# Patient Record
Sex: MALE | Race: WHITE | NOT HISPANIC OR LATINO | URBAN - METROPOLITAN AREA
[De-identification: names, ages, dates, MRNs, and addresses within clinical notes are randomized per-mention and may not be internally consistent; named-entity substitution may affect disease eponyms.]

---

## 2018-10-29 ENCOUNTER — INPATIENT (INPATIENT)
Facility: HOSPITAL | Age: 83
LOS: 2 days | Discharge: NURSING, REHAB OR LONG-TERM CARE | DRG: 310 | End: 2018-11-01
Attending: STUDENT IN AN ORGANIZED HEALTH CARE EDUCATION/TRAINING PROGRAM | Admitting: STUDENT IN AN ORGANIZED HEALTH CARE EDUCATION/TRAINING PROGRAM
Payer: MEDICARE

## 2018-10-29 VITALS
HEART RATE: 74 BPM | TEMPERATURE: 98 F | RESPIRATION RATE: 18 BRPM | SYSTOLIC BLOOD PRESSURE: 141 MMHG | OXYGEN SATURATION: 97 % | DIASTOLIC BLOOD PRESSURE: 90 MMHG

## 2018-10-29 DIAGNOSIS — L97.919 NON-PRESSURE CHRONIC ULCER OF UNSPECIFIED PART OF RIGHT LOWER LEG WITH UNSPECIFIED SEVERITY: ICD-10-CM

## 2018-10-29 DIAGNOSIS — E03.9 HYPOTHYROIDISM, UNSPECIFIED: ICD-10-CM

## 2018-10-29 DIAGNOSIS — Z29.9 ENCOUNTER FOR PROPHYLACTIC MEASURES, UNSPECIFIED: ICD-10-CM

## 2018-10-29 DIAGNOSIS — E87.5 HYPERKALEMIA: ICD-10-CM

## 2018-10-29 DIAGNOSIS — I48.91 UNSPECIFIED ATRIAL FIBRILLATION: ICD-10-CM

## 2018-10-29 LAB
ALBUMIN SERPL ELPH-MCNC: 3 G/DL — LOW (ref 3.5–5)
ALP SERPL-CCNC: 70 U/L — SIGNIFICANT CHANGE UP (ref 40–120)
ALT FLD-CCNC: 16 U/L DA — SIGNIFICANT CHANGE UP (ref 10–60)
ANION GAP SERPL CALC-SCNC: 4 MMOL/L — LOW (ref 5–17)
ANION GAP SERPL CALC-SCNC: 5 MMOL/L — SIGNIFICANT CHANGE UP (ref 5–17)
AST SERPL-CCNC: 28 U/L — SIGNIFICANT CHANGE UP (ref 10–40)
BASOPHILS # BLD AUTO: 0.1 K/UL — SIGNIFICANT CHANGE UP (ref 0–0.2)
BASOPHILS NFR BLD AUTO: 1.4 % — SIGNIFICANT CHANGE UP (ref 0–2)
BILIRUB SERPL-MCNC: 0.9 MG/DL — SIGNIFICANT CHANGE UP (ref 0.2–1.2)
BUN SERPL-MCNC: 16 MG/DL — SIGNIFICANT CHANGE UP (ref 7–18)
BUN SERPL-MCNC: 20 MG/DL — HIGH (ref 7–18)
CALCIUM SERPL-MCNC: 8.3 MG/DL — LOW (ref 8.4–10.5)
CALCIUM SERPL-MCNC: 9 MG/DL — SIGNIFICANT CHANGE UP (ref 8.4–10.5)
CHLORIDE SERPL-SCNC: 106 MMOL/L — SIGNIFICANT CHANGE UP (ref 96–108)
CHLORIDE SERPL-SCNC: 107 MMOL/L — SIGNIFICANT CHANGE UP (ref 96–108)
CK MB CFR SERPL CALC: 1.3 NG/ML — SIGNIFICANT CHANGE UP (ref 0–3.6)
CK SERPL-CCNC: 101 U/L — SIGNIFICANT CHANGE UP (ref 35–232)
CO2 SERPL-SCNC: 27 MMOL/L — SIGNIFICANT CHANGE UP (ref 22–31)
CO2 SERPL-SCNC: 28 MMOL/L — SIGNIFICANT CHANGE UP (ref 22–31)
CREAT SERPL-MCNC: 1.13 MG/DL — SIGNIFICANT CHANGE UP (ref 0.5–1.3)
CREAT SERPL-MCNC: 1.13 MG/DL — SIGNIFICANT CHANGE UP (ref 0.5–1.3)
EOSINOPHIL # BLD AUTO: 0.2 K/UL — SIGNIFICANT CHANGE UP (ref 0–0.5)
EOSINOPHIL NFR BLD AUTO: 2.8 % — SIGNIFICANT CHANGE UP (ref 0–6)
GLUCOSE SERPL-MCNC: 80 MG/DL — SIGNIFICANT CHANGE UP (ref 70–99)
GLUCOSE SERPL-MCNC: 85 MG/DL — SIGNIFICANT CHANGE UP (ref 70–99)
HCT VFR BLD CALC: 46.6 % — SIGNIFICANT CHANGE UP (ref 39–50)
HGB BLD-MCNC: 14.9 G/DL — SIGNIFICANT CHANGE UP (ref 13–17)
LYMPHOCYTES # BLD AUTO: 1.9 K/UL — SIGNIFICANT CHANGE UP (ref 1–3.3)
LYMPHOCYTES # BLD AUTO: 34.2 % — SIGNIFICANT CHANGE UP (ref 13–44)
MCHC RBC-ENTMCNC: 31.5 PG — SIGNIFICANT CHANGE UP (ref 27–34)
MCHC RBC-ENTMCNC: 32 GM/DL — SIGNIFICANT CHANGE UP (ref 32–36)
MCV RBC AUTO: 98.2 FL — SIGNIFICANT CHANGE UP (ref 80–100)
MONOCYTES # BLD AUTO: 0.4 K/UL — SIGNIFICANT CHANGE UP (ref 0–0.9)
MONOCYTES NFR BLD AUTO: 7.1 % — SIGNIFICANT CHANGE UP (ref 2–14)
NEUTROPHILS # BLD AUTO: 3.1 K/UL — SIGNIFICANT CHANGE UP (ref 1.8–7.4)
NEUTROPHILS NFR BLD AUTO: 54.7 % — SIGNIFICANT CHANGE UP (ref 43–77)
PLATELET # BLD AUTO: 221 K/UL — SIGNIFICANT CHANGE UP (ref 150–400)
POTASSIUM SERPL-MCNC: 4.3 MMOL/L — SIGNIFICANT CHANGE UP (ref 3.5–5.3)
POTASSIUM SERPL-MCNC: 5.4 MMOL/L — HIGH (ref 3.5–5.3)
POTASSIUM SERPL-SCNC: 4.3 MMOL/L — SIGNIFICANT CHANGE UP (ref 3.5–5.3)
POTASSIUM SERPL-SCNC: 5.4 MMOL/L — HIGH (ref 3.5–5.3)
PROT SERPL-MCNC: 7.7 G/DL — SIGNIFICANT CHANGE UP (ref 6–8.3)
RBC # BLD: 4.75 M/UL — SIGNIFICANT CHANGE UP (ref 4.2–5.8)
RBC # FLD: 15.7 % — HIGH (ref 10.3–14.5)
SODIUM SERPL-SCNC: 138 MMOL/L — SIGNIFICANT CHANGE UP (ref 135–145)
SODIUM SERPL-SCNC: 139 MMOL/L — SIGNIFICANT CHANGE UP (ref 135–145)
TROPONIN I SERPL-MCNC: <0.015 NG/ML — SIGNIFICANT CHANGE UP (ref 0–0.04)
WBC # BLD: 5.7 K/UL — SIGNIFICANT CHANGE UP (ref 3.8–10.5)
WBC # FLD AUTO: 5.7 K/UL — SIGNIFICANT CHANGE UP (ref 3.8–10.5)

## 2018-10-29 PROCEDURE — 99285 EMERGENCY DEPT VISIT HI MDM: CPT

## 2018-10-29 PROCEDURE — 99223 1ST HOSP IP/OBS HIGH 75: CPT | Mod: GC

## 2018-10-29 RX ORDER — ENOXAPARIN SODIUM 100 MG/ML
80 INJECTION SUBCUTANEOUS ONCE
Qty: 0 | Refills: 0 | Status: COMPLETED | OUTPATIENT
Start: 2018-10-29 | End: 2018-10-29

## 2018-10-29 RX ORDER — ASPIRIN/CALCIUM CARB/MAGNESIUM 324 MG
325 TABLET ORAL ONCE
Qty: 0 | Refills: 0 | Status: COMPLETED | OUTPATIENT
Start: 2018-10-29 | End: 2018-10-29

## 2018-10-29 RX ORDER — ENOXAPARIN SODIUM 100 MG/ML
40 INJECTION SUBCUTANEOUS DAILY
Qty: 0 | Refills: 0 | Status: DISCONTINUED | OUTPATIENT
Start: 2018-10-30 | End: 2018-11-01

## 2018-10-29 RX ORDER — SODIUM POLYSTYRENE SULFONATE 4.1 MEQ/G
15 POWDER, FOR SUSPENSION ORAL ONCE
Qty: 0 | Refills: 0 | Status: COMPLETED | OUTPATIENT
Start: 2018-10-29 | End: 2018-10-29

## 2018-10-29 RX ORDER — LEVOTHYROXINE SODIUM 125 MCG
75 TABLET ORAL DAILY
Qty: 0 | Refills: 0 | Status: DISCONTINUED | OUTPATIENT
Start: 2018-10-29 | End: 2018-10-30

## 2018-10-29 RX ORDER — ENOXAPARIN SODIUM 100 MG/ML
85 INJECTION SUBCUTANEOUS ONCE
Qty: 0 | Refills: 0 | Status: DISCONTINUED | OUTPATIENT
Start: 2018-10-29 | End: 2018-10-29

## 2018-10-29 RX ORDER — METOPROLOL TARTRATE 50 MG
12.5 TABLET ORAL
Qty: 0 | Refills: 0 | Status: DISCONTINUED | OUTPATIENT
Start: 2018-10-29 | End: 2018-10-29

## 2018-10-29 RX ORDER — AZTREONAM 2 G
1 VIAL (EA) INJECTION
Qty: 28 | Refills: 0 | OUTPATIENT
Start: 2018-10-29 | End: 2018-11-11

## 2018-10-29 RX ORDER — CEPHALEXIN 500 MG
1 CAPSULE ORAL
Qty: 56 | Refills: 0 | OUTPATIENT
Start: 2018-10-29 | End: 2018-11-11

## 2018-10-29 RX ORDER — SODIUM CHLORIDE 9 MG/ML
1000 INJECTION INTRAMUSCULAR; INTRAVENOUS; SUBCUTANEOUS ONCE
Qty: 0 | Refills: 0 | Status: COMPLETED | OUTPATIENT
Start: 2018-10-29 | End: 2018-10-29

## 2018-10-29 RX ORDER — ENOXAPARIN SODIUM 100 MG/ML
30 INJECTION SUBCUTANEOUS DAILY
Qty: 0 | Refills: 0 | Status: DISCONTINUED | OUTPATIENT
Start: 2018-10-29 | End: 2018-10-29

## 2018-10-29 RX ADMIN — Medication 1 TABLET(S): at 17:22

## 2018-10-29 RX ADMIN — SODIUM POLYSTYRENE SULFONATE 15 GRAM(S): 4.1 POWDER, FOR SUSPENSION ORAL at 13:16

## 2018-10-29 RX ADMIN — SODIUM CHLORIDE 1000 MILLILITER(S): 9 INJECTION INTRAMUSCULAR; INTRAVENOUS; SUBCUTANEOUS at 15:44

## 2018-10-29 RX ADMIN — Medication 325 MILLIGRAM(S): at 17:22

## 2018-10-29 RX ADMIN — Medication 100 MILLIGRAM(S): at 17:22

## 2018-10-29 NOTE — H&P ADULT - SKIN COMMENTS
diffuse skin hyperpigmentation, thickening, flaking w/ multiples areas of bogginess diffusely around bilat ankles w/ several areas of ulceration w/ some granulation. Lateral R ankle w/ discharge and erythema. No focal tenderness or deformity.

## 2018-10-29 NOTE — H&P ADULT - HISTORY OF PRESENT ILLNESS
89 y/o M w/ hx of "thyroid problem (on unknown meds), chronic LE ulcers c/o ulcer eval x today. Denies changes in ulcers.  States he's planning a trip soon and wants ulcers to be evaluated to make sure they are okay. Denies fever, worsening pain, discharge, odor, recent abx or hospitalizations, and any other complaints. NKDA. 89 yo M from home, lives with wife, ambulates with cane, with PMH of hypothyroidism, chronic LE venous ulcerations presents to ED c/o ulcer for ulcer evaluation today, Pt denies changes in ulcers and he is planning a trip soon and wants ulcers to be evaluated to make sure they are okay. Pt denies fever, chills, worsening pain, discharge, odor, chest pain, sob, abd pain, nausea, vomiting, diarrhea, constipation , recent travel, ill contact or any other complaints. In ED, pt vitals stable, ED consulted with podiatrist and recommended out patient follow up, low suspicion for infection. Labs noted K 5.4, s/p Kayexalate and bactrim, doxycyline PO in ED. EKG noted a fib with HR controlled at 84 bpm, which is new A fib.

## 2018-10-29 NOTE — H&P ADULT - PROBLEM SELECTOR PLAN 1
New onset Afib, no hx of prior heart disease   [id number] score of 2  EKG noted a fib with HR controlled at 84bpm  Telemetry monitoring   Trend CEx3  stated lopressor low dose 12.5mg bid for HR controlled   Follow TSH, lipid panel , A1C  f/u ECHO  Cardiology consult: Dr. Crowder. New onset Afib, no hx of prior heart disease   [id number] score of 2  EKG noted a fib with HR controlled at 84bpm  Telemetry monitoring   Trend CEx3  stated cardizem 30mg q8hrs for HR controlled   will give one full dose lovenox for now and hold AC until cardio consult  Follow TSH, lipid panel , A1C  f/u ECHO  Cardiology consult: Dr. Crowder.
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
## 2018-10-29 NOTE — ED ADULT NURSE NOTE - NSIMPLEMENTINTERV_GEN_ALL_ED
Implemented All Fall with Harm Risk Interventions:  [place] to call system. Call bell, personal items and telephone within reach. Instruct patient to call for assistance. Room bathroom lighting operational. Non-slip footwear when patient is off stretcher. Physically safe environment: no spills, clutter or unnecessary equipment. Stretcher in lowest position, wheels locked, appropriate side rails in place. Provide visual cue, wrist band, yellow gown, etc. Monitor gait and stability. Monitor for mental status changes and reorient to person, place, and time. Review medications for side effects contributing to fall risk. Reinforce activity limits and safety measures with patient and family. Provide visual clues: red socks.

## 2018-10-29 NOTE — H&P ADULT - NSHPPHYSICALEXAM_GEN_ALL_CORE
Vital Signs Last 24 Hrs  T(C): 36.3 (29 Oct 2018 15:47), Max: 36.5 (29 Oct 2018 10:14)  T(F): 97.3 (29 Oct 2018 15:47), Max: 97.7 (29 Oct 2018 10:14)  HR: 80 (29 Oct 2018 15:47) (74 - 80)  BP: 151/92 (29 Oct 2018 15:47) (141/90 - 151/92)  BP(mean): --  RR: 18 (29 Oct 2018 15:47) (18 - 18)  SpO2: 97% (29 Oct 2018 15:47) (97% - 97%)

## 2018-10-29 NOTE — ED PROVIDER NOTE - HISTORY ATTESTATION, MLM
----- Message from Anaid Granados sent at 5/23/2017  9:11 AM CDT -----  Contact: Patient  x 1st Request  _ 2nd Request  _ 3rd Request    Who: OSCAR CARRENO [[id number]]    Why: Patient is calling in regards to scheduling a annual appointment. Patient is needing a call back.    What Number to Call Back: [phone].    When to Expect a call back: (Before the end of the day)  -- if call after 3:00 call back will be tomorrow.    I have reviewed and confirmed nurses' notes...

## 2018-10-29 NOTE — H&P ADULT - PROBLEM SELECTOR PLAN 2
stable chronic LE venous ulcerations  s/p bactrim, doxycyline PO in ED   hold ABx for now, less likely infection   afebrile, no leukocytosis   podiatry consulted DR. Magallanes and recommended outpt follow up  PT charito

## 2018-10-29 NOTE — ED PROVIDER NOTE - OBJECTIVE STATEMENT
89 y/o M w/ hx of "thyroid problem (on unknown meds), chronic LE ulcers c/o ulcer eval x today. Denies changes in ulcers.  States he's planning a trip soon and wants ulcers to be evaluated to make sure they are okay. Denies fever, worsening pain, discharge, odor, recent abx or hospitalizations, and any other complaints. NKDA.

## 2018-10-29 NOTE — H&P ADULT - NSHPLABSRESULTS_GEN_ALL_CORE
Comprehensive Metabolic Panel (10.29.18 @ 11:37)    Sodium, Serum: 138 mmol/L    Potassium, Serum: 5.4: Specimen slightly hemolyzed mmol/L    Chloride, Serum: 107 mmol/L    Carbon Dioxide, Serum: 27 mmol/L    Anion Gap, Serum: 4 mmol/L    Blood Urea Nitrogen, Serum: 16 mg/dL    Creatinine, Serum: 1.13 mg/dL    Glucose, Serum: 80 mg/dL    Calcium, Total Serum: 9.0 mg/dL    Protein Total, Serum: 7.7 g/dL    Albumin, Serum: 3.0 g/dL    Bilirubin Total, Serum: 0.9 mg/dL    Alkaline Phosphatase, Serum: 70 U/L    Aspartate Aminotransferase (AST/SGOT): 28 U/L    Alanine Aminotransferase (ALT/SGPT): 16 U/L DA    eGFR if Non : 57: Interpretative comment  The units for eGFR are ml/min/1.73m2 (normalized body surface area). The  eGFR is calculated from a serum creatinine using the CKD-EPI equation.  Other variables required for calculation are race, age and sex. Among  patients with chronic kidney disease (CKD), the eGFR is useful in  determining the stage of disease according to KDOQI CKD classification.  All eGFR results are reported numerically with the following  interpretation.          GFR                    With                 Without     (ml/min/1.73 m2)    Kidney Damage       Kidney Damage        >= 90                    Stage 1                     Normal        60-89                    Stage 2                     Decreased GFR        30-59     Stage 3                     Stage 3        15-29                    Stage 4                     Stage 4        < 15                      Stage 5                     Stage 5  Each stage of CKD assumes that the associated GFR level has been in  effect for at least 3 months. Determination of stages one and two (with  eGFR > 59 ml/min/m2) requires estimation of kidney damage for at least 3  months as defined by structural or functional abnormalities.  Limitations: All estimates of GFR will be less accurate for patients at  extremes of muscle mass (including but not limited to frail elderly,  critically ill, or cancer patients), those with unusual diets, and those  with conditions associated with reduced secretion or extrarenal  elimination of creatinine. The eGFR equation is not recommended for use  in patients with unstable creatinine levels. mL/min/1.73M2    eGFR if African American: 65 mL/min/1.73M2      Complete Blood Count + Automated Diff (10.29.18 @ 11:37)    WBC Count: 5.7 K/uL    RBC Count: 4.75 M/uL    Hemoglobin: 14.9 g/dL    Hematocrit: 46.6 %    Mean Cell Volume: 98.2 fl    Mean Cell Hemoglobin: 31.5 pg    Mean Cell Hemoglobin Conc: 32.0 gm/dL    Red Cell Distrib Width: 15.7 %    Platelet Count - Automated: 221 K/uL    Auto Neutrophil #: 3.1 K/uL    Auto Lymphocyte #: 1.9 K/uL    Auto Monocyte #: 0.4 K/uL    Auto Eosinophil #: 0.2 K/uL    Auto Basophil #: 0.1 K/uL    Auto Neutrophil %: 54.7: Differential percentages must be correlated with absolute numbers for  clinical significance. %    Auto Lymphocyte %: 34.2 %    Auto Monocyte %: 7.1 %    Auto Eosinophil %: 2.8 %    Auto Basophil %: 1.4 %

## 2018-10-29 NOTE — H&P ADULT - PROBLEM SELECTOR PLAN 5
IMPROVE VTE Individual Risk Assessment    RISK                                                          Points  [] Previous VTE                                           3  [] Thrombophilia                                        2  [] Lower limb paralysis                              2   [] Current Cancer                                       2   [x Immobilization > 24 hrs                        1  [] ICU/CCU stay > 24 hours                       1  [x] Age > 60                                                   1    IMPROVE VTE Score: 2  need for DVT ppx. on lovenox  no need for GI ppx

## 2018-10-29 NOTE — ED PROVIDER NOTE - PHYSICAL EXAMINATION
SKIN: bilateral LEs-- diffuse skin hyperpigmentation, thickening, flaking w/ multiples areas of bogginess diffusely around bilat ankles w/ several areas of ulceration w/ some granulation. Lateral R ankle w/ discharge and erythema. No focal tenderness or deformity. DP pulses 1+ bilaterally. Otherwise within normal limits.

## 2018-10-29 NOTE — H&P ADULT - ASSESSMENT
89 yo M from home, lives with wife, ambulates with cane, with PMH of hypothyroidism, chronic LE venous ulcerations presents to ED c/o ulcer for ulcer evaluation today, Pt denies changes in ulcers and he is planning a trip soon and wants ulcers to be evaluated to make sure they are okay. Pt denies fever, chills, worsening pain, discharge, odor, chest pain, sob, abd pain, nausea, vomiting, diarrhea, constipation , recent travel, ill contact or any other complaints. In ED, pt vitals stable, ED consulted with podiatrist and recommended out patient follow up, low suspicion for infection. Labs noted K 5.4, s/p Kayexalate and bactrim, doxycyline PO in ED. EKG noted a fib with HR controlled at 84 bpm, which is new A fib. 89 yo M from home, lives with wife, ambulates with cane, with PMH of hypothyroidism, chronic LE venous ulcerations presents to ED c/o ulcer for ulcer evaluation today. In ED, pt vitals stable, ED consulted with podiatrist and recommended out patient follow up, low suspicion for infection. Labs noted K 5.4, s/p Kayexalate and bactrim, doxycyline PO in ED. EKG noted a fib with HR controlled at 84 bpm, which is new A fib.

## 2018-10-29 NOTE — CONSULT NOTE ADULT - SUBJECTIVE AND OBJECTIVE BOX
Patient is a 89y old  Male who presents with a chief complaint of     HPI: 89 y/o M w/ hx of "thyroid problem (on unknown meds), chronic LE ulcers c/o ulcer eval x today. Denies changes in ulcers.  States he's planning a trip soon and wants ulcers to be evaluated to make sure they are okay. Denies fever, worsening pain, discharge, odor, recent abx or hospitalizations, and any other complaints. NKDA.  Pt seen bedside by podiatry in ED. pt states he has had ulcers for years and before would follow up with a podiatrist for his wounds but did not continue due to being unhappy with the care. Pt currently not being cared for by a podiatrist. pt denies F/C/N/SOB. pt states the ulcers have not changed in appearance for a long time.       PMH:  Allergies: No Known Allergies    Medications:   FH:  PSX:   SH:     Vital Signs Last 24 Hrs  T(C): 36.5 (29 Oct 2018 10:14), Max: 36.5 (29 Oct 2018 10:14)  T(F): 97.7 (29 Oct 2018 10:14), Max: 97.7 (29 Oct 2018 10:14)  HR: 74 (29 Oct 2018 10:14) (74 - 74)  BP: 141/90 (29 Oct 2018 10:14) (141/90 - 141/90)  BP(mean): --  RR: 18 (29 Oct 2018 10:14) (18 - 18)  SpO2: 97% (29 Oct 2018 10:14) (97% - 97%)    LABS- none                          PHYSICAL EXAM  GEN: ADRY DE SOUZA is a pleasant well-nourished, well developed 89y Male in no acute distress, alert awake, and oriented to person, place and time.   LE Focused:    Vasc: DP/PT palpable b/l, CFT< 3 secs to all digits, TG warm to cool   Derm: lateral right leg ulceration, no drainage, -PTB, erythema to lower legs b/l, -purulence  Neuro: protective sensation intact      A:   b/l venous ulcerations    P:  pt evaluated and chart reviewed  DSD and ACE applied bilaterally  PT stable podiatrically  discussed with Pt that he should follow up either with a podiatrist of his choosing or at our podiatry clinic at [address] [place] [address].

## 2018-10-29 NOTE — ED PROVIDER NOTE - MEDICAL DECISION MAKING DETAILS
Pt w/ likely early R ankle ulcer infection. no systemic sx. Pt feels well. VSS. Will consult podiatry for possible outpatient trail of abx vs admission.

## 2018-10-29 NOTE — ED ADULT NURSE NOTE - OBJECTIVE STATEMENT
BIB EMS from home alert and verbally responsive C/O none healing wound to B/L LE. Pt noted with  swelling to Rt LE redness and discoloration to B/L legs denies fever/chills

## 2018-10-29 NOTE — ED PROVIDER NOTE - CARE PLAN
Principal Discharge DX:	Ulcers of both lower extremities  Secondary Diagnosis:	Hyperkalemia Principal Discharge DX:	Ulcers of both lower extremities  Secondary Diagnosis:	Hyperkalemia  Secondary Diagnosis:	Atrial fibrillation

## 2018-10-30 DIAGNOSIS — Z65.9 PROBLEM RELATED TO UNSPECIFIED PSYCHOSOCIAL CIRCUMSTANCES: ICD-10-CM

## 2018-10-30 LAB
CK MB BLD-MCNC: 1.9 % — SIGNIFICANT CHANGE UP (ref 0–3.5)
CK MB CFR SERPL CALC: 1.4 NG/ML — SIGNIFICANT CHANGE UP (ref 0–3.6)
CK SERPL-CCNC: 72 U/L — SIGNIFICANT CHANGE UP (ref 35–232)
TROPONIN I SERPL-MCNC: 0.02 NG/ML — SIGNIFICANT CHANGE UP (ref 0–0.04)

## 2018-10-30 PROCEDURE — 99233 SBSQ HOSP IP/OBS HIGH 50: CPT | Mod: GC

## 2018-10-30 PROCEDURE — 99222 1ST HOSP IP/OBS MODERATE 55: CPT

## 2018-10-30 RX ORDER — HALOPERIDOL DECANOATE 100 MG/ML
2 INJECTION INTRAMUSCULAR ONCE
Qty: 0 | Refills: 0 | Status: DISCONTINUED | OUTPATIENT
Start: 2018-10-30 | End: 2018-10-30

## 2018-10-30 RX ORDER — HALOPERIDOL DECANOATE 100 MG/ML
2 INJECTION INTRAMUSCULAR ONCE
Qty: 0 | Refills: 0 | Status: COMPLETED | OUTPATIENT
Start: 2018-10-30 | End: 2018-10-30

## 2018-10-30 RX ORDER — ASPIRIN/CALCIUM CARB/MAGNESIUM 324 MG
81 TABLET ORAL DAILY
Qty: 0 | Refills: 0 | Status: DISCONTINUED | OUTPATIENT
Start: 2018-10-30 | End: 2018-11-01

## 2018-10-30 RX ORDER — LEVOTHYROXINE SODIUM 125 MCG
100 TABLET ORAL DAILY
Qty: 0 | Refills: 0 | Status: DISCONTINUED | OUTPATIENT
Start: 2018-10-30 | End: 2018-11-01

## 2018-10-30 RX ADMIN — HALOPERIDOL DECANOATE 2 MILLIGRAM(S): 100 INJECTION INTRAMUSCULAR at 15:04

## 2018-10-30 RX ADMIN — Medication 75 MICROGRAM(S): at 05:58

## 2018-10-30 NOTE — CONSULT NOTE ADULT - SUBJECTIVE AND OBJECTIVE BOX
CHIEF COMPLAINT: foot ulcer    HPI: 89 yo M with hypothyroidism and chronic LE ulcers in setting of venous stasis who presented to the ED to have his legs evaluated. A routine EKG showed atrial fibrillation, for which cardiology service was called. Patient       In the ED, patient noted to be in rate-controlled atrial fibrillation (84 bpm).     PAST MEDICAL & SURGICAL HISTORY:  As above, no significant past surgical history    Allergies    No Known Allergies    MEDICATIONS  (STANDING):  diltiazem    Tablet 30 milliGRAM(s) Oral every 8 hours  enoxaparin Injectable 40 milliGRAM(s) SubCutaneous daily  levothyroxine 75 MICROGram(s) Oral daily    MEDICATIONS  (PRN):    FAMILY HISTORY:  No pertinent family history in first degree relatives    No family history of premature coronary artery disease or sudden cardiac death    SOCIAL HISTORY:  Smoking-  Alcohol-  Illicit Drug use-    REVIEW OF SYSTEMS:  Constitutional: [ ] fever, [ ]weight loss,  [ ]fatigue  Eyes: [ ] visual changes  Respiratory: [ ]shortness of breath;  [ ] cough, [ ]wheezing, [ ]chills, [ ]hemoptysis  Cardiovascular: [ ] chest pain, [ ]palpitations, [ ]dizziness,  [ ]leg swelling [ ]syncope  Gastrointestinal: [ ] abdominal pain, [ ]nausea, [ ]vomiting,  [ ]diarrhea   Genitourinary: [ ] dysuria, [ ] hematuria  Neurologic: [ ] headaches [ ] tremors  [ ] weakness [ ] lightheadedness  Skin: [ ] itching, [ ]burning, [ ] rashes  Endocrine: [ ] heat or cold intolerance  Musculoskeletal: [ ] joint pain or swelling; [ ] muscle, back, or extremity pain  Psychiatric: [ ] depression, [ ]anxiety, [ ]mood swings, or [ ]difficulty sleeping  Hematologic: [ ] easy bruising, [ ] bleeding gums       [ x] All others negative	  [ ] Unable to obtain    Vital Signs Last 24 Hrs  T(C): 36.4 (30 Oct 2018 07:51), Max: 36.6 (29 Oct 2018 20:36)  T(F): 97.6 (30 Oct 2018 07:51), Max: 97.9 (29 Oct 2018 20:36)  HR: 64 (30 Oct 2018 07:51) (64 - 80)  BP: 136/64 (30 Oct 2018 07:51) (136/64 - 162/84)  BP(mean): --  RR: 18 (30 Oct 2018 07:51) (18 - 18)  SpO2: 100% (30 Oct 2018 07:51) (97% - 100%)  I&O's Summary    PHYSICAL EXAM:  General: No acute distress  HEENT: EOMI, PERRL  Neck: Supple, No JVD  Lungs: Clear to auscultation bilaterally; No rales or wheezing  Heart: Regular rate and rhythm; No murmurs, rubs, or gallops  Abdomen: Nontender, bowel sounds present  Extremities: No clubbing, cyanosis, or edema  Nervous system:  Alert & Oriented X3, no focal deficits  Psychiatric: Normal affect  Skin: No rashes or lesions      LABS:  10-29    139  |  106  |  20<H>  ----------------------------<  85  4.3   |  28  |  1.13    Ca    8.3<L>      29 Oct 2018 19:46    TPro  7.7  /  Alb  3.0<L>  /  TBili  0.9  /  DBili  x   /  AST  28  /  ALT  16  /  AlkPhos  70  10-29    Creatinine Trend: 1.13<--, 1.13<--                        14.9   5.7   )-----------( 221      ( 29 Oct 2018 11:37 )             46.6     Lipid Panel:   Cardiac Enzymes: CARDIAC MARKERS ( 30 Oct 2018 01:47 )  0.020 ng/mL / x     / 72 U/L / x     / 1.4 ng/mL  CARDIAC MARKERS ( 29 Oct 2018 19:46 )  <0.015 ng/mL / x     / 101 U/L / x     / 1.3 ng/mL    ECG [my interpretation]: 10/29/2018 @ 14:55: A fib at 84 bpm    TELEMETRY:    ECHO: Pending CHIEF COMPLAINT: foot ulcer    HPI: 89 yo M with hypothyroidism and chronic LE ulcers in setting of venous stasis who presented to the ED to have his legs evaluated. A routine EKG showed atrial fibrillation, for which cardiology service was called. Patient denies any palpitations, chest pain, dyspnea, LH, syncope, or any other cardiac symptoms. He is not aware of the arrhythmia     In the ED, patient noted to be in rate-controlled atrial fibrillation (84 bpm).     PAST MEDICAL & SURGICAL HISTORY:  As above, no significant past surgical history    Allergies    No Known Allergies    MEDICATIONS  (STANDING):  diltiazem    Tablet 30 milliGRAM(s) Oral every 8 hours  enoxaparin Injectable 40 milliGRAM(s) SubCutaneous daily  levothyroxine 75 MICROGram(s) Oral daily    MEDICATIONS  (PRN):    FAMILY HISTORY:  No family history of premature coronary artery disease or sudden cardiac death    SOCIAL HISTORY:  Smoking-Denies  Alcohol-denies  Illicit Drug use-denies    REVIEW OF SYSTEMS:  Constitutional: [ ] fever, [ ]weight loss,  [ ]fatigue  Eyes: [ ] visual changes  Respiratory: [ ]shortness of breath;  [ ] cough, [ ]wheezing, [ ]chills, [ ]hemoptysis  Cardiovascular: [ ] chest pain, [ ]palpitations, [ ]dizziness,  [ ]leg swelling [ ]syncope  Gastrointestinal: [ ] abdominal pain, [ ]nausea, [ ]vomiting,  [ ]diarrhea   Genitourinary: [ ] dysuria, [ ] hematuria  Neurologic: [ ] headaches [ ] tremors  [ ] weakness [ ] lightheadedness  Skin: [ ] itching, [ ]burning, [ ] rashes  Endocrine: [ ] heat or cold intolerance  Musculoskeletal: [ ] joint pain or swelling; [ ] muscle, back, or extremity pain  Psychiatric: [ ] depression, [ ]anxiety, [ ]mood swings, or [ ]difficulty sleeping  Hematologic: [ ] easy bruising, [ ] bleeding gums       [ x] All others negative	  [ ] Unable to obtain    Vital Signs Last 24 Hrs  T(C): 36.4 (30 Oct 2018 07:51), Max: 36.6 (29 Oct 2018 20:36)  T(F): 97.6 (30 Oct 2018 07:51), Max: 97.9 (29 Oct 2018 20:36)  HR: 64 (30 Oct 2018 07:51) (64 - 80)  BP: 136/64 (30 Oct 2018 07:51) (136/64 - 162/84)  BP(mean): --  RR: 18 (30 Oct 2018 07:51) (18 - 18)  SpO2: 100% (30 Oct 2018 07:51) (97% - 100%)  I&O's Summary    PHYSICAL EXAM:  General: No acute distress  HEENT: EOMI, PERRL  Neck: Supple, No JVD  Lungs: Clear to auscultation bilaterally; No rales or wheezing  Heart: Regular rate and rhythm; No murmurs, rubs, or gallops  Abdomen: Nontender, bowel sounds present  Extremities: No clubbing, cyanosis,B/L LE wrappings  Nervous system:  Alert & Oriented X3, no focal deficits  Psychiatric: Normal affect  Skin: No rashes or lesions      LABS:  10-29    139  |  106  |  20<H>  ----------------------------<  85  4.3   |  28  |  1.13    Ca    8.3<L>      29 Oct 2018 19:46    TPro  7.7  /  Alb  3.0<L>  /  TBili  0.9  /  DBili  x   /  AST  28  /  ALT  16  /  AlkPhos  70  10-29    Creatinine Trend: 1.13<--, 1.13<--                        14.9   5.7   )-----------( 221      ( 29 Oct 2018 11:37 )             46.6     Lipid Panel:   Cardiac Enzymes: CARDIAC MARKERS ( 30 Oct 2018 01:47 )  0.020 ng/mL / x     / 72 U/L / x     / 1.4 ng/mL  CARDIAC MARKERS ( 29 Oct 2018 19:46 )  <0.015 ng/mL / x     / 101 U/L / x     / 1.3 ng/mL    ECG [my interpretation]: 10/29/2018 @ 14:55: A fib at 84 bpm    TELEMETRY: A fib in 80s    ECHO: Pending

## 2018-10-30 NOTE — CONSULT NOTE ADULT - ASSESSMENT
89 yo M with hypothyroidism with incidentally noted atrial fibrillation on EKG.     1. Atrial fibrillation:   -CHADS2-VASc score is 2 (age +2), consistent with 2.2% annual risk of stroke if off systemic anticoagulation  -Would start on Eliquis 5mg PO BID if covered by insurance  -Check TSH  -Check echocardiogram  -Rate controlled on diltiazem 30mg Q8H    ***Note that this is a preliminary note and any recommendations should NOT be carried out until this note is finalized. *** 89 yo M with hypothyroidism with incidentally noted atrial fibrillation on EKG.     1. Atrial fibrillation:   -CHADS2-VASc score is 2 (age +2), consistent with 2.2% annual risk of stroke if off systemic anticoagulation  -Would start on Eliquis 5mg PO BID if covered by insurance  -Check TSH  -Check echocardiogram  -Rate controlled on diltiazem 30mg Q8H

## 2018-10-30 NOTE — PATIENT PROFILE ADULT - VISION (WITH CORRECTIVE LENSES IF THE PATIENT USUALLY WEARS THEM):
Partially impaired: cannot see medication labels or newsprint, but can see obstacles in path, and the surrounding layout; can count fingers at arm's length/with glasses

## 2018-10-30 NOTE — ADVANCED PRACTICE NURSE CONSULT - ASSESSMENT
This is a 89yr old male patient admitted for Chronic Ulcers, presenting with Bilateral Lower Leg Ulcers to which the patient is being followed by Podiatry with a treatment plan in place to address these issues. There is currently no further need for wound care specialist consultation at this time.

## 2018-10-31 LAB
APTT BLD: 33 SEC — SIGNIFICANT CHANGE UP (ref 27.5–36.3)
INR BLD: 1.2 RATIO — HIGH (ref 0.88–1.16)
PROTHROM AB SERPL-ACNC: 13.4 SEC — HIGH (ref 10–12.9)

## 2018-10-31 PROCEDURE — 99233 SBSQ HOSP IP/OBS HIGH 50: CPT | Mod: GC

## 2018-10-31 RX ADMIN — ENOXAPARIN SODIUM 40 MILLIGRAM(S): 100 INJECTION SUBCUTANEOUS at 13:31

## 2018-10-31 RX ADMIN — Medication 81 MILLIGRAM(S): at 13:31

## 2018-10-31 RX ADMIN — Medication 100 MICROGRAM(S): at 06:09

## 2018-10-31 NOTE — PHYSICAL THERAPY INITIAL EVALUATION ADULT - PERTINENT HX OF CURRENT PROBLEM, REHAB EVAL
Pt. admitted from home due to non-ulcers on both LE. Pt. lives w/ wife in apt. bldg and uses SAC to ambulate. Pt. w/ h/o Hypothyroidism

## 2018-10-31 NOTE — PROGRESS NOTE ADULT - I WAS PHYSICALLY PRESENT FOR THE KEY PORTIONS OF THE EVALUATION AND MANAGEMENT (E/M) SERVICE PROVIDED.  I AGREE WITH THE ABOVE HISTORY, PHYSICAL, AND PLAN WHICH I HAVE REVIEWED AND EDITED WHERE APPROPRIATE
Pt A+Ox4. Pt NPO for IR. Pt received from IR & requesting dinner. Current orders for NPO except meds. Statement Selected

## 2018-10-31 NOTE — PROGRESS NOTE ADULT - ATTENDING COMMENTS
Agree with all the above   Patient seen and examined at bedside. Wife and aid were present during examination. Today he is pleasant and cooperative. No new complains, only asks " please fix my legs". Still refusing to be on anticoagulation.   Per wife, patient was diagnosed with Afib couple of years ago, but refused to be on anticoagulation.     ROS as above   PE:   General: fragile looking elderly man sitting on the chair, NAD   Cardio: irregular rate, no murmur   Pulm; clear breath sounds   GI: abdomen soft, non tender   Extr: B/l LE edema and redness with dry scaly skin   Neuro: AOx3, no focal weakness     Vital Signs Last 24 Hrs  T(C): 36.6 (31 Oct 2018 15:20), Max: 36.9 (31 Oct 2018 04:57)  T(F): 97.8 (31 Oct 2018 15:20), Max: 98.4 (31 Oct 2018 04:57)  HR: 70 (31 Oct 2018 15:20) (58 - 78)  BP: 118/67 (31 Oct 2018 15:20) (104/44 - 162/80)  BP(mean): --  RR: 18 (31 Oct 2018 15:20) (18 - 18)  SpO2: 100% (31 Oct 2018 15:20) (97% - 100%)    1. AFib is likely chronic not new: per wife it was diagnosed couple of years ago   Refusing anticoagulation: c/w Aspirin   Rate controlled on Diltiazem   d/c telemetry     2. b/l Venous stasis; compression stacking  Local wound care for small superficial ulcer     The rest as above     Medically stable for discharge to [facility].  SEBASTIAN Ramon notified

## 2018-11-01 ENCOUNTER — TRANSCRIPTION ENCOUNTER (OUTPATIENT)
Age: 83
End: 2018-11-01

## 2018-11-01 VITALS
OXYGEN SATURATION: 98 % | HEART RATE: 60 BPM | SYSTOLIC BLOOD PRESSURE: 143 MMHG | DIASTOLIC BLOOD PRESSURE: 69 MMHG | TEMPERATURE: 98 F | RESPIRATION RATE: 18 BRPM

## 2018-11-01 LAB
ALBUMIN SERPL ELPH-MCNC: 2.6 G/DL — LOW (ref 3.5–5)
ALP SERPL-CCNC: 56 U/L — SIGNIFICANT CHANGE UP (ref 40–120)
ALT FLD-CCNC: 15 U/L DA — SIGNIFICANT CHANGE UP (ref 10–60)
ANION GAP SERPL CALC-SCNC: 7 MMOL/L — SIGNIFICANT CHANGE UP (ref 5–17)
AST SERPL-CCNC: 20 U/L — SIGNIFICANT CHANGE UP (ref 10–40)
BASOPHILS # BLD AUTO: 0.1 K/UL — SIGNIFICANT CHANGE UP (ref 0–0.2)
BASOPHILS NFR BLD AUTO: 0.9 % — SIGNIFICANT CHANGE UP (ref 0–2)
BILIRUB SERPL-MCNC: 0.6 MG/DL — SIGNIFICANT CHANGE UP (ref 0.2–1.2)
BUN SERPL-MCNC: 22 MG/DL — HIGH (ref 7–18)
CALCIUM SERPL-MCNC: 8.3 MG/DL — LOW (ref 8.4–10.5)
CHLORIDE SERPL-SCNC: 109 MMOL/L — HIGH (ref 96–108)
CO2 SERPL-SCNC: 26 MMOL/L — SIGNIFICANT CHANGE UP (ref 22–31)
CREAT SERPL-MCNC: 1.15 MG/DL — SIGNIFICANT CHANGE UP (ref 0.5–1.3)
EOSINOPHIL # BLD AUTO: 0.3 K/UL — SIGNIFICANT CHANGE UP (ref 0–0.5)
EOSINOPHIL NFR BLD AUTO: 4.8 % — SIGNIFICANT CHANGE UP (ref 0–6)
GLUCOSE SERPL-MCNC: 76 MG/DL — SIGNIFICANT CHANGE UP (ref 70–99)
HCT VFR BLD CALC: 35.6 % — LOW (ref 39–50)
HGB BLD-MCNC: 11.5 G/DL — LOW (ref 13–17)
LYMPHOCYTES # BLD AUTO: 2.2 K/UL — SIGNIFICANT CHANGE UP (ref 1–3.3)
LYMPHOCYTES # BLD AUTO: 35.8 % — SIGNIFICANT CHANGE UP (ref 13–44)
MAGNESIUM SERPL-MCNC: 2.3 MG/DL — SIGNIFICANT CHANGE UP (ref 1.6–2.6)
MCHC RBC-ENTMCNC: 31.6 PG — SIGNIFICANT CHANGE UP (ref 27–34)
MCHC RBC-ENTMCNC: 32.3 GM/DL — SIGNIFICANT CHANGE UP (ref 32–36)
MCV RBC AUTO: 97.7 FL — SIGNIFICANT CHANGE UP (ref 80–100)
MONOCYTES # BLD AUTO: 0.3 K/UL — SIGNIFICANT CHANGE UP (ref 0–0.9)
MONOCYTES NFR BLD AUTO: 5.5 % — SIGNIFICANT CHANGE UP (ref 2–14)
NEUTROPHILS # BLD AUTO: 3.2 K/UL — SIGNIFICANT CHANGE UP (ref 1.8–7.4)
NEUTROPHILS NFR BLD AUTO: 52.9 % — SIGNIFICANT CHANGE UP (ref 43–77)
PHOSPHATE SERPL-MCNC: 3 MG/DL — SIGNIFICANT CHANGE UP (ref 2.5–4.5)
PLATELET # BLD AUTO: 203 K/UL — SIGNIFICANT CHANGE UP (ref 150–400)
POTASSIUM SERPL-MCNC: 3.9 MMOL/L — SIGNIFICANT CHANGE UP (ref 3.5–5.3)
POTASSIUM SERPL-SCNC: 3.9 MMOL/L — SIGNIFICANT CHANGE UP (ref 3.5–5.3)
PROT SERPL-MCNC: 6.6 G/DL — SIGNIFICANT CHANGE UP (ref 6–8.3)
RBC # BLD: 3.64 M/UL — LOW (ref 4.2–5.8)
RBC # FLD: 15.5 % — HIGH (ref 10.3–14.5)
SODIUM SERPL-SCNC: 142 MMOL/L — SIGNIFICANT CHANGE UP (ref 135–145)
WBC # BLD: 6 K/UL — SIGNIFICANT CHANGE UP (ref 3.8–10.5)
WBC # FLD AUTO: 6 K/UL — SIGNIFICANT CHANGE UP (ref 3.8–10.5)

## 2018-11-01 PROCEDURE — 99239 HOSP IP/OBS DSCHRG MGMT >30: CPT

## 2018-11-01 RX ORDER — DILTIAZEM HCL 120 MG
1 CAPSULE, EXT RELEASE 24 HR ORAL
Qty: 0 | Refills: 0 | DISCHARGE
Start: 2018-11-01

## 2018-11-01 RX ORDER — LEVOTHYROXINE SODIUM 125 MCG
1 TABLET ORAL
Qty: 0 | Refills: 0 | DISCHARGE
Start: 2018-11-01

## 2018-11-01 RX ORDER — ASPIRIN/CALCIUM CARB/MAGNESIUM 324 MG
1 TABLET ORAL
Qty: 0 | Refills: 0 | DISCHARGE
Start: 2018-11-01

## 2018-11-01 RX ORDER — LEVOTHYROXINE SODIUM 125 MCG
1 TABLET ORAL
Qty: 0 | Refills: 0 | COMMUNITY

## 2018-11-01 RX ADMIN — ENOXAPARIN SODIUM 40 MILLIGRAM(S): 100 INJECTION SUBCUTANEOUS at 12:18

## 2018-11-01 RX ADMIN — Medication 100 MICROGRAM(S): at 06:44

## 2018-11-01 RX ADMIN — Medication 81 MILLIGRAM(S): at 12:18

## 2018-11-01 NOTE — PROGRESS NOTE ADULT - PROBLEM SELECTOR PLAN 5
IMPROVE VTE Individual Risk Assessment  RISK                                                          Points  [] Previous VTE                                           3  [] Thrombophilia                                        2  [] Lower limb paralysis                              2   [] Current Cancer                                       2   [x Immobilization > 24 hrs                        1  [] ICU/CCU stay > 24 hours                       1  [x] Age > 60                                                   1    IMPROVE VTE Score: 2  need for DVT ppx. on lovenox  no need for GI ppx
Unable to get in touch with patients wife  Will postpone the discharge for today .
IMPROVE VTE Individual Risk Assessment  RISK                                                          Points  [] Previous VTE                                           3  [] Thrombophilia                                        2  [] Lower limb paralysis                              2   [] Current Cancer                                       2   [x Immobilization > 24 hrs                        1  [] ICU/CCU stay > 24 hours                       1  [x] Age > 60                                                   1    IMPROVE VTE Score: 2  need for DVT ppx. on lovenox  no need for GI ppx

## 2018-11-01 NOTE — DISCHARGE NOTE ADULT - PATIENT PORTAL LINK FT
You can access the [facility] Patient Portal, offered by [facility], by registering with the following website: [email or web address]

## 2018-11-01 NOTE — DISCHARGE NOTE ADULT - CARE PLAN
Principal Discharge DX:	Atrial fibrillation  Secondary Diagnosis:	Hypothyroid  Secondary Diagnosis:	Ulcers of both lower extremities  Secondary Diagnosis:	Hyperkalemia Principal Discharge DX:	Atrial fibrillation  Goal:	hr< 100  Assessment and plan of treatment:	You presented with Afib. Its chronic Afib as per your wife. You were recommended to start on Eliquis. But you refused to start it. You are recommended to take aspirin 81 mg once a day and Cardizem 30mg three times a day. Your ECHo was done. YOu are recommended to take  Secondary Diagnosis:	Hypothyroid  Secondary Diagnosis:	Ulcers of both lower extremities  Secondary Diagnosis:	Hyperkalemia Principal Discharge DX:	Atrial fibrillation  Goal:	hr< 100  Assessment and plan of treatment:	You presented with Afib. Its chronic Afib as per your wife. You were recommended to start on Eliquis. But you refused to start it. You are recommended to take aspirin 81 mg once a day and Cardizem 30mg three times a day. Your ECHo was done. YOu are recommended to follow up OP for the results in 1 week  Secondary Diagnosis:	Hypothyroid  Assessment and plan of treatment:	You TSH: 21, most likely because of non compliance with medications. You Levothyroxine dose is increased to 100 mcg once a day. You are recommended to follow up with PCP.  Secondary Diagnosis:	Ulcers of both lower extremities  Assessment and plan of treatment:	You have superficial ulcers on b/l Lower extremities. They are most likely due to venous stasis. Podiatry was consulted and they recommended out patient follow up with podiatry and wound care.  Secondary Diagnosis:	Hyperkalemia  Assessment and plan of treatment:	You potassium was low on admission. it was replaced and repeat potassium was normal. You are recommended to maintain good oral intake,

## 2018-11-01 NOTE — DISCHARGE NOTE ADULT - MEDICATION SUMMARY - MEDICATIONS TO TAKE
I will START or STAY ON the medications listed below when I get home from the hospital:    aspirin 81 mg oral tablet, chewable  -- 1 tab(s) by mouth once a day  -- Indication: For Antiplatelet     dilTIAZem 30 mg oral tablet  -- 1 tab(s) by mouth every 8 hours  -- Indication: For Afib     levothyroxine 100 mcg (0.1 mg) oral tablet  -- 1 tab(s) by mouth once a day  -- Indication: For Thyroid

## 2018-11-01 NOTE — PROGRESS NOTE ADULT - ATTENDING COMMENTS
Agree with all the above   patient seen and examined at bedside. No new complains, wants to walk around the unit. No overnight events. Awaiting EDIE placement.   Labs and vital signs reviewed   Plan of care as above and discussed with Dr. Felton PGY1.   Spoke to CM; Charisma Ramon: authorisation from insurance company received, awaiting the choices from family.

## 2018-11-01 NOTE — PROGRESS NOTE ADULT - ASSESSMENT
89 yo M from home, lives with wife, ambulates with cane, with PMH of hypothyroidism, chronic LE venous ulcerations presents to ED c/o ulcer for ulcer evaluation today. In ED, pt vitals stable, ED consulted with podiatrist and recommended out patient follow up, low suspicion for infection. Labs noted K 5.4, s/p Kayexalate and bactrim, doxycyline PO in ED. EKG noted a fib with HR controlled at 84 bpm, which is new A fib.

## 2018-11-01 NOTE — DISCHARGE NOTE ADULT - HOSPITAL COURSE
89 yo M from home, lives with wife, ambulates with cane, with PMH of hypothyroidism, chronic LE venous ulcerations presents to ED c/o ulcer for ulcer evaluation today, Pt denies changes in ulcers and he is planning a trip soon and wants ulcers to be evaluated to make sure they are okay. Pt denies fever, chills, worsening pain, discharge, odor, chest pain, sob, abd pain, nausea, vomiting, diarrhea, constipation , recent travel, ill contact or any other complaints. In ED, pt vitals stable, ED consulted with podiatrist and recommended out patient follow up, low suspicion for infection. Labs noted K 5.4, s/p Kayexalate and bactrim, doxycyline PO in ED. EKG noted a fib with HR controlled at 84 bpm, which is new A fib.     For A-fib, ? chronic as per the family,  Cardizem 30 Q8 and aspirin. Patient  refused AC .  CE: negative 3   stated cardizem 30mg q8hrs for HR controlled   For Ulcers of both lower extremities, venous ulcerations : Podiatry consulted. Recommended OP Podiatry follow up and wound care. It was not infected.  For  Hyperkalemia.  Plan: K 5.4 with slight hemolyzed, s/p Kayexalate in ED, f/u repeat BMP: resolved .     For  Hypothyroid. TSH: 21. / non compliance with medications. Synthroid increased to 100 from 75     Patient is medically stable ot be discharged. CAse discussed with the attending

## 2018-11-01 NOTE — DISCHARGE NOTE ADULT - MEDICATION SUMMARY - MEDICATIONS TO STOP TAKING
I will STOP taking the medications listed below when I get home from the hospital:    Keflex 500 mg oral capsule  -- 1 cap(s) by mouth 4 times a day   -- Finish all this medication unless otherwise directed by prescriber.    doxycycline hyclate 100 mg oral tablet  -- 1 tab(s) by mouth 2 times a day   -- Avoid prolonged or excessive exposure to direct and/or artificial sunlight while taking this medication.  Do not take this drug if you are pregnant.  Finish all this medication unless otherwise directed by prescriber.  Medication should be taken with plenty of water.

## 2018-11-01 NOTE — PROGRESS NOTE ADULT - PROBLEM SELECTOR PLAN 2
stable chronic LE venous ulcerations  Podiatry: OP Podiatry follow up
stable chronic LE venous ulcerations  Podiatry: OP Podiatry follow up
stable chronic LE venous ulcerations  Compression stoking

## 2018-11-01 NOTE — PROGRESS NOTE ADULT - PROBLEM SELECTOR PLAN 1
TSH: 21,   Patient refused Eliquis  c/w aspirin, Cardizem 30 q8  Patient refused Eliquis   HR controlled   DC tele  ECHO: awaiting result   DC planning pending rehab placement
Its a chronic afib as per the family   Patient refused Eliquis  c/w aspirin, Cardizem 30 q8  Patient refused Eliquis   HR controlled   DC tele  ECHO: awaiting result   DC planning pending rehab placement
Rate controlled on Cardizem   [id number] score of 2, refusing to be on Eliquis. Says : I will take my changes"   Will start Aspirin 81 mg po daily   Awaiting TTE   Cardiology consult noted

## 2018-11-01 NOTE — DISCHARGE NOTE ADULT - PLAN OF CARE
hr< 100 You presented with Afib. Its chronic Afib as per your wife. You were recommended to start on Eliquis. But you refused to start it. You are recommended to take aspirin 81 mg once a day and Cardizem 30mg three times a day. Your ECHo was done. YOu are recommended to take You presented with Afib. Its chronic Afib as per your wife. You were recommended to start on Eliquis. But you refused to start it. You are recommended to take aspirin 81 mg once a day and Cardizem 30mg three times a day. Your ECHo was done. YOu are recommended to follow up OP for the results in 1 week You TSH: 21, most likely because of non compliance with medications. You Levothyroxine dose is increased to 100 mcg once a day. You are recommended to follow up with PCP. You have superficial ulcers on b/l Lower extremities. They are most likely due to venous stasis. Podiatry was consulted and they recommended out patient follow up with podiatry and wound care. You potassium was low on admission. it was replaced and repeat potassium was normal. You are recommended to maintain good oral intake,

## 2018-11-01 NOTE — PROGRESS NOTE ADULT - PROBLEM SELECTOR PLAN 4
TSH is 21, unclear if patient takes Levothyroxine or not at home   Increased levothyroxine to 100 mcg po daily
TSH: 21: ?non compliance with the medication   Levothyroxine dose increased to 100
TSH: 21: ?non compliance with the medication   Levothyroxine dose increased to 100

## 2018-11-01 NOTE — PROGRESS NOTE ADULT - SUBJECTIVE AND OBJECTIVE BOX
PGY 1 Note discussed with supervising resident and primary attending    Patient is a 89y old  Male who presents with a chief complaint of foot ulcer and new A fib (30 Oct 2018 17:34)      INTERVAL HPI/OVERNIGHT EVENTS:   Patient seen at the bedside. No new complains     MEDICATIONS  (STANDING):  aspirin  chewable 81 milliGRAM(s) Oral daily  diltiazem    Tablet 30 milliGRAM(s) Oral every 8 hours  enoxaparin Injectable 40 milliGRAM(s) SubCutaneous daily  levothyroxine 100 MICROGram(s) Oral daily    MEDICATIONS  (PRN):      __________________________________________________  REVIEW OF SYSTEMS:    CONSTITUTIONAL: No fever,   EYES: no acute visual disturbances  NECK: No pain or stiffness  RESPIRATORY: No cough; No shortness of breath  CARDIOVASCULAR: No chest pain, no palpitations  GASTROINTESTINAL: No pain. No nausea or vomiting; No diarrhea   NEUROLOGICAL: No headache or numbness, no tremors  MUSCULOSKELETAL: No joint pain, no muscle pain  GENITOURINARY: no dysuria, no frequency, no hesitancy  PSYCHIATRY: no depression , no anxiety  ALL OTHER  ROS negative        Vital Signs Last 24 Hrs  T(C): 36.6 (31 Oct 2018 15:20), Max: 36.9 (31 Oct 2018 04:57)  T(F): 97.8 (31 Oct 2018 15:20), Max: 98.4 (31 Oct 2018 04:57)  HR: 70 (31 Oct 2018 15:20) (58 - 78)  BP: 118/67 (31 Oct 2018 15:20) (104/44 - 162/80)  BP(mean): --  RR: 18 (31 Oct 2018 15:20) (18 - 18)  SpO2: 100% (31 Oct 2018 15:20) (97% - 100%)    ________________________________________________  PHYSICAL EXAM:  GENERAL: NAD  HEENT: Normocephalic;  conjunctivae and sclerae clear; moist mucous membranes;   NECK : supple  CHEST/LUNG: Clear to auscultation bilaterally with good air entry   HEART: S1 S2  regular; no murmurs, gallops or rubs  ABDOMEN: Soft, Nontender, Nondistended; Bowel sounds present  EXTREMITIES: no cyanosis; no edema; no calf tenderness  SKIN: warm and dry; no rash  NERVOUS SYSTEM:  Awake and alert; Oriented  to place, person and time ; no new deficits    _________________________________________________  LABS:    10-29    139  |  106  |  20<H>  ----------------------------<  85  4.3   |  28  |  1.13    Ca    8.3<L>      29 Oct 2018 19:46      PT/INR - ( 31 Oct 2018 06:27 )   PT: 13.4 sec;   INR: 1.20 ratio         PTT - ( 31 Oct 2018 06:27 )  PTT:33.0 sec    CAPILLARY BLOOD GLUCOSE            RADIOLOGY & ADDITIONAL TESTS:    Imaging Personally Reviewed:  YES/NO    Consultant(s) Notes Reviewed:   YES/ No    Care Discussed with Consultants :     Plan of care was discussed with patient and /or primary care giver; all questions and concerns were addressed and care was aligned with patient's wishes.
PGY 1 Note discussed with supervising resident and primary attending    Patient is a 89y old  Male who presents with a chief complaint of foot ulcer and new A fib (31 Oct 2018 16:28)      INTERVAL HPI/OVERNIGHT EVENTS:   Patient seen at the bedside. No new complains , no over night events    MEDICATIONS  (STANDING):  aspirin  chewable 81 milliGRAM(s) Oral daily  diltiazem    Tablet 30 milliGRAM(s) Oral every 8 hours  enoxaparin Injectable 40 milliGRAM(s) SubCutaneous daily  levothyroxine 100 MICROGram(s) Oral daily    MEDICATIONS  (PRN):      __________________________________________________  REVIEW OF SYSTEMS:    CONSTITUTIONAL: No fever,   EYES: no acute visual disturbances  NECK: No pain or stiffness  RESPIRATORY: No cough; No shortness of breath  CARDIOVASCULAR: No chest pain, no palpitations  GASTROINTESTINAL: No pain. No nausea or vomiting; No diarrhea   NEUROLOGICAL: No headache or numbness, no tremors  MUSCULOSKELETAL: No joint pain, no muscle pain  GENITOURINARY: no dysuria, no frequency, no hesitancy  PSYCHIATRY: no depression , no anxiety  ALL OTHER  ROS negative        Vital Signs Last 24 Hrs  T(C): 36.4 (01 Nov 2018 07:27), Max: 37 (01 Nov 2018 00:07)  T(F): 97.5 (01 Nov 2018 07:27), Max: 98.6 (01 Nov 2018 00:07)  HR: 55 (01 Nov 2018 07:27) (55 - 71)  BP: 149/84 (01 Nov 2018 07:27) (104/44 - 149/84)  BP(mean): --  RR: 18 (01 Nov 2018 07:27) (16 - 18)  SpO2: 99% (01 Nov 2018 07:27) (96% - 100%)    ________________________________________________  PHYSICAL EXAM:  GENERAL: NAD  HEENT: Normocephalic;  conjunctivae and sclerae clear; moist mucous membranes;   NECK : supple  CHEST/LUNG: Clear to auscultation bilaterally with good air entry   HEART: S1 S2  regular; no murmurs, gallops or rubs  ABDOMEN: Soft, Nontender, Nondistended; Bowel sounds present  EXTREMITIES: no cyanosis; no edema; no calf tenderness  SKIN: warm and dry; no rash  NERVOUS SYSTEM:  Awake and alert; Oriented  to place, person and time ; no new deficits    _________________________________________________  LABS:                        11.5   6.0   )-----------( 203      ( 01 Nov 2018 07:28 )             x        11-01    142  |  109<H>  |  22<H>  ----------------------------<  76  3.9   |  26  |  1.15    Ca    8.3<L>      01 Nov 2018 07:28  Phos  3.0     11-01  Mg     2.3     11-01    TPro  6.6  /  Alb  2.6<L>  /  TBili  0.6  /  DBili  x   /  AST  20  /  ALT  15  /  AlkPhos  56  11-01    PT/INR - ( 31 Oct 2018 06:27 )   PT: 13.4 sec;   INR: 1.20 ratio         PTT - ( 31 Oct 2018 06:27 )  PTT:33.0 sec    CAPILLARY BLOOD GLUCOSE            RADIOLOGY & ADDITIONAL TESTS:    Imaging Personally Reviewed:  YES/NO    Consultant(s) Notes Reviewed:   YES/ No    Care Discussed with Consultants :     Plan of care was discussed with patient and /or primary care giver; all questions and concerns were addressed and care was aligned with patient's wishes.
Patient is a 89y old  Male who presents with a chief complaint of foot ulcer and new A fib (30 Oct 2018 09:06)      INTERVAL HPI/OVERNIGHT EVENTS: seen and examined. Still in ED. Has not complains, but agitated and wants to leave the hospital. Says " I hate all of you"   Per the nurse, patient is angry that he cant go home, was hitting the staff with his cane.     MEDICATIONS  (STANDING):  aspirin  chewable 81 milliGRAM(s) Oral daily  diltiazem    Tablet 30 milliGRAM(s) Oral every 8 hours  enoxaparin Injectable 40 milliGRAM(s) SubCutaneous daily  levothyroxine 100 MICROGram(s) Oral daily    MEDICATIONS  (PRN):      Allergies    No Known Allergies    Intolerances        REVIEW OF SYSTEMS: did not provide th ROS    Vital Signs Last 24 Hrs  T(C): 36.4 (30 Oct 2018 07:51), Max: 36.6 (29 Oct 2018 20:36)  T(F): 97.6 (30 Oct 2018 07:51), Max: 97.9 (29 Oct 2018 20:36)  HR: 65 (30 Oct 2018 12:58) (64 - 68)  BP: 156/60 (30 Oct 2018 12:58) (136/64 - 162/84)  BP(mean): --  RR: 14 (30 Oct 2018 11:22) (14 - 18)  SpO2: 99% (30 Oct 2018 11:22) (97% - 100%)    PHYSICAL EXAM:  GENERAL: Angry, sitting on the chair   HEAD:  Atraumatic, Normocephalic  EYES: conjunctiva and sclera clear  NECK: Supple, No JVD  NERVOUS SYSTEM:  Alert & Oriented X3, No gross focal deficits  CHEST/LUNG: Clear to percussion bilaterally; No rales, rhonchi, wheezing, or rubs  HEART:  Irregular rate and rhythm; No murmurs, rubs, or gallops  ABDOMEN: Soft, Nontender, Nondistended; Bowel sounds present  EXTREMITIES: b/l venous stasis with dermatitis,       LABS:                        14.9   5.7   )-----------( 221      ( 29 Oct 2018 11:37 )             46.6     10-29    139  |  106  |  20<H>  ----------------------------<  85  4.3   |  28  |  1.13    Ca    8.3<L>      29 Oct 2018 19:46    TPro  7.7  /  Alb  3.0<L>  /  TBili  0.9  /  DBili  x   /  AST  28  /  ALT  16  /  AlkPhos  70  10-29        CAPILLARY BLOOD GLUCOSE          RADIOLOGY & ADDITIONAL TESTS:    Imaging Personally Reviewed:  [ ] YES  [ ] NO    Consultant(s) Notes Reviewed:  [ ] YES  [ ] NO    Care Discussed with Consultants/Other Providers [ ] YES  [ ] NO    Plan of Care discussed with Housestaff [ ]YES [ ] NO

## 2018-11-11 PROCEDURE — 84443 ASSAY THYROID STIM HORMONE: CPT

## 2018-11-11 PROCEDURE — 97530 THERAPEUTIC ACTIVITIES: CPT

## 2018-11-11 PROCEDURE — 84484 ASSAY OF TROPONIN QUANT: CPT

## 2018-11-11 PROCEDURE — 84100 ASSAY OF PHOSPHORUS: CPT

## 2018-11-11 PROCEDURE — 93005 ELECTROCARDIOGRAM TRACING: CPT

## 2018-11-11 PROCEDURE — 82553 CREATINE MB FRACTION: CPT

## 2018-11-11 PROCEDURE — 80048 BASIC METABOLIC PNL TOTAL CA: CPT

## 2018-11-11 PROCEDURE — 99285 EMERGENCY DEPT VISIT HI MDM: CPT

## 2018-11-11 PROCEDURE — 93306 TTE W/DOPPLER COMPLETE: CPT

## 2018-11-11 PROCEDURE — 80053 COMPREHEN METABOLIC PANEL: CPT

## 2018-11-11 PROCEDURE — 85730 THROMBOPLASTIN TIME PARTIAL: CPT

## 2018-11-11 PROCEDURE — 85610 PROTHROMBIN TIME: CPT

## 2018-11-11 PROCEDURE — 85027 COMPLETE CBC AUTOMATED: CPT

## 2018-11-11 PROCEDURE — 82550 ASSAY OF CK (CPK): CPT

## 2018-11-11 PROCEDURE — 83735 ASSAY OF MAGNESIUM: CPT

## 2018-11-11 PROCEDURE — 97110 THERAPEUTIC EXERCISES: CPT

## 2018-11-11 PROCEDURE — 97162 PT EVAL MOD COMPLEX 30 MIN: CPT

## 2019-11-15 ENCOUNTER — INPATIENT (INPATIENT)
Facility: HOSPITAL | Age: 84
LOS: 19 days | Discharge: NURSING, REHAB OR LONG-TERM CARE | DRG: 871 | End: 2019-12-05
Attending: INTERNAL MEDICINE | Admitting: INTERNAL MEDICINE
Payer: MEDICARE

## 2019-11-15 VITALS — WEIGHT: 220.02 LBS | TEMPERATURE: 100 F | HEART RATE: 101 BPM | RESPIRATION RATE: 17 BRPM | OXYGEN SATURATION: 97 %

## 2019-11-15 DIAGNOSIS — R50.9 FEVER, UNSPECIFIED: ICD-10-CM

## 2019-11-15 PROBLEM — E03.9 HYPOTHYROIDISM, UNSPECIFIED: Chronic | Status: ACTIVE | Noted: 2018-10-29

## 2019-11-15 PROBLEM — L97.919 NON-PRESSURE CHRONIC ULCER OF UNSPECIFIED PART OF RIGHT LOWER LEG WITH UNSPECIFIED SEVERITY: Chronic | Status: ACTIVE | Noted: 2018-10-29

## 2019-11-15 LAB
ALBUMIN SERPL ELPH-MCNC: 3.4 G/DL — LOW (ref 3.5–5)
ALP SERPL-CCNC: 168 U/L — HIGH (ref 40–120)
ALT FLD-CCNC: 65 U/L DA — HIGH (ref 10–60)
ANION GAP SERPL CALC-SCNC: 8 MMOL/L — SIGNIFICANT CHANGE UP (ref 5–17)
APTT BLD: 37.3 SEC — HIGH (ref 27.5–36.3)
AST SERPL-CCNC: 68 U/L — HIGH (ref 10–40)
BASOPHILS # BLD AUTO: 0.01 K/UL — SIGNIFICANT CHANGE UP (ref 0–0.2)
BASOPHILS NFR BLD AUTO: 0.1 % — SIGNIFICANT CHANGE UP (ref 0–2)
BILIRUB SERPL-MCNC: 1.1 MG/DL — SIGNIFICANT CHANGE UP (ref 0.2–1.2)
BUN SERPL-MCNC: 32 MG/DL — HIGH (ref 7–18)
CALCIUM SERPL-MCNC: 9.1 MG/DL — SIGNIFICANT CHANGE UP (ref 8.4–10.5)
CHLORIDE SERPL-SCNC: 101 MMOL/L — SIGNIFICANT CHANGE UP (ref 96–108)
CO2 SERPL-SCNC: 27 MMOL/L — SIGNIFICANT CHANGE UP (ref 22–31)
CREAT SERPL-MCNC: 2.22 MG/DL — HIGH (ref 0.5–1.3)
EOSINOPHIL # BLD AUTO: 0.13 K/UL — SIGNIFICANT CHANGE UP (ref 0–0.5)
EOSINOPHIL NFR BLD AUTO: 1.1 % — SIGNIFICANT CHANGE UP (ref 0–6)
FLU A RESULT: SIGNIFICANT CHANGE UP
FLU A RESULT: SIGNIFICANT CHANGE UP
FLUAV AG NPH QL: SIGNIFICANT CHANGE UP
FLUBV AG NPH QL: SIGNIFICANT CHANGE UP
GLUCOSE SERPL-MCNC: 105 MG/DL — HIGH (ref 70–99)
HCT VFR BLD CALC: 44.9 % — SIGNIFICANT CHANGE UP (ref 39–50)
HGB BLD-MCNC: 14.2 G/DL — SIGNIFICANT CHANGE UP (ref 13–17)
IMM GRANULOCYTES NFR BLD AUTO: 0.5 % — SIGNIFICANT CHANGE UP (ref 0–1.5)
INR BLD: 1.37 RATIO — HIGH (ref 0.88–1.16)
LACTATE SERPL-SCNC: 1.9 MMOL/L — SIGNIFICANT CHANGE UP (ref 0.7–2)
LYMPHOCYTES # BLD AUTO: 1.15 K/UL — SIGNIFICANT CHANGE UP (ref 1–3.3)
LYMPHOCYTES # BLD AUTO: 9.8 % — LOW (ref 13–44)
MCHC RBC-ENTMCNC: 31.2 PG — SIGNIFICANT CHANGE UP (ref 27–34)
MCHC RBC-ENTMCNC: 31.6 GM/DL — LOW (ref 32–36)
MCV RBC AUTO: 98.7 FL — SIGNIFICANT CHANGE UP (ref 80–100)
MONOCYTES # BLD AUTO: 0.5 K/UL — SIGNIFICANT CHANGE UP (ref 0–0.9)
MONOCYTES NFR BLD AUTO: 4.3 % — SIGNIFICANT CHANGE UP (ref 2–14)
NEUTROPHILS # BLD AUTO: 9.88 K/UL — HIGH (ref 1.8–7.4)
NEUTROPHILS NFR BLD AUTO: 84.2 % — HIGH (ref 43–77)
NRBC # BLD: 0 /100 WBCS — SIGNIFICANT CHANGE UP (ref 0–0)
PLATELET # BLD AUTO: 280 K/UL — SIGNIFICANT CHANGE UP (ref 150–400)
POTASSIUM SERPL-MCNC: 4.8 MMOL/L — SIGNIFICANT CHANGE UP (ref 3.5–5.3)
POTASSIUM SERPL-SCNC: 4.8 MMOL/L — SIGNIFICANT CHANGE UP (ref 3.5–5.3)
PROT SERPL-MCNC: 8.6 G/DL — HIGH (ref 6–8.3)
PROTHROM AB SERPL-ACNC: 15.4 SEC — HIGH (ref 10–12.9)
RBC # BLD: 4.55 M/UL — SIGNIFICANT CHANGE UP (ref 4.2–5.8)
RBC # FLD: 14 % — SIGNIFICANT CHANGE UP (ref 10.3–14.5)
RSV RESULT: SIGNIFICANT CHANGE UP
RSV RNA RESP QL NAA+PROBE: SIGNIFICANT CHANGE UP
SODIUM SERPL-SCNC: 136 MMOL/L — SIGNIFICANT CHANGE UP (ref 135–145)
WBC # BLD: 11.73 K/UL — HIGH (ref 3.8–10.5)
WBC # FLD AUTO: 11.73 K/UL — HIGH (ref 3.8–10.5)

## 2019-11-15 PROCEDURE — 99285 EMERGENCY DEPT VISIT HI MDM: CPT

## 2019-11-15 PROCEDURE — 99223 1ST HOSP IP/OBS HIGH 75: CPT

## 2019-11-15 PROCEDURE — 71045 X-RAY EXAM CHEST 1 VIEW: CPT | Mod: 26

## 2019-11-15 RX ORDER — ACETAMINOPHEN 500 MG
650 TABLET ORAL EVERY 6 HOURS
Refills: 0 | Status: DISCONTINUED | OUTPATIENT
Start: 2019-11-15 | End: 2019-11-16

## 2019-11-15 RX ORDER — CEFTRIAXONE 500 MG/1
1000 INJECTION, POWDER, FOR SOLUTION INTRAMUSCULAR; INTRAVENOUS ONCE
Refills: 0 | Status: COMPLETED | OUTPATIENT
Start: 2019-11-15 | End: 2019-11-15

## 2019-11-15 RX ORDER — SODIUM CHLORIDE 9 MG/ML
3100 INJECTION INTRAMUSCULAR; INTRAVENOUS; SUBCUTANEOUS ONCE
Refills: 0 | Status: COMPLETED | OUTPATIENT
Start: 2019-11-15 | End: 2019-11-15

## 2019-11-15 RX ADMIN — CEFTRIAXONE 1000 MILLIGRAM(S): 500 INJECTION, POWDER, FOR SOLUTION INTRAMUSCULAR; INTRAVENOUS at 18:15

## 2019-11-15 RX ADMIN — CEFTRIAXONE 100 MILLIGRAM(S): 500 INJECTION, POWDER, FOR SOLUTION INTRAMUSCULAR; INTRAVENOUS at 17:45

## 2019-11-15 RX ADMIN — SODIUM CHLORIDE 3100 MILLILITER(S): 9 INJECTION INTRAMUSCULAR; INTRAVENOUS; SUBCUTANEOUS at 17:10

## 2019-11-15 RX ADMIN — SODIUM CHLORIDE 3100 MILLILITER(S): 9 INJECTION INTRAMUSCULAR; INTRAVENOUS; SUBCUTANEOUS at 19:10

## 2019-11-15 NOTE — H&P ADULT - ASSESSMENT
Patient is 89 year old male, has hha 24x7, ambulates with walker,  has medical history significant for hypothyroidism, chronic venous insufficiency came in to ED because of fever of 101 at home. Pt has leucocytosis of 11.7, has some cough, and dysuria and also pt has b/l lower extremity swelling, warmness and edema concerning for possible sources of infection.

## 2019-11-15 NOTE — ED ADULT NURSE NOTE - NSIMPLEMENTINTERV_GEN_ALL_ED
Implemented All Fall with Harm Risk Interventions:  [place] to call system. Call bell, personal items and telephone within reach. Instruct patient to call for assistance. Room bathroom lighting operational. Non-slip footwear when patient is off stretcher. Physically safe environment: no spills, clutter or unnecessary equipment. Stretcher in lowest position, wheels locked, appropriate side rails in place. Provide visual cue, wrist band, yellow gown, etc. Monitor gait and stability. Monitor for mental status changes and reorient to person, place, and time. Review medications for side effects contributing to fall risk. Reinforce activity limits and safety measures with patient and family. Provide visual clues: red socks.

## 2019-11-15 NOTE — H&P ADULT - PROBLEM SELECTOR PLAN 3
Pt has hx of afib  EKG: Afib rate controlled @ 85 bpm  on last admission pt was on diltiazem, but wife is not sure   not on any AC

## 2019-11-15 NOTE — H&P ADULT - PROBLEM SELECTOR PLAN 2
likely pre renal given sepsis and dehydration   s/p 3 L ivf in the ED  f/u bmp in am   f/u ct abdomen  Nephro eval in am

## 2019-11-15 NOTE — ED ADULT NURSE NOTE - OBJECTIVE STATEMENT
Patient BIBA from home by family for fever, weakness , inability to walk and poor appetite as per friend at bedside. PAtient A&Ox2-3 with b/l lower extremities cellulitis

## 2019-11-15 NOTE — H&P ADULT - HISTORY OF PRESENT ILLNESS
Patient is 89 year old male, has hha 24x7, ambulates with walker,  has medical history significant for hypothyroidism, chronic venous insufficiency came in to ED because of fever of 101 at home.   Per wife at bedside pt was in his usual health a week ago, when pt was started on abx for UTI by visiting physician. pt completed the abx of 5 days. Since yesterday pt is having fever at home tmax 101.4, associated with dry cough for 2 days, weakness, poor po intake, chills, dysuria. Pt also has b/l leg swelling, with warmness, and redness. per wife it's chronic and pt is on furosemide for leg swelling. Pt denies any dizziness, headache, chest pain, sob, n/v/d/c.

## 2019-11-15 NOTE — H&P ADULT - PROBLEM SELECTOR PLAN 1
fever, tachycardia, leucocytosis meets sirs criteria   currently unknown source   cxr neg but pt has cough, pt has not given urine sample as pt is incontinent   b/l leg swelling concerning for cellulitis although per wife it she has not see any unusual changes in his legs   will c/w Rocephin for now, u/a remains neg will give vancomycin to cover for cellulitis

## 2019-11-15 NOTE — ED ADULT NURSE NOTE - ED STAT RN HANDOFF DETAILS
received pt.in bed at 1910 pt.is awake and  responsive.denies  pain. admitted to medicine for febrile.  pending RSV result. transfer to holding area. report given to rn petra ( holding ) not in distress

## 2019-11-15 NOTE — ED PROVIDER NOTE - OBJECTIVE STATEMENT
Patient is a 89 y/o male with PMHx hypothyroid, lower extremity ulcers, c/o fever and cough today; as of now, he is currently complaining of feeling cold. Patient notes a Tmax of 101F at home. In the ER his temperature is 101.4F. He denies nausea, vomiting, diarrhea, urinary complaints, or any other acute complaints.

## 2019-11-15 NOTE — H&P ADULT - PROBLEM SELECTOR PLAN 4
pt on levothyroxine. wife is not sure about the dose   will start pt on synthroid 100mcg as per last admission   f/u TSH   ***primary team to confirm home medication

## 2019-11-15 NOTE — H&P ADULT - ATTENDING COMMENTS
Vital Signs Last 24 Hrs  T(C): 37.1 (15 Nov 2019 21:52), Max: 38 (15 Nov 2019 16:21)  T(F): 98.7 (15 Nov 2019 21:52), Max: 100.4 (15 Nov 2019 16:21)  HR: 95 (15 Nov 2019 21:52) (87 - 101)  BP: 119/89 (15 Nov 2019 21:52) (103/88 - 119/89)  BP(mean): --  RR: 20 (15 Nov 2019 21:52) (17 - 20)  SpO2: 95% (15 Nov 2019 21:52) (95% - 97%) Patient seen and examined ; case was discussed with the admitting resident    ROS: as in the HPI; all other ROS negative    SH and family history as above    Vital Signs Last 24 Hrs  T(C): 37.1 (15 Nov 2019 21:52), Max: 38 (15 Nov 2019 16:21)  T(F): 98.7 (15 Nov 2019 21:52), Max: 100.4 (15 Nov 2019 16:21)  HR: 95 (15 Nov 2019 21:52) (87 - 101)  BP: 119/89 (15 Nov 2019 21:52) (103/88 - 119/89)  BP(mean): --  RR: 20 (15 Nov 2019 21:52) (17 - 20)  SpO2: 95% (15 Nov 2019 21:52) (95% - 97%)    GEN: NAD  HEENT- normocephalic; mouth moist  CVS- S1S2+  LUNGS- clear to auscultation; no wheezing  ABD: Soft , nontender, nondistended, Bowel sounds are present  EXTREMITY: no calf tenderness, no cyanosis, no edema  NEURO: AAOx3; non focal neurologic exam; cranial nerves grossly intact  PSYCH: normal affect and behavior  BACK: no swelling or mass;   VASCULAR: ++ distal peripheral pulses  SKIN: warm and dry.   Labs Reviewed:                         14.2   11.73 )-----------( 280      ( 15 Nov 2019 17:09 )             44.9   11-15    136  |  101  |  32<H>  ----------------------------<  105<H>  4.8   |  27  |  2.22<H>  Ca    9.1      15 Nov 2019 17:09    TPro  8.6<H>  /  Alb  3.4<L>  /  TBili  1.1  /  DBili  x   /  AST  68<H>  /  ALT  65<H>  /  AlkPhos  168<H>  11-15  MEDICATIONS  (PRN):  acetaminophen   Tablet .. 650 milliGRAM(s) Oral every 6 hours PRN Moderate Pain (4 - 6)    Prev hospitalization records reviewed     89 y/o M with a fib not on AC per patient preference and documented refusal of noac on prev admission, chronic venous stasis, hypothyroidism admitted with sepsis.         Plan of care discussed with patient ;  all questions and concerns were addressed.  Discussed with Patient's family Patient seen and examined ; case was discussed with the admitting resident    ROS: as in the HPI; all other ROS negative    SH and family history as above    Vital Signs Last 24 Hrs  T(C): 37.1 (15 Nov 2019 21:52), Max: 38 (15 Nov 2019 16:21)  T(F): 98.7 (15 Nov 2019 21:52), Max: 100.4 (15 Nov 2019 16:21)  HR: 95 (15 Nov 2019 21:52) (87 - 101)  BP: 119/89 (15 Nov 2019 21:52) (103/88 - 119/89)  BP(mean): --  RR: 20 (15 Nov 2019 21:52) (17 - 20)  SpO2: 95% (15 Nov 2019 21:52) (95% - 97%)    GEN: NAD  HEENT- normocephalic; mouth moist  CVS- S1S2+  LUNGS- clear to auscultation; no wheezing  ABD: Soft , nontender, nondistended, Bowel sounds are present  EXTREMITY: no calf tenderness, no cyanosis, LE + edema with chronic stasis dermatitis, warm to touch, cap refill <2s  NEURO: AAOx3; non focal neurologic exam; cranial nerves grossly intact  PSYCH: normal affect and behavior  BACK: no swelling or mass;   VASCULAR: ++ distal peripheral pulses  SKIN: warm and dry, scattered SKs   Labs Reviewed:                         14.2   11.73 )-----------( 280      ( 15 Nov 2019 17:09 )             44.9   11-15    136  |  101  |  32<H>  ----------------------------<  105<H>  4.8   |  27  |  2.22<H>  Ca    9.1      15 Nov 2019 17:09    TPro  8.6<H>  /  Alb  3.4<L>  /  TBili  1.1  /  DBili  x   /  AST  68<H>  /  ALT  65<H>  /  AlkPhos  168<H>  11-15  MEDICATIONS  (PRN):  acetaminophen   Tablet .. 650 milliGRAM(s) Oral every 6 hours PRN Moderate Pain (4 - 6)  CXR Reviewed   EKG reviewed   CT abdomen/pelvis reviewed pending radiology read   Prev hospitalization records reviewed     89 y/o M with a fib not on AC per patient preference and documented refusal of noac on prev admission, chronic venous stasis, hypothyroidism admitted with sepsis.     1. Cellulitis, possible UTI- Obtain UA and UCx, f/u blood and urine cultures, cont rocephin for now presumed urosepsis and cellulitis, f/u results of Abdominal CT, le doppler, podiatry consult appreciated, patient might need vasc evaluation  2. Sepsis- poa- leukocytosis, fever, tachycardia- due to cellulitis vs uti- empiric rocephin, monitor response consider adding doxycycline for better skin whitney coverage   3. ARMIN- UA as above, CT abdomen to eval obstruction, measure I/Os, avoid nephrotoxic medications   4. Elevated hepatic enzymes- mixed hepatocellular and cholestatic pattern, abd nontender, f/u results of CT abdomen, consider US if uptrending   5. a fib- chadsvasc of 3 given age and HTN- patient refused noac in the past, cont asa, currently rate controlled and patient denies taking dilt as on prev admission one year ago.   6. Hypothyroid       Plan of care discussed with patient ;  all questions and concerns were addressed.

## 2019-11-15 NOTE — ED PROVIDER NOTE - PHYSICAL EXAMINATION
Ronchi on both bases of lungs  Abd soft, nontender.  AOx3.  Skin dry. Mucous membranes dry.  Lower extremity chronic brawny skin changes with some edema.

## 2019-11-16 DIAGNOSIS — Z29.9 ENCOUNTER FOR PROPHYLACTIC MEASURES, UNSPECIFIED: ICD-10-CM

## 2019-11-16 DIAGNOSIS — E03.9 HYPOTHYROIDISM, UNSPECIFIED: ICD-10-CM

## 2019-11-16 DIAGNOSIS — L03.90 CELLULITIS, UNSPECIFIED: ICD-10-CM

## 2019-11-16 DIAGNOSIS — A41.9 SEPSIS, UNSPECIFIED ORGANISM: ICD-10-CM

## 2019-11-16 DIAGNOSIS — N17.9 ACUTE KIDNEY FAILURE, UNSPECIFIED: ICD-10-CM

## 2019-11-16 DIAGNOSIS — I48.91 UNSPECIFIED ATRIAL FIBRILLATION: ICD-10-CM

## 2019-11-16 LAB
24R-OH-CALCIDIOL SERPL-MCNC: 30.4 NG/ML — SIGNIFICANT CHANGE UP (ref 30–80)
ALBUMIN SERPL ELPH-MCNC: 2.5 G/DL — LOW (ref 3.5–5)
ALP SERPL-CCNC: 125 U/L — HIGH (ref 40–120)
ALT FLD-CCNC: 45 U/L DA — SIGNIFICANT CHANGE UP (ref 10–60)
ANION GAP SERPL CALC-SCNC: 9 MMOL/L — SIGNIFICANT CHANGE UP (ref 5–17)
APPEARANCE UR: CLEAR — SIGNIFICANT CHANGE UP
AST SERPL-CCNC: 40 U/L — SIGNIFICANT CHANGE UP (ref 10–40)
BILIRUB SERPL-MCNC: 1.1 MG/DL — SIGNIFICANT CHANGE UP (ref 0.2–1.2)
BILIRUB UR-MCNC: NEGATIVE — SIGNIFICANT CHANGE UP
BUN SERPL-MCNC: 29 MG/DL — HIGH (ref 7–18)
CALCIUM SERPL-MCNC: 8.2 MG/DL — LOW (ref 8.4–10.5)
CHLORIDE SERPL-SCNC: 104 MMOL/L — SIGNIFICANT CHANGE UP (ref 96–108)
CHOLEST SERPL-MCNC: 105 MG/DL — SIGNIFICANT CHANGE UP (ref 10–199)
CO2 SERPL-SCNC: 23 MMOL/L — SIGNIFICANT CHANGE UP (ref 22–31)
COLOR SPEC: YELLOW — SIGNIFICANT CHANGE UP
CREAT SERPL-MCNC: 1.74 MG/DL — HIGH (ref 0.5–1.3)
DIFF PNL FLD: ABNORMAL
EPI CELLS # UR: ABNORMAL /HPF
FOLATE SERPL-MCNC: 12.7 NG/ML — SIGNIFICANT CHANGE UP
GLUCOSE SERPL-MCNC: 108 MG/DL — HIGH (ref 70–99)
GLUCOSE UR QL: NEGATIVE — SIGNIFICANT CHANGE UP
HBA1C BLD-MCNC: 5.5 % — SIGNIFICANT CHANGE UP (ref 4–5.6)
HCT VFR BLD CALC: 37.6 % — LOW (ref 39–50)
HDLC SERPL-MCNC: 37 MG/DL — LOW
HGB BLD-MCNC: 12.2 G/DL — LOW (ref 13–17)
KETONES UR-MCNC: ABNORMAL
LEUKOCYTE ESTERASE UR-ACNC: ABNORMAL
LIPID PNL WITH DIRECT LDL SERPL: 56 MG/DL — SIGNIFICANT CHANGE UP
MCHC RBC-ENTMCNC: 31.4 PG — SIGNIFICANT CHANGE UP (ref 27–34)
MCHC RBC-ENTMCNC: 32.4 GM/DL — SIGNIFICANT CHANGE UP (ref 32–36)
MCV RBC AUTO: 96.9 FL — SIGNIFICANT CHANGE UP (ref 80–100)
NITRITE UR-MCNC: NEGATIVE — SIGNIFICANT CHANGE UP
NRBC # BLD: 0 /100 WBCS — SIGNIFICANT CHANGE UP (ref 0–0)
PH UR: 5 — SIGNIFICANT CHANGE UP (ref 5–8)
PHOSPHATE SERPL-MCNC: 2.3 MG/DL — LOW (ref 2.5–4.5)
PLATELET # BLD AUTO: 224 K/UL — SIGNIFICANT CHANGE UP (ref 150–400)
POTASSIUM SERPL-MCNC: 4.1 MMOL/L — SIGNIFICANT CHANGE UP (ref 3.5–5.3)
POTASSIUM SERPL-SCNC: 4.1 MMOL/L — SIGNIFICANT CHANGE UP (ref 3.5–5.3)
PROT SERPL-MCNC: 6.7 G/DL — SIGNIFICANT CHANGE UP (ref 6–8.3)
PROT UR-MCNC: 15
RBC # BLD: 3.88 M/UL — LOW (ref 4.2–5.8)
RBC # FLD: 14.2 % — SIGNIFICANT CHANGE UP (ref 10.3–14.5)
RBC CASTS # UR COMP ASSIST: SIGNIFICANT CHANGE UP /HPF (ref 0–2)
SODIUM SERPL-SCNC: 136 MMOL/L — SIGNIFICANT CHANGE UP (ref 135–145)
SP GR SPEC: 1.02 — SIGNIFICANT CHANGE UP (ref 1.01–1.02)
TOTAL CHOLESTEROL/HDL RATIO MEASUREMENT: 2.8 RATIO — LOW (ref 3.4–9.6)
TRIGL SERPL-MCNC: 58 MG/DL — SIGNIFICANT CHANGE UP (ref 10–149)
TSH SERPL-MCNC: 1.91 UU/ML — SIGNIFICANT CHANGE UP (ref 0.34–4.82)
UROBILINOGEN FLD QL: 1
VIT B12 SERPL-MCNC: 372 PG/ML — SIGNIFICANT CHANGE UP (ref 232–1245)
WBC # BLD: 12.43 K/UL — HIGH (ref 3.8–10.5)
WBC # FLD AUTO: 12.43 K/UL — HIGH (ref 3.8–10.5)
WBC UR QL: SIGNIFICANT CHANGE UP /HPF (ref 0–5)

## 2019-11-16 PROCEDURE — 74176 CT ABD & PELVIS W/O CONTRAST: CPT | Mod: 26

## 2019-11-16 PROCEDURE — 99233 SBSQ HOSP IP/OBS HIGH 50: CPT | Mod: GC

## 2019-11-16 RX ORDER — AMPICILLIN SODIUM AND SULBACTAM SODIUM 250; 125 MG/ML; MG/ML
1.5 INJECTION, POWDER, FOR SUSPENSION INTRAMUSCULAR; INTRAVENOUS EVERY 6 HOURS
Refills: 0 | Status: DISCONTINUED | OUTPATIENT
Start: 2019-11-17 | End: 2019-11-19

## 2019-11-16 RX ORDER — ENOXAPARIN SODIUM 100 MG/ML
40 INJECTION SUBCUTANEOUS AT BEDTIME
Refills: 0 | Status: DISCONTINUED | OUTPATIENT
Start: 2019-11-16 | End: 2019-11-16

## 2019-11-16 RX ORDER — FUROSEMIDE 40 MG
40 TABLET ORAL DAILY
Refills: 0 | Status: DISCONTINUED | OUTPATIENT
Start: 2019-11-16 | End: 2019-11-17

## 2019-11-16 RX ORDER — LEVOTHYROXINE SODIUM 125 MCG
100 TABLET ORAL DAILY
Refills: 0 | Status: DISCONTINUED | OUTPATIENT
Start: 2019-11-16 | End: 2019-12-05

## 2019-11-16 RX ORDER — CEFTRIAXONE 500 MG/1
1000 INJECTION, POWDER, FOR SOLUTION INTRAMUSCULAR; INTRAVENOUS EVERY 24 HOURS
Refills: 0 | Status: DISCONTINUED | OUTPATIENT
Start: 2019-11-16 | End: 2019-11-16

## 2019-11-16 RX ORDER — TAMSULOSIN HYDROCHLORIDE 0.4 MG/1
0.4 CAPSULE ORAL ONCE
Refills: 0 | Status: COMPLETED | OUTPATIENT
Start: 2019-11-16 | End: 2019-11-16

## 2019-11-16 RX ORDER — ACETAMINOPHEN 500 MG
650 TABLET ORAL EVERY 6 HOURS
Refills: 0 | Status: DISCONTINUED | OUTPATIENT
Start: 2019-11-16 | End: 2019-12-05

## 2019-11-16 RX ORDER — LISINOPRIL 2.5 MG/1
2.5 TABLET ORAL DAILY
Refills: 0 | Status: DISCONTINUED | OUTPATIENT
Start: 2019-11-16 | End: 2019-11-17

## 2019-11-16 RX ORDER — CLOPIDOGREL BISULFATE 75 MG/1
75 TABLET, FILM COATED ORAL DAILY
Refills: 0 | Status: DISCONTINUED | OUTPATIENT
Start: 2019-11-16 | End: 2019-11-21

## 2019-11-16 RX ORDER — AMPICILLIN SODIUM AND SULBACTAM SODIUM 250; 125 MG/ML; MG/ML
INJECTION, POWDER, FOR SUSPENSION INTRAMUSCULAR; INTRAVENOUS
Refills: 0 | Status: DISCONTINUED | OUTPATIENT
Start: 2019-11-16 | End: 2019-11-19

## 2019-11-16 RX ORDER — ENOXAPARIN SODIUM 100 MG/ML
30 INJECTION SUBCUTANEOUS DAILY
Refills: 0 | Status: DISCONTINUED | OUTPATIENT
Start: 2019-11-16 | End: 2019-11-25

## 2019-11-16 RX ORDER — AMPICILLIN SODIUM AND SULBACTAM SODIUM 250; 125 MG/ML; MG/ML
1.5 INJECTION, POWDER, FOR SUSPENSION INTRAMUSCULAR; INTRAVENOUS ONCE
Refills: 0 | Status: COMPLETED | OUTPATIENT
Start: 2019-11-16 | End: 2019-11-16

## 2019-11-16 RX ADMIN — AMPICILLIN SODIUM AND SULBACTAM SODIUM 100 GRAM(S): 250; 125 INJECTION, POWDER, FOR SUSPENSION INTRAMUSCULAR; INTRAVENOUS at 18:30

## 2019-11-16 RX ADMIN — Medication 40 MILLIGRAM(S): at 17:43

## 2019-11-16 RX ADMIN — AMPICILLIN SODIUM AND SULBACTAM SODIUM 100 GRAM(S): 250; 125 INJECTION, POWDER, FOR SUSPENSION INTRAMUSCULAR; INTRAVENOUS at 23:05

## 2019-11-16 RX ADMIN — Medication 650 MILLIGRAM(S): at 05:24

## 2019-11-16 RX ADMIN — CLOPIDOGREL BISULFATE 75 MILLIGRAM(S): 75 TABLET, FILM COATED ORAL at 17:43

## 2019-11-16 RX ADMIN — LISINOPRIL 2.5 MILLIGRAM(S): 2.5 TABLET ORAL at 17:42

## 2019-11-16 RX ADMIN — Medication 650 MILLIGRAM(S): at 21:53

## 2019-11-16 RX ADMIN — TAMSULOSIN HYDROCHLORIDE 0.4 MILLIGRAM(S): 0.4 CAPSULE ORAL at 05:22

## 2019-11-16 RX ADMIN — ENOXAPARIN SODIUM 30 MILLIGRAM(S): 100 INJECTION SUBCUTANEOUS at 11:37

## 2019-11-16 RX ADMIN — Medication 650 MILLIGRAM(S): at 20:42

## 2019-11-16 RX ADMIN — Medication 110 MILLIGRAM(S): at 05:21

## 2019-11-16 RX ADMIN — Medication 100 MICROGRAM(S): at 05:25

## 2019-11-16 RX ADMIN — Medication 110 MILLIGRAM(S): at 17:42

## 2019-11-16 NOTE — PROGRESS NOTE ADULT - PROBLEM SELECTOR PLAN 2
Suspect cellulitis of Lower extremity- erythema, warmth  -Podiatry consulted, recommends IV Abx for cellulitis  -US LE Doppler ordered for b/l lower extremity.  -Patient on furosemide at home, will continue.

## 2019-11-16 NOTE — CONSULT NOTE ADULT - SUBJECTIVE AND OBJECTIVE BOX
S :   89y year old Male seen bedside resting. Pt states "he does not want to die as he has too many responsibilities at home." Pt denies pain in his legs or feet. Pt shows signs of dementia and is poor historian.       Patient admits to  (-) Fevers, (-) Chills, (-) Nausea, (-) Vomiting, (-) Shortness of Breath      PMH: Hypothyroid  Ulcers of both lower extremities    PSH:No significant past surgical history      Allergies:No Known Allergies      Labs:                        14.2   11.73 )-----------( 280      ( 15 Nov 2019 17:09 )             44.9       WBC Trend  11.73<H> Date (11-15 @ 17:09)      Chem  11-15    136  |  101  |  32<H>  ----------------------------<  105<H>  4.8   |  27  |  2.22<H>    Ca    9.1      15 Nov 2019 17:09    TPro  8.6<H>  /  Alb  3.4<L>  /  TBili  1.1  /  DBili  x   /  AST  68<H>  /  ALT  65<H>  /  AlkPhos  168<H>  11-15          T(F): 100.8 (11-16-19 @ 04:58), Max: 100.8 (11-16-19 @ 04:58)  HR: 90 (11-16-19 @ 04:58) (80 - 101)  BP: 132/62 (11-16-19 @ 04:58) (103/88 - 132/62)  RR: 18 (11-16-19 @ 04:58) (17 - 20)  SpO2: 98% (11-16-19 @ 04:58) (95% - 99%)  Wt(kg): --    O:   Integument:  Skin warm, dry and supple bilateral.  No open lesions or inter-digital macerations noted bilateral. Moderate erythema noted lower legs. mild non-pitting edema. Pt legs are covered in thick lotion. TG warm to warm.   Vascular: Dorsalis Pedis and Posterior Tibial pulses 1/4.  Capillary re-fill time less than 3 seconds digits 1-5 bilateral.   Neuro: unable to fully assess due to lack of verbal cooperation. Pt reacts to light touch at level of the toes.       A:   Cellulitis LE b/l     P: Discussed diagnosis and treatment with patient  Pt would benefit from IV antibiotics  Ordered xray   Seen with

## 2019-11-16 NOTE — CONSULT NOTE ADULT - SUBJECTIVE AND OBJECTIVE BOX
[facility] NEPHROLOGY- CONSULTATION NOTE    Patient is a 89y Male with hypothyroidism, chronic venous insufficiency, recent UTI s/p antibiotics p/w fever, cough and worsening LE swelling/ warmth. Renal consulted for Elevated serum creatinine.  Pt's wife denies any h/o kidney disease. Patient denies any NSAID use, recent CT with contrast, hepatitis or blood transfusions.  Pt with dysuria and finished course of abx 1 week ago. Pt then developed Fever yesterday with dry cough for 2 days. Pt with chronic LE edema on Lasix but now worsening with warmth.   pt denies any SOB, chest pain, n/v/d/ abd pain    PAST MEDICAL & SURGICAL HISTORY:  Hypothyroid  Ulcers of both lower extremities  No significant past surgical history    No Known Allergies    Home Medications Reviewed  Hospital Medications:   MEDICATIONS  (STANDING):  ampicillin/sulbactam  IVPB 1.5 Gram(s) IV Intermittent once  ampicillin/sulbactam  IVPB      clopidogrel Tablet 75 milliGRAM(s) Oral daily  doxycycline IVPB      doxycycline IVPB 100 milliGRAM(s) IV Intermittent every 12 hours  enoxaparin Injectable 30 milliGRAM(s) SubCutaneous daily  furosemide    Tablet 40 milliGRAM(s) Oral daily  levothyroxine 100 MICROGram(s) Oral daily  lisinopril 2.5 milliGRAM(s) Oral daily    SOCIAL HISTORY:    FAMILY HISTORY:  No pertinent family history in first degree relatives      REVIEW OF SYSTEMS:  Gen: no changes in weight  HEENT: no rhinorrhea  Neck: no sore throat  Cards: no chest pain  Resp: no dyspnea +dry cough  GI: no nausea or vomiting or diarrhea  : + dysuria No hematuria  Vascular: +LE edema  Derm: no rashes  Neuro: no numbness/tingling  All other review of systems is negative unless indicated above.    VITALS:  T(F): 97.8 (19 @ 13:27), Max: 100.8 (19 @ 04:58)  HR: 97 (19 @ 13:27)  BP: 154/67 (19 @ 13:27)  RR: 19 (19 @ 13:27)  SpO2: 100% (19 @ 13:27)  Wt(kg): --      Weight (kg): 92.3 ( @ 02:18)    PHYSICAL EXAM:  Gen: NAD, calm  HEENT: MMM  Neck: no JVD  Cards: irregular, +S1/S2, no M/G/R  Resp: CTA ant   GI: soft, NT/ND, NABS  : no CVA tenderness/no cabrales  Extremities: + LE edema B/L with hyperpigmentation/ warmth  Derm: see above  Neuro: non-focal    LABS:      136  |  104  |  29<H>  ----------------------------<  108<H>  4.1   |  23  |  1.74<H>    Ca    8.2<L>      2019 08:40  Phos  2.3         TPro  6.7  /  Alb  2.5<L>  /  TBili  1.1  /  DBili      /  AST  40  /  ALT  45  /  AlkPhos  125<H>      Creatinine Trend: 1.74 <--, 2.22 <--                        12.2   12.43 )-----------( 224      ( 2019 08:40 )             37.6     Urine Studies:  Urinalysis Basic - ( 2019 08:33 )    Color: Yellow / Appearance: Clear / S.020 / pH:   Gluc:  / Ketone: Trace  / Bili: Negative / Urobili: 1   Blood:  / Protein: 15 / Nitrite: Negative   Leuk Esterase: Trace / RBC: 0-2 /HPF / WBC 0-2 /HPF   Sq Epi:  / Non Sq Epi: Occasional /HPF / Bacteria:         RADIOLOGY & ADDITIONAL STUDIES:    < from: Xray Chest 1 View-PORTABLE IMMEDIATE (11.15.19 @ 17:48) >    EXAM:  XR CHEST PORTABLE IMMED 1V                            PROCEDURE DATE:  11/15/2019          INTERPRETATION:  AP semisupine chest on November 15, 2019 at 5:48 PM.   Patient has sepsis.    Poor inspiration crowds the chest.    The heart is magnified by technique.    There are no gross areas of lung consolidation or layering effusion.    Chest is similar to August 3, 2013.    IMPRESSION: No gross acute finding.      < end of copied text >    < from: CT Abdomen and Pelvis No Cont (19 @ 00:07) >    EXAM:  CT ABDOMEN AND PELVIS                            PROCEDURE DATE:  2019    < from: CT Abdomen and Pelvis No Cont (19 @ 00:07) >  KIDNEYS/URETERS: 3.5 cm left renal cyst.    < from: CT Abdomen and Pelvis No Cont (19 @ 00:07) >  IMPRESSION:     Motion limited examination. Also limited without intravenous and oral   contrast.    No CT finding to explain the patient's sepsis. Numerous incidental   findings as above.        < end of copied text >      < end of copied text >    < end of copied text >

## 2019-11-16 NOTE — PROGRESS NOTE ADULT - SUBJECTIVE AND OBJECTIVE BOX
PGY 1 Note discussed with supervising resident and primary attending    Patient is a 89y old  Male who presents with a chief complaint of Fever (2019 08:38)      INTERVAL HPI/OVERNIGHT EVENTS: Patient examined lying in bed. Patient is a poor historian, goes off on tangents. However when asked about pain, does not have any current pain in legs, however does so upon walking. Denies any chest pain, SOB, nausea, vomiting, abdominal pain. patient has difficulty increasing urine(observed, and confirmed by CT finding of prostate obstructing the bladder.) Is able to tolerate diet well.     MEDICATIONS  (STANDING):  cefTRIAXone   IVPB 1000 milliGRAM(s) IV Intermittent every 24 hours  doxycycline IVPB      doxycycline IVPB 100 milliGRAM(s) IV Intermittent every 12 hours  enoxaparin Injectable 30 milliGRAM(s) SubCutaneous daily  levothyroxine 100 MICROGram(s) Oral daily    MEDICATIONS  (PRN):  acetaminophen   Tablet .. 650 milliGRAM(s) Oral every 6 hours PRN Temp greater or equal to 38C (100.4F), Moderate Pain (4 - 6)      __________________________________________________  REVIEW OF SYSTEMS:    CONSTITUTIONAL: +fever, + chills   EYES: no acute visual disturbances  NECK: No pain or stiffness  RESPIRATORY: +cough; No shortness of breath  CARDIOVASCULAR: No chest pain, no palpitations  GASTROINTESTINAL: No pain. No nausea or vomiting; No diarrhea   NEUROLOGICAL: No headache or numbness, no tremors  MUSCULOSKELETAL: No joint pain, no muscle pain, + lower extremity pain.  GENITOURINARY: no dysuria, no frequency, +hesitancy  ALL OTHER  ROS negative        Vital Signs Last 24 Hrs  T(C): 38.2 (2019 04:58), Max: 38.2 (2019 04:58)  T(F): 100.8 (2019 04:58), Max: 100.8 (2019 04:58)  HR: 90 (2019 04:58) (80 - 101)  BP: 132/62 (2019 04:58) (103/88 - 132/62)  BP(mean): --  RR: 18 (2019 04:58) (17 - 20)  SpO2: 98% (2019 04:58) (95% - 99%)    ________________________________________________  PHYSICAL EXAM:  GENERAL: NAD  HEENT: Normocephalic;  conjunctivae and sclerae clear; moist mucous membranes;   NECK : supple  CHEST/LUNG: Clear to auscultation bilaterally with good air entry   HEART: S1 S2  irregular; no murmurs appreciated  ABDOMEN: Soft, Nontender, Nondistended; Bowel sounds present  EXTREMITIES: White scaly skin extending to the knee of b/L LE. Warmth, erythema  SKIN: warm and dry; no rash  NERVOUS SYSTEM:  Awake and alert; Oriented  to place, person and time ; no new deficits    _________________________________________________  LABS:                        12.2   12.43 )-----------( 224      ( 2019 08:40 )             37.6         136  |  104  |  29<H>  ----------------------------<  108<H>  4.1   |  23  |  1.74<H>    Ca    8.2<L>      2019 08:40  Phos  2.3         TPro  6.7  /  Alb  2.5<L>  /  TBili  1.1  /  DBili  x   /  AST  40  /  ALT  45  /  AlkPhos  125<H>      PT/INR - ( 15 Nov 2019 17:16 )   PT: 15.4 sec;   INR: 1.37 ratio         PTT - ( 15 Nov 2019 17:16 )  PTT:37.3 sec  Urinalysis Basic - ( 2019 08:33 )    Color: Yellow / Appearance: Clear / S.020 / pH: x  Gluc: x / Ketone: Trace  / Bili: Negative / Urobili: 1   Blood: x / Protein: 15 / Nitrite: Negative   Leuk Esterase: Trace / RBC: 0-2 /HPF / WBC 0-2 /HPF   Sq Epi: x / Non Sq Epi: Occasional /HPF / Bacteria: x      CAPILLARY BLOOD GLUCOSE            RADIOLOGY & ADDITIONAL TESTS:    Imaging Personally Reviewed:  YES/NO    Consultant(s) Notes Reviewed:   YES/ No    Care Discussed with Consultants :     Plan of care was discussed with patient and /or primary care giver; all questions and concerns were addressed and care was aligned with patient's wishes.

## 2019-11-16 NOTE — CONSULT NOTE ADULT - ATTENDING COMMENTS
[facility] NEPHROLOGY  Justin Orozco M.D.  Kj Fernandez D.O.  Tonia Sanches M.D.  Salina Case, MSN, ANP-C  [phone]    [address] [place] [address]

## 2019-11-16 NOTE — PROGRESS NOTE ADULT - PROBLEM SELECTOR PLAN 1
fever, tachycardia, leucocytosis meets SIRS criteria   -UA (-), CXR (-) however complains of cough  -Was started on ceftriaxone for suspected UTI  -b/l leg swelling concerning for cellulitis although per wife it she has not see any unusual changes in his legs   -c/w Rocephin for now, U/A neg, consider to give vancomycin to cover for MRSA cellulitis

## 2019-11-16 NOTE — CONSULT NOTE ADULT - ASSESSMENT
Patient is a 89y Male with hypothyroidism, chronic venous insufficiency, recent UTI s/p antibiotics p/w fever, cough and worsening LE swelling/ warmth. Renal consulted for Elevated serum creatinine.    1. ARMIN- likely hemodynamically mediated in the setting of sepsis. Recc to hold Lisnopril. Check urine lytes. Ok to c/w Lasix for now.   Strict I/Os. Avoid nephrotoxins/ NSAIDs/ RCA. Monitor BMP.  2. CKD-3- previous Scr 1.1; defer secondary w/u due to advanced age. Monitor lytes  3. Sepsis- 2/2 Cellulitis- c/w abx as per primary team.   4. LE edema- ok to c/w lasix for now. Monitor u/p. If worsening kidney function; would hold Lasix  5. HTN 2/2 CKD- Recc to hold Lisnopril due to ARMIN. Pt with Afib. Consider BB or CCB for rate control. Monitor BP

## 2019-11-16 NOTE — PROGRESS NOTE ADULT - PROBLEM SELECTOR PLAN 3
Baseline Cr of 1.15 in 11/2018  Creatinine trending down with IVF, however still above baseline  -CT abd/pelvis shows mass effect of enlarged prostate on bladder  -Started on Tamsulosin  -Consider Nephro consult

## 2019-11-17 LAB
ANION GAP SERPL CALC-SCNC: 11 MMOL/L — SIGNIFICANT CHANGE UP (ref 5–17)
BUN SERPL-MCNC: 32 MG/DL — HIGH (ref 7–18)
CALCIUM SERPL-MCNC: 8.8 MG/DL — SIGNIFICANT CHANGE UP (ref 8.4–10.5)
CHLORIDE SERPL-SCNC: 103 MMOL/L — SIGNIFICANT CHANGE UP (ref 96–108)
CHLORIDE UR-SCNC: 55 MMOL/L — SIGNIFICANT CHANGE UP (ref 55–125)
CO2 SERPL-SCNC: 22 MMOL/L — SIGNIFICANT CHANGE UP (ref 22–31)
CREAT ?TM UR-MCNC: 173 MG/DL — SIGNIFICANT CHANGE UP
CREAT SERPL-MCNC: 1.9 MG/DL — HIGH (ref 0.5–1.3)
CULTURE RESULTS: SIGNIFICANT CHANGE UP
GLUCOSE SERPL-MCNC: 101 MG/DL — HIGH (ref 70–99)
HCT VFR BLD CALC: 41.3 % — SIGNIFICANT CHANGE UP (ref 39–50)
HGB BLD-MCNC: 13.5 G/DL — SIGNIFICANT CHANGE UP (ref 13–17)
MCHC RBC-ENTMCNC: 31.2 PG — SIGNIFICANT CHANGE UP (ref 27–34)
MCHC RBC-ENTMCNC: 32.7 GM/DL — SIGNIFICANT CHANGE UP (ref 32–36)
MCV RBC AUTO: 95.4 FL — SIGNIFICANT CHANGE UP (ref 80–100)
NRBC # BLD: 0 /100 WBCS — SIGNIFICANT CHANGE UP (ref 0–0)
PLATELET # BLD AUTO: 235 K/UL — SIGNIFICANT CHANGE UP (ref 150–400)
POTASSIUM SERPL-MCNC: 4.6 MMOL/L — SIGNIFICANT CHANGE UP (ref 3.5–5.3)
POTASSIUM SERPL-SCNC: 4.6 MMOL/L — SIGNIFICANT CHANGE UP (ref 3.5–5.3)
RBC # BLD: 4.33 M/UL — SIGNIFICANT CHANGE UP (ref 4.2–5.8)
RBC # FLD: 14.1 % — SIGNIFICANT CHANGE UP (ref 10.3–14.5)
SODIUM SERPL-SCNC: 136 MMOL/L — SIGNIFICANT CHANGE UP (ref 135–145)
SODIUM UR-SCNC: 33 MMOL/L — LOW (ref 40–220)
SPECIMEN SOURCE: SIGNIFICANT CHANGE UP
UUN UR-MCNC: 1065 MG/DL — SIGNIFICANT CHANGE UP
WBC # BLD: 17.03 K/UL — HIGH (ref 3.8–10.5)
WBC # FLD AUTO: 17.03 K/UL — HIGH (ref 3.8–10.5)

## 2019-11-17 PROCEDURE — 99232 SBSQ HOSP IP/OBS MODERATE 35: CPT

## 2019-11-17 RX ORDER — SODIUM CHLORIDE 9 MG/ML
500 INJECTION INTRAMUSCULAR; INTRAVENOUS; SUBCUTANEOUS
Refills: 0 | Status: DISCONTINUED | OUTPATIENT
Start: 2019-11-17 | End: 2019-11-18

## 2019-11-17 RX ADMIN — Medication 40 MILLIGRAM(S): at 05:52

## 2019-11-17 RX ADMIN — AMPICILLIN SODIUM AND SULBACTAM SODIUM 100 GRAM(S): 250; 125 INJECTION, POWDER, FOR SUSPENSION INTRAMUSCULAR; INTRAVENOUS at 05:17

## 2019-11-17 RX ADMIN — CLOPIDOGREL BISULFATE 75 MILLIGRAM(S): 75 TABLET, FILM COATED ORAL at 11:57

## 2019-11-17 RX ADMIN — AMPICILLIN SODIUM AND SULBACTAM SODIUM 100 GRAM(S): 250; 125 INJECTION, POWDER, FOR SUSPENSION INTRAMUSCULAR; INTRAVENOUS at 17:12

## 2019-11-17 RX ADMIN — Medication 110 MILLIGRAM(S): at 05:52

## 2019-11-17 RX ADMIN — Medication 650 MILLIGRAM(S): at 22:39

## 2019-11-17 RX ADMIN — Medication 100 MICROGRAM(S): at 05:52

## 2019-11-17 RX ADMIN — Medication 650 MILLIGRAM(S): at 16:20

## 2019-11-17 RX ADMIN — LISINOPRIL 2.5 MILLIGRAM(S): 2.5 TABLET ORAL at 05:52

## 2019-11-17 RX ADMIN — AMPICILLIN SODIUM AND SULBACTAM SODIUM 100 GRAM(S): 250; 125 INJECTION, POWDER, FOR SUSPENSION INTRAMUSCULAR; INTRAVENOUS at 11:57

## 2019-11-17 RX ADMIN — ENOXAPARIN SODIUM 30 MILLIGRAM(S): 100 INJECTION SUBCUTANEOUS at 11:57

## 2019-11-17 RX ADMIN — SODIUM CHLORIDE 50 MILLILITER(S): 9 INJECTION INTRAMUSCULAR; INTRAVENOUS; SUBCUTANEOUS at 17:12

## 2019-11-17 RX ADMIN — Medication 650 MILLIGRAM(S): at 15:49

## 2019-11-17 RX ADMIN — AMPICILLIN SODIUM AND SULBACTAM SODIUM 100 GRAM(S): 250; 125 INJECTION, POWDER, FOR SUSPENSION INTRAMUSCULAR; INTRAVENOUS at 23:01

## 2019-11-17 RX ADMIN — Medication 110 MILLIGRAM(S): at 17:12

## 2019-11-17 RX ADMIN — Medication 650 MILLIGRAM(S): at 23:41

## 2019-11-17 NOTE — PROGRESS NOTE ADULT - SUBJECTIVE AND OBJECTIVE BOX
[facility] NEPHROLOGY- PROGRESS NOTE    Patient is a 89y Male with hypothyroidism, chronic venous insufficiency, recent UTI s/p antibiotics p/w fever, cough and worsening LE swelling/ warmth. Renal consulted for Elevated serum creatinine.    Hospital Medications: Medications reviewed.  REVIEW OF SYSTEMS:  CONSTITUTIONAL: +fevers No chills  RESPIRATORY: No shortness of breath  CARDIOVASCULAR: No chest pain.  GASTROINTESTINAL: No nausea, vomiting, diarrhea or abdominal pain.   VASCULAR: +bilateral lower extremity edema.     VITALS:  T(F): 99.3 (19 @ 10:09), Max: 101.7 (19 @ 20:40)  HR: 96 (19 @ 10:15)  BP: 134/59 (19 @ 10:09)  RR: 24 (19 @ 10:09)  SpO2: 100% (19 @ 10:09)  Wt(kg): --    Weight (kg): 92.3 ( @ 02:18)    PHYSICAL EXAM:  Gen: NAD, calm  HEENT: MMM  Neck: no JVD  Cards: irregular, +S1/S2, no M/G/R  Resp: CTA ant   GI: soft, NT/ND, NABS  Extremities: + LE edema B/L with hyperpigmentation/ warmth        LABS:      136  |  103  |  32<H>  ----------------------------<  101<H>  4.6   |  22  |  1.90<H>    Ca    8.8      2019 06:32  Phos  2.3         TPro  6.7  /  Alb  2.5<L>  /  TBili  1.1  /  DBili      /  AST  40  /  ALT  45  /  AlkPhos  125<H>      Creatinine Trend: 1.90 <--, 1.74 <--, 2.22 <--                        13.5   17.03 )-----------( 235      ( 2019 06:32 )             41.3     Urine Studies:  Urinalysis Basic - ( 2019 08:33 )    Color: Yellow / Appearance: Clear / S.020 / pH:   Gluc:  / Ketone: Trace  / Bili: Negative / Urobili: 1   Blood:  / Protein: 15 / Nitrite: Negative   Leuk Esterase: Trace / RBC: 0-2 /HPF / WBC 0-2 /HPF   Sq Epi:  / Non Sq Epi: Occasional /HPF / Bacteria:       Creatinine, Random Urine: 173 mg/dL ( @ 02:52)  Chloride, Random Urine: 55 mmol/L ( @ 02:52)  Sodium, Random Urine: 33 mmol/L ( @ 02:52)    RADIOLOGY & ADDITIONAL STUDIES:

## 2019-11-17 NOTE — PROGRESS NOTE ADULT - PROBLEM SELECTOR PLAN 1
fever, tachycardia, leucocytosis meets SIRS criteria   -UA (-), CXR (-) however complains of cough  -Was started on ceftriaxone for suspected UTI  -b/l leg swelling concerning for cellulitis although per wife it she has not see any unusual changes in his legs   -c/w Rocephin for now, U/A neg, consider to give vancomycin to cover for MRSA cellulitis Possibly from LE cellulitis, however, would explore etiologies in light of recent abx course  -need to corroborate recent abx course from [facility]  -UA (-), CXR (-) however complains of cough  -b/l leg swelling concerning for cellulitis although per wife it she has not see any unusual changes in his legs   -c/w Rocephin for now, U/A neg, consider to give vancomycin to cover for MRSA cellulitis

## 2019-11-17 NOTE — PROGRESS NOTE ADULT - SUBJECTIVE AND OBJECTIVE BOX
Patient is a 89y old  Male who presents with a chief complaint of Fever (2019 10:41)      INTERVAL HPI/OVERNIGHT EVENTS: no new complaints    MEDICATIONS  (STANDING):  ampicillin/sulbactam  IVPB 1.5 Gram(s) IV Intermittent every 6 hours  ampicillin/sulbactam  IVPB      clopidogrel Tablet 75 milliGRAM(s) Oral daily  doxycycline IVPB      doxycycline IVPB 100 milliGRAM(s) IV Intermittent every 12 hours  enoxaparin Injectable 30 milliGRAM(s) SubCutaneous daily  levothyroxine 100 MICROGram(s) Oral daily  sodium chloride 0.9%. 500 milliLiter(s) (50 mL/Hr) IV Continuous <Continuous>    MEDICATIONS  (PRN):  acetaminophen   Tablet .. 650 milliGRAM(s) Oral every 6 hours PRN Temp greater or equal to 38C (100.4F), Moderate Pain (4 - 6)      __________________________________________________  REVIEW OF SYSTEMS:    CONSTITUTIONAL: No fever,   EYES: no acute visual disturbances  NECK: No pain or stiffness  RESPIRATORY: No cough; No shortness of breath  CARDIOVASCULAR: No chest pain, no palpitations  GASTROINTESTINAL: No pain. No nausea or vomiting; No diarrhea   NEUROLOGICAL: No headache or numbness, no tremors  MUSCULOSKELETAL: No joint pain, no muscle pain  GENITOURINARY: no dysuria, no frequency, no hesitancy  PSYCHIATRY: no depression , no anxiety  ALL OTHER  ROS negative        Vital Signs Last 24 Hrs  T(C): 37.9 (2019 13:01), Max: 38.7 (2019 20:40)  T(F): 100.2 (2019 13:01), Max: 101.7 (2019 20:40)  HR: 98 (2019 13:01) (81 - 100)  BP: 122/78 (2019 13:01) (111/59 - 134/59)  BP(mean): --  RR: 20 (2019 13:01) (16 - 24)  SpO2: 100% (2019 13:01) (99% - 100%)    ________________________________________________  PHYSICAL EXAM:  GENERAL: NAD  HEENT: Normocephalic;  conjunctivae and sclerae clear; moist mucous membranes;   NECK : supple  CHEST/LUNG: Clear to auscultation bilaterally with good air entry   HEART: S1 S2  regular; no murmurs, gallops or rubs  ABDOMEN: Soft, Nontender, Nondistended; Bowel sounds present  EXTREMITIES: no cyanosis; no edema; no calf tenderness  SKIN: warm and dry; no rash  NERVOUS SYSTEM:  Awake and alert; Oriented  to place, person and time ; no new deficits    _________________________________________________  LABS:                        13.5   17.03 )-----------( 235      ( 2019 06:32 )             41.3         136  |  103  |  32<H>  ----------------------------<  101<H>  4.6   |  22  |  1.90<H>    Ca    8.8      2019 06:32  Phos  2.3         TPro  6.7  /  Alb  2.5<L>  /  TBili  1.1  /  DBili  x   /  AST  40  /  ALT  45  /  AlkPhos  125<H>        Urinalysis Basic - ( 2019 08:33 )    Color: Yellow / Appearance: Clear / S.020 / pH: x  Gluc: x / Ketone: Trace  / Bili: Negative / Urobili: 1   Blood: x / Protein: 15 / Nitrite: Negative   Leuk Esterase: Trace / RBC: 0-2 /HPF / WBC 0-2 /HPF   Sq Epi: x / Non Sq Epi: Occasional /HPF / Bacteria: x      CAPILLARY BLOOD GLUCOSE            RADIOLOGY & ADDITIONAL TESTS:    Imaging Personally Reviewed:  YES/NO    Consultant(s) Notes Reviewed:   YES/ No    Care Discussed with Consultants :     Plan of care was discussed with patient and /or primary care giver; all questions and concerns were addressed and care was aligned with patient's wishes. Patient is a 89y old  Male who presents with a chief complaint of Fever (2019 10:41)      INTERVAL HPI/OVERNIGHT EVENTS: overall the patient reports he feels unwell though discomfort at b/l lower extremities has improved  No chills/ nausea/vomiting/diarrhea. + dry cough. Pt awaiting CT chest, radiology notified of study.  **Of note, wife reports pt received 5 days of abx prior to this current admission for LE cellulitis. She does not recall the name of the abx--> prescription was picked up at the [facility] at [address]    MEDICATIONS  (STANDING):  ampicillin/sulbactam  IVPB 1.5 Gram(s) IV Intermittent every 6 hours  ampicillin/sulbactam  IVPB      clopidogrel Tablet 75 milliGRAM(s) Oral daily  doxycycline IVPB      doxycycline IVPB 100 milliGRAM(s) IV Intermittent every 12 hours  enoxaparin Injectable 30 milliGRAM(s) SubCutaneous daily  levothyroxine 100 MICROGram(s) Oral daily  sodium chloride 0.9%. 500 milliLiter(s) (50 mL/Hr) IV Continuous <Continuous>    MEDICATIONS  (PRN):  acetaminophen   Tablet .. 650 milliGRAM(s) Oral every 6 hours PRN Temp greater or equal to 38C (100.4F), Moderate Pain (4 - 6)      __________________________________________________  REVIEW OF SYSTEMS:    CONSTITUTIONAL: No fever,   EYES: no acute visual disturbances  NECK: No pain or stiffness  RESPIRATORY: No cough; No shortness of breath  CARDIOVASCULAR: No chest pain, no palpitations  GASTROINTESTINAL: No pain. No nausea or vomiting; No diarrhea   NEUROLOGICAL: No headache or numbness, no tremors  MUSCULOSKELETAL: No joint pain, no muscle pain  GENITOURINARY: no dysuria, no frequency, no hesitancy  PSYCHIATRY: no depression , no anxiety  ALL OTHER  ROS negative        Vital Signs Last 24 Hrs  T(C): 37.9 (2019 13:01), Max: 38.7 (2019 20:40)  T(F): 100.2 (2019 13:01), Max: 101.7 (2019 20:40)  HR: 98 (2019 13:01) (81 - 100)  BP: 122/78 (2019 13:01) (111/59 - 134/59)  BP(mean): --  RR: 20 (2019 13:01) (16 - 24)  SpO2: 100% (2019 13:01) (99% - 100%)    ________________________________________________  PHYSICAL EXAM:  GENERAL: NAD  HEENT: Normocephalic;  conjunctivae and sclerae clear; moist mucous membranes;   NECK : supple  CHEST/LUNG: Clear to auscultation bilaterally with good air entry   HEART: S1 S2  regular; no murmurs, gallops or rubs  ABDOMEN: Soft, Nontender, Nondistended; Bowel sounds present  EXTREMITIES: no cyanosis; no edema; no calf tenderness  SKIN: warm and dry; no rash  NERVOUS SYSTEM:  Awake and alert; Oriented  to place, person and time ; no new deficits    _________________________________________________  LABS:                        13.5   17. )-----------( 235      ( 2019 06:32 )             41.3         136  |  103  |  32<H>  ----------------------------<  101<H>  4.6   |  22  |  1.90<H>    Ca    8.8      2019 06:32  Phos  2.3         TPro  6.7  /  Alb  2.5<L>  /  TBili  1.1  /  DBili  x   /  AST  40  /  ALT  45  /  AlkPhos  125<H>        Urinalysis Basic - ( 2019 08:33 )    Color: Yellow / Appearance: Clear / S.020 / pH: x  Gluc: x / Ketone: Trace  / Bili: Negative / Urobili: 1   Blood: x / Protein: 15 / Nitrite: Negative   Leuk Esterase: Trace / RBC: 0-2 /HPF / WBC 0-2 /HPF   Sq Epi: x / Non Sq Epi: Occasional /HPF / Bacteria: x      CAPILLARY BLOOD GLUCOSE            RADIOLOGY & ADDITIONAL TESTS:    Imaging Personally Reviewed:  YES/NO    Consultant(s) Notes Reviewed:   YES/ No    Care Discussed with Consultants :     Plan of care was discussed with patient and /or primary care giver; all questions and concerns were addressed and care was aligned with patient's wishes.

## 2019-11-17 NOTE — PROGRESS NOTE ADULT - ATTENDING COMMENTS
[facility] NEPHROLOGY  Justin Orozco M.D.  jK Fernandez D.O.  Tonia Sanches M.D.  Salina Case, MSN, ANP-C  [phone]    [address] [place] [address]

## 2019-11-17 NOTE — PROGRESS NOTE ADULT - PROBLEM SELECTOR PLAN 3
Baseline Cr of 1.15 in 11/2018  Creatinine trending down with IVF, however still above baseline  -CT abd/pelvis shows mass effect of enlarged prostate on bladder  -Started on Tamsulosin  -Consider Nephro consult Baseline Cr of 1.15 in 11/2018  Creatinine trending down with IVF, however still above baseline  -CT abd/pelvis shows mass effect of enlarged prostate on bladder  -Started on Tamsulosin  -give trial of gentle IVF at 50 cc/hr today. Pt still received ACE-I and lasix both today and yesterday. Would defer until creatinine is stable  -renal/bladder ultrasound

## 2019-11-17 NOTE — PROGRESS NOTE ADULT - ASSESSMENT
Patient is a 89y Male with hypothyroidism, chronic venous insufficiency, recent UTI s/p antibiotics p/w fever, cough and worsening LE swelling/ warmth. Renal consulted for Elevated serum creatinine.    1. ARMIN- likely hemodynamically mediated in the setting of sepsis. Pt with mild increase in SCr and low urine Na; Will hold Lisnopril/ Lasix. CT with no hydro   Strict I/Os. Avoid nephrotoxins/ NSAIDs/ RCA. Monitor BMP.  2. CKD-3- previous Scr 1.1; defer secondary w/u due to advanced age. Monitor lytes  3. Sepsis- 2/2 Cellulitis- c/w abx as per primary team.   4. LE edema- in the setting of cellulitis. Will hold Lasis due to  worsening kidney function.   5. HTN 2/2 CKD- Recc to hold Lisnopril due to ARMIN. Pt with Afib. Consider BB or CCB for rate control. Monitor BP

## 2019-11-17 NOTE — PROGRESS NOTE ADULT - PROBLEM SELECTOR PLAN 5
Home medication 100mcg of levothyroxine  TSH- wnl Pt has hx of afib  EKG: Afib rate controlled   Not on any rate control medication  -Patient refused AC on previous admission with new onset afib, however patient on clopidogrel(?) as confirmed with pharmacy

## 2019-11-17 NOTE — PROGRESS NOTE ADULT - SUBJECTIVE AND OBJECTIVE BOX
S :   89y year old Male seen bedside resting. Pt states "he does not want to die as he has too many responsibilities at home." Pt denies pain in his legs or feet. Pt shows signs of dementia and is poor historian.   Pt was seen this morning by podiatry . Pt states he has no complaints about his legs. Legs showing mild erythema and hyperpigmentation.      Patient admits to  (-) Fevers, (-) Chills, (-) Nausea, (-) Vomiting, (-) Shortness of Breath      PMH: Hypothyroid  Ulcers of both lower extremities    PSH:No significant past surgical history      Allergies:No Known Allergies      Labs:                        13.5   17.03 )-----------( 235      ( 17 Nov 2019 06:32 )             41.3   chem  11-17    136  |  103  |  32<H>  ----------------------------<  101<H>  4.6   |  22  |  1.90<H>    Ca    8.8      17 Nov 2019 06:32  Phos  2.3     11-16    TPro  6.7  /  Alb  2.5<L>  /  TBili  1.1  /  DBili  x   /  AST  40  /  ALT  45  /  AlkPhos  125<H>  11-16  Vitals  Vital Signs Last 24 Hrs  T(C): 36.9 (17 Nov 2019 05:00), Max: 38.7 (16 Nov 2019 20:40)  T(F): 98.4 (17 Nov 2019 05:00), Max: 101.7 (16 Nov 2019 20:40)  HR: 81 (17 Nov 2019 05:00) (81 - 100)  BP: 111/59 (17 Nov 2019 05:00) (111/59 - 154/67)  BP(mean): --  RR: 16 (17 Nov 2019 05:00) (16 - 19)  SpO2: 99% (17 Nov 2019 05:00) (99% - 100%)      O:   Integument:  Skin warm, dry and supple bilateral.  No open lesions or inter-digital macerations noted bilateral. Moderate erythema noted lower legs. mild non-pitting edema. Pt legs are covered in thick lotion. TG warm to warm.   Vascular: Dorsalis Pedis and Posterior Tibial pulses 1/4.  Capillary re-fill time less than 3 seconds digits 1-5 bilateral.   Neuro: unable to fully assess due to lack of verbal cooperation. Pt reacts to light touch at level of the toes.       A:   Cellulitis LE b/l     P: Discussed diagnosis and treatment with patient  Pt would benefit from IV antibiotics  xray pending  Discussed  with

## 2019-11-17 NOTE — PROGRESS NOTE ADULT - PROBLEM SELECTOR PLAN 2
Suspect cellulitis of Lower extremity- erythema, warmth  -Podiatry consulted, recommends IV Abx for cellulitis  -US LE Doppler ordered for b/l lower extremity.  -Patient on furosemide at home, will continue. Suspect cellulitis of Lower extremity- erythema, warmth  -Podiatry consulted, recommends IV Abx for cellulitis  -US LE Doppler ordered for b/l lower extremity.  -unasyn+doxycycline  -call pharmacy above for recent treatment course

## 2019-11-18 DIAGNOSIS — R05 COUGH: ICD-10-CM

## 2019-11-18 LAB
ANION GAP SERPL CALC-SCNC: 9 MMOL/L — SIGNIFICANT CHANGE UP (ref 5–17)
BUN SERPL-MCNC: 33 MG/DL — HIGH (ref 7–18)
CALCIUM SERPL-MCNC: 8.6 MG/DL — SIGNIFICANT CHANGE UP (ref 8.4–10.5)
CHLORIDE SERPL-SCNC: 103 MMOL/L — SIGNIFICANT CHANGE UP (ref 96–108)
CO2 SERPL-SCNC: 23 MMOL/L — SIGNIFICANT CHANGE UP (ref 22–31)
CREAT SERPL-MCNC: 1.49 MG/DL — HIGH (ref 0.5–1.3)
GLUCOSE SERPL-MCNC: 111 MG/DL — HIGH (ref 70–99)
HCT VFR BLD CALC: 38.1 % — LOW (ref 39–50)
HGB BLD-MCNC: 12.3 G/DL — LOW (ref 13–17)
MCHC RBC-ENTMCNC: 30.8 PG — SIGNIFICANT CHANGE UP (ref 27–34)
MCHC RBC-ENTMCNC: 32.3 GM/DL — SIGNIFICANT CHANGE UP (ref 32–36)
MCV RBC AUTO: 95.3 FL — SIGNIFICANT CHANGE UP (ref 80–100)
NRBC # BLD: 0 /100 WBCS — SIGNIFICANT CHANGE UP (ref 0–0)
PLATELET # BLD AUTO: 234 K/UL — SIGNIFICANT CHANGE UP (ref 150–400)
POTASSIUM SERPL-MCNC: 3.8 MMOL/L — SIGNIFICANT CHANGE UP (ref 3.5–5.3)
POTASSIUM SERPL-SCNC: 3.8 MMOL/L — SIGNIFICANT CHANGE UP (ref 3.5–5.3)
RBC # BLD: 4 M/UL — LOW (ref 4.2–5.8)
RBC # FLD: 14.2 % — SIGNIFICANT CHANGE UP (ref 10.3–14.5)
SODIUM SERPL-SCNC: 135 MMOL/L — SIGNIFICANT CHANGE UP (ref 135–145)
WBC # BLD: 17.3 K/UL — HIGH (ref 3.8–10.5)
WBC # FLD AUTO: 17.3 K/UL — HIGH (ref 3.8–10.5)

## 2019-11-18 PROCEDURE — 76770 US EXAM ABDO BACK WALL COMP: CPT | Mod: 26

## 2019-11-18 PROCEDURE — 71250 CT THORAX DX C-: CPT | Mod: 26

## 2019-11-18 PROCEDURE — 73590 X-RAY EXAM OF LOWER LEG: CPT | Mod: 26,50

## 2019-11-18 PROCEDURE — 73630 X-RAY EXAM OF FOOT: CPT | Mod: 26,50

## 2019-11-18 PROCEDURE — 99232 SBSQ HOSP IP/OBS MODERATE 35: CPT

## 2019-11-18 RX ADMIN — CLOPIDOGREL BISULFATE 75 MILLIGRAM(S): 75 TABLET, FILM COATED ORAL at 11:34

## 2019-11-18 RX ADMIN — ENOXAPARIN SODIUM 30 MILLIGRAM(S): 100 INJECTION SUBCUTANEOUS at 11:34

## 2019-11-18 RX ADMIN — AMPICILLIN SODIUM AND SULBACTAM SODIUM 100 GRAM(S): 250; 125 INJECTION, POWDER, FOR SUSPENSION INTRAMUSCULAR; INTRAVENOUS at 11:34

## 2019-11-18 RX ADMIN — SODIUM CHLORIDE 50 MILLILITER(S): 9 INJECTION INTRAMUSCULAR; INTRAVENOUS; SUBCUTANEOUS at 05:35

## 2019-11-18 RX ADMIN — AMPICILLIN SODIUM AND SULBACTAM SODIUM 100 GRAM(S): 250; 125 INJECTION, POWDER, FOR SUSPENSION INTRAMUSCULAR; INTRAVENOUS at 17:23

## 2019-11-18 RX ADMIN — AMPICILLIN SODIUM AND SULBACTAM SODIUM 100 GRAM(S): 250; 125 INJECTION, POWDER, FOR SUSPENSION INTRAMUSCULAR; INTRAVENOUS at 23:57

## 2019-11-18 RX ADMIN — Medication 650 MILLIGRAM(S): at 23:12

## 2019-11-18 RX ADMIN — Medication 100 MICROGRAM(S): at 05:21

## 2019-11-18 RX ADMIN — Medication 110 MILLIGRAM(S): at 17:23

## 2019-11-18 RX ADMIN — Medication 650 MILLIGRAM(S): at 22:12

## 2019-11-18 RX ADMIN — SODIUM CHLORIDE 50 MILLILITER(S): 9 INJECTION INTRAMUSCULAR; INTRAVENOUS; SUBCUTANEOUS at 11:34

## 2019-11-18 RX ADMIN — Medication 110 MILLIGRAM(S): at 05:21

## 2019-11-18 RX ADMIN — AMPICILLIN SODIUM AND SULBACTAM SODIUM 100 GRAM(S): 250; 125 INJECTION, POWDER, FOR SUSPENSION INTRAMUSCULAR; INTRAVENOUS at 05:21

## 2019-11-18 NOTE — SWALLOW BEDSIDE ASSESSMENT ADULT - SLP PERTINENT HISTORY OF CURRENT PROBLEM
89 year old male, has hha 24x7, ambulates with walker,  has medical history significant for hypothyroidism, chronic venous insufficiency came in to ED because of fever of 101 at home.

## 2019-11-18 NOTE — SWALLOW BEDSIDE ASSESSMENT ADULT - CONSISTENCIES ADMINISTERED
puree/4x nectar thick/3x thin liquid/5x Saltine crackers + applesauce 3x/mech soft saltine cracker 1x/solid

## 2019-11-18 NOTE — SWALLOW BEDSIDE ASSESSMENT ADULT - SWALLOW EVAL: RECOMMENDED DIET
Mechanical Soft Diet (chopped vegetables & ground meats w/gravy if possible) and Nectar Thick Liquids

## 2019-11-18 NOTE — SWALLOW BEDSIDE ASSESSMENT ADULT - COMMENTS
Pt sleepy but arousable. HOB elevated to 45 degrees. Followed basic directions & responded to basic questions appropriately. Per HIRAL Chong, pt coughing w/thin liquids yesterday and this morning. Pt burped after PO trials & reported stomach pain. When asked if he wants to vomit, pt replied "not really." Pt fatigue w/labored breathing towards end of eval. Pt likes apples juice & water. Gurgly vocal quality post swallow 1x. Throat clear post swallow 1x. Mild oral residue post swallow but cleared w/sips of liquid. Mild oral residue post swallow but cleared w/sips of liquid. Pt refused more trials. Eval terminated at this time. Pt sleepy but arousable. HOB elevated to 45 degrees. Followed basic directions & responded to basic questions appropriately. Per HIRAL Chong, pt coughing w/thin liquids yesterday and this morning. Pt burped after PO trials & reported stomach pain. When asked if he wants to vomit, pt replied "not really." Pt fatigue w/slight labored breathing towards end of eval. Pt likes apples juice & water.

## 2019-11-18 NOTE — PROGRESS NOTE ADULT - SUBJECTIVE AND OBJECTIVE BOX
[facility] NEPHROLOGY- PROGRESS NOTE    Patient is a 89y Male with hypothyroidism, chronic venous insufficiency, recent UTI s/p antibiotics p/w fever, cough and worsening LE swelling/ warmth. Renal consulted for Elevated serum creatinine.    Hospital Medications: Medications reviewed.  REVIEW OF SYSTEMS:  CONSTITUTIONAL: +fevers No chills  RESPIRATORY: + shortness of breath  CARDIOVASCULAR: No chest pain.  GASTROINTESTINAL: No nausea, vomiting, or diarrhea + abdominal pain.   VASCULAR: +bilateral lower extremity edema.     VITALS:  T(F): 100 (19 @ 12:42), Max: 101.6 (19 @ 18:26)  HR: 93 (19 @ 12:42)  BP: 152/90 (19 @ 12:42)  RR: 18 (19 @ 12:42)  SpO2: 98% (19 @ 12:42)  Wt(kg): --      PHYSICAL EXAM:  Gen: NAD, calm  HEENT: MMM  Neck: no JVD  Cards: irregular, +S1/S2, no M/G/R  Resp: mild rales at bases  GI: soft, NT/ND, NABS  Extremities: + LE edema B/L with hyperpigmentation      LABS:      135  |  103  |  33<H>  ----------------------------<  111<H>  3.8   |  23  |  1.49<H>    Ca    8.6      2019 07:06      Creatinine Trend: 1.49 <--, 1.90 <--, 1.74 <--, 2.22 <--                        12.3   17.30 )-----------( 234      ( 2019 07:06 )             38.1     Urine Studies:  Urinalysis Basic - ( 2019 08:33 )    Color: Yellow / Appearance: Clear / S.020 / pH:   Gluc:  / Ketone: Trace  / Bili: Negative / Urobili: 1   Blood:  / Protein: 15 / Nitrite: Negative   Leuk Esterase: Trace / RBC: 0-2 /HPF / WBC 0-2 /HPF   Sq Epi:  / Non Sq Epi: Occasional /HPF / Bacteria:       Creatinine, Random Urine: 173 mg/dL (11-17 @ 02:52)  Chloride, Random Urine: 55 mmol/L ( @ 02:52)  Sodium, Random Urine: 33 mmol/L ( @ 02:52)    < from: CT Chest No Cont (19 @ 10:36) >    EXAM:  CT CHEST                            PROCEDURE DATE:  2019        < end of copied text >  < from: CT Chest No Cont (19 @ 10:36) >  IMPRESSION:     Mild intralobular septal thickening consistent with mild pulmonary edema.   Bilateral dependent changes.    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    Mild cardiomegaly.    Distended gallbladder partially imaged. Consider abdominal ultrasound for   additional diagnostic benefit.    < end of copied text >

## 2019-11-18 NOTE — PROGRESS NOTE ADULT - ATTENDING COMMENTS
[facility] NEPHROLOGY  Justin Orozco M.D.  Kj Fernandez D.O.  Tonia Sanches M.D.  Salina Case, MSN, ANP-C  [phone]    [address] [place] [address]

## 2019-11-18 NOTE — PROGRESS NOTE ADULT - ASSESSMENT
Patient is a 89y Male with hypothyroidism, chronic venous insufficiency, recent UTI s/p antibiotics p/w fever, cough and worsening LE swelling/ warmth. Renal consulted for Elevated serum creatinine.    1. ARMIN- likely hemodynamically mediated in the setting of sepsis. Pt with improvement in renal function off Lisnopril/ Lasix with IVF. CT chest with mild pulmonary edema- recc to d/c IVF.  CT abd/pelvis with no hydro   Strict I/Os. Avoid nephrotoxins/ NSAIDs/ RCA. Monitor BMP.  2. CKD-3- previous Scr 1.1; defer secondary w/u due to advanced age. Monitor lytes  3. Sepsis- 2/2 Cellulitis- c/w abx as per primary team.   4. LE edema- in the setting of cellulitis. Continue to hold Lasix due to ARMIN.   5. HTN 2/2 CKD- BP acceptable off Lisnopril due to ARMIN. Pt with Afib. Consider BB or CCB for rate control. Monitor BP

## 2019-11-18 NOTE — PROGRESS NOTE ADULT - ATTENDING COMMENTS
Patient seen and examined.    T(C): 37.7 (11-18-19 @ 20:38), Max: 37.8 (11-18-19 @ 12:42)  HR: 102 (11-18-19 @ 20:38) (93 - 106)  BP: 128/78 (11-18-19 @ 20:38) (128/78 - 156/91)  RR: 17 (11-18-19 @ 20:38) (17 - 18)  SpO2: 94% (11-18-19 @ 20:38) (94% - 98%)    Cellulitis- Podiatry consulted, recommends IV Abx for cellulitis  on unasyn and doxy, ID consult called as patient failed out patient antibiotic course.

## 2019-11-18 NOTE — PROGRESS NOTE ADULT - SUBJECTIVE AND OBJECTIVE BOX
S :   89y year old Male seen bedside resting. Pt states "he does not want to die as he has too many responsibilities at home." Pt denies pain in his legs or feet. Pt shows signs of dementia and is poor historian.   Pt was seen this morning by podiatry . Pt states he has no complaints about his legs. Legs showing mild erythema and hyperpigmentation.      Patient admits to  (-) Fevers, (-) Chills, (-) Nausea, (-) Vomiting, (-) Shortness of Breath      PMH: Hypothyroid  Ulcers of both lower extremities    PSH:No significant past surgical history      Allergies:No Known Allergies      Labs:                                              12.3   17.30 )-----------( 234      ( 18 Nov 2019 07:06 )             38.1     11-18    135  |  103  |  33<H>  ----------------------------<  111<H>  3.8   |  23  |  1.49<H>    Ca    8.6      18 Nov 2019 07:06      Vital Signs Last 24 Hrs  T(C): 37 (18 Nov 2019 05:15), Max: 38.7 (17 Nov 2019 18:26)  T(F): 98.6 (18 Nov 2019 05:15), Max: 101.6 (17 Nov 2019 18:26)  HR: 88 (18 Nov 2019 05:15) (88 - 98)  BP: 133/71 (18 Nov 2019 05:15) (108/52 - 133/71)  BP(mean): --  RR: 17 (18 Nov 2019 05:15) (17 - 20)  SpO2: 96% (18 Nov 2019 05:15) (96% - 100%)      O:   Integument:  Skin warm, dry and supple bilateral.  No open lesions or inter-digital macerations noted bilateral. Moderate erythema noted lower legs. mild non-pitting edema. Pt legs are covered in thick lotion. TG warm to warm.   Vascular: Dorsalis Pedis and Posterior Tibial pulses 1/4.  Capillary re-fill time less than 3 seconds digits 1-5 bilateral.   Neuro: unable to fully assess due to lack of verbal cooperation. Pt reacts to light touch at level of the toes.         A:   Cellulitis LE b/l     P: Discussed diagnosis and treatment with patient  Pt would benefit from IV antibiotics  xray pending   Discussed and seen with

## 2019-11-18 NOTE — PROGRESS NOTE ADULT - PROBLEM SELECTOR PLAN 3
Baseline Cr of 1.15 in 11/2018  Creatinine 1.49 today   -CT abd/pelvis shows mass effect of enlarged prostate on bladder  -continue Tamsulosin  -f/u Nephro   off of lisinopril and lasix

## 2019-11-18 NOTE — SWALLOW BEDSIDE ASSESSMENT ADULT - PHARYNGEAL PHASE
Decreased laryngeal elevation/Delayed pharyngeal swallow Delayed pharyngeal swallow/Decreased laryngeal elevation/Multiple swallows Delayed pharyngeal swallow/Throat clear post oral intake/Multiple swallows/Decreased laryngeal elevation/Wet vocal quality post oral intake Delayed pharyngeal swallow/Decreased laryngeal elevation

## 2019-11-18 NOTE — PROGRESS NOTE ADULT - SUBJECTIVE AND OBJECTIVE BOX
PGY 1 Note discussed with supervising resident and primary attending    Patient is a 89y old  Male who presents with a chief complaint of Fever (18 Nov 2019 13:24)    INTERVAL HPI/OVERNIGHT EVENTS: Patient seen and examined at the bedside. No acute events overnight. Patient states having some abdominal pain. CT chest showing pulmonary edema. IVF stopped. Renal US showing no hydronephrosis.     MEDICATIONS  (STANDING):  ampicillin/sulbactam  IVPB 1.5 Gram(s) IV Intermittent every 6 hours  ampicillin/sulbactam  IVPB      clopidogrel Tablet 75 milliGRAM(s) Oral daily  doxycycline IVPB      doxycycline IVPB 100 milliGRAM(s) IV Intermittent every 12 hours  enoxaparin Injectable 30 milliGRAM(s) SubCutaneous daily  levothyroxine 100 MICROGram(s) Oral daily  sodium chloride 0.9%. 500 milliLiter(s) (50 mL/Hr) IV Continuous <Continuous>    MEDICATIONS  (PRN):  acetaminophen   Tablet .. 650 milliGRAM(s) Oral every 6 hours PRN Temp greater or equal to 38C (100.4F), Moderate Pain (4 - 6)      __________________________________________________  REVIEW OF SYSTEMS:  CONSTITUTIONAL: No fever   EYES: no visual disturbances  NECK: No pain  RESPIRATORY: No cough; No shortness of breath  CARDIOVASCULAR: No chest pain  GASTROINTESTINAL: Mild abdominal pain. No nausea or vomiting   NEUROLOGICAL: No headache   MUSCULOSKELETAL: No joint pain, no muscle pain  GENITOURINARY: no dysuria  PSYCHIATRY: no anxiety  ALL OTHER  ROS negative        Vital Signs Last 24 Hrs  T(C): 37.8 (18 Nov 2019 12:42), Max: 38.7 (17 Nov 2019 18:26)  T(F): 100 (18 Nov 2019 12:42), Max: 101.6 (17 Nov 2019 18:26)  HR: 93 (18 Nov 2019 12:42) (88 - 94)  BP: 152/90 (18 Nov 2019 12:42) (108/52 - 152/90)  RR: 18 (18 Nov 2019 12:42) (17 - 18)  SpO2: 98% (18 Nov 2019 12:42) (96% - 98%)    ________________________________________________  PHYSICAL EXAM:  GENERAL: Patient seen resting in bed and in no apparent distress  HEENT: Normocephalic; conjunctivae and sclerae clear   NECK: supple  CHEST/LUNG: Mildly congested lung sounds    HEART: S1 S2, irregular; no murmurs  ABDOMEN: Soft, Nontender, Nondistended; Bowel sounds present  EXTREMITIES: chronic venous insufficiency, no edema; no calf tenderness  SKIN: warm and dry  NERVOUS SYSTEM: Awake and alert; no new deficits    _________________________________________________  LABS:                        12.3   17.30 )-----------( 234      ( 18 Nov 2019 07:06 )             38.1     11-18    135  |  103  |  33<H>  ----------------------------<  111<H>  3.8   |  23  |  1.49<H>    Ca    8.6      18 Nov 2019 07:06      RADIOLOGY & ADDITIONAL TESTS:    Imaging Personally Reviewed:  YES     < from: CT Chest No Cont (11.18.19 @ 10:36) >  IMPRESSION:     Mild intralobular septal thickening consistent with mild pulmonary edema.   Bilateral dependent changes.    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    Mild cardiomegaly.    Distended gallbladder partially imaged. Consider abdominal ultrasound for   additional diagnostic benefit.    < end of copied text >    < from: US Kidney and Bladder (11.18.19 @ 10:55) >  IMPRESSION:     No evidence of hydronephrosis. Right renal cyst. Significantly distended   bladder. Patient would not void on command.    < end of copied text >    < from: Xray Tibia + Fibula 2 Views, Bilateral (11.18.19 @ 10:38) >    Impression:    No acute osseous abnormality noted.    < end of copied text >    < from: Xray Foot AP + Lateral + Oblique, Bilat (11.18.19 @ 10:30) >    IMPRESSION: Left toe fracture which may not be acute. Clinical   correlation is suggested.    < end of copied text >    Consultant(s) Notes Reviewed:   YES     Care Discussed with Consultants :     Plan of care was discussed with patient and /or primary care giver; all questions and concerns were addressed and care was aligned with patient's wishes.

## 2019-11-18 NOTE — SWALLOW BEDSIDE ASSESSMENT ADULT - ASR SWALLOW ASPIRATION MONITOR
oral hygiene/fever/pneumonia/gurgly voice/change of breathing pattern/position upright (89Y)/throat clearing/cough/upper respiratory infection

## 2019-11-18 NOTE — SWALLOW BEDSIDE ASSESSMENT ADULT - SLP PRECAUTIONS/LIMITATIONS: VISION
Partially impaired: cannot see medication labels or newsprint, but can see obstacles in path, and the surrounding layout; can count fingers at arm's length; with glasses

## 2019-11-18 NOTE — PROGRESS NOTE ADULT - PROBLEM SELECTOR PLAN 2
possible cellulitis of Lower extremity but unlikely   -Podiatry consulted, recommends IV Abx for cellulitis  on unasyn and doxy  ID Dr Jha

## 2019-11-18 NOTE — SWALLOW BEDSIDE ASSESSMENT ADULT - SWALLOW EVAL: RECOMMENDED FEEDING/EATING TECHNIQUES
no straws/small sips/bites/check mouth frequently for oral residue/pocketing/crush medication (when feasible)/position upright (90 degrees)/maintain upright posture during/after eating for 30 mins/oral hygiene/alternate food with liquid/allow for swallow between intakes

## 2019-11-18 NOTE — SWALLOW BEDSIDE ASSESSMENT ADULT - SWALLOW EVAL: DIAGNOSIS
Pt presented with s/s of oropharyngeal dysphagia. Pt exhibited Reduced oral grading. Prolonged oral transit & bolus transport. Initiation of swallow delayed w/reduced laryngeal elevation. Multiple swallows. Throat clearing & gurgly vocal quality post swallow w/thin liquids. At risk of aspiration at this time. Pt presented with s/s of oropharyngeal dysphagia. Pt exhibited Reduced oral grading. Prolonged oral transit & bolus transport. Initiation of swallow delayed w/reduced laryngeal elevation. Multiple swallows. Throat clearing & gurgly vocal quality post swallow w/thin liquids. Fatigued w/slight labored breathing towards end of eval. At risk of aspiration at this time.

## 2019-11-18 NOTE — PROGRESS NOTE ADULT - PROBLEM SELECTOR PLAN 1
patient had episode of fever overnight  on unasyn and doxy  CT chest showing mild pulmonary edema, partially distended gallbladder  hepatic US ordered   will also DC IVF due to pulm edema  ID Dr Jha consulted

## 2019-11-19 LAB
ANION GAP SERPL CALC-SCNC: 8 MMOL/L — SIGNIFICANT CHANGE UP (ref 5–17)
BASOPHILS # BLD AUTO: 0.05 K/UL — SIGNIFICANT CHANGE UP (ref 0–0.2)
BASOPHILS NFR BLD AUTO: 0.3 % — SIGNIFICANT CHANGE UP (ref 0–2)
BUN SERPL-MCNC: 32 MG/DL — HIGH (ref 7–18)
CALCIUM SERPL-MCNC: 9 MG/DL — SIGNIFICANT CHANGE UP (ref 8.4–10.5)
CHLORIDE SERPL-SCNC: 105 MMOL/L — SIGNIFICANT CHANGE UP (ref 96–108)
CO2 SERPL-SCNC: 23 MMOL/L — SIGNIFICANT CHANGE UP (ref 22–31)
CREAT SERPL-MCNC: 1.26 MG/DL — SIGNIFICANT CHANGE UP (ref 0.5–1.3)
EOSINOPHIL # BLD AUTO: 0.01 K/UL — SIGNIFICANT CHANGE UP (ref 0–0.5)
EOSINOPHIL NFR BLD AUTO: 0.1 % — SIGNIFICANT CHANGE UP (ref 0–6)
GLUCOSE SERPL-MCNC: 113 MG/DL — HIGH (ref 70–99)
HCT VFR BLD CALC: 39.2 % — SIGNIFICANT CHANGE UP (ref 39–50)
HGB BLD-MCNC: 13.1 G/DL — SIGNIFICANT CHANGE UP (ref 13–17)
IMM GRANULOCYTES NFR BLD AUTO: 1.5 % — SIGNIFICANT CHANGE UP (ref 0–1.5)
LYMPHOCYTES # BLD AUTO: 1.16 K/UL — SIGNIFICANT CHANGE UP (ref 1–3.3)
LYMPHOCYTES # BLD AUTO: 5.8 % — LOW (ref 13–44)
MAGNESIUM SERPL-MCNC: 2.3 MG/DL — SIGNIFICANT CHANGE UP (ref 1.6–2.6)
MCHC RBC-ENTMCNC: 31 PG — SIGNIFICANT CHANGE UP (ref 27–34)
MCHC RBC-ENTMCNC: 33.4 GM/DL — SIGNIFICANT CHANGE UP (ref 32–36)
MCV RBC AUTO: 92.9 FL — SIGNIFICANT CHANGE UP (ref 80–100)
MONOCYTES # BLD AUTO: 0.91 K/UL — HIGH (ref 0–0.9)
MONOCYTES NFR BLD AUTO: 4.6 % — SIGNIFICANT CHANGE UP (ref 2–14)
NEUTROPHILS # BLD AUTO: 17.49 K/UL — HIGH (ref 1.8–7.4)
NEUTROPHILS NFR BLD AUTO: 87.7 % — HIGH (ref 43–77)
NRBC # BLD: 0 /100 WBCS — SIGNIFICANT CHANGE UP (ref 0–0)
PHOSPHATE SERPL-MCNC: 2.3 MG/DL — LOW (ref 2.5–4.5)
PLATELET # BLD AUTO: 259 K/UL — SIGNIFICANT CHANGE UP (ref 150–400)
POTASSIUM SERPL-MCNC: 3.7 MMOL/L — SIGNIFICANT CHANGE UP (ref 3.5–5.3)
POTASSIUM SERPL-SCNC: 3.7 MMOL/L — SIGNIFICANT CHANGE UP (ref 3.5–5.3)
RBC # BLD: 4.22 M/UL — SIGNIFICANT CHANGE UP (ref 4.2–5.8)
RBC # FLD: 14.5 % — SIGNIFICANT CHANGE UP (ref 10.3–14.5)
SODIUM SERPL-SCNC: 136 MMOL/L — SIGNIFICANT CHANGE UP (ref 135–145)
WBC # BLD: 19.91 K/UL — HIGH (ref 3.8–10.5)
WBC # FLD AUTO: 19.91 K/UL — HIGH (ref 3.8–10.5)

## 2019-11-19 PROCEDURE — 99233 SBSQ HOSP IP/OBS HIGH 50: CPT | Mod: GC

## 2019-11-19 PROCEDURE — 76705 ECHO EXAM OF ABDOMEN: CPT | Mod: 26

## 2019-11-19 RX ORDER — SODIUM CHLORIDE 9 MG/ML
1000 INJECTION, SOLUTION INTRAVENOUS
Refills: 0 | Status: DISCONTINUED | OUTPATIENT
Start: 2019-11-19 | End: 2019-11-19

## 2019-11-19 RX ORDER — PIPERACILLIN AND TAZOBACTAM 4; .5 G/20ML; G/20ML
3.38 INJECTION, POWDER, LYOPHILIZED, FOR SOLUTION INTRAVENOUS ONCE
Refills: 0 | Status: COMPLETED | OUTPATIENT
Start: 2019-11-19 | End: 2019-11-19

## 2019-11-19 RX ORDER — PIPERACILLIN AND TAZOBACTAM 4; .5 G/20ML; G/20ML
3.38 INJECTION, POWDER, LYOPHILIZED, FOR SOLUTION INTRAVENOUS EVERY 8 HOURS
Refills: 0 | Status: COMPLETED | OUTPATIENT
Start: 2019-11-20 | End: 2019-11-26

## 2019-11-19 RX ORDER — TAMSULOSIN HYDROCHLORIDE 0.4 MG/1
0.4 CAPSULE ORAL AT BEDTIME
Refills: 0 | Status: DISCONTINUED | OUTPATIENT
Start: 2019-11-19 | End: 2019-12-05

## 2019-11-19 RX ORDER — POTASSIUM PHOSPHATE, MONOBASIC POTASSIUM PHOSPHATE, DIBASIC 236; 224 MG/ML; MG/ML
15 INJECTION, SOLUTION INTRAVENOUS ONCE
Refills: 0 | Status: COMPLETED | OUTPATIENT
Start: 2019-11-19 | End: 2019-11-19

## 2019-11-19 RX ADMIN — Medication 100 MICROGRAM(S): at 05:17

## 2019-11-19 RX ADMIN — PIPERACILLIN AND TAZOBACTAM 200 GRAM(S): 4; .5 INJECTION, POWDER, LYOPHILIZED, FOR SOLUTION INTRAVENOUS at 18:46

## 2019-11-19 RX ADMIN — POTASSIUM PHOSPHATE, MONOBASIC POTASSIUM PHOSPHATE, DIBASIC 62.5 MILLIMOLE(S): 236; 224 INJECTION, SOLUTION INTRAVENOUS at 17:57

## 2019-11-19 RX ADMIN — AMPICILLIN SODIUM AND SULBACTAM SODIUM 100 GRAM(S): 250; 125 INJECTION, POWDER, FOR SUSPENSION INTRAMUSCULAR; INTRAVENOUS at 12:56

## 2019-11-19 RX ADMIN — TAMSULOSIN HYDROCHLORIDE 0.4 MILLIGRAM(S): 0.4 CAPSULE ORAL at 12:55

## 2019-11-19 RX ADMIN — Medication 110 MILLIGRAM(S): at 05:17

## 2019-11-19 RX ADMIN — ENOXAPARIN SODIUM 30 MILLIGRAM(S): 100 INJECTION SUBCUTANEOUS at 12:55

## 2019-11-19 RX ADMIN — AMPICILLIN SODIUM AND SULBACTAM SODIUM 100 GRAM(S): 250; 125 INJECTION, POWDER, FOR SUSPENSION INTRAMUSCULAR; INTRAVENOUS at 05:17

## 2019-11-19 RX ADMIN — CLOPIDOGREL BISULFATE 75 MILLIGRAM(S): 75 TABLET, FILM COATED ORAL at 12:56

## 2019-11-19 NOTE — PROGRESS NOTE ADULT - PROBLEM SELECTOR PLAN 1
has been afebrile overnight  questionable aspiration PNA due to coughing when eating; speech and swallow evaluated patient and diet changed to mech soft with nectar thick liquids   currently on unasyn and doxy  hepatic US showing distended gallbladder with sludge wall thickening  f/u ID Dr Jha  patient was on antibiotics before admission for UTI and was given Bactrim as per pharmacy

## 2019-11-19 NOTE — PROGRESS NOTE ADULT - SUBJECTIVE AND OBJECTIVE BOX
PGY 1 Note discussed with supervising resident and primary attending    Patient is a 89y old  Male who presents with a chief complaint of Fever (19 Nov 2019 10:24)    INTERVAL HPI/OVERNIGHT EVENTS: Patient seen and examined at the bedside. Patient had hepatic US done today showing distended gallbladder with sludge wall thickening. Patient states having some abdominal pain. Denies any other concerns or complaints.     MEDICATIONS  (STANDING):  ampicillin/sulbactam  IVPB 1.5 Gram(s) IV Intermittent every 6 hours  ampicillin/sulbactam  IVPB      clopidogrel Tablet 75 milliGRAM(s) Oral daily  doxycycline IVPB      doxycycline IVPB 100 milliGRAM(s) IV Intermittent every 12 hours  enoxaparin Injectable 30 milliGRAM(s) SubCutaneous daily  levothyroxine 100 MICROGram(s) Oral daily  tamsulosin 0.4 milliGRAM(s) Oral at bedtime    MEDICATIONS  (PRN):  acetaminophen   Tablet .. 650 milliGRAM(s) Oral every 6 hours PRN Temp greater or equal to 38C (100.4F), Moderate Pain (4 - 6)    _________________________________________________  REVIEW OF SYSTEMS:  CONSTITUTIONAL: No fever   EYES: no visual disturbances  NECK: No pain   RESPIRATORY: Mild cough; No shortness of breath  CARDIOVASCULAR: No chest pain  GASTROINTESTINAL: Abdominal pain. No nausea or vomiting  NEUROLOGICAL: No headache   MUSCULOSKELETAL: lower extremity pain   GENITOURINARY: no dysuria  PSYCHIATRY: no anxiety  ALL OTHER  ROS negative        Vital Signs Last 24 Hrs  T(C): 37.9 (19 Nov 2019 14:27), Max: 37.9 (19 Nov 2019 14:27)  T(F): 100.2 (19 Nov 2019 14:27), Max: 100.2 (19 Nov 2019 14:27)  HR: 105 (19 Nov 2019 14:27) (98 - 106)  BP: 140/88 (19 Nov 2019 14:27) (128/78 - 156/91)  RR: 18 (19 Nov 2019 14:27) (17 - 18)  SpO2: 95% (19 Nov 2019 14:27) (94% - 96%)    ________________________________________________  PHYSICAL EXAM:  GENERAL: Patient seen resting in bed and in no acute distress  HEENT: Normocephalic; conjunctivae and sclerae clear   NECK: supple  CHEST/LUNG: Clear to auscultation bilaterally   HEART: S1 S2, irregular; no murmurs  ABDOMEN: Soft, Mild tenderness to palpation throughout, Nondistended; Bowel sounds present  EXTREMITIES: chronic venous stasis; no edema  SKIN: warm and dry  NERVOUS SYSTEM: Awake and alert; not oriented to place or time    _________________________________________________  LABS:                        13.1   19.91 )-----------( 259      ( 19 Nov 2019 06:53 )             39.2     11-19    136  |  105  |  32<H>  ----------------------------<  113<H>  3.7   |  23  |  1.26    Ca    9.0      19 Nov 2019 06:53  Phos  2.3     11-19  Mg     2.3     11-19      RADIOLOGY & ADDITIONAL TESTS:    Imaging Personally Reviewed:  YES     < from: US Hepatic & Pancreatic (11.19.19 @ 13:01) >  Impression: Distended gallbladder with sludge wall thickening. Correlate   with HIDA scan as clinically indicated. No biliary dilatation.    < end of copied text >    Consultant(s) Notes Reviewed:   YES     Care Discussed with Consultants :     Plan of care was discussed with patient and /or primary care giver; all questions and concerns were addressed and care was aligned with patient's wishes.

## 2019-11-19 NOTE — PROGRESS NOTE ADULT - ASSESSMENT
Patient is a 89y Male with hypothyroidism, chronic venous insufficiency, recent UTI s/p antibiotics p/w fever, cough and worsening LE swelling/ warmth. Renal consulted for Elevated serum creatinine.    1. ARMIN- likely hemodynamically mediated in the setting of sepsis. Pt with improvement in renal function off Lisnopril/ Lasix with IVF. CT chest with mild pulmonary edema and IVF d/c 11/18.  Phos repleted. CT abd/pelvis with no hydro, however Renal US with distended bladder; recc post void bladder scan. Strict I/Os. Avoid nephrotoxins/ NSAIDs/ RCA. Monitor BMP.  2. CKD-3- previous Scr 1.1; defer secondary w/u due to advanced age. Monitor lytes  3. Sepsis- 2/2 Cellulitis- c/w abx as per primary team.   4. LE edema- in the setting of cellulitis. Continue to hold Lasix due to ARMIN.   5. HTN 2/2 CKD- BP acceptable; off Lisnopril due to ARMIN. Pt with Afib. Consider BB or CCB for rate control. Monitor BP

## 2019-11-19 NOTE — PROGRESS NOTE ADULT - PROBLEM SELECTOR PLAN 3
Baseline Cr of 1.15 in 11/2018  Creatinine was elevated but has been downtrending and is 1.26 today   -CT abd/pelvis shows mass effect of enlarged prostate on bladder  -continue Tamsulosin  -f/u Nephro   off of lisinopril and lasix

## 2019-11-19 NOTE — CONSULT NOTE ADULT - RS GEN PE MLT RESP DETAILS PC
no wheezes/good air movement/no rales/breath sounds equal/clear to auscultation bilaterally/no rhonchi

## 2019-11-19 NOTE — PROGRESS NOTE ADULT - SUBJECTIVE AND OBJECTIVE BOX
[facility] NEPHROLOGY- PROGRESS NOTE    Patient is a 89y Male with hypothyroidism, chronic venous insufficiency, recent UTI s/p antibiotics p/w fever, cough and worsening LE swelling/ warmth. Renal consulted for Elevated serum creatinine.    Hospital Medications: Medications reviewed.  REVIEW OF SYSTEMS:  CONSTITUTIONAL: No fevers No chills  RESPIRATORY: No shortness of breath  CARDIOVASCULAR: No chest pain.  GASTROINTESTINAL: No nausea, vomiting, or diarrhea + abdominal pain.   VASCULAR: +bilateral lower extremity edema.     VITALS:  T(F): 100.2 (19 @ 14:27), Max: 100.2 (19 @ 14:27)  HR: 105 (19 @ 14:27)  BP: 140/88 (19 @ 14:27)  RR: 18 (19 @ 14:27)  SpO2: 95% (19 @ 14:27)  Wt(kg): --      PHYSICAL EXAM:  Gen: NAD, calm  HEENT: MMM  Neck: no JVD  Cards: irregular, +S1/S2, no M/G/R  Resp: CTA ant  GI: soft, NT/ND, NABS  Extremities: + LE edema B/L with hyperpigmentation/ warmth    LABS:      136  |  105  |  32<H>  ----------------------------<  113<H>  3.7   |  23  |  1.26    Ca    9.0      2019 06:53  Phos  2.3       Mg     2.3           Creatinine Trend: 1.26 <--, 1.49 <--, 1.90 <--, 1.74 <--, 2.22 <--                        13.1   19.91 )-----------( 259      ( 2019 06:53 )             39.2     Urine Studies:  Urinalysis Basic - ( 2019 08:33 )    Color: Yellow / Appearance: Clear / S.020 / pH:   Gluc:  / Ketone: Trace  / Bili: Negative / Urobili: 1   Blood:  / Protein: 15 / Nitrite: Negative   Leuk Esterase: Trace / RBC: 0-2 /HPF / WBC 0-2 /HPF   Sq Epi:  / Non Sq Epi: Occasional /HPF / Bacteria:       Creatinine, Random Urine: 173 mg/dL ( @ 02:52)  Chloride, Random Urine: 55 mmol/L ( @ 02:52)  Sodium, Random Urine: 33 mmol/L ( @ 02:52)    < from: US Kidney and Bladder (19 @ 10:55) >  EXAM:  US KIDNEYS AND BLADDER                            PROCEDURE DATE:  2019          INTERPRETATION:  CLINICAL INFORMATION: Acute kidney injury, evaluate post   void residual    COMPARISON: None available.    TECHNIQUE: Sonography of the kidneys and bladder.     FINDINGS:    Right kidney:  9.6 cm. No renal mass, hydronephrosis or calculi.    Left kidney:  9.1 cm. No renal mass, hydronephrosis or calculi. Upper   pole cyst measures 3.6 x 3.3 x 3.7 cm    Urinary bladder: Within normal limits. Prevoid volume is 665 cc. Patient   would not void on request.    IMPRESSION:     No evidence of hydronephrosis. Right renal cyst. Significantly distended   bladder. Patient would not void on command.      < end of copied text >

## 2019-11-19 NOTE — CONSULT NOTE ADULT - SUBJECTIVE AND OBJECTIVE BOX
HPI:  Patient is 89 year old male, has hha 24x7, ambulates with walker,  has medical history significant for hypothyroidism, chronic venous insufficiency came in to ED because of fever of 101 at home.   Per wife at bedside pt was in his usual health a week ago, when pt was started on abx for UTI by visiting physician. pt completed the abx of 5 days. Since yesterday pt is having fever at home tmax 101.4, associated with dry cough for 2 days, weakness, poor po intake, chills, dysuria. Pt also has b/l leg swelling, with warmness, and redness. per wife it's chronic and pt is on furosemide for leg swelling. Pt denies any dizziness, headache, chest pain, sob, n/v/d/c. (15 Nov 2019 22:05)      PAST MEDICAL & SURGICAL HISTORY:  Hypothyroid  Ulcers of both lower extremities  No significant past surgical history      No Known Allergies      Meds:  acetaminophen   Tablet .. 650 milliGRAM(s) Oral every 6 hours PRN  ampicillin/sulbactam  IVPB 1.5 Gram(s) IV Intermittent every 6 hours  ampicillin/sulbactam  IVPB      clopidogrel Tablet 75 milliGRAM(s) Oral daily  doxycycline IVPB      doxycycline IVPB 100 milliGRAM(s) IV Intermittent every 12 hours  enoxaparin Injectable 30 milliGRAM(s) SubCutaneous daily  levothyroxine 100 MICROGram(s) Oral daily  potassium phosphate IVPB 15 milliMole(s) IV Intermittent once  tamsulosin 0.4 milliGRAM(s) Oral at bedtime      SOCIAL HISTORY:  Smoker:    ETOH use:     FAMILY HISTORY:  No pertinent family history in first degree relatives      VITALS:  Vital Signs Last 24 Hrs  T(C): 37.8 (19 Nov 2019 17:12), Max: 37.9 (19 Nov 2019 14:27)  T(F): 100 (19 Nov 2019 17:12), Max: 100.2 (19 Nov 2019 14:27)  HR: 105 (19 Nov 2019 14:27) (98 - 106)  BP: 140/88 (19 Nov 2019 14:27) (128/78 - 156/91)  BP(mean): --  RR: 18 (19 Nov 2019 14:27) (17 - 18)  SpO2: 95% (19 Nov 2019 14:27) (94% - 96%)    LABS/DIAGNOSTIC TESTS:                          13.1   19.91 )-----------( 259      ( 19 Nov 2019 06:53 )             39.2     WBC Count: 19.91 K/uL (11-19 @ 06:53)  WBC Count: 17.30 K/uL (11-18 @ 07:06)  WBC Count: 17.03 K/uL (11-17 @ 06:32)      11-19    136  |  105  |  32<H>  ----------------------------<  113<H>  3.7   |  23  |  1.26    Ca    9.0      19 Nov 2019 06:53  Phos  2.3     11-19  Mg     2.3     11-19                    LACTATE:    ABG -     CULTURES:   .Urine  11-16 @ 16:28   <10,000 CFU/mL Normal Urogenital Amanda  --  --      .Blood  11-16 @ 01:59   No growth to date.  --  --          RADIOLOGY:< from: US Hepatic & Pancreatic (11.19.19 @ 13:01) >  EXAM:  US LIVER AND PANCREAS                            PROCEDURE DATE:  11/19/2019          INTERPRETATION:  History: Distended gallbladder on recent CT.    Right upper quadrant ultrasound.    Gallbladder is distended with dependent sludge mild diffuse wall   thickening. No gallstones or pericholecystic fluid. If there is clinical   suspicion for cystic duct obstruction/cholecystitis consider HIDA scan.   No biliary dilatation. Common duct 0.6 cm, normal for age.  Liver not remarkable. Pancreas not well visualized. Right kidney 11.6 cm,   unremarkable.  No ascites.    Impression: Distended gallbladder with sludge wall thickening. Correlate   with HIDA scan as clinically indicated. No biliary dilatation.                OG SINGH M.D.,ATTENDING RADIOLOGIST  This document has been electronically signed. Nov 19 2019  1:25PM      ---------------------------------------------------------------------------------------------------------------------------------------  < from: US Kidney and Bladder (11.18.19 @ 10:55) >  EXAM:  US KIDNEYS AND BLADDER                            PROCEDURE DATE:  11/18/2019          INTERPRETATION:  CLINICAL INFORMATION: Acute kidney injury, evaluate post   void residual    COMPARISON: None available.    TECHNIQUE: Sonography of the kidneys and bladder.     FINDINGS:    Right kidney:  9.6 cm. No renal mass, hydronephrosis or calculi.    Left kidney:  9.1 cm. No renal mass, hydronephrosis or calculi. Upper   pole cyst measures 3.6 x 3.3 x 3.7 cm    Urinary bladder: Within normal limits. Prevoid volume is 665 cc. Patient   would not void on request.    IMPRESSION:     No evidence of hydronephrosis. Right renal cyst. Significantly distended   bladder. Patient would not void on command.                    ROSALVA RANDOLPH M.D.,ATTENDING RADIOLOGIST  This document has been electronically signed. Nov 18 2019  2:22PM        -----------------------------------------------------------------------------------------------------------------------------  < from: Xray Tibia + Fibula 2 Views, Bilateral (11.18.19 @ 10:38) >  EXAM:  LEG AP&LAT - BILATERAL                            PROCEDURE DATE:  11/18/2019          INTERPRETATION:  Clinical information: Cellulitis, leg ulcers.    AP and lateral views of the bilateral tibia and fibula.    Osteopenia is present.  No focal osteolysis or acute fracture is noted.    Impression:    No acute osseous abnormality noted.                MIRIAM HUMPHREYS M.D., ATTENDING RADIOLOGIST  This document has been electronically signed. Nov 18 2019  2:07PM          < end of copied text >        ROS  [  ] UNABLE TO ELICIT HPI:  Patient is 89 year old male, has hha 24x7, ambulates with walker,  has medical history significant for hypothyroidism, chronic venous insufficiency came in to ED because of fever of 101 at home.   Per wife at bedside pt was in his usual health a week ago, when pt was started on abx for UTI by visiting physician. pt completed the abx of 5 days. Since yesterday pt is having fever at home tmax 101.4, associated with dry cough for 2 days, weakness, poor po intake, chills, dysuria. Pt also has b/l leg swelling, with warmness, and redness. per wife it's chronic and pt is on furosemide for leg swelling. Pt denies any dizziness, headache, chest pain, sob, n/v/d/c. (15 Nov 2019 22:05)    History as above, asked to eval for fevers , he has chronic stasis dermatitis and was felt to have cellulitis initially , pt is a very poor historian and does not answer questions appropriately. He has a low grade fever today and has an increasing wbc count.      PAST MEDICAL & SURGICAL HISTORY:  Hypothyroid  Ulcers of both lower extremities  No significant past surgical history      No Known Allergies      Meds:  acetaminophen   Tablet .. 650 milliGRAM(s) Oral every 6 hours PRN  ampicillin/sulbactam  IVPB 1.5 Gram(s) IV Intermittent every 6 hours  ampicillin/sulbactam  IVPB      clopidogrel Tablet 75 milliGRAM(s) Oral daily  doxycycline IVPB      doxycycline IVPB 100 milliGRAM(s) IV Intermittent every 12 hours  enoxaparin Injectable 30 milliGRAM(s) SubCutaneous daily  levothyroxine 100 MICROGram(s) Oral daily  potassium phosphate IVPB 15 milliMole(s) IV Intermittent once  tamsulosin 0.4 milliGRAM(s) Oral at bedtime      SOCIAL HISTORY:  Smoker:  no  ETOH use: no     FAMILY HISTORY:  No pertinent family history in first degree relatives      VITALS:  Vital Signs Last 24 Hrs  T(C): 37.8 (19 Nov 2019 17:12), Max: 37.9 (19 Nov 2019 14:27)  T(F): 100 (19 Nov 2019 17:12), Max: 100.2 (19 Nov 2019 14:27)  HR: 105 (19 Nov 2019 14:27) (98 - 106)  BP: 140/88 (19 Nov 2019 14:27) (128/78 - 156/91)  BP(mean): --  RR: 18 (19 Nov 2019 14:27) (17 - 18)  SpO2: 95% (19 Nov 2019 14:27) (94% - 96%)    LABS/DIAGNOSTIC TESTS:                          13.1   19.91 )-----------( 259      ( 19 Nov 2019 06:53 )             39.2     WBC Count: 19.91 K/uL (11-19 @ 06:53)  WBC Count: 17.30 K/uL (11-18 @ 07:06)  WBC Count: 17.03 K/uL (11-17 @ 06:32)      11-19    136  |  105  |  32<H>  ----------------------------<  113<H>  3.7   |  23  |  1.26    Ca    9.0      19 Nov 2019 06:53  Phos  2.3     11-19  Mg     2.3     11-19                    LACTATE:    ABG -     CULTURES:   .Urine  11-16 @ 16:28   <10,000 CFU/mL Normal Urogenital Amanda  --  --      .Blood  11-16 @ 01:59   No growth to date.  --  --          RADIOLOGY:< from: US Hepatic & Pancreatic (11.19.19 @ 13:01) >  EXAM:  US LIVER AND PANCREAS                            PROCEDURE DATE:  11/19/2019          INTERPRETATION:  History: Distended gallbladder on recent CT.    Right upper quadrant ultrasound.    Gallbladder is distended with dependent sludge mild diffuse wall   thickening. No gallstones or pericholecystic fluid. If there is clinical   suspicion for cystic duct obstruction/cholecystitis consider HIDA scan.   No biliary dilatation. Common duct 0.6 cm, normal for age.  Liver not remarkable. Pancreas not well visualized. Right kidney 11.6 cm,   unremarkable.  No ascites.    Impression: Distended gallbladder with sludge wall thickening. Correlate   with HIDA scan as clinically indicated. No biliary dilatation.                OG SINGH M.D.,ATTENDING RADIOLOGIST  This document has been electronically signed. Nov 19 2019  1:25PM      ---------------------------------------------------------------------------------------------------------------------------------------  < from: US Kidney and Bladder (11.18.19 @ 10:55) >  EXAM:  US KIDNEYS AND BLADDER                            PROCEDURE DATE:  11/18/2019          INTERPRETATION:  CLINICAL INFORMATION: Acute kidney injury, evaluate post   void residual    COMPARISON: None available.    TECHNIQUE: Sonography of the kidneys and bladder.     FINDINGS:    Right kidney:  9.6 cm. No renal mass, hydronephrosis or calculi.    Left kidney:  9.1 cm. No renal mass, hydronephrosis or calculi. Upper   pole cyst measures 3.6 x 3.3 x 3.7 cm    Urinary bladder: Within normal limits. Prevoid volume is 665 cc. Patient   would not void on request.    IMPRESSION:     No evidence of hydronephrosis. Right renal cyst. Significantly distended   bladder. Patient would not void on command.                    ROSALVA RANDOLPH M.D.,ATTENDING RADIOLOGIST  This document has been electronically signed. Nov 18 2019  2:22PM        -----------------------------------------------------------------------------------------------------------------------------  < from: Xray Tibia + Fibula 2 Views, Bilateral (11.18.19 @ 10:38) >  EXAM:  LEG AP&LAT - BILATERAL                            PROCEDURE DATE:  11/18/2019          INTERPRETATION:  Clinical information: Cellulitis, leg ulcers.    AP and lateral views of the bilateral tibia and fibula.    Osteopenia is present.  No focal osteolysis or acute fracture is noted.    Impression:    No acute osseous abnormality noted.                MIRIAM HUMPHREYS M.D., ATTENDING RADIOLOGIST  This document has been electronically signed. Nov 18 2019  2:07PM          < end of copied text >        ROS  [ x ] UNABLE TO ELICIT

## 2019-11-19 NOTE — PROGRESS NOTE ADULT - ATTENDING COMMENTS
[facility] NEPHROLOGY  Justin Orozco M.D.  Kj Fernandez D.O.  Tonia Sanches M.D.  Salina Case, MSN, ANP-C  [phone]    [address] [place] [address]

## 2019-11-19 NOTE — CONSULT NOTE ADULT - ASSESSMENT
Fevers  Leukocytosis - increased  Likely has Acute Cholecystitis    Plan - agree with getting HIDA scan  DC unasyn and doxycycline  Start Zosyn 3,375gms iv q8hrs

## 2019-11-19 NOTE — DIETITIAN INITIAL EVALUATION ADULT. - PROBLEM SELECTOR PLAN 1
.
fever, tachycardia, leucocytosis meets sirs criteria   currently unknown source   cxr neg but pt has cough, pt has not given urine sample as pt is incontinent   b/l leg swelling concerning for cellulitis although per wife it she has not see any unusual changes in his legs   will c/w Rocephin for now, u/a remains neg will give vancomycin to cover for cellulitis

## 2019-11-19 NOTE — CONSULT NOTE ADULT - GASTROINTESTINAL DETAILS
no distention/no guarding/bowel sounds normal/no masses palpable/no rebound tenderness/no rigidity/no organomegaly/soft

## 2019-11-19 NOTE — PROGRESS NOTE ADULT - ATTENDING COMMENTS
I have seen and examined the patient. Case discussed with house staff. I have reviewed and edited the note where appropriate.    Patient is a 89y Male with hypothyroidism, chronic venous insufficiency, recent UTI s/p antibiotics p/w fever, cough and worsening LE swelling admitted for cellulitis.    Underwent RUQ sono which     A/P  Leukocytosis presumed secondary to cellulitis on Doxy and Unasyn.  Leukocytosis persisting with 100.2 F this afternoon. cultures negative so far.  Afib  Hypothyroidism I have seen and examined the patient. Case discussed with house staff. I have reviewed and edited the note where appropriate.    Patient is a 89y Male with hypothyroidism, chronic venous insufficiency, recent UTI s/p antibiotics p/w fever, cough and worsening LE swelling admitted for cellulitis.    OE  NAD  soft, RUQ tenderness, +bs  B/L extremity chronic venous stasis changes  awake, alert    A/P  Leukocytosis persisting with 100.2 F this afternoon. Exam significant for RUQ tenderness, R/o Cholecystitis, doubt cellulitis ;Patient has chronic venous stasis   IV antibiotics switched to Zosyn.  Awaits HIDA scan in am  NPO  2D Echo to assess cardiac status  verify the indication for Plavix with PCP  Afib not on anticoagulation ,CHADSVAS score at least 3, needs to obtain more information from PCP in the morning     Discussed with ID, patients spouse and daughter at bedside.

## 2019-11-19 NOTE — DIETITIAN INITIAL EVALUATION ADULT. - OTHER INFO
Pt visited. Pt asleep. D/W PCA Pt eating ~ 50 % of  Meal.  Pt is assisted W meals. S/P SLP eval 11/18- [facility]. soft chopped Vegetables, Ground Meat [place] liquids. Po tolerated Per PCA.

## 2019-11-19 NOTE — PROGRESS NOTE ADULT - SUBJECTIVE AND OBJECTIVE BOX
S :  Pt was seen this morning bedside. Pt states he has pain all over his body.       Patient admits to  (-) Fevers, (-) Chills, (-) Nausea, (-) Vomiting, (-) Shortness of Breath      PMH: Hypothyroid  Ulcers of both lower extremities    PSH:No significant past surgical history      Allergies:No Known Allergies      Labs:                                                         13.1   19.91 )-----------( 259      ( 19 Nov 2019 06:53 )             39.2       11-19    136  |  105  |  32<H>  ----------------------------<  113<H>  3.7   |  23  |  1.26    Ca    9.0      19 Nov 2019 06:53  Phos  2.3     11-19  Mg     2.3     11-19      Vital Signs Last 24 Hrs  T(C): 36.7 (19 Nov 2019 05:10), Max: 37.8 (18 Nov 2019 12:42)  T(F): 98.1 (19 Nov 2019 05:10), Max: 100 (18 Nov 2019 12:42)  HR: 98 (19 Nov 2019 05:10) (93 - 106)  BP: 136/85 (19 Nov 2019 05:10) (128/78 - 156/91)  BP(mean): --  RR: 17 (19 Nov 2019 05:10) (17 - 18)  SpO2: 96% (19 Nov 2019 05:10) (94% - 98%)    O:   Integument:  Skin warm, dry and supple bilateral.  No open lesions or inter-digital macerations noted bilateral. Moderate erythema noted lower legs. mild non-pitting edema. Pt legs are covered in thick lotion. TG warm to warm.   Vascular: Dorsalis Pedis and Posterior Tibial pulses 1/4.  Capillary re-fill time less than 3 seconds digits 1-5 bilateral.   Neuro: unable to fully assess due to lack of verbal cooperation. Pt reacts to light touch at level of the toes.     < from: Xray Tibia + Fibula 2 Views, Bilateral (11.18.19 @ 10:38) >  EXAM:  LEG AP&LAT - BILATERAL                            PROCEDURE DATE:  11/18/2019          INTERPRETATION:  Clinical information: Cellulitis, leg ulcers.    AP and lateral views of the bilateral tibia and fibula.    Osteopenia is present.  No focal osteolysis or acute fracture is noted.    Impression:    No acute osseous abnormality noted.                MIRIAM HUMPHREYS M.D., ATTENDING RADIOLOGIST  This document has been electronically signed. Nov 18 2019  2:07PM    < end of copied text >      A:   Cellulitis LE b/l   < from: Xray Foot AP + Lateral + Oblique, Bilat (11.18.19 @ 10:30) >    EXAM:  FOOT BILATERAL (MINIMUM 3 V)                            PROCEDURE DATE:  11/18/2019          INTERPRETATION:  Bilateral feet    HISTORY: Foot ulcers      Three views of the right foot and two views of the left foot show a   buckle fracture of the base of the left second proximal phalanx. The   joint spaces are maintained.    IMPRESSION: Left toe fracture which may not be acute. Clinical   correlation is suggested.    Note - This does not exclude fractures in perfect alignment and   apposition as presence may be indicated at one to three weeks following   callus formation.    Thank you for this referral.                ARLEEN DIAZ M.D., ATTENDING RADIOLOGIST  This document has been electronically signed. Nov 18 2019  2:10PM    < end of copied text >    P: Discussed diagnosis and treatment with patient  Continue IV antibiotics per ID  xray reviewed   Unlikely that infection is coming from the LE however MRI may be necessary to rule out   Discussed with

## 2019-11-20 LAB
ALBUMIN SERPL ELPH-MCNC: 2 G/DL — LOW (ref 3.5–5)
ALP SERPL-CCNC: 336 U/L — HIGH (ref 40–120)
ALT FLD-CCNC: 459 U/L DA — HIGH (ref 10–60)
ANION GAP SERPL CALC-SCNC: 10 MMOL/L — SIGNIFICANT CHANGE UP (ref 5–17)
AST SERPL-CCNC: 1131 U/L — HIGH (ref 10–40)
BASOPHILS # BLD AUTO: 0.04 K/UL — SIGNIFICANT CHANGE UP (ref 0–0.2)
BASOPHILS NFR BLD AUTO: 0.2 % — SIGNIFICANT CHANGE UP (ref 0–2)
BILIRUB DIRECT SERPL-MCNC: 1.1 MG/DL — HIGH (ref 0–0.2)
BILIRUB INDIRECT FLD-MCNC: 0.7 MG/DL — SIGNIFICANT CHANGE UP (ref 0.2–1)
BILIRUB SERPL-MCNC: 1.8 MG/DL — HIGH (ref 0.2–1.2)
BUN SERPL-MCNC: 40 MG/DL — HIGH (ref 7–18)
CALCIUM SERPL-MCNC: 8.8 MG/DL — SIGNIFICANT CHANGE UP (ref 8.4–10.5)
CHLORIDE SERPL-SCNC: 105 MMOL/L — SIGNIFICANT CHANGE UP (ref 96–108)
CO2 SERPL-SCNC: 21 MMOL/L — LOW (ref 22–31)
CREAT SERPL-MCNC: 1.52 MG/DL — HIGH (ref 0.5–1.3)
CRP SERPL-MCNC: 32.83 MG/DL — HIGH (ref 0–0.4)
EOSINOPHIL # BLD AUTO: 0.01 K/UL — SIGNIFICANT CHANGE UP (ref 0–0.5)
EOSINOPHIL NFR BLD AUTO: 0.1 % — SIGNIFICANT CHANGE UP (ref 0–6)
ERYTHROCYTE [SEDIMENTATION RATE] IN BLOOD: 80 MM/HR — HIGH (ref 0–20)
GLUCOSE SERPL-MCNC: 119 MG/DL — HIGH (ref 70–99)
HCT VFR BLD CALC: 38.1 % — LOW (ref 39–50)
HGB BLD-MCNC: 12.7 G/DL — LOW (ref 13–17)
IMM GRANULOCYTES NFR BLD AUTO: 1.3 % — SIGNIFICANT CHANGE UP (ref 0–1.5)
LYMPHOCYTES # BLD AUTO: 1.35 K/UL — SIGNIFICANT CHANGE UP (ref 1–3.3)
LYMPHOCYTES # BLD AUTO: 7.8 % — LOW (ref 13–44)
MCHC RBC-ENTMCNC: 30.9 PG — SIGNIFICANT CHANGE UP (ref 27–34)
MCHC RBC-ENTMCNC: 33.3 GM/DL — SIGNIFICANT CHANGE UP (ref 32–36)
MCV RBC AUTO: 92.7 FL — SIGNIFICANT CHANGE UP (ref 80–100)
MONOCYTES # BLD AUTO: 0.78 K/UL — SIGNIFICANT CHANGE UP (ref 0–0.9)
MONOCYTES NFR BLD AUTO: 4.5 % — SIGNIFICANT CHANGE UP (ref 2–14)
NEUTROPHILS # BLD AUTO: 14.98 K/UL — HIGH (ref 1.8–7.4)
NEUTROPHILS NFR BLD AUTO: 86.1 % — HIGH (ref 43–77)
NRBC # BLD: 0 /100 WBCS — SIGNIFICANT CHANGE UP (ref 0–0)
PLATELET # BLD AUTO: 281 K/UL — SIGNIFICANT CHANGE UP (ref 150–400)
POTASSIUM SERPL-MCNC: 3.8 MMOL/L — SIGNIFICANT CHANGE UP (ref 3.5–5.3)
POTASSIUM SERPL-SCNC: 3.8 MMOL/L — SIGNIFICANT CHANGE UP (ref 3.5–5.3)
PROT SERPL-MCNC: 7.1 G/DL — SIGNIFICANT CHANGE UP (ref 6–8.3)
RBC # BLD: 4.11 M/UL — LOW (ref 4.2–5.8)
RBC # FLD: 14.7 % — HIGH (ref 10.3–14.5)
SODIUM SERPL-SCNC: 136 MMOL/L — SIGNIFICANT CHANGE UP (ref 135–145)
WBC # BLD: 17.39 K/UL — HIGH (ref 3.8–10.5)
WBC # FLD AUTO: 17.39 K/UL — HIGH (ref 3.8–10.5)

## 2019-11-20 PROCEDURE — 99233 SBSQ HOSP IP/OBS HIGH 50: CPT | Mod: GC

## 2019-11-20 PROCEDURE — 99223 1ST HOSP IP/OBS HIGH 75: CPT

## 2019-11-20 RX ORDER — SODIUM CHLORIDE 9 MG/ML
1000 INJECTION, SOLUTION INTRAVENOUS
Refills: 0 | Status: DISCONTINUED | OUTPATIENT
Start: 2019-11-20 | End: 2019-11-21

## 2019-11-20 RX ADMIN — Medication 100 MICROGRAM(S): at 05:47

## 2019-11-20 RX ADMIN — PIPERACILLIN AND TAZOBACTAM 25 GRAM(S): 4; .5 INJECTION, POWDER, LYOPHILIZED, FOR SOLUTION INTRAVENOUS at 01:34

## 2019-11-20 RX ADMIN — TAMSULOSIN HYDROCHLORIDE 0.4 MILLIGRAM(S): 0.4 CAPSULE ORAL at 21:13

## 2019-11-20 RX ADMIN — PIPERACILLIN AND TAZOBACTAM 25 GRAM(S): 4; .5 INJECTION, POWDER, LYOPHILIZED, FOR SOLUTION INTRAVENOUS at 18:40

## 2019-11-20 RX ADMIN — ENOXAPARIN SODIUM 30 MILLIGRAM(S): 100 INJECTION SUBCUTANEOUS at 12:04

## 2019-11-20 RX ADMIN — SODIUM CHLORIDE 70 MILLILITER(S): 9 INJECTION, SOLUTION INTRAVENOUS at 13:37

## 2019-11-20 RX ADMIN — PIPERACILLIN AND TAZOBACTAM 25 GRAM(S): 4; .5 INJECTION, POWDER, LYOPHILIZED, FOR SOLUTION INTRAVENOUS at 11:46

## 2019-11-20 NOTE — PROGRESS NOTE ADULT - PROBLEM SELECTOR PLAN 2
possible cellulitis of Lower extremity but unlikely   -Podiatry consulted, recommends IV Abx for cellulitis  ID Dr Jha

## 2019-11-20 NOTE — CONSULT NOTE ADULT - SUBJECTIVE AND OBJECTIVE BOX
HPI:  Patient is 89 year old male, has hha 24x7, ambulates with walker,  has medical history significant for hypothyroidism, chronic venous insufficiency came in to ED because of fever of 101 at home. Per wife at bedside pt was in his usual health a week ago, when pt was started on abx for UTI by visiting physician. pt completed the abx of 5 days. Since yesterday pt is having fever at home tmax 101.4, associated with dry cough for 2 days, weakness, poor po intake, chills, dysuria. Pt also has b/l leg swelling, with warmness, and redness. per wife it's chronic and pt is on furosemide for leg swelling. Pt denies any dizziness, headache, chest pain, sob, n/v/d/c.    General surgery consulted for possible acute cholecystitis. Pt is somewhat of a poor historian. C/o abd pain for a few months. Denies n/v, cp, dysuria. States he is passing flatus and had BM yesterday. States he does not eat much and is unsure if pain is worsened with food. Denies previous surgeries. Pt has been spiking fevers and low grade temps on tylenol.       SurgHx: None  Allergies: No Known Allergies        Vitals: T(F): 99 (11-20-19 @ 13:06), Max: 100.2 (11-19-19 @ 14:27)  HR: 90 (11-20-19 @ 13:06) (90 - 105)  BP: 123/70 (11-20-19 @ 13:06) (118/62 - 140/88)  RR: 16 (11-20-19 @ 13:06) (16 - 19)  SpO2: 97% (11-20-19 @ 13:06) (93% - 97%)      Labs:                         12.7   17.39 )-----------( 281      ( 20 Nov 2019 07:01 )             38.1     11-20    136  |  105  |  40<H>  ----------------------------<  119<H>  3.8   |  21<L>  |  1.52<H>    Ca    8.8      20 Nov 2019 07:01  Phos  2.3     11-19  Mg     2.3     11-19    Culture - Urine (11.16.19 @ 16:28)    Specimen Source: .Urine    Culture Results:   <10,000 CFU/mL Normal Urogenital Amanda    Culture - Blood (11.16.19 @ 01:59)    Specimen Source: .Blood    Culture Results:   No growth to date.      General: Elderly man, AAO x 3, No acute distress  Skin: No jaundice or scleral icterus noted  Abdomen: soft, nondistended, tender in RUQ. No palpable masses.  Extremities: B/l chronic venous skin changes, +edema, redness and warm to touch.     < from: CT Chest No Cont (11.18.19 @ 10:36) >  IMPRESSION:     Mild intralobular septal thickening consistent with mild pulmonary edema.   Bilateral dependent changes.    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    Mild cardiomegaly.    Distended gallbladder partially imaged. Consider abdominal ultrasound for   additional diagnostic benefit.    MAIKEL PATEL M.D., ATTENDING RADIOLOGIST  This document has been electronically signed. Nov 18 2019 10:50AM        < end of copied text >    < from: CT Abdomen and Pelvis No Cont (11.16.19 @ 00:07) >    IMPRESSION:     Motion limited examination. Also limited without intravenous and oral   contrast.    No CT finding to explain the patient's sepsis. Numerous incidental   findings as above.    VANI MELLO M.D., ATTENDING RADIOLOGIST  This document has been electronically signed. Nov 16 2019  7:51AM        < end of copied text >    < from: US Kidney and Bladder (11.18.19 @ 10:55) >    IMPRESSION:     No evidence of hydronephrosis. Right renal cyst. Significantly distended   bladder. Patient would not void on command.      ROSALVA RANDOLPH M.D.,ATTENDING RADIOLOGIST  This document has been electronically signed. Nov 18 2019  2:22PM        < end of copied text >    < from: US Hepatic & Pancreatic (11.19.19 @ 13:01) >    Impression: Distended gallbladder with sludge wall thickening. Correlate   with HIDA scan as clinically indicated. No biliary dilatation.      GO SINGH M.D.,ATTENDING RADIOLOGIST  This document has been electronically signed. Nov 19 2019  1:25PM        < end of copied text > HPI:  Patient is 89 year old male, has hha 24x7, ambulates with walker,  has medical history significant for hypothyroidism, chronic venous insufficiency came in to ED because of fever of 101 at home. Per wife at bedside pt was in his usual health a week ago, when pt was started on abx for UTI by visiting physician. pt completed the abx of 5 days. Since yesterday pt is having fever at home tmax 101.4, associated with dry cough for 2 days, weakness, poor po intake, chills, dysuria. Pt also has b/l leg swelling, with warmness, and redness. per wife it's chronic and pt is on furosemide for leg swelling. Pt denies any dizziness, headache, chest pain, sob, n/v/d/c.    General surgery consulted for possible acute cholecystitis. Pt is somewhat of a poor historian. C/o abd pain for a few months. Denies n/v, cp, dysuria. States he is passing flatus and had BM yesterday. States he does not eat much and is unsure if pain is worsened with food. Denies previous surgeries. Pt has been spiking fevers and low grade temps on tylenol. CT chest, AP unremarkable. Renal US unremarkable. RUQ US w/ distended, thickened GB with sludge. Cultures neg to date. Pt also with b/l LE pain, swelling, warmth and redness.       SurgHx: None  Allergies: No Known Allergies        Vitals: T(F): 99 (11-20-19 @ 13:06), Max: 100.2 (11-19-19 @ 14:27)  HR: 90 (11-20-19 @ 13:06) (90 - 105)  BP: 123/70 (11-20-19 @ 13:06) (118/62 - 140/88)  RR: 16 (11-20-19 @ 13:06) (16 - 19)  SpO2: 97% (11-20-19 @ 13:06) (93% - 97%)      Labs:                         12.7   17.39 )-----------( 281      ( 20 Nov 2019 07:01 )             38.1     11-20    136  |  105  |  40<H>  ----------------------------<  119<H>  3.8   |  21<L>  |  1.52<H>    Ca    8.8      20 Nov 2019 07:01  Phos  2.3     11-19  Mg     2.3     11-19    Culture - Urine (11.16.19 @ 16:28)    Specimen Source: .Urine    Culture Results:   <10,000 CFU/mL Normal Urogenital Amanda    Culture - Blood (11.16.19 @ 01:59)    Specimen Source: .Blood    Culture Results:   No growth to date.      General: Elderly man, AAO x 3, No acute distress  Skin: No jaundice or scleral icterus noted  Abdomen: soft, nondistended, tender in RUQ. No palpable masses.  Extremities: B/l chronic venous skin changes, +tenderness, edema, redness and warm to touch.     < from: CT Chest No Cont (11.18.19 @ 10:36) >  IMPRESSION:     Mild intralobular septal thickening consistent with mild pulmonary edema.   Bilateral dependent changes.    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    Mild cardiomegaly.    Distended gallbladder partially imaged. Consider abdominal ultrasound for   additional diagnostic benefit.    MAIKEL PATEL M.D., ATTENDING RADIOLOGIST  This document has been electronically signed. Nov 18 2019 10:50AM        < end of copied text >    < from: CT Abdomen and Pelvis No Cont (11.16.19 @ 00:07) >    IMPRESSION:     Motion limited examination. Also limited without intravenous and oral   contrast.    No CT finding to explain the patient's sepsis. Numerous incidental   findings as above.    VANI MELLO M.D., ATTENDING RADIOLOGIST  This document has been electronically signed. Nov 16 2019  7:51AM        < end of copied text >    < from: US Kidney and Bladder (11.18.19 @ 10:55) >    IMPRESSION:     No evidence of hydronephrosis. Right renal cyst. Significantly distended   bladder. Patient would not void on command.      ROSALVA RANDOLPH M.D.,ATTENDING RADIOLOGIST  This document has been electronically signed. Nov 18 2019  2:22PM        < end of copied text >    < from: US Hepatic & Pancreatic (11.19.19 @ 13:01) >    Impression: Distended gallbladder with sludge wall thickening. Correlate   with HIDA scan as clinically indicated. No biliary dilatation.      OG SINGH M.D.,ATTENDING RADIOLOGIST  This document has been electronically signed. Nov 19 2019  1:25PM        < end of copied text >

## 2019-11-20 NOTE — PROGRESS NOTE ADULT - PROBLEM SELECTOR PLAN 3
Baseline Cr of 1.15 in 11/2018  Creatinine 1.52 today   -CT abd/pelvis shows mass effect of enlarged prostate on bladder  -continue Tamsulosin  -f/u Nephro   off of lisinopril and lasix

## 2019-11-20 NOTE — PROGRESS NOTE ADULT - ASSESSMENT
Patient is a 89y Male with hypothyroidism, chronic venous insufficiency, recent UTI s/p antibiotics p/w fever, cough and worsening LE swelling/ warmth. Renal consulted for Elevated serum creatinine.    1. ARMIN- likely hemodynamically mediated in the setting of sepsis. Pt with improvement in renal function off Lisnopril/ Lasix with IVF. CT chest with mild pulmonary edema and IVF d/c 11/18. Now restarted due to NPO status; monitor for fluid overload. CT abd/pelvis with no hydro, however Renal US with distended bladder; recc post void bladder scan. Strict I/Os. Avoid nephrotoxins/ NSAIDs/ RCA. Monitor BMP.  2. CKD-3- previous Scr 1.1; defer secondary w/u due to advanced age. Monitor lytes  3. Sepsis- 2/2 Cellulitis- c/w abx as per primary team/ ID.   4. LE edema- in the setting of cellulitis. Continue to hold Lasix due to ARMIN.   5. HTN 2/2 CKD- BP acceptable; off Lisnopril due to ARMIN. Pt with Afib. Consider BB or CCB for rate control. Monitor BP

## 2019-11-20 NOTE — CONSULT NOTE ADULT - ASSESSMENT
89M admitted for sepsis of unclear origin, possibly due to b/l LE cellulitis vs acute cholecystitis, c/o abd pain, WBC persistently elevated, US w/ dilated and thickened GB w/ sludge, LFTs from 4 days ago mildly elevated, bili wnl.     -F/u HIDA scan  -Recommend repeating LFTs  -Recommend NPO  -Continue IV abx   -Care per medicine   -Surgery will continue to follow 89M admitted for sepsis of unclear origin, possibly due to b/l LE cellulitis vs acute cholecystitis, c/o abd pain, WBC persistently elevated, US w/ dilated and thickened GB w/ sludge, LFTs from 4 days ago mildly elevated, bili wnl. +ARMIN    -F/u HIDA scan  -Recommend repeating LFTs  -Recommend NPO, IVF  -Continue IV abx   -Care per medicine   -Surgery will continue to follow

## 2019-11-20 NOTE — PROGRESS NOTE ADULT - PROBLEM SELECTOR PLAN 1
antibiotics changed to zosyn overnight   f/u ID Dr Jha consult   hepatic US showing distended gallbladder with sludge wall thickening  plan for HIDA in AM; patient refused this morning but agreed later on  will keep NPO and give D5 and 1/2 NS  patient was on antibiotics before admission for UTI and was given Bactrim as per pharmacy

## 2019-11-20 NOTE — PROGRESS NOTE ADULT - ATTENDING COMMENTS
[facility] NEPHROLOGY  Justin Orozco M.D.  Kj Fernandez D.O.  Tonia Sanches M.D.  Salina Case, MSN, ANP-C  [phone]    [id number] [place] [address]

## 2019-11-20 NOTE — PROGRESS NOTE ADULT - SUBJECTIVE AND OBJECTIVE BOX
S :  Pt was seen this morning bedside. Pt states he wants to sleep.       Patient admits to  (-) Fevers, (-) Chills, (-) Nausea, (-) Vomiting, (-) Shortness of Breath      PMH: Hypothyroid  Ulcers of both lower extremities    PSH:No significant past surgical history      Allergies:No Known Allergies      Labs:                                                                        12.7   17.39 )-----------( 281      ( 20 Nov 2019 07:01 )             38.1       11-20    136  |  105  |  40<H>  ----------------------------<  119<H>  3.8   |  21<L>  |  1.52<H>    Ca    8.8      20 Nov 2019 07:01  Phos  2.3     11-19  Mg     2.3     11-19        Vital Signs Last 24 Hrs  T(C): 37.2 (20 Nov 2019 13:06), Max: 37.8 (19 Nov 2019 17:12)  T(F): 99 (20 Nov 2019 13:06), Max: 100 (19 Nov 2019 17:12)  HR: 90 (20 Nov 2019 13:06) (90 - 101)  BP: 123/70 (20 Nov 2019 13:06) (118/62 - 124/66)  BP(mean): --  RR: 16 (20 Nov 2019 13:06) (16 - 19)  SpO2: 97% (20 Nov 2019 13:06) (93% - 97%)        O:   Integument:  Skin warm, dry and supple bilateral.  No open lesions or inter-digital macerations noted bilateral. Moderate erythema noted lower legs. mild non-pitting edema. Pt legs are covered in thick lotion. TG warm to warm.   Vascular: Dorsalis Pedis and Posterior Tibial pulses 1/4.  Capillary re-fill time less than 3 seconds digits 1-5 bilateral.   Neuro: unable to fully assess due to lack of verbal cooperation. Pt reacts to light touch at level of the toes.     < from: Xray Tibia + Fibula 2 Views, Bilateral (11.18.19 @ 10:38) >  EXAM:  LEG AP&LAT - BILATERAL                            PROCEDURE DATE:  11/18/2019          INTERPRETATION:  Clinical information: Cellulitis, leg ulcers.    AP and lateral views of the bilateral tibia and fibula.    Osteopenia is present.  No focal osteolysis or acute fracture is noted.    Impression:    No acute osseous abnormality noted.                MIRIAM HUMPHREYS M.D., ATTENDING RADIOLOGIST  This document has been electronically signed. Nov 18 2019  2:07PM    < end of copied text >      A:   Cellulitis LE b/l   < from: Xray Foot AP + Lateral + Oblique, Bilat (11.18.19 @ 10:30) >    EXAM:  FOOT BILATERAL (MINIMUM 3 V)                            PROCEDURE DATE:  11/18/2019          INTERPRETATION:  Bilateral feet    HISTORY: Foot ulcers      Three views of the right foot and two views of the left foot show a   buckle fracture of the base of the left second proximal phalanx. The   joint spaces are maintained.    IMPRESSION: Left toe fracture which may not be acute. Clinical   correlation is suggested.    Note - This does not exclude fractures in perfect alignment and   apposition as presence may be indicated at one to three weeks following   callus formation.    Thank you for this referral.                ARLEEN DIAZ M.D., ATTENDING RADIOLOGIST  This document has been electronically signed. Nov 18 2019  2:10PM    < end of copied text >    P: Discussed diagnosis and treatment with patient  Continue IV antibiotics per ID  xray reviewed   Pending MRI however unlikely elevated WBC coming from LE   Discussed with

## 2019-11-20 NOTE — PROGRESS NOTE ADULT - SUBJECTIVE AND OBJECTIVE BOX
PGY 1 Note discussed with supervising resident and primary attending    Patient is a 89y old  Male who presents with a chief complaint of Fever (20 Nov 2019 10:07)    INTERVAL HPI/OVERNIGHT EVENTS: Patient seen and examined at the bedside. Patient was to have HIDA scan this morning but refused. Will reattempt tomorrow. Surgery consulted for possible acute cholecystitis.     MEDICATIONS  (STANDING):  clopidogrel Tablet 75 milliGRAM(s) Oral daily  dextrose 5% + sodium chloride 0.45%. 1000 milliLiter(s) (70 mL/Hr) IV Continuous <Continuous>  enoxaparin Injectable 30 milliGRAM(s) SubCutaneous daily  levothyroxine 100 MICROGram(s) Oral daily  piperacillin/tazobactam IVPB.. 3.375 Gram(s) IV Intermittent every 8 hours  tamsulosin 0.4 milliGRAM(s) Oral at bedtime    MEDICATIONS  (PRN):  acetaminophen   Tablet .. 650 milliGRAM(s) Oral every 6 hours PRN Temp greater or equal to 38C (100.4F), Moderate Pain (4 - 6)      __________________________________________________  REVIEW OF SYSTEMS:  CONSTITUTIONAL: No fever   EYES: no visual disturbances  NECK: No pain   RESPIRATORY: No cough; No shortness of breath  CARDIOVASCULAR: No chest pain  GASTROINTESTINAL: Abdominal pain. No nausea or vomiting  NEUROLOGICAL: No headache   MUSCULOSKELETAL: lower extremity pain  GENITOURINARY: no dysuria  PSYCHIATRY: no anxiety  ALL OTHER  ROS negative        Vital Signs Last 24 Hrs  T(C): 36.7 (20 Nov 2019 04:50), Max: 37.9 (19 Nov 2019 14:27)  T(F): 98.1 (20 Nov 2019 04:50), Max: 100.2 (19 Nov 2019 14:27)  HR: 96 (20 Nov 2019 04:50) (96 - 105)  BP: 118/62 (20 Nov 2019 04:50) (118/62 - 140/88)  RR: 17 (20 Nov 2019 04:50) (17 - 19)  SpO2: 93% (20 Nov 2019 04:50) (93% - 97%)    ________________________________________________  PHYSICAL EXAM:  GENERAL: Patient seen resting in bed and in distress due to pain   HEENT: Normocephalic; conjunctivae and sclerae clear   NECK: supple  CHEST/LUNG: Clear to auscultation bilaterally with good air entry   HEART: S1 S2, irregular; no murmurs  ABDOMEN: Tenderness to palpation, Nondistended; Bowel sounds present  EXTREMITIES: chronic bilateral venous stasis; tenderness to palpation bilaterally  SKIN: warm and dry; chronic venous stasis bilateral lower extremities  NERVOUS SYSTEM:  Awake but not oriented; no new deficits    _________________________________________________  LABS:                        12.7   17.39 )-----------( 281      ( 20 Nov 2019 07:01 )             38.1     11-20    136  |  105  |  40<H>  ----------------------------<  119<H>  3.8   |  21<L>  |  1.52<H>    Ca    8.8      20 Nov 2019 07:01  Phos  2.3     11-19  Mg     2.3     11-19      RADIOLOGY & ADDITIONAL TESTS:    Imaging Personally Reviewed:  YES    < from: US Hepatic & Pancreatic (11.19.19 @ 13:01) >    Impression: Distended gallbladder with sludge wall thickening. Correlate   with HIDA scan as clinically indicated. No biliary dilatation.    < end of copied text >    Consultant(s) Notes Reviewed:   YES     Care Discussed with Consultants :     Plan of care was discussed with patient and /or primary care giver; all questions and concerns were addressed and care was aligned with patient's wishes.

## 2019-11-20 NOTE — PROGRESS NOTE ADULT - ATTENDING COMMENTS
I have seen and examined the patient. Case discussed with house staff. I have reviewed and edited the note where appropriate.    Patient is a 89y Male with hypothyroidism, chronic venous insufficiency, recent UTI s/p antibiotics p/w fever, cough and worsening LE swelling admitted for cellulitis with persistent leukocytosis and RUQ tenderness thickened and dilated GB with sludge concerning for cholecystitis.     Appreciate surgical evaluation. Patient refused HIDA scan this morning,    A/P  Leukocytosis , concern for Cholecystitis, doubt cellulitis, hemodynamically stable, cultures negative so far.  C/w IV  Zosyn   Add on LFTs to today s bmp if feasible, if not repeat in am     Reattempt HIDA scan in am  NPO , C/w D5half NS  Awaits 2D Echo to   Afib not on anticoagulation ,CHADSVAS score at least 3,  PCP is unclear why patient is not on AC.  Plavix was started for PVD     Discussed with  patients spouse and daughter at bedside.

## 2019-11-20 NOTE — PROGRESS NOTE ADULT - SUBJECTIVE AND OBJECTIVE BOX
[facility] NEPHROLOGY- PROGRESS NOTE    Patient is a 89y Male with hypothyroidism, chronic venous insufficiency, recent UTI s/p antibiotics p/w fever, cough and worsening LE swelling/ warmth. Renal consulted for Elevated serum creatinine.  Pt NPO with gallbladder sludge; for [facility] Medications: Medications reviewed.  REVIEW OF SYSTEMS:  CONSTITUTIONAL: No fevers No chills  RESPIRATORY: No shortness of breath  CARDIOVASCULAR: No chest pain.  GASTROINTESTINAL: No nausea, vomiting, or diarrhea + abdominal pain.   VASCULAR: +bilateral lower extremity edema.     VITALS:  T(F): 99 (19 @ 13:06), Max: 100 (19 @ 17:12)  HR: 90 (19 @ 13:06)  BP: 123/70 (19 @ 13:06)  RR: 16 (19 @ 13:06)  SpO2: 97% (19 @ 13:06)  Wt(kg): --        PHYSICAL EXAM:  Gen: NAD, calm  HEENT: MMM  Neck: no JVD  Cards: irregular, +S1/S2, no M/G/R  Resp: CTA ant  GI: soft, +epigastric tenderness NABS  Extremities: + LE edema B/L with hyperpigmentation/ warmth      LABS:      136  |  105  |  40<H>  ----------------------------<  119<H>  3.8   |  21<L>  |  1.52<H>    Ca    8.8      2019 07:01  Phos  2.3       Mg     2.3           Creatinine Trend: 1.52 <--, 1.26 <--, 1.49 <--, 1.90 <--, 1.74 <--, 2.22 <--                        12.7   17.39 )-----------( 281      ( 2019 07:01 )             38.1     Urine Studies:  Urinalysis Basic - ( 2019 08:33 )    Color: Yellow / Appearance: Clear / S.020 / pH:   Gluc:  / Ketone: Trace  / Bili: Negative / Urobili: 1   Blood:  / Protein: 15 / Nitrite: Negative   Leuk Esterase: Trace / RBC: 0-2 /HPF / WBC 0-2 /HPF   Sq Epi:  / Non Sq Epi: Occasional /HPF / Bacteria:       Creatinine, Random Urine: 173 mg/dL ( @ 02:52)  Chloride, Random Urine: 55 mmol/L ( @ 02:52)  Sodium, Random Urine: 33 mmol/L ( @ 02:52)

## 2019-11-21 LAB
ALBUMIN SERPL ELPH-MCNC: 1.7 G/DL — LOW (ref 3.5–5)
ALP SERPL-CCNC: 286 U/L — HIGH (ref 40–120)
ALT FLD-CCNC: 358 U/L DA — HIGH (ref 10–60)
ANION GAP SERPL CALC-SCNC: 8 MMOL/L — SIGNIFICANT CHANGE UP (ref 5–17)
AST SERPL-CCNC: 403 U/L — HIGH (ref 10–40)
BASOPHILS # BLD AUTO: 0.03 K/UL — SIGNIFICANT CHANGE UP (ref 0–0.2)
BASOPHILS NFR BLD AUTO: 0.2 % — SIGNIFICANT CHANGE UP (ref 0–2)
BILIRUB DIRECT SERPL-MCNC: 0.8 MG/DL — HIGH (ref 0–0.2)
BILIRUB SERPL-MCNC: 1.4 MG/DL — HIGH (ref 0.2–1.2)
BUN SERPL-MCNC: 40 MG/DL — HIGH (ref 7–18)
CALCIUM SERPL-MCNC: 8.5 MG/DL — SIGNIFICANT CHANGE UP (ref 8.4–10.5)
CHLORIDE SERPL-SCNC: 108 MMOL/L — SIGNIFICANT CHANGE UP (ref 96–108)
CO2 SERPL-SCNC: 23 MMOL/L — SIGNIFICANT CHANGE UP (ref 22–31)
CREAT SERPL-MCNC: 1.27 MG/DL — SIGNIFICANT CHANGE UP (ref 0.5–1.3)
CULTURE RESULTS: SIGNIFICANT CHANGE UP
CULTURE RESULTS: SIGNIFICANT CHANGE UP
EOSINOPHIL # BLD AUTO: 0.07 K/UL — SIGNIFICANT CHANGE UP (ref 0–0.5)
EOSINOPHIL NFR BLD AUTO: 0.4 % — SIGNIFICANT CHANGE UP (ref 0–6)
GLUCOSE SERPL-MCNC: 113 MG/DL — HIGH (ref 70–99)
HCT VFR BLD CALC: 36.9 % — LOW (ref 39–50)
HGB BLD-MCNC: 12.4 G/DL — LOW (ref 13–17)
IMM GRANULOCYTES NFR BLD AUTO: 1 % — SIGNIFICANT CHANGE UP (ref 0–1.5)
LYMPHOCYTES # BLD AUTO: 1.25 K/UL — SIGNIFICANT CHANGE UP (ref 1–3.3)
LYMPHOCYTES # BLD AUTO: 7.5 % — LOW (ref 13–44)
MAGNESIUM SERPL-MCNC: 2.4 MG/DL — SIGNIFICANT CHANGE UP (ref 1.6–2.6)
MCHC RBC-ENTMCNC: 30.9 PG — SIGNIFICANT CHANGE UP (ref 27–34)
MCHC RBC-ENTMCNC: 33.6 GM/DL — SIGNIFICANT CHANGE UP (ref 32–36)
MCV RBC AUTO: 92 FL — SIGNIFICANT CHANGE UP (ref 80–100)
MONOCYTES # BLD AUTO: 0.75 K/UL — SIGNIFICANT CHANGE UP (ref 0–0.9)
MONOCYTES NFR BLD AUTO: 4.5 % — SIGNIFICANT CHANGE UP (ref 2–14)
NEUTROPHILS # BLD AUTO: 14.38 K/UL — HIGH (ref 1.8–7.4)
NEUTROPHILS NFR BLD AUTO: 86.4 % — HIGH (ref 43–77)
NRBC # BLD: 0 /100 WBCS — SIGNIFICANT CHANGE UP (ref 0–0)
PHOSPHATE SERPL-MCNC: 2.9 MG/DL — SIGNIFICANT CHANGE UP (ref 2.5–4.5)
PLATELET # BLD AUTO: 298 K/UL — SIGNIFICANT CHANGE UP (ref 150–400)
POTASSIUM SERPL-MCNC: 3.6 MMOL/L — SIGNIFICANT CHANGE UP (ref 3.5–5.3)
POTASSIUM SERPL-SCNC: 3.6 MMOL/L — SIGNIFICANT CHANGE UP (ref 3.5–5.3)
PROT SERPL-MCNC: 6.6 G/DL — SIGNIFICANT CHANGE UP (ref 6–8.3)
RBC # BLD: 4.01 M/UL — LOW (ref 4.2–5.8)
RBC # FLD: 14.6 % — HIGH (ref 10.3–14.5)
SODIUM SERPL-SCNC: 139 MMOL/L — SIGNIFICANT CHANGE UP (ref 135–145)
SPECIMEN SOURCE: SIGNIFICANT CHANGE UP
SPECIMEN SOURCE: SIGNIFICANT CHANGE UP
WBC # BLD: 16.65 K/UL — HIGH (ref 3.8–10.5)
WBC # FLD AUTO: 16.65 K/UL — HIGH (ref 3.8–10.5)

## 2019-11-21 PROCEDURE — 99233 SBSQ HOSP IP/OBS HIGH 50: CPT

## 2019-11-21 PROCEDURE — 78226 HEPATOBILIARY SYSTEM IMAGING: CPT | Mod: 26

## 2019-11-21 PROCEDURE — 99232 SBSQ HOSP IP/OBS MODERATE 35: CPT

## 2019-11-21 RX ORDER — SODIUM CHLORIDE 9 MG/ML
1000 INJECTION, SOLUTION INTRAVENOUS
Refills: 0 | Status: DISCONTINUED | OUTPATIENT
Start: 2019-11-21 | End: 2019-11-21

## 2019-11-21 RX ORDER — MORPHINE SULFATE 50 MG/1
2 CAPSULE, EXTENDED RELEASE ORAL ONCE
Refills: 0 | Status: DISCONTINUED | OUTPATIENT
Start: 2019-11-21 | End: 2019-11-21

## 2019-11-21 RX ORDER — SODIUM CHLORIDE 9 MG/ML
1000 INJECTION, SOLUTION INTRAVENOUS
Refills: 0 | Status: DISCONTINUED | OUTPATIENT
Start: 2019-11-21 | End: 2019-11-24

## 2019-11-21 RX ADMIN — MORPHINE SULFATE 2 MILLIGRAM(S): 50 CAPSULE, EXTENDED RELEASE ORAL at 12:33

## 2019-11-21 RX ADMIN — Medication 650 MILLIGRAM(S): at 15:36

## 2019-11-21 RX ADMIN — SODIUM CHLORIDE 65 MILLILITER(S): 9 INJECTION, SOLUTION INTRAVENOUS at 22:19

## 2019-11-21 RX ADMIN — Medication 100 MICROGRAM(S): at 05:17

## 2019-11-21 RX ADMIN — MORPHINE SULFATE 2 MILLIGRAM(S): 50 CAPSULE, EXTENDED RELEASE ORAL at 13:00

## 2019-11-21 RX ADMIN — Medication 650 MILLIGRAM(S): at 16:00

## 2019-11-21 RX ADMIN — TAMSULOSIN HYDROCHLORIDE 0.4 MILLIGRAM(S): 0.4 CAPSULE ORAL at 21:37

## 2019-11-21 RX ADMIN — PIPERACILLIN AND TAZOBACTAM 25 GRAM(S): 4; .5 INJECTION, POWDER, LYOPHILIZED, FOR SOLUTION INTRAVENOUS at 17:57

## 2019-11-21 RX ADMIN — PIPERACILLIN AND TAZOBACTAM 25 GRAM(S): 4; .5 INJECTION, POWDER, LYOPHILIZED, FOR SOLUTION INTRAVENOUS at 01:13

## 2019-11-21 NOTE — PROGRESS NOTE ADULT - ASSESSMENT
89yo male with sepsis, cholelithiasis, rule out acute cholecystitis    1) f/u HIDA today  2) keep npo, ivf  3) iv antibc  4) dvt prophyalxis

## 2019-11-21 NOTE — PROGRESS NOTE ADULT - SUBJECTIVE AND OBJECTIVE BOX
PGY 1 Note discussed with supervising resident and primary attending    Patient is a 89y old  Male who presents with a chief complaint of Fever (21 Nov 2019 10:54)    INTERVAL HPI/OVERNIGHT EVENTS: Patient seen and examined at the bedside. No acute events overnight. Patient to have HIDA scan done today to evaluate for acute cholecystitis. Patient has some abdominal pain and lower extremity pain. Denies any other concerns or complaints at this time.     MEDICATIONS  (STANDING):  clopidogrel Tablet 75 milliGRAM(s) Oral daily  dextrose 5% + sodium chloride 0.45%. 1000 milliLiter(s) (70 mL/Hr) IV Continuous <Continuous>  enoxaparin Injectable 30 milliGRAM(s) SubCutaneous daily  levothyroxine 100 MICROGram(s) Oral daily  piperacillin/tazobactam IVPB.. 3.375 Gram(s) IV Intermittent every 8 hours  tamsulosin 0.4 milliGRAM(s) Oral at bedtime    MEDICATIONS  (PRN):  acetaminophen   Tablet .. 650 milliGRAM(s) Oral every 6 hours PRN Temp greater or equal to 38C (100.4F), Moderate Pain (4 - 6)      __________________________________________________  REVIEW OF SYSTEMS:  CONSTITUTIONAL: No fever   EYES: no visual disturbances  NECK: No pain   RESPIRATORY: Mild cough; No shortness of breath  CARDIOVASCULAR: No chest pain  GASTROINTESTINAL: Abdominal pain. No nausea or vomiting   NEUROLOGICAL: No headache  MUSCULOSKELETAL: bilateral lower extremity pain   GENITOURINARY: no dysuria  PSYCHIATRY: no anxiety  ALL OTHER  ROS negative        Vital Signs Last 24 Hrs  T(C): 36.6 (21 Nov 2019 05:20), Max: 37.2 (20 Nov 2019 13:06)  T(F): 97.8 (21 Nov 2019 05:20), Max: 99 (20 Nov 2019 13:06)  HR: 86 (21 Nov 2019 05:20) (78 - 90)  BP: 140/82 (21 Nov 2019 05:20) (99/53 - 140/82)  RR: 16 (21 Nov 2019 05:20) (16 - 16)  SpO2: 94% (21 Nov 2019 05:20) (94% - 97%)    ________________________________________________  PHYSICAL EXAM:  GENERAL: Patient seen resting in bed and in mild distress due to pain   HEENT: Normocephalic; conjunctivae and sclerae clear   NECK: supple  CHEST/LUNG: Clear to auscultation bilaterally with good air entry   HEART: S1 S2, irregular; no murmurs  ABDOMEN: Tenderness to palpation mostly in RUQ, Nondistended; Bowel sounds present  EXTREMITIES: chronic venous stasis  SKIN: warm and dry  NERVOUS SYSTEM: Awake but not fully oriented; no new deficits    _________________________________________________  LABS:                        12.4   16.65 )-----------( 298      ( 21 Nov 2019 05:54 )             36.9     11-21    139  |  108  |  40<H>  ----------------------------<  113<H>  3.6   |  23  |  1.27    Ca    8.5      21 Nov 2019 05:54  Phos  2.9     11-21  Mg     2.4     11-21    TPro  6.6  /  Alb  1.7<L>  /  TBili  1.4<H>  /  DBili  0.8<H>  /  AST  403<H>  /  ALT  358<H>  /  AlkPhos  286<H>  11-21      RADIOLOGY & ADDITIONAL TESTS:    Imaging Personally Reviewed:  YES     No new imaging    Consultant(s) Notes Reviewed:   YES     Care Discussed with Consultants :     Plan of care was discussed with patient and /or primary care giver; all questions and concerns were addressed and care was aligned with patient's wishes.

## 2019-11-21 NOTE — PROGRESS NOTE ADULT - ATTENDING COMMENTS
I have seen and examined the patient. Case discussed with house staff. I have reviewed and edited the note where appropriate.    Patient is a 89y Male with hypothyroidism, chronic venous insufficiency, recent UTI s/p antibiotics p/w fever, cough and worsening LE swelling admitted for cellulitis with persistent leukocytosis and RUQ tenderness thickened and dilated GB with sludge concerning for cholecystitis.     Appreciate surgical evaluation. Patient refused HIDA scan this morning,    A/P  Leukocytosis, HIDA scan positive Acute Cholecystitis, doubt cellulitis, hemodynamically stable, cultures negative so far.  C/w IV  Zosyn   Add on LFTs to today s bmp if feasible, if not repeat in am     Reattempt HIDA scan in am  NPO , C/w D5half NS  Awaits 2D Echo to   Afib not on anticoagulation ,CHADSVAS score at least 3,  PCP is unclear why patient is not on AC.  Plavix was started for PVD     Discussed with  patients spouse and daughter at bedside.      35 minutes spent on total encounter; more than 50% of the visit was spent counseling and/or coordinating care by the attending physician. I have seen and examined the patient. Case discussed with house staff. I have reviewed and edited the note where appropriate.    Patient is a 89y Male with hypothyroidism, chronic venous insufficiency, recent UTI s/p antibiotics p/w fever, cough and worsening LE swelling admitted for cellulitis underwent HIDA scan today significant for cholecystitis.      A/P  Acute Cholecystitis  Afib not on anticoagulation   HTN  PVD    Plan  NPO , C/w D5half NS  C/w IV Zosyn   Will discontinue Plavix in anticipation of possible surgical intervention  F/u surgery  Awaits 2D Echo      Discussed with  patients spouse and daughter at bedside. All questions answered and care in align with their wishes.    35 minutes spent on total encounter; more than 50% of the visit was spent counseling and/or coordinating care by the attending physician. I have seen and examined the patient. Case discussed with house staff. I have reviewed and edited the note where appropriate.    Patient is a 89y Male with hypothyroidism, chronic venous insufficiency, recent UTI s/p antibiotics p/w fever, cough and worsening LE swelling admitted for cellulitis underwent HIDA scan today significant for cholecystitis.      A/P  Acute Cholecystitis  Afib not on anticoagulation   HTN  PVD  CKD stage 3    Plan  NPO , C/w D5half NS  C/w IV Zosyn   Will discontinue Plavix in anticipation of possible surgical intervention  F/u surgery  Awaits 2D Echo      Discussed with  patients spouse and daughter at bedside. All questions answered and care in align with their wishes.    35 minutes spent on total encounter; more than 50% of the visit was spent counseling and/or coordinating care by the attending physician.

## 2019-11-21 NOTE — PROGRESS NOTE ADULT - PROBLEM SELECTOR PLAN 3
Baseline Cr of 1.15 in 11/2018  Creatinine 1.27 today   -CT abd/pelvis shows mass effect of enlarged prostate on bladder  -continue Tamsulosin  -f/u Nephro   off of lisinopril and lasix

## 2019-11-21 NOTE — PROGRESS NOTE ADULT - ATTENDING COMMENTS
[facility] NEPHROLOGY  Justin Orozco M.D.  Kj Fernandez D.O.  Tonia Sanches M.D.  Salina Case, MSN, ANP-C  [phone]    [address] [place] [address]

## 2019-11-21 NOTE — PROGRESS NOTE ADULT - SUBJECTIVE AND OBJECTIVE BOX
[facility] NEPHROLOGY- PROGRESS NOTE    Patient is a 89y Male with hypothyroidism, chronic venous insufficiency, recent UTI s/p antibiotics p/w fever, cough and worsening LE swelling/ warmth. Renal consulted for Elevated serum creatinine.  Pt NPO with gallbladder sludge; for [facility] Medications: Medications reviewed.  REVIEW OF SYSTEMS:  CONSTITUTIONAL: No fevers No chills  RESPIRATORY: No shortness of breath  CARDIOVASCULAR: No chest pain.  GASTROINTESTINAL: No nausea, vomiting, or diarrhea + abdominal pain.   VASCULAR: +bilateral lower extremity edema.     VITALS:  T(F): 97.8 (19 @ 05:20), Max: 99 (19 @ 13:06)  HR: 86 (19 @ 05:20)  BP: 140/82 (19 @ 05:20)  RR: 16 (19 @ 05:20)  SpO2: 94% (19 @ 05:20)  Wt(kg): --        PHYSICAL EXAM:  Gen: NAD, calm  HEENT: MMM  Neck: no JVD  Cards: irregular, +S1/S2, no M/G/R  Resp: CTA ant  GI: soft, +epigastric tenderness NABS  Extremities: + LE edema B/L with hyperpigmentation/ warmth    LABS:      139  |  108  |  40<H>  ----------------------------<  113<H>  3.6   |  23  |  1.27    Ca    8.5      2019 05:54  Phos  2.9       Mg     2.4         TPro  6.6  /  Alb  1.7<L>  /  TBili  1.4<H>  /  DBili  0.8<H>  /  AST  403<H>  /  ALT  358<H>  /  AlkPhos  286<H>      Creatinine Trend: 1.27 <--, 1.52 <--, 1.26 <--, 1.49 <--, 1.90 <--, 1.74 <--, 2.22 <--                        12.4   16.65 )-----------( 298      ( 2019 05:54 )             36.9     Urine Studies:  Urinalysis Basic - ( 2019 08:33 )    Color: Yellow / Appearance: Clear / S.020 / pH:   Gluc:  / Ketone: Trace  / Bili: Negative / Urobili: 1   Blood:  / Protein: 15 / Nitrite: Negative   Leuk Esterase: Trace / RBC: 0-2 /HPF / WBC 0-2 /HPF   Sq Epi:  / Non Sq Epi: Occasional /HPF / Bacteria:       Creatinine, Random Urine: 173 mg/dL ( @ 02:52)  Chloride, Random Urine: 55 mmol/L ( @ 02:52)  Sodium, Random Urine: 33 mmol/L ( @ 02:52)

## 2019-11-21 NOTE — PROGRESS NOTE ADULT - SUBJECTIVE AND OBJECTIVE BOX
S :  Pt was seen this morning bedside. Pt states he thinks his feet are ugly. Pt has no other complaints today.       Patient admits to  (-) Fevers, (-) Chills, (-) Nausea, (-) Vomiting, (-) Shortness of Breath      PMH: Hypothyroid  Ulcers of both lower extremities    PSH:No significant past surgical history      Allergies:No Known Allergies                            12.4   16.65 )-----------( 298      ( 21 Nov 2019 05:54 )             36.9       11-21    139  |  108  |  40<H>  ----------------------------<  113<H>  3.6   |  23  |  1.27    Ca    8.5      21 Nov 2019 05:54  Phos  2.9     11-21  Mg     2.4     11-21    TPro  6.6  /  Alb  1.7<L>  /  TBili  1.4<H>  /  DBili  0.8<H>  /  AST  403<H>  /  ALT  358<H>  /  AlkPhos  286<H>  11-21    WBC Count: 16.65 K/uL (11.21.19 @ 05:54)  WBC Count: 17.39 K/uL (11.20.19 @ 07:01)        Vital Signs Last 24 Hrs  T(C): 36.6 (21 Nov 2019 05:20), Max: 37.2 (20 Nov 2019 13:06)  T(F): 97.8 (21 Nov 2019 05:20), Max: 99 (20 Nov 2019 13:06)  HR: 86 (21 Nov 2019 05:20) (78 - 90)  BP: 140/82 (21 Nov 2019 05:20) (99/53 - 140/82)  BP(mean): --  RR: 16 (21 Nov 2019 05:20) (16 - 16)  SpO2: 94% (21 Nov 2019 05:20) (94% - 97%)      O:   Integument:  Skin warm, dry and supple bilateral.  No open lesions or inter-digital macerations noted bilateral. Moderate erythema noted lower legs. mild non-pitting edema. Pt legs are covered in thick lotion. TG warm to warm.   Vascular: Dorsalis Pedis and Posterior Tibial pulses 1/4.  Capillary re-fill time less than 3 seconds digits 1-5 bilateral.   Neuro: unable to fully assess due to lack of verbal cooperation. Pt reacts to light touch at level of the toes.     < from: Xray Tibia + Fibula 2 Views, Bilateral (11.18.19 @ 10:38) >  EXAM:  LEG AP&LAT - BILATERAL                            PROCEDURE DATE:  11/18/2019          INTERPRETATION:  Clinical information: Cellulitis, leg ulcers.    AP and lateral views of the bilateral tibia and fibula.    Osteopenia is present.  No focal osteolysis or acute fracture is noted.    Impression:    No acute osseous abnormality noted.                MIRIAM HUMPHREYS M.D., ATTENDING RADIOLOGIST  This document has been electronically signed. Nov 18 2019  2:07PM    < end of copied text >      A:   Cellulitis LE b/l   < from: Xray Foot AP + Lateral + Oblique, Bilat (11.18.19 @ 10:30) >    EXAM:  FOOT BILATERAL (MINIMUM 3 V)                            PROCEDURE DATE:  11/18/2019          INTERPRETATION:  Bilateral feet    HISTORY: Foot ulcers      Three views of the right foot and two views of the left foot show a   buckle fracture of the base of the left second proximal phalanx. The   joint spaces are maintained.    IMPRESSION: Left toe fracture which may not be acute. Clinical   correlation is suggested.    Note - This does not exclude fractures in perfect alignment and   apposition as presence may be indicated at one to three weeks following   callus formation.    Thank you for this referral.                ARLEEN DIAZ M.D., ATTENDING RADIOLOGIST  This document has been electronically signed. Nov 18 2019  2:10PM    < end of copied text >    P: Discussed diagnosis and treatment with patient  Continue IV antibiotics per ID  No podiatric intervention   Please reconsult as needed  Seen with

## 2019-11-21 NOTE — CHART NOTE - NSCHARTNOTEFT_GEN_A_CORE
Positive HIDA findings noted. Discussed with surgical attending who states that pt did not want surgery. Findings reviewed with patient and patient again refuses surgery. However, quick assessment of patient's mentation showed that he is only A&Ox1. Recommend psych eval for capacity. If pt is deemed to lack capacity, wife may become health care proxy and make decision to have surgical intervention. Attempted to contact medical attending Dr. Coleman regarding plan, unable to reach via telephone, unable to leave voicemail as mailbox full. Will reattempt to discuss with primary team in AM

## 2019-11-21 NOTE — PROGRESS NOTE ADULT - ASSESSMENT
Patient is a 89y Male with hypothyroidism, chronic venous insufficiency, recent UTI s/p antibiotics p/w fever, cough and worsening LE swelling/ warmth. Renal consulted for Elevated serum creatinine.    1. ARMIN- likely hemodynamically mediated in the setting of sepsis. Pt with improvement in renal function off Lisnopril/ Lasix with IVF. CT chest with mild pulmonary edema and IVF d/c 11/18. Restarted 11/20 due to NPO status; monitor for fluid overload. CT abd/pelvis with no hydro, however Renal US with distended bladder; recc post void bladder scan. Strict I/Os. Avoid nephrotoxins/ NSAIDs/ RCA. Monitor BMP.  2. CKD-3- previous Scr 1.1; defer secondary w/u due to advanced age. Monitor lytes  3. Sepsis- 2/2 Cellulitis- c/w abx as per primary team/ ID.   4. LE edema- in the setting of cellulitis. Continue to hold Lasix due to ARMIN.   5. HTN 2/2 CKD- BP acceptable; off Lisnopril due to ARMIN. Pt with Afib. If elevated BP and/or HR, consider BB or CCB for rate control. Monitor BP

## 2019-11-21 NOTE — PROGRESS NOTE ADULT - SUBJECTIVE AND OBJECTIVE BOX
Patient examined at bedside, no overnight events  No nausea, no vomiting  denies abdominal pain    T(C): 36.6 (11-21-19 @ 05:20), Max: 37.2 (11-20-19 @ 13:06)  HR: 86 (11-21-19 @ 05:20) (78 - 90)  BP: 140/82 (11-21-19 @ 05:20) (99/53 - 140/82)  RR: 16 (11-21-19 @ 05:20) (16 - 16)  SpO2: 94% (11-21-19 @ 05:20) (94% - 97%)  Wt(kg): --      Physical Exam  General: AAOx3, No acute distress  Skin: No jaundice, no icterus  Abdomen: soft, nondistended, RUQ tenderness to deep palpation  Extremities: non edematous, no calf pain bilaterally                          12.4   16.65 )-----------( 298      ( 21 Nov 2019 05:54 )             36.9   11-21    139  |  108  |  40<H>  ----------------------------<  113<H>  3.6   |  23  |  1.27    Ca    8.5      21 Nov 2019 05:54  Phos  2.9     11-21  Mg     2.4     11-21    TPro  6.6  /  Alb  1.7<L>  /  TBili  1.4<H>  /  DBili  0.8<H>  /  AST  403<H>  /  ALT  358<H>  /  AlkPhos  286<H>  11-21

## 2019-11-21 NOTE — PROGRESS NOTE ADULT - PROBLEM SELECTOR PLAN 1
continue zosyn    f/u ID Dr Jha   hepatic US showing distended gallbladder with sludge wall thickening  plan for HIDA today   patient was on antibiotics before admission for UTI and was given Bactrim as per pharmacy  f/u surgery

## 2019-11-22 ENCOUNTER — TRANSCRIPTION ENCOUNTER (OUTPATIENT)
Age: 84
End: 2019-11-22

## 2019-11-22 LAB
ALBUMIN SERPL ELPH-MCNC: 1.8 G/DL — LOW (ref 3.5–5)
ALP SERPL-CCNC: 277 U/L — HIGH (ref 40–120)
ALT FLD-CCNC: 244 U/L DA — HIGH (ref 10–60)
ANION GAP SERPL CALC-SCNC: 9 MMOL/L — SIGNIFICANT CHANGE UP (ref 5–17)
ANISOCYTOSIS BLD QL: SLIGHT — SIGNIFICANT CHANGE UP
AST SERPL-CCNC: 163 U/L — HIGH (ref 10–40)
BASOPHILS # BLD AUTO: 0 K/UL — SIGNIFICANT CHANGE UP (ref 0–0.2)
BASOPHILS NFR BLD AUTO: 0 % — SIGNIFICANT CHANGE UP (ref 0–2)
BILIRUB SERPL-MCNC: 1.4 MG/DL — HIGH (ref 0.2–1.2)
BUN SERPL-MCNC: 35 MG/DL — HIGH (ref 7–18)
CALCIUM SERPL-MCNC: 8.8 MG/DL — SIGNIFICANT CHANGE UP (ref 8.4–10.5)
CHLORIDE SERPL-SCNC: 109 MMOL/L — HIGH (ref 96–108)
CO2 SERPL-SCNC: 22 MMOL/L — SIGNIFICANT CHANGE UP (ref 22–31)
CREAT SERPL-MCNC: 1.29 MG/DL — SIGNIFICANT CHANGE UP (ref 0.5–1.3)
ELLIPTOCYTES BLD QL SMEAR: SLIGHT — SIGNIFICANT CHANGE UP
EOSINOPHIL # BLD AUTO: 0 K/UL — SIGNIFICANT CHANGE UP (ref 0–0.5)
EOSINOPHIL NFR BLD AUTO: 0 % — SIGNIFICANT CHANGE UP (ref 0–6)
GLUCOSE SERPL-MCNC: 144 MG/DL — HIGH (ref 70–99)
HCT VFR BLD CALC: 37.6 % — LOW (ref 39–50)
HGB BLD-MCNC: 12.7 G/DL — LOW (ref 13–17)
HYPOCHROMIA BLD QL: SLIGHT — SIGNIFICANT CHANGE UP
LYMPHOCYTES # BLD AUTO: 1.11 K/UL — SIGNIFICANT CHANGE UP (ref 1–3.3)
LYMPHOCYTES # BLD AUTO: 5 % — LOW (ref 13–44)
MANUAL SMEAR VERIFICATION: SIGNIFICANT CHANGE UP
MCHC RBC-ENTMCNC: 31 PG — SIGNIFICANT CHANGE UP (ref 27–34)
MCHC RBC-ENTMCNC: 33.8 GM/DL — SIGNIFICANT CHANGE UP (ref 32–36)
MCV RBC AUTO: 91.7 FL — SIGNIFICANT CHANGE UP (ref 80–100)
MONOCYTES # BLD AUTO: 1.33 K/UL — HIGH (ref 0–0.9)
MONOCYTES NFR BLD AUTO: 6 % — SIGNIFICANT CHANGE UP (ref 2–14)
NEUTROPHILS # BLD AUTO: 19.71 K/UL — HIGH (ref 1.8–7.4)
NEUTROPHILS NFR BLD AUTO: 89 % — HIGH (ref 43–77)
NRBC # BLD: 0 /100 — SIGNIFICANT CHANGE UP (ref 0–0)
OVALOCYTES BLD QL SMEAR: SLIGHT — SIGNIFICANT CHANGE UP
PLAT MORPH BLD: NORMAL — SIGNIFICANT CHANGE UP
PLATELET # BLD AUTO: 371 K/UL — SIGNIFICANT CHANGE UP (ref 150–400)
PLATELET COUNT - ESTIMATE: NORMAL — SIGNIFICANT CHANGE UP
POIKILOCYTOSIS BLD QL AUTO: SLIGHT — SIGNIFICANT CHANGE UP
POLYCHROMASIA BLD QL SMEAR: SLIGHT — SIGNIFICANT CHANGE UP
POTASSIUM SERPL-MCNC: 3.7 MMOL/L — SIGNIFICANT CHANGE UP (ref 3.5–5.3)
POTASSIUM SERPL-SCNC: 3.7 MMOL/L — SIGNIFICANT CHANGE UP (ref 3.5–5.3)
PROT SERPL-MCNC: 7.1 G/DL — SIGNIFICANT CHANGE UP (ref 6–8.3)
RBC # BLD: 4.1 M/UL — LOW (ref 4.2–5.8)
RBC # FLD: 15 % — HIGH (ref 10.3–14.5)
RBC BLD AUTO: ABNORMAL
SODIUM SERPL-SCNC: 140 MMOL/L — SIGNIFICANT CHANGE UP (ref 135–145)
WBC # BLD: 22.15 K/UL — HIGH (ref 3.8–10.5)
WBC # FLD AUTO: 22.15 K/UL — HIGH (ref 3.8–10.5)

## 2019-11-22 PROCEDURE — 99233 SBSQ HOSP IP/OBS HIGH 50: CPT | Mod: GC

## 2019-11-22 PROCEDURE — 99231 SBSQ HOSP IP/OBS SF/LOW 25: CPT

## 2019-11-22 RX ADMIN — PIPERACILLIN AND TAZOBACTAM 25 GRAM(S): 4; .5 INJECTION, POWDER, LYOPHILIZED, FOR SOLUTION INTRAVENOUS at 11:21

## 2019-11-22 RX ADMIN — PIPERACILLIN AND TAZOBACTAM 25 GRAM(S): 4; .5 INJECTION, POWDER, LYOPHILIZED, FOR SOLUTION INTRAVENOUS at 01:16

## 2019-11-22 RX ADMIN — PIPERACILLIN AND TAZOBACTAM 25 GRAM(S): 4; .5 INJECTION, POWDER, LYOPHILIZED, FOR SOLUTION INTRAVENOUS at 17:51

## 2019-11-22 RX ADMIN — ENOXAPARIN SODIUM 30 MILLIGRAM(S): 100 INJECTION SUBCUTANEOUS at 11:25

## 2019-11-22 RX ADMIN — TAMSULOSIN HYDROCHLORIDE 0.4 MILLIGRAM(S): 0.4 CAPSULE ORAL at 21:49

## 2019-11-22 RX ADMIN — Medication 100 MICROGRAM(S): at 05:37

## 2019-11-22 NOTE — DISCHARGE NOTE PROVIDER - ATTENDING COMMENTS
Seen and examined at bedside.    No complaints        Vital Signs Last 24 Hrs    T(C): 36.6 (05 Dec 2019 05:18), Max: 36.6 (05 Dec 2019 05:18)    T(F): 97.8 (05 Dec 2019 05:18), Max: 97.8 (05 Dec 2019 05:18)    HR: 64 (05 Dec 2019 05:18) (64 - 84)    BP: 104/53 (05 Dec 2019 05:18) (104/53 - 144/73)    BP(mean): --    RR: 16 (05 Dec 2019 05:18) (14 - 17)    SpO2: 98% (05 Dec 2019 05:18) (98% - 99%)        OE        NAD    RRR S1s2    Clear lungs    Soft NT, ND, +BS    No extremity  edema.        A/P        Sepsis secondary to acute Cholecystitis s/p IR guided cholecystostomy tube 11/26 resolved s/p completion of IV Zosyn    Severe protein calorie malnutrition    Cholelithiasis     Afib not on anticoagulation deferred to his advanced age    HTN    PVD on Plavix    CKD stage 3    Elevated INR -normalized s/p vitamin K    Hypokalemia s/p repletion- resolved        Plan        Medically stable to discharge to [facility]    Will discharge on lisinopril 2.5 mg Qd    Needs repeat bmp in 3-4 days at [facility]

## 2019-11-22 NOTE — DISCHARGE NOTE PROVIDER - NSDCCPCAREPLAN_GEN_ALL_CORE_FT
PRINCIPAL DISCHARGE DIAGNOSIS  Diagnosis: Acute cholecystitis  Assessment and Plan of Treatment: Acute cholecystitis      SECONDARY DISCHARGE DIAGNOSES  Diagnosis: Afib  Assessment and Plan of Treatment: Afib    Diagnosis: ARMIN (acute kidney injury)  Assessment and Plan of Treatment: ARMIN (acute kidney injury)    Diagnosis: Hypernatremia  Assessment and Plan of Treatment: Noted to have elevated sodium levels during the admission. This was most likely due to no PO intake of food due to the cholecystitis.    Diagnosis: Hypothyroid  Assessment and Plan of Treatment: Continue your home medications for hypothyroidism.   Follow up with your PCP for further evaluation and management. PRINCIPAL DISCHARGE DIAGNOSIS  Diagnosis: Acute cholecystitis  Assessment and Plan of Treatment: You were admitted because of concerns for infection oncerns of cholecystitis and had HIDA scan done. HIDA showed signs of acute cholecystitis. Antibiotics changed to zosyn.   Surgery consulted for possible cholecystitis. they placed the cholecystostomy tube, you are recommended to follow up as out patient with gastroenterlogy and surgery for further management of cholecystostomy tube.        SECONDARY DISCHARGE DIAGNOSES  Diagnosis: Hypertension  Assessment and Plan of Treatment: Blood Pressure Control , Please continue current medication regimen, and follow up with your PCP   - You should continue on the lisinopril 2.5mg   -we have held your lasix because of ARMIN. and blood pressure is controlled with it.  - You blood pressure should be within 140/90.  - You should follow-up with your PCP within 1 week of your discharge for routine blood pressure monitoring at your next visit.  - Notify your doctor if you have any of the following symptoms:   (Dizziness, Lightheadedness, Blurry vision, Headache, Chest pain, Shortness of breath.)  - You should maintain healthy lifestyle by eating healthy low salt diet, avoid fatty food, weight loss, exercise regularly as tolerated 30 mins X 3 time per week.      Diagnosis: Hypernatremia  Assessment and Plan of Treatment: Noted to have elevated sodium levels during the admission. This was most likely due to no PO intake of food due to the cholecystitis.    Diagnosis: ARMIN (acute kidney injury)  Assessment and Plan of Treatment: You were found to have ARMIN, that was subsequently improved after holdng lasix. you are recommended to follow up with PCP for further recommendations.    Diagnosis: Hypothyroid  Assessment and Plan of Treatment: Continue your home medications for hypothyroidism.   Follow up with your PCP for further evaluation and management.

## 2019-11-22 NOTE — PROGRESS NOTE ADULT - SUBJECTIVE AND OBJECTIVE BOX
INTERVAL HPI/OVERNIGHT EVENTS:    No acute events overnight.   Pt resting comfortably. No acute complaints.     MEDICATIONS  (STANDING):  dextrose 5% + sodium chloride 0.45%. 1000 milliLiter(s) (65 mL/Hr) IV Continuous <Continuous>  enoxaparin Injectable 30 milliGRAM(s) SubCutaneous daily  levothyroxine 100 MICROGram(s) Oral daily  piperacillin/tazobactam IVPB.. 3.375 Gram(s) IV Intermittent every 8 hours  tamsulosin 0.4 milliGRAM(s) Oral at bedtime    MEDICATIONS  (PRN):  acetaminophen   Tablet .. 650 milliGRAM(s) Oral every 6 hours PRN Temp greater or equal to 38C (100.4F), Moderate Pain (4 - 6)    Vital Signs Last 24 Hrs  T(C): 36.4 (22 Nov 2019 05:20), Max: 37.9 (21 Nov 2019 14:48)  T(F): 97.6 (22 Nov 2019 05:20), Max: 100.3 (21 Nov 2019 14:48)  HR: 91 (22 Nov 2019 05:20) (91 - 96)  BP: 129/60 (22 Nov 2019 05:20) (124/61 - 129/60)  RR: 16 (22 Nov 2019 05:20) (16 - 16)  SpO2: 90% (22 Nov 2019 05:20) (90% - 96%)    Physical Exam:  General: in no acute distress  Skin: No jaundice, no icterus  Abdomen: soft, nondistended, RUQ tenderness to deep palpation  Extremities: non-edematous, no calf pain bilaterally    LABS:                        12.7   22.15 )-----------( 371      ( 22 Nov 2019 07:00 )             37.6             11-22    140  |  109<H>  |  35<H>  ----------------------------<  144<H>  3.7   |  22  |  1.29    Ca    8.8      22 Nov 2019 07:00  Phos  2.9     11-21  Mg     2.4     11-21    TPro  7.1  /  Alb  1.8<L>  /  TBili  1.4<H>  /  DBili  x   /  AST  163<H>  /  ALT  244<H>  /  AlkPhos  277<H>  11-22

## 2019-11-22 NOTE — PROGRESS NOTE ADULT - ATTENDING COMMENTS
[facility] NEPHROLOGY  Justin Orozco M.D.  Kj Fernandez D.O.  Tonia Sanches M.D.  Salina Case, MSN, ANP-C  [phone]    [address] [place] [address]

## 2019-11-22 NOTE — PROGRESS NOTE ADULT - ASSESSMENT
Acute Cholecystitis   Leukocytosis - increased    Plan - cont Zosyn 3.375 gms iv q8hrs  for surgery on Monday.

## 2019-11-22 NOTE — PROGRESS NOTE ADULT - PROBLEM SELECTOR PLAN 2
Baseline Cr of 1.15 in 11/2018  Creatinine 1.29 today   -CT abd/pelvis shows mass effect of enlarged prostate on bladder  -continue Tamsulosin  -f/u Nephro   off of lisinopril and lasix

## 2019-11-22 NOTE — PROGRESS NOTE ADULT - PROBLEM SELECTOR PLAN 3
Pt has hx of afib  EKG: Afib rate controlled   Not on any rate control medication  patient on plavix for PVD, PCP office unsure why patient not on AC  will hold on plavix for possible surgery

## 2019-11-22 NOTE — DISCHARGE NOTE PROVIDER - NSDCMRMEDTOKEN_GEN_ALL_CORE_FT
clopidogrel 75 mg oral tablet: 1 tab(s) orally once a day  furosemide 40 mg oral tablet: 1 tab(s) orally once a day  levothyroxine 100 mcg (0.1 mg) oral tablet: 1 tab(s) orally once a day  lisinopril 2.5 mg oral tablet: 1 tab(s) orally once a day acetaminophen 325 mg oral tablet: 2 tab(s) orally every 6 hours, As needed, Temp greater or equal to 38C (100.4F), Moderate Pain (4 - 6)  calamine topical lotion: 1 application topically once a day  clopidogrel 75 mg oral tablet: 1 tab(s) orally once a day  levothyroxine 100 mcg (0.1 mg) oral tablet: 1 tab(s) orally once a day  lisinopril 2.5 mg oral tablet: 1 tab(s) orally once a day  tamsulosin 0.4 mg oral capsule: 1 cap(s) orally once a day (at bedtime)

## 2019-11-22 NOTE — PROGRESS NOTE ADULT - PROBLEM SELECTOR PLAN 1
continue zosyn    f/u ID Dr Jha   hepatic US showing distended gallbladder with sludge wall thickening  HIDA positive for cholecystitis   f/u surgery  patient is only alert to person only; patient does not seem to have capacity for medical decisions, wife makes decisions for him

## 2019-11-22 NOTE — PROGRESS NOTE ADULT - ASSESSMENT
Patient is a 89y Male with hypothyroidism, chronic venous insufficiency, recent UTI s/p antibiotics p/w fever, cough and worsening LE swelling/ warmth. Renal consulted for Elevated serum creatinine.    1. ARMIN- likely hemodynamically mediated in the setting of sepsis. Pt with improvement in renal function off Lisnopril/ Lasix with IVF. CT chest with mild pulmonary edema and IVF d/c 11/18. Restarted 11/20 due to NPO status; monitor for fluid overload. CT abd/pelvis with no hydro, however Renal US with distended bladder; recc post void bladder scan. Strict I/Os. Avoid nephrotoxins/ NSAIDs/ RCA. Monitor BMP.  2. CKD-3- previous Scr 1.1; defer secondary w/u due to advanced age. Monitor lytes  3. Sepsis- 2/2 Cellulitis/ ?Acute blayne- c/w abx as per primary team/ ID.   4. LE edema- in the setting of cellulitis. Continue to hold Lasix due to ARMIN.   5. HTN 2/2 CKD- BP acceptable; off Lisnopril due to ARMIN. Pt with Afib. If elevated BP and/or HR, consider BB or CCB for rate control. Monitor BP

## 2019-11-22 NOTE — PROGRESS NOTE ADULT - SUBJECTIVE AND OBJECTIVE BOX
89y Male    Meds:  piperacillin/tazobactam IVPB.. 3.375 Gram(s) IV Intermittent every 8 hours    Allergies    No Known Allergies    Intolerances        VITALS:  Vital Signs Last 24 Hrs  T(C): 36.5 (22 Nov 2019 14:43), Max: 36.5 (22 Nov 2019 14:43)  T(F): 97.7 (22 Nov 2019 14:43), Max: 97.7 (22 Nov 2019 14:43)  HR: 100 (22 Nov 2019 14:43) (91 - 100)  BP: 122/62 (22 Nov 2019 14:43) (122/62 - 129/60)  BP(mean): --  RR: 19 (22 Nov 2019 14:43) (16 - 19)  SpO2: 92% (22 Nov 2019 14:43) (90% - 94%)    LABS/DIAGNOSTIC TESTS:                          12.7   22.15 )-----------( 371      ( 22 Nov 2019 07:00 )             37.6         11-22    140  |  109<H>  |  35<H>  ----------------------------<  144<H>  3.7   |  22  |  1.29    Ca    8.8      22 Nov 2019 07:00  Phos  2.9     11-21  Mg     2.4     11-21    TPro  7.1  /  Alb  1.8<L>  /  TBili  1.4<H>  /  DBili  x   /  AST  163<H>  /  ALT  244<H>  /  AlkPhos  277<H>  11-22      LIVER FUNCTIONS - ( 22 Nov 2019 07:00 )  Alb: 1.8 g/dL / Pro: 7.1 g/dL / ALK PHOS: 277 U/L / ALT: 244 U/L DA / AST: 163 U/L / GGT: x             CULTURES: .Urine  11-16 @ 16:28   <10,000 CFU/mL Normal Urogenital Amanda  --  --      .Blood  11-16 @ 01:59   No growth at 5 days.  --  --            RADIOLOGY:< from: [facility] Hepatobiliary Imaging (11.21.19 @ 13:50) >  EXAM:  [facility] HEPATOBILIARY IMG                            PROCEDURE DATE:  11/21/2019          INTERPRETATION:  CLINICAL INFORMATION: 89 year-old male with fever and   cholelithiasis, referred to evaluate for acute cholecystitis.    RADIOPHARMACEUTICAL: 3.1 mCi and 2.8 mCi Tc-99m-Mebrofenin, I.V.; 2 doses    TECHNIQUE: Dynamic imaging of the anterior abdomen was performed for 1   hour following injection of radiotracer. Morphine 2 mg I.V. and a second   dose of radiotracer were administered at 1 hour.  Dynamic imaging was   continued for 1 hour followed by static images of the abdomen in the   anterior projection. The patient was unable to tolerate additional   imaging.    COMPARISON: No previous hepatobiliary scan for comparison    FINDINGS: There is prompt, homogeneous uptake of radiotracer by the   hepatocytes. Activity is first seen in the bowel at 15 minutes. The   gallbladder is not visualized at anytime during the study despite   administration of Morphine. There is good clearance of activity from the   liver at the end of the study.    IMPRESSION:  Abnormal morphine-augmented hepatobiliary scan compatible   with acute cholecystitis.      Dr. Tatum was notified of these results by Dr. Garcia via telephone on   11/21/2019 at 2:10 PM,                   INA GARCIA M.D., NUCLEAR MEDICINE ATTENDING  This document has been electronically signed. Nov 21 2019  2:12PM        < end of copied text >        ROS:  [  ] UNABLE TO ELICIT 89y Male who is doing better clinically but is not answering any questions, his LFTs are decreasing nicely, his HIDA scan is positive for acute cholecystitis and family has agreed for surgery and so will go on Monday. No fevers but WBC count has increased a little.    Meds:  piperacillin/tazobactam IVPB.. 3.375 Gram(s) IV Intermittent every 8 hours    Allergies    No Known Allergies    Intolerances        VITALS:  Vital Signs Last 24 Hrs  T(C): 36.5 (22 Nov 2019 14:43), Max: 36.5 (22 Nov 2019 14:43)  T(F): 97.7 (22 Nov 2019 14:43), Max: 97.7 (22 Nov 2019 14:43)  HR: 100 (22 Nov 2019 14:43) (91 - 100)  BP: 122/62 (22 Nov 2019 14:43) (122/62 - 129/60)  BP(mean): --  RR: 19 (22 Nov 2019 14:43) (16 - 19)  SpO2: 92% (22 Nov 2019 14:43) (90% - 94%)    LABS/DIAGNOSTIC TESTS:                          12.7   22.15 )-----------( 371      ( 22 Nov 2019 07:00 )             37.6         11-22    140  |  109<H>  |  35<H>  ----------------------------<  144<H>  3.7   |  22  |  1.29    Ca    8.8      22 Nov 2019 07:00  Phos  2.9     11-21  Mg     2.4     11-21    TPro  7.1  /  Alb  1.8<L>  /  TBili  1.4<H>  /  DBili  x   /  AST  163<H>  /  ALT  244<H>  /  AlkPhos  277<H>  11-22      LIVER FUNCTIONS - ( 22 Nov 2019 07:00 )  Alb: 1.8 g/dL / Pro: 7.1 g/dL / ALK PHOS: 277 U/L / ALT: 244 U/L DA / AST: 163 U/L / GGT: x             CULTURES: .Urine  11-16 @ 16:28   <10,000 CFU/mL Normal Urogenital Amanda  --  --      .Blood  11-16 @ 01:59   No growth at 5 days.  --  --            RADIOLOGY:< from: [facility] Hepatobiliary Imaging (11.21.19 @ 13:50) >  EXAM:  NM HEPATOBILIARY IMG                            PROCEDURE DATE:  11/21/2019          INTERPRETATION:  CLINICAL INFORMATION: 89 year-old male with fever and   cholelithiasis, referred to evaluate for acute cholecystitis.    RADIOPHARMACEUTICAL: 3.1 mCi and 2.8 mCi Tc-99m-Mebrofenin, I.V.; 2 doses    TECHNIQUE: Dynamic imaging of the anterior abdomen was performed for 1   hour following injection of radiotracer. Morphine 2 mg I.V. and a second   dose of radiotracer were administered at 1 hour.  Dynamic imaging was   continued for 1 hour followed by static images of the abdomen in the   anterior projection. The patient was unable to tolerate additional   imaging.    COMPARISON: No previous hepatobiliary scan for comparison    FINDINGS: There is prompt, homogeneous uptake of radiotracer by the   hepatocytes. Activity is first seen in the bowel at 15 minutes. The   gallbladder is not visualized at anytime during the study despite   administration of Morphine. There is good clearance of activity from the   liver at the end of the study.    IMPRESSION:  Abnormal morphine-augmented hepatobiliary scan compatible   with acute cholecystitis.      Dr. Tatum was notified of these results by Dr. Garcia via telephone on   11/21/2019 at 2:10 PM,                   INA GARCIA M.D., NUCLEAR MEDICINE ATTENDING  This document has been electronically signed. Nov 21 2019  2:12PM        < end of copied text >        ROS:  [ x ] UNABLE TO ELICIT

## 2019-11-22 NOTE — DISCHARGE NOTE PROVIDER - CARE PROVIDER_API CALL
Alec Elias)  Surgery  [address] [facility], [place] [address]  Phone: [phone]  Fax: [phone]  Follow Up Time:

## 2019-11-22 NOTE — PROGRESS NOTE ADULT - SUBJECTIVE AND OBJECTIVE BOX
[facility] NEPHROLOGY- PROGRESS NOTE    Patient is a 89y Male with hypothyroidism, chronic venous insufficiency, recent UTI s/p antibiotics p/w fever, cough and worsening LE swelling/ warmth. Renal consulted for Elevated serum creatinine.  Pt NPO with gallbladder sludge;  +HIDDA for acute blayne    Hospital Medications: Medications reviewed.  REVIEW OF SYSTEMS:  CONSTITUTIONAL: No fevers No chills  RESPIRATORY: No shortness of breath  CARDIOVASCULAR: No chest pain.  GASTROINTESTINAL: No nausea, vomiting, or diarrhea. Denies abdominal pain.   VASCULAR: +bilateral lower extremity edema.     VITALS:  T(F): 97.6 (19 @ 05:20), Max: 100.3 (19 @ 14:48)  HR: 91 (19 @ 05:20)  BP: 129/60 (19 @ 05:20)  RR: 16 (19 @ 05:20)  SpO2: 90% (19 @ 05:20)  Wt(kg): --      PHYSICAL EXAM:  Gen: NAD, calm  HEENT: MMM  Neck: no JVD  Cards: irregular, +S1/S2, no M/G/R  Resp: CTA ant  GI: soft, +epigastric tenderness NABS  Extremities: + LE edema B/L with hyperpigmentation/ warmth      LABS:      140  |  109<H>  |  35<H>  ----------------------------<  144<H>  3.7   |  22  |  1.29    Ca    8.8      2019 07:00  Phos  2.9       Mg     2.4         TPro  7.1  /  Alb  1.8<L>  /  TBili  1.4<H>  /  DBili      /  AST  163<H>  /  ALT  244<H>  /  AlkPhos  277<H>      Creatinine Trend: 1.29 <--, 1.27 <--, 1.52 <--, 1.26 <--, 1.49 <--, 1.90 <--, 1.74 <--, 2.22 <--                        12.7   22.15 )-----------( 371      ( 2019 07:00 )             37.6     Urine Studies:  Urinalysis Basic - ( 2019 08:33 )    Color: Yellow / Appearance: Clear / S.020 / pH:   Gluc:  / Ketone: Trace  / Bili: Negative / Urobili: 1   Blood:  / Protein: 15 / Nitrite: Negative   Leuk Esterase: Trace / RBC: 0-2 /HPF / WBC 0-2 /HPF   Sq Epi:  / Non Sq Epi: Occasional /HPF / Bacteria:       Creatinine, Random Urine: 173 mg/dL ( @ 02:52)  Chloride, Random Urine: 55 mmol/L ( @ 02:52)  Sodium, Random Urine: 33 mmol/L ( @ 02:52)

## 2019-11-22 NOTE — PROGRESS NOTE ADULT - ATTENDING COMMENTS
I have seen and examined the patient. Case discussed with house staff in detail. I have reviewed and edited the note where appropriate.    Patient is a 89y Male with hypothyroidism, chronic venous insufficiency, recent UTI s/p antibiotics p/w fever, cough and worsening LE swelling admitted for cellulitis underwent HIDA scan 11/21 significant for cholecystitis.     Surgery follow up noted.  Uptrend wbc. No fever spikes today     OE  NAD  Lethargic  RRR s1s2  clear lungs  RUQ tenderness, +BS  No pedal edema  Awake, alert, oriented to person     A/P  Acute Cholecystitis  Afib not on anticoagulation   HTN  PVD on Plavix; last dose on 11/21/19  CKD stage 3    Plan  NPO , C/w D5half NS low rate  C/w IV Zosyn   Awaits 2D Echo    Patient DOES NOT have capacity to make medical decisions. Patients spouse is agreeable for surgery.   F/u surgery    Discussed with patients spouse and daughter at bedside. All questions answered and care in align with their wishes. I have seen and examined the patient. Case discussed with house staff in detail. I have reviewed and edited the note where appropriate.    Patient is a 89y Male with hypothyroidism, chronic venous insufficiency, recent UTI s/p antibiotics p/w fever, cough and worsening LE swelling admitted for cellulitis underwent HIDA scan 11/21 significant for cholecystitis.     Surgery follow up noted.  Uptrend wbc. No fever spikes today     OE  NAD  Lethargic  RRR s1s2  clear lungs  RUQ tenderness, +BS  No pedal edema  Awake, alert, oriented to person only    A/P  Acute Cholecystitis  Afib not on anticoagulation   HTN  PVD on Plavix; last dose on 11/21/19  CKD stage 3    Plan  NPO , C/w D5half NS low rate  C/w IV Zosyn   Awaits 2D Echo    Patient DOES NOT have capacity to make medical decisions. Patients spouse is agreeable for surgery.   F/u surgery

## 2019-11-22 NOTE — PROGRESS NOTE ADULT - SUBJECTIVE AND OBJECTIVE BOX
PGY 1 Note discussed with supervising resident and primary attending    Patient is a 89y old  Male who presents with a chief complaint of Fever (22 Nov 2019 07:51)    INTERVAL HPI/OVERNIGHT EVENTS: Patient seen and examined at the bedside. Patient noted to have positive HIDA scan yesterday. Patient currently NPO and on IVF. Patient lacks capacity and has wife that helps make decisions. Patient will need surgery for acute cholecystitis.     MEDICATIONS  (STANDING):  dextrose 5% + sodium chloride 0.45%. 1000 milliLiter(s) (65 mL/Hr) IV Continuous <Continuous>  enoxaparin Injectable 30 milliGRAM(s) SubCutaneous daily  levothyroxine 100 MICROGram(s) Oral daily  piperacillin/tazobactam IVPB.. 3.375 Gram(s) IV Intermittent every 8 hours  tamsulosin 0.4 milliGRAM(s) Oral at bedtime    MEDICATIONS  (PRN):  acetaminophen   Tablet .. 650 milliGRAM(s) Oral every 6 hours PRN Temp greater or equal to 38C (100.4F), Moderate Pain (4 - 6)      __________________________________________________  REVIEW OF SYSTEMS:  CONSTITUTIONAL: No fever   EYES: no visual disturbances  NECK: No pain   RESPIRATORY: Mild cough; No shortness of breath  CARDIOVASCULAR: No chest pain  GASTROINTESTINAL: Abdominal pain. No nausea or vomiting   NEUROLOGICAL: No headache   MUSCULOSKELETAL: bilateral lower extremity pain   GENITOURINARY: no dysuria  PSYCHIATRY: no anxiety  ALL OTHER  ROS negative        Vital Signs Last 24 Hrs  T(C): 36.4 (22 Nov 2019 05:20), Max: 37.9 (21 Nov 2019 14:48)  T(F): 97.6 (22 Nov 2019 05:20), Max: 100.3 (21 Nov 2019 14:48)  HR: 91 (22 Nov 2019 05:20) (91 - 96)  BP: 129/60 (22 Nov 2019 05:20) (124/61 - 129/60)  RR: 16 (22 Nov 2019 05:20) (16 - 16)  SpO2: 90% (22 Nov 2019 05:20) (90% - 96%)    ________________________________________________  PHYSICAL EXAM:  GENERAL: Patient seen resting in bed and in no acute distress  HEENT: Normocephalic; conjunctivae and sclerae clear   NECK: supple  CHEST/LUNG: Clear to auscultation bilaterally    HEART: S1 S2, irregular; no murmurs  ABDOMEN: Soft, Tenderness to palpation mostly in RUQ, Nondistended; Bowel sounds present  EXTREMITIES: no cyanosis; no edema; no calf tenderness  SKIN: warm and dry; no rash  NERVOUS SYSTEM:  Awake but not alert; Oriented to person only    _________________________________________________  LABS:                        12.7   22.15 )-----------( 371      ( 22 Nov 2019 07:00 )             37.6     11-22    140  |  109<H>  |  35<H>  ----------------------------<  144<H>  3.7   |  22  |  1.29    Ca    8.8      22 Nov 2019 07:00  Phos  2.9     11-21  Mg     2.4     11-21    TPro  7.1  /  Alb  1.8<L>  /  TBili  1.4<H>  /  DBili  x   /  AST  163<H>  /  ALT  244<H>  /  AlkPhos  277<H>  11-22      RADIOLOGY & ADDITIONAL TESTS:    Imaging Personally Reviewed:  YES    < from: [facility] Hepatobiliary Imaging (11.21.19 @ 13:50) >  IMPRESSION:  Abnormal morphine-augmented hepatobiliary scan compatible   with acute cholecystitis.    < end of copied text >    Consultant(s) Notes Reviewed:   YES    Care Discussed with Consultants :     Plan of care was discussed with patient and /or primary care giver; all questions and concerns were addressed and care was aligned with patient's wishes.

## 2019-11-22 NOTE — PROGRESS NOTE ADULT - ASSESSMENT
89 y/o male with sepsis, cholelithiasis, rule out acute cholecystitis    - f/u psych for capacity eval today  - keep npo  - cont ivf  - cont iv abx

## 2019-11-22 NOTE — DISCHARGE NOTE PROVIDER - HOSPITAL COURSE
Patient is 89 year old male, has HHA 24x7, ambulates with walker, has medical history significant for hypothyroidism, and chronic venous insufficiency, who came in to ED because of fever of 101 at home.         Per wife at bedside pt was in his usual health a week ago, when pt was started on abx for UTI by visiting physician. pt completed the abx of 5 days. Since day before admission, pt was having fever at home with tmax 101.4, associated with dry cough for 2 days, weakness, poor po intake, chills, dysuria. Pt also has b/l leg swelling, with warmness, and redness. Per wife, it's chronic and pt is on furosemide for leg swelling.         Chest x-ray done showed no acute processes.         Started on rocephin and vancomycin for concerns of cellulitis of lower extremities.         Patient had concerns of cholecystitis and had HIDA scan done. HIDA showed signs of acute cholecystitis.         Antibiotics changed to zosyn.         Surgery consulted for possible cholecystitis.         Noted to have elevated creatinine level on admission. Improved after holding lisinopril and lasix.         NOTE NOT COMPLETE Patient is 89 year old male, has HHA 24x7, ambulates with walker, has medical history significant for hypothyroidism, and chronic venous insufficiency, who came in to ED because of fever of 101 at home.         Per wife at bedside pt was in his usual health a week ago, when pt was started on abx for UTI by visiting physician. pt completed the abx of 5 days. Since day before admission, pt was having fever at home with tmax 101.4, associated with dry cough for 2 days, weakness, poor po intake, chills, dysuria. Pt also has b/l leg swelling, with warmness, and redness. Per wife, it's chronic and pt is on furosemide for leg swelling.         Chest x-ray done showed no acute processes.         Started on rocephin and vancomycin for concerns of cellulitis of lower extremities.         Patient had concerns of cholecystitis and had HIDA scan done. HIDA showed signs of acute cholecystitis.         Antibiotics changed to zosyn.         Surgery consulted for possible cholecystitis.         Noted to have elevated creatinine level on admission. Improved after holding lisinopril and lasix.         Surgery recommeded for IR consult for drainage as surgery.         NOTE NOT COMPLETE Patient is 89 year old male, has HHA 24x7, ambulates with walker, has medical history significant for hypothyroidism, and chronic venous insufficiency, who came in to ED because of fever of 101 at home.         Per wife at bedside pt was in his usual health a week ago, when pt was started on abx for UTI by visiting physician. pt completed the abx of 5 days. Since day before admission, pt was having fever at home with tmax 101.4, associated with dry cough for 2 days, weakness, poor po intake, chills, dysuria. Pt also has b/l leg swelling, with warmness, and redness. Per wife, it's chronic and pt is on furosemide for leg swelling.         Chest x-ray done showed no acute processes.         Started on rocephin and vancomycin for concerns of cellulitis of lower extremities.         Patient had concerns of cholecystitis and had HIDA scan done. HIDA showed signs of acute cholecystitis.         Antibiotics changed to zosyn.         Surgery consulted for possible cholecystitis.         Noted to have elevated creatinine level on admission. Improved after holding lisinopril and lasix.         Surgery recommeded for IR consult for drainage as surgery.         IR placed cholecystostomy tube. Patient clinically improved. LFTs downtrended. WBC count downtrended. Patient was no longer febrile.        Diet was advanced and patient tolerated well. Patient completed antibiotic course of zosyn.         PT evaluated patient and recommended for rehab placement.         Patient to follow up with surgery as outpatient for further management of cholecystostomy tube.         Patient is medically stable for discharge. Case was discussed with attending. Patient is 89 year old male, has HHA 24x7, ambulates with walker, has medical history significant for hypothyroidism, and chronic venous insufficiency, who came in to ED because of fever of 101 at home.         Per wife at bedside pt was in his usual health a week ago, when pt was started on abx for UTI by visiting physician. pt completed the abx of 5 days. Since day before admission, pt was having fever at home with tmax 101.4, associated with dry cough for 2 days, weakness, poor po intake, chills, dysuria. Pt also has b/l leg swelling, with warmness, and redness. Per wife, it's chronic and pt is on furosemide for leg swelling.         Chest x-ray done showed no acute processes.         Started on rocephin and vancomycin for concerns of cellulitis of lower extremities.         Patient had concerns of cholecystitis and had HIDA scan done. HIDA showed signs of acute cholecystitis.         Antibiotics changed to zosyn.         Surgery consulted for possible cholecystitis.         Noted to have elevated creatinine level on admission. Improved after holding lisinopril and lasix.         Surgery recommeded for IR consult for drainage as surgery.         IR placed cholecystostomy tube. Patient clinically improved. LFTs downtrended. WBC count downtrended. Patient was no longer febrile. Completed IV zosyn. Blood cultures were negative        Diet was advanced and patient tolerated well. Patient completed antibiotic course of zosyn.         PT evaluated patient and recommended for rehab placement.         Patient to follow up with surgery as outpatient for further management of cholecystostomy tube.         Patient is medically stable for discharge. Case was discussed with attending.

## 2019-11-23 DIAGNOSIS — K81.0 ACUTE CHOLECYSTITIS: ICD-10-CM

## 2019-11-23 DIAGNOSIS — E46 UNSPECIFIED PROTEIN-CALORIE MALNUTRITION: ICD-10-CM

## 2019-11-23 LAB
ALBUMIN SERPL ELPH-MCNC: 1.6 G/DL — LOW (ref 3.5–5)
ALP SERPL-CCNC: 247 U/L — HIGH (ref 40–120)
ALT FLD-CCNC: 177 U/L DA — HIGH (ref 10–60)
ANION GAP SERPL CALC-SCNC: 7 MMOL/L — SIGNIFICANT CHANGE UP (ref 5–17)
APTT BLD: 34.6 SEC — SIGNIFICANT CHANGE UP (ref 27.5–36.3)
AST SERPL-CCNC: 107 U/L — HIGH (ref 10–40)
BASOPHILS # BLD AUTO: 0.07 K/UL — SIGNIFICANT CHANGE UP (ref 0–0.2)
BASOPHILS NFR BLD AUTO: 0.3 % — SIGNIFICANT CHANGE UP (ref 0–2)
BILIRUB SERPL-MCNC: 1.3 MG/DL — HIGH (ref 0.2–1.2)
BUN SERPL-MCNC: 35 MG/DL — HIGH (ref 7–18)
CALCIUM SERPL-MCNC: 8.8 MG/DL — SIGNIFICANT CHANGE UP (ref 8.4–10.5)
CHLORIDE SERPL-SCNC: 112 MMOL/L — HIGH (ref 96–108)
CO2 SERPL-SCNC: 24 MMOL/L — SIGNIFICANT CHANGE UP (ref 22–31)
CREAT SERPL-MCNC: 1.24 MG/DL — SIGNIFICANT CHANGE UP (ref 0.5–1.3)
EOSINOPHIL # BLD AUTO: 0.03 K/UL — SIGNIFICANT CHANGE UP (ref 0–0.5)
EOSINOPHIL NFR BLD AUTO: 0.1 % — SIGNIFICANT CHANGE UP (ref 0–6)
GLUCOSE SERPL-MCNC: 117 MG/DL — HIGH (ref 70–99)
HCT VFR BLD CALC: 38.7 % — LOW (ref 39–50)
HGB BLD-MCNC: 12.7 G/DL — LOW (ref 13–17)
IMM GRANULOCYTES NFR BLD AUTO: 2.9 % — HIGH (ref 0–1.5)
INR BLD: 2.16 RATIO — HIGH (ref 0.88–1.16)
LYMPHOCYTES # BLD AUTO: 1.3 K/UL — SIGNIFICANT CHANGE UP (ref 1–3.3)
LYMPHOCYTES # BLD AUTO: 6.3 % — LOW (ref 13–44)
MAGNESIUM SERPL-MCNC: 2.7 MG/DL — HIGH (ref 1.6–2.6)
MCHC RBC-ENTMCNC: 30.3 PG — SIGNIFICANT CHANGE UP (ref 27–34)
MCHC RBC-ENTMCNC: 32.8 GM/DL — SIGNIFICANT CHANGE UP (ref 32–36)
MCV RBC AUTO: 92.4 FL — SIGNIFICANT CHANGE UP (ref 80–100)
MONOCYTES # BLD AUTO: 0.85 K/UL — SIGNIFICANT CHANGE UP (ref 0–0.9)
MONOCYTES NFR BLD AUTO: 4.1 % — SIGNIFICANT CHANGE UP (ref 2–14)
NEUTROPHILS # BLD AUTO: 17.82 K/UL — HIGH (ref 1.8–7.4)
NEUTROPHILS NFR BLD AUTO: 86.3 % — HIGH (ref 43–77)
NRBC # BLD: 0 /100 WBCS — SIGNIFICANT CHANGE UP (ref 0–0)
PHOSPHATE SERPL-MCNC: 2.6 MG/DL — SIGNIFICANT CHANGE UP (ref 2.5–4.5)
PLATELET # BLD AUTO: 390 K/UL — SIGNIFICANT CHANGE UP (ref 150–400)
POTASSIUM SERPL-MCNC: 3.5 MMOL/L — SIGNIFICANT CHANGE UP (ref 3.5–5.3)
POTASSIUM SERPL-SCNC: 3.5 MMOL/L — SIGNIFICANT CHANGE UP (ref 3.5–5.3)
PROT SERPL-MCNC: 6.9 G/DL — SIGNIFICANT CHANGE UP (ref 6–8.3)
PROTHROM AB SERPL-ACNC: 24.6 SEC — HIGH (ref 10–12.9)
RBC # BLD: 4.19 M/UL — LOW (ref 4.2–5.8)
RBC # FLD: 15.1 % — HIGH (ref 10.3–14.5)
SODIUM SERPL-SCNC: 143 MMOL/L — SIGNIFICANT CHANGE UP (ref 135–145)
WBC # BLD: 20.68 K/UL — HIGH (ref 3.8–10.5)
WBC # FLD AUTO: 20.68 K/UL — HIGH (ref 3.8–10.5)

## 2019-11-23 PROCEDURE — 99232 SBSQ HOSP IP/OBS MODERATE 35: CPT | Mod: GC

## 2019-11-23 RX ORDER — SODIUM CHLORIDE 9 MG/ML
1000 INJECTION, SOLUTION INTRAVENOUS
Refills: 0 | Status: DISCONTINUED | OUTPATIENT
Start: 2019-11-23 | End: 2019-11-24

## 2019-11-23 RX ADMIN — TAMSULOSIN HYDROCHLORIDE 0.4 MILLIGRAM(S): 0.4 CAPSULE ORAL at 21:43

## 2019-11-23 RX ADMIN — Medication 100 MICROGRAM(S): at 05:18

## 2019-11-23 RX ADMIN — PIPERACILLIN AND TAZOBACTAM 25 GRAM(S): 4; .5 INJECTION, POWDER, LYOPHILIZED, FOR SOLUTION INTRAVENOUS at 11:18

## 2019-11-23 RX ADMIN — SODIUM CHLORIDE 70 MILLILITER(S): 9 INJECTION, SOLUTION INTRAVENOUS at 19:57

## 2019-11-23 RX ADMIN — ENOXAPARIN SODIUM 30 MILLIGRAM(S): 100 INJECTION SUBCUTANEOUS at 11:16

## 2019-11-23 RX ADMIN — PIPERACILLIN AND TAZOBACTAM 25 GRAM(S): 4; .5 INJECTION, POWDER, LYOPHILIZED, FOR SOLUTION INTRAVENOUS at 02:16

## 2019-11-23 RX ADMIN — PIPERACILLIN AND TAZOBACTAM 25 GRAM(S): 4; .5 INJECTION, POWDER, LYOPHILIZED, FOR SOLUTION INTRAVENOUS at 17:27

## 2019-11-23 NOTE — CHART NOTE - NSCHARTNOTEFT_GEN_A_CORE
Assessment:       Nutrition reassessment for follow-up NPO status. Chart reviewed, pt visited, awake, confused; Pending Psychiatric consult per capacity evaluation per MD    Factors impacting intake: [ ] none [ ] nausea  [ ] vomiting [ ] diarrhea [ ] constipation  [ ]chewing problems [ X ] swallowing issues  [ X ] other: altered GI function of cholecystitis, pt refusing surgery per team; s/p swallow evaluation with mechanical soft food, nectar thicken liquid recommended 19 noted; cognitive limitation     Diet Presciption: Diet, NPO:   Except Medications (19 @ 05:03)    Intake: NPO x 3 to 4d     Daily Weight in k.8 (2019 13:08)    % Weight Change: unclear     Pertinent Medications: MEDICATIONS  (STANDING):  dextrose 5% + sodium chloride 0.45%. 1000 milliLiter(s) (65 mL/Hr) IV Continuous <Continuous>  enoxaparin Injectable 30 milliGRAM(s) SubCutaneous daily  levothyroxine 100 MICROGram(s) Oral daily  piperacillin/tazobactam IVPB.. 3.375 Gram(s) IV Intermittent every 8 hours  tamsulosin 0.4 milliGRAM(s) Oral at bedtime    MEDICATIONS  (PRN):  acetaminophen   Tablet .. 650 milliGRAM(s) Oral every 6 hours PRN Temp greater or equal to 38C (100.4F), Moderate Pain (4 - 6)    Pertinent Labs:  Na143 mmol/L Glu 117 mg/dL<H> K+ 3.5 mmol/L Cr  1.24 mg/dL BUN 35 mg/dL<H>  Phos 2.6 mg/dL  Alb 1.6 g/dL<L>  [id number] 5.5 %  Chol 105 mg/dL LDL 56 mg/dL HDL 37 mg/dL<L> Trig 58 mg/dL     CAPILLARY BLOOD GLUCOSE    Skin: intact     Estimated Needs:   [ X ] no change since previous assessment  [ ] recalculated:     Previous Nutrition Diagnosis:   [ X ] Swallowing Difficult     Nutrition Diagnosis is [ X ] ongoing  [ ] Improving   [ ] resolved [ ] not applicable     New Nutrition Diagnosis: [ ] not applicable    [ X ] Inadequate intake related acute altered GI function of cholecystitis as evidenced by NPO x 3 to 4d     Interventions:   Recommend  [ X ] Change Diet To: Advance diet or consider alternate nutrition support if NPO prolonged further   [ ] Nutrition Supplement  [ ] Nutrition Support  [ X ] Other: Provide food choices within diet Rx as available/updated when diet advanced                   Nursing to continue feeding assistance and encouragement, aspiration precaution     Monitoring and Evaluation:   [ X ] PO intake [ x ] Tolerance to diet prescription [ x ] weights [ x ] labs[ x ] follow up per protocol  [ ] other:

## 2019-11-23 NOTE — PROGRESS NOTE ADULT - PROBLEM SELECTOR PLAN 1
Acute cholecystitis with sepsis  - on Zosyn  Plavix has been discontinued. He is planned for surgical intervention 5-7 days off of Plavix due to bleeding risk.  Continue to monitor

## 2019-11-23 NOTE — PROGRESS NOTE ADULT - PROBLEM SELECTOR PLAN 3
Yao possible ATN  in the setting of sepsis and possibly prerenal.  Creatinine improved now. Avoid nephrotoxins  'continue to monitor

## 2019-11-23 NOTE — PROGRESS NOTE ADULT - SUBJECTIVE AND OBJECTIVE BOX
MEDICAL ATTENDING NOTE    Patient is a 89y old  Male who presents with a chief complaint of Fever (22 Nov 2019 17:05)      INTERVAL HPI/OVERNIGHT EVENTS: no new complaints    MEDICATIONS  (STANDING):  dextrose 5% + sodium chloride 0.45%. 1000 milliLiter(s) (65 mL/Hr) IV Continuous <Continuous>  enoxaparin Injectable 30 milliGRAM(s) SubCutaneous daily  levothyroxine 100 MICROGram(s) Oral daily  piperacillin/tazobactam IVPB.. 3.375 Gram(s) IV Intermittent every 8 hours  tamsulosin 0.4 milliGRAM(s) Oral at bedtime    MEDICATIONS  (PRN):  acetaminophen   Tablet .. 650 milliGRAM(s) Oral every 6 hours PRN Temp greater or equal to 38C (100.4F), Moderate Pain (4 - 6)      __________________________________________________  ----------------------------------------------------------------------------------  REVIEW OF SYSTEMS: no fever, no SOB, No Chest pain; feels well      Vital Signs Last 24 Hrs  T(C): 36.4 (23 Nov 2019 13:00), Max: 36.6 (22 Nov 2019 20:24)  T(F): 97.6 (23 Nov 2019 13:00), Max: 97.9 (23 Nov 2019 04:28)  HR: 105 (23 Nov 2019 13:00) (88 - 105)  BP: 128/86 (23 Nov 2019 13:00) (125/81 - 157/90)  BP(mean): --  RR: 17 (23 Nov 2019 13:00) (17 - 17)  SpO2: 95% (23 Nov 2019 13:00) (93% - 95%)    _________________  PHYSICAL EXAM:  ---------------------------   NAD; Normocephalic;   LUNGS - no wheezing; good air entry  HEART: S1 S2+   ABDOMEN: Soft, Nontender, non distended, BS+  EXTREMITIES: no cyanosis; no edema  NERVOUS SYSTEM:  Awake ; follows commands;    _________________________________________________  LABS:                        12.7   20.68 )-----------( 390      ( 23 Nov 2019 06:21 )             38.7     11-23    143  |  112<H>  |  35<H>  ----------------------------<  117<H>  3.5   |  24  |  1.24    Ca    8.8      23 Nov 2019 06:21  Phos  2.6     11-23  Mg     2.7     11-23    TPro  6.9  /  Alb  1.6<L>  /  TBili  1.3<H>  /  DBili  x   /  AST  107<H>  /  ALT  177<H>  /  AlkPhos  247<H>  11-23    PT/INR - ( 23 Nov 2019 06:21 )   PT: 24.6 sec;   INR: 2.16 ratio         PTT - ( 23 Nov 2019 06:21 )  PTT:34.6 sec    CAPILLARY BLOOD GLUCOSE

## 2019-11-24 LAB
ALBUMIN SERPL ELPH-MCNC: 1.6 G/DL — LOW (ref 3.5–5)
ALP SERPL-CCNC: 232 U/L — HIGH (ref 40–120)
ALT FLD-CCNC: 153 U/L DA — HIGH (ref 10–60)
ANION GAP SERPL CALC-SCNC: 8 MMOL/L — SIGNIFICANT CHANGE UP (ref 5–17)
AST SERPL-CCNC: 143 U/L — HIGH (ref 10–40)
BASOPHILS # BLD AUTO: 0.09 K/UL — SIGNIFICANT CHANGE UP (ref 0–0.2)
BASOPHILS NFR BLD AUTO: 0.4 % — SIGNIFICANT CHANGE UP (ref 0–2)
BILIRUB SERPL-MCNC: 1.4 MG/DL — HIGH (ref 0.2–1.2)
BUN SERPL-MCNC: 35 MG/DL — HIGH (ref 7–18)
CALCIUM SERPL-MCNC: 8.4 MG/DL — SIGNIFICANT CHANGE UP (ref 8.4–10.5)
CHLORIDE SERPL-SCNC: 117 MMOL/L — HIGH (ref 96–108)
CO2 SERPL-SCNC: 24 MMOL/L — SIGNIFICANT CHANGE UP (ref 22–31)
CREAT SERPL-MCNC: 1.29 MG/DL — SIGNIFICANT CHANGE UP (ref 0.5–1.3)
EOSINOPHIL # BLD AUTO: 0.01 K/UL — SIGNIFICANT CHANGE UP (ref 0–0.5)
EOSINOPHIL NFR BLD AUTO: 0 % — SIGNIFICANT CHANGE UP (ref 0–6)
GLUCOSE SERPL-MCNC: 134 MG/DL — HIGH (ref 70–99)
HCT VFR BLD CALC: 35.9 % — LOW (ref 39–50)
HGB BLD-MCNC: 12 G/DL — LOW (ref 13–17)
IMM GRANULOCYTES NFR BLD AUTO: 2.5 % — HIGH (ref 0–1.5)
LYMPHOCYTES # BLD AUTO: 1.48 K/UL — SIGNIFICANT CHANGE UP (ref 1–3.3)
LYMPHOCYTES # BLD AUTO: 6 % — LOW (ref 13–44)
MCHC RBC-ENTMCNC: 31.2 PG — SIGNIFICANT CHANGE UP (ref 27–34)
MCHC RBC-ENTMCNC: 33.4 GM/DL — SIGNIFICANT CHANGE UP (ref 32–36)
MCV RBC AUTO: 93.2 FL — SIGNIFICANT CHANGE UP (ref 80–100)
MONOCYTES # BLD AUTO: 0.88 K/UL — SIGNIFICANT CHANGE UP (ref 0–0.9)
MONOCYTES NFR BLD AUTO: 3.6 % — SIGNIFICANT CHANGE UP (ref 2–14)
NEUTROPHILS # BLD AUTO: 21.67 K/UL — HIGH (ref 1.8–7.4)
NEUTROPHILS NFR BLD AUTO: 87.5 % — HIGH (ref 43–77)
NRBC # BLD: 0 /100 WBCS — SIGNIFICANT CHANGE UP (ref 0–0)
PLATELET # BLD AUTO: 467 K/UL — HIGH (ref 150–400)
POTASSIUM SERPL-MCNC: 3.3 MMOL/L — LOW (ref 3.5–5.3)
POTASSIUM SERPL-SCNC: 3.3 MMOL/L — LOW (ref 3.5–5.3)
PROT SERPL-MCNC: 6.9 G/DL — SIGNIFICANT CHANGE UP (ref 6–8.3)
RBC # BLD: 3.85 M/UL — LOW (ref 4.2–5.8)
RBC # FLD: 15.3 % — HIGH (ref 10.3–14.5)
SODIUM SERPL-SCNC: 149 MMOL/L — HIGH (ref 135–145)
WBC # BLD: 24.74 K/UL — HIGH (ref 3.8–10.5)
WBC # FLD AUTO: 24.74 K/UL — HIGH (ref 3.8–10.5)

## 2019-11-24 PROCEDURE — 99233 SBSQ HOSP IP/OBS HIGH 50: CPT | Mod: GC

## 2019-11-24 RX ORDER — ACETAMINOPHEN 500 MG
1000 TABLET ORAL ONCE
Refills: 0 | Status: COMPLETED | OUTPATIENT
Start: 2019-11-24 | End: 2019-11-24

## 2019-11-24 RX ORDER — POTASSIUM CHLORIDE 20 MEQ
10 PACKET (EA) ORAL
Refills: 0 | Status: COMPLETED | OUTPATIENT
Start: 2019-11-24 | End: 2019-11-24

## 2019-11-24 RX ORDER — DEXTROSE MONOHYDRATE, SODIUM CHLORIDE, AND POTASSIUM CHLORIDE 50; .745; 4.5 G/1000ML; G/1000ML; G/1000ML
1000 INJECTION, SOLUTION INTRAVENOUS
Refills: 0 | Status: DISCONTINUED | OUTPATIENT
Start: 2019-11-24 | End: 2019-11-25

## 2019-11-24 RX ADMIN — Medication 650 MILLIGRAM(S): at 14:06

## 2019-11-24 RX ADMIN — Medication 650 MILLIGRAM(S): at 13:00

## 2019-11-24 RX ADMIN — PIPERACILLIN AND TAZOBACTAM 25 GRAM(S): 4; .5 INJECTION, POWDER, LYOPHILIZED, FOR SOLUTION INTRAVENOUS at 09:34

## 2019-11-24 RX ADMIN — Medication 100 MILLIEQUIVALENT(S): at 12:33

## 2019-11-24 RX ADMIN — Medication 100 MICROGRAM(S): at 05:27

## 2019-11-24 RX ADMIN — Medication 100 MILLIEQUIVALENT(S): at 09:32

## 2019-11-24 RX ADMIN — PIPERACILLIN AND TAZOBACTAM 25 GRAM(S): 4; .5 INJECTION, POWDER, LYOPHILIZED, FOR SOLUTION INTRAVENOUS at 17:43

## 2019-11-24 RX ADMIN — TAMSULOSIN HYDROCHLORIDE 0.4 MILLIGRAM(S): 0.4 CAPSULE ORAL at 21:32

## 2019-11-24 RX ADMIN — Medication 1000 MILLIGRAM(S): at 18:40

## 2019-11-24 RX ADMIN — SODIUM CHLORIDE 70 MILLILITER(S): 9 INJECTION, SOLUTION INTRAVENOUS at 09:35

## 2019-11-24 RX ADMIN — DEXTROSE MONOHYDRATE, SODIUM CHLORIDE, AND POTASSIUM CHLORIDE 60 MILLILITER(S): 50; .745; 4.5 INJECTION, SOLUTION INTRAVENOUS at 17:43

## 2019-11-24 RX ADMIN — PIPERACILLIN AND TAZOBACTAM 25 GRAM(S): 4; .5 INJECTION, POWDER, LYOPHILIZED, FOR SOLUTION INTRAVENOUS at 01:03

## 2019-11-24 RX ADMIN — Medication 100 MILLIEQUIVALENT(S): at 09:33

## 2019-11-24 RX ADMIN — Medication 400 MILLIGRAM(S): at 17:43

## 2019-11-24 RX ADMIN — ENOXAPARIN SODIUM 30 MILLIGRAM(S): 100 INJECTION SUBCUTANEOUS at 12:33

## 2019-11-24 NOTE — PROGRESS NOTE ADULT - ASSESSMENT
Patient is a 89y Male with hypothyroidism, chronic venous insufficiency, recent UTI s/p antibiotics p/w fever, cough and worsening LE swelling/ warmth. Renal consulted for Elevated serum creatinine.    1. ARMIN- likely hemodynamically mediated in the setting of sepsis. Pt with improvement in renal function off Lisinopril/Lasix with IVF. CT chest with mild pulmonary edema and IVF d/c 11/18. Restarted 11/20 due to NPO status; monitor for fluid overload and change to 1/2 NS with D5W with KCL supplementation given hypernatremia and hypokalemia. CT abd/pelvis with no hydro, however Renal US with distended bladder; recc post void bladder scan. Strict I/Os. Avoid nephrotoxins/ NSAIDs/ RCA. Monitor BMP.  2. CKD-3- previous Scr 1.1; defer secondary w/u due to advanced age. Monitor lytes  3. Sepsis- 2/2 Cellulitis/ ?Acute blayne- c/w abx as per primary team/ ID.   4. LE edema- in the setting of cellulitis and hypoalbuminemia. Continue to hold Lasix as patient NPO.   5. HTN 2/2 CKD- BP acceptable; off Lisinopril due to ARMIN. Pt with Afib. If elevated BP and/or HR, consider BB or CCB for rate control. Monitor BP.

## 2019-11-24 NOTE — CHART NOTE - NSCHARTNOTEFT_GEN_A_CORE
Writer was informed by the Surgical team that due to the patient's INR of 2.1 and poor surgical candidate status they procedure for cholecystectomy was cancelled.     Surgical team recommends the patient to be seen by IR for percutaneous drainage. Patient needs to remain NPO.     Primary team to follow up.

## 2019-11-24 NOTE — PROGRESS NOTE ADULT - ATTENDING COMMENTS
[facility] NEPHROLOGY  Justin Orozco M.D.  Kj Fernandez D.O.  Tonia Sanches M.D.  Salina Case, MSN, ANP-C  [phone]    [address] [place] [address]

## 2019-11-24 NOTE — PROGRESS NOTE ADULT - SUBJECTIVE AND OBJECTIVE BOX
PGY 1 Note discussed with supervising resident and primary attending    Patient is a 89y old  Male who presents with a chief complaint of Fever (23 Nov 2019 16:38)      INTERVAL HPI/OVERNIGHT EVENTS: offers no new complaints.  MEDICATIONS  (STANDING):  dextrose 5% + sodium chloride 0.45%. 1000 milliLiter(s) (65 mL/Hr) IV Continuous <Continuous>  dextrose 5% + sodium chloride 0.9%. 1000 milliLiter(s) (70 mL/Hr) IV Continuous <Continuous>  enoxaparin Injectable 30 milliGRAM(s) SubCutaneous daily  levothyroxine 100 MICROGram(s) Oral daily  piperacillin/tazobactam IVPB.. 3.375 Gram(s) IV Intermittent every 8 hours  tamsulosin 0.4 milliGRAM(s) Oral at bedtime    MEDICATIONS  (PRN):  acetaminophen   Tablet .. 650 milliGRAM(s) Oral every 6 hours PRN Temp greater or equal to 38C (100.4F), Moderate Pain (4 - 6)      __________________________________________________  REVIEW OF SYSTEMS:    CONSTITUTIONAL: No fever,   EYES: no acute visual disturbances  NECK: No pain or stiffness  RESPIRATORY: No cough; No shortness of breath  CARDIOVASCULAR: No chest pain, no palpitations  GASTROINTESTINAL: No pain. No nausea or vomiting; No diarrhea   NEUROLOGICAL: No headache or numbness, no tremors  MUSCULOSKELETAL: No joint pain, no muscle pain  GENITOURINARY: no dysuria, no frequency, no hesitancy  PSYCHIATRY: no depression , no anxiety  ALL OTHER  ROS negative        Vital Signs Last 24 Hrs  T(C): 36.6 (24 Nov 2019 05:23), Max: 37.2 (23 Nov 2019 20:28)  T(F): 97.8 (24 Nov 2019 05:23), Max: 98.9 (23 Nov 2019 20:28)  HR: 54 (24 Nov 2019 05:23) (54 - 105)  BP: 119/62 (24 Nov 2019 05:23) (114/59 - 128/86)  BP(mean): --  RR: 18 (24 Nov 2019 05:23) (17 - 18)  SpO2: 98% (24 Nov 2019 05:23) (94% - 98%)    ________________________________________________  PHYSICAL EXAM:  GENERAL: NAD  HEENT: Normocephalic;  conjunctivae and sclerae clear; moist mucous membranes;   NECK : supple  CHEST/LUNG: Clear to auscultation bilaterally with good air entry   HEART: S1 S2  regular; no murmurs, gallops or rubs  ABDOMEN: Soft, Nontender, Nondistended; Bowel sounds present  EXTREMITIES: no cyanosis; no edema; no calf tenderness  SKIN: warm and dry; no rash  NERVOUS SYSTEM:  Awake and alert; follows commands    _________________________________________________  LABS:                        12.0   24.74 )-----------( 467      ( 24 Nov 2019 05:58 )             35.9     11-24    149<H>  |  117<H>  |  35<H>  ----------------------------<  134<H>  3.3<L>   |  24  |  1.29    Ca    8.4      24 Nov 2019 05:58  Phos  2.6     11-23  Mg     2.7     11-23    TPro  6.9  /  Alb  1.6<L>  /  TBili  1.4<H>  /  DBili  x   /  AST  143<H>  /  ALT  153<H>  /  AlkPhos  232<H>  11-24    PT/INR - ( 23 Nov 2019 06:21 )   PT: 24.6 sec;   INR: 2.16 ratio         PTT - ( 23 Nov 2019 06:21 )  PTT:34.6 sec    CAPILLARY BLOOD GLUCOSE            RADIOLOGY & ADDITIONAL TESTS:    Imaging Personally Reviewed:  YES/NO    Consultant(s) Notes Reviewed:   YES/ No    Care Discussed with Consultants :     Plan of care was discussed with patient and /or primary care giver; all questions and concerns were addressed and care was aligned with patient's wishes.

## 2019-11-24 NOTE — PROGRESS NOTE ADULT - ATTENDING COMMENTS
Covering for Dr. Mcwilliams    patient seen and examined. Laying in bed, refused to be examined. Did not let to touch.   Vital signs and labs reviewed.   No fever, but WBC trending up.   BC no growth to date.   Going to surgery on Monday     Vital Signs Last 24 Hrs  T(C): 36.6 (24 Nov 2019 05:23), Max: 37.2 (23 Nov 2019 20:28)  T(F): 97.8 (24 Nov 2019 05:23), Max: 98.9 (23 Nov 2019 20:28)  HR: 54 (24 Nov 2019 05:23) (54 - 102)  BP: 119/62 (24 Nov 2019 05:23) (114/59 - 119/62)  BP(mean): --  RR: 18 (24 Nov 2019 05:23) (18 - 18)  SpO2: 98% (24 Nov 2019 05:23) (94% - 98%)    1. Acute cholecystitis: afebrile, WBC (+), On Zosyn   Surgery in AM   Keep NPO after midnight    2. ARMIN: improving   3. Chronic afib: rate controlled. Not on anticoagulation   4. Deconditioning   5. DVT prophylaxis

## 2019-11-24 NOTE — PROGRESS NOTE ADULT - SUBJECTIVE AND OBJECTIVE BOX
[facility] NEPHROLOGY- PROGRESS NOTE    Patient is a 89y Male with hypothyroidism, chronic venous insufficiency, recent UTI s/p antibiotics p/w fever, cough and worsening LE swelling/ warmth. Renal consulted for Elevated serum creatinine.  Pt NPO with gallbladder sludge;  HIDA for acute blayne    Hospital Medications: Medications reviewed.    REVIEW OF SYSTEMS:  CONSTITUTIONAL: No fevers No chills  RESPIRATORY: No shortness of breath  CARDIOVASCULAR: No chest pain.  GASTROINTESTINAL: No nausea, vomiting, or diarrhea. Denies abdominal pain.   VASCULAR: +bilateral lower extremity edema.       VITALS:  T(F): 100.4 (11-24-19 @ 16:08), Max: 101 (11-24-19 @ 13:22)  HR: 100 (11-24-19 @ 16:08)  BP: 126/87 (11-24-19 @ 13:22)  RR: 16 (11-24-19 @ 13:22)  SpO2: 99% (11-24-19 @ 13:22)  Wt(kg): --    Height (cm): 167.6 (11-24 @ 08:10)      PHYSICAL EXAM:  Gen: NAD, calm  Cards: irregular, +S1/S2, no M/G/R  Resp: CTA ant  GI: soft, +epigastric tenderness NABS  Extremities: + LE edema B/L with hyperpigmentation/ warmth        LABS:  11-24    149<H>  |  117<H>  |  35<H>  ----------------------------<  134<H>  3.3<L>   |  24  |  1.29    Ca    8.4      24 Nov 2019 05:58  Phos  2.6     11-23  Mg     2.7     11-23    TPro  6.9  /  Alb  1.6<L>  /  TBili  1.4<H>  /  DBili      /  AST  143<H>  /  ALT  153<H>  /  AlkPhos  232<H>  11-24    Creatinine Trend: 1.29 <--, 1.24 <--, 1.29 <--, 1.27 <--, 1.52 <--, 1.26 <--, 1.49 <--                        12.0   24.74 )-----------( 467      ( 24 Nov 2019 05:58 )             35.9     Urine Studies:

## 2019-11-24 NOTE — CHART NOTE - NSCHARTNOTEFT_GEN_A_CORE
Writer was informed that the patient had a fever of 101.8 with a WBC of 24.6.     Due to the above findings the patient was administered Tylenol and blood/urine cultures were drawn to assess for sources of infection.     Primary care team to follow-up in the am.

## 2019-11-24 NOTE — CHART NOTE - NSCHARTNOTEFT_GEN_A_CORE
Pt scheduled for OR tomorrow 11/25 for lap blayne.  Please document medical clearance in chart.  INR noted to be elevated at 2.16. F/u repeat coags  Potassium noted to be 3.3. Please replete and f/u repeat CMP  Keep NPO.   Preop labs ordered including T&S. Pt scheduled for OR tomorrow 11/25 for lap blayne.  Please document medical clearance in chart.  INR noted to be elevated at 2.16. INR should be <2.   Electrolyte abnormalities will need to be corrected:  K3.3 and Na 149.   Keep NPO.   Preop labs ordered including T&S. Lap blayne planned for tomorrow is cancelled.  Given pts age and current INR of 2.16, he is not a surgical candidate.  Recommend IR consult for percutaneous drainage.       Discussed with Dr. Elias

## 2019-11-25 DIAGNOSIS — Z51.5 ENCOUNTER FOR PALLIATIVE CARE: ICD-10-CM

## 2019-11-25 DIAGNOSIS — R53.81 OTHER MALAISE: ICD-10-CM

## 2019-11-25 DIAGNOSIS — E43 UNSPECIFIED SEVERE PROTEIN-CALORIE MALNUTRITION: ICD-10-CM

## 2019-11-25 DIAGNOSIS — I27.20 PULMONARY HYPERTENSION, UNSPECIFIED: ICD-10-CM

## 2019-11-25 DIAGNOSIS — E87.0 HYPEROSMOLALITY AND HYPERNATREMIA: ICD-10-CM

## 2019-11-25 LAB
ABO RH CONFIRMATION: SIGNIFICANT CHANGE UP
ALBUMIN SERPL ELPH-MCNC: 1.4 G/DL — LOW (ref 3.5–5)
ALP SERPL-CCNC: 242 U/L — HIGH (ref 40–120)
ALT FLD-CCNC: 129 U/L DA — HIGH (ref 10–60)
ANION GAP SERPL CALC-SCNC: 6 MMOL/L — SIGNIFICANT CHANGE UP (ref 5–17)
ANION GAP SERPL CALC-SCNC: 6 MMOL/L — SIGNIFICANT CHANGE UP (ref 5–17)
ANION GAP SERPL CALC-SCNC: 7 MMOL/L — SIGNIFICANT CHANGE UP (ref 5–17)
APTT BLD: 35.9 SEC — SIGNIFICANT CHANGE UP (ref 27.5–36.3)
APTT BLD: 41.5 SEC — HIGH (ref 27.5–36.3)
AST SERPL-CCNC: 119 U/L — HIGH (ref 10–40)
BASOPHILS # BLD AUTO: 0.07 K/UL — SIGNIFICANT CHANGE UP (ref 0–0.2)
BASOPHILS NFR BLD AUTO: 0.3 % — SIGNIFICANT CHANGE UP (ref 0–2)
BILIRUB SERPL-MCNC: 1.4 MG/DL — HIGH (ref 0.2–1.2)
BLD GP AB SCN SERPL QL: SIGNIFICANT CHANGE UP
BUN SERPL-MCNC: 32 MG/DL — HIGH (ref 7–18)
BUN SERPL-MCNC: 32 MG/DL — HIGH (ref 7–18)
BUN SERPL-MCNC: 33 MG/DL — HIGH (ref 7–18)
CALCIUM SERPL-MCNC: 8.3 MG/DL — LOW (ref 8.4–10.5)
CALCIUM SERPL-MCNC: 8.4 MG/DL — SIGNIFICANT CHANGE UP (ref 8.4–10.5)
CALCIUM SERPL-MCNC: 8.4 MG/DL — SIGNIFICANT CHANGE UP (ref 8.4–10.5)
CHLORIDE SERPL-SCNC: 114 MMOL/L — HIGH (ref 96–108)
CHLORIDE SERPL-SCNC: 118 MMOL/L — HIGH (ref 96–108)
CHLORIDE SERPL-SCNC: 119 MMOL/L — HIGH (ref 96–108)
CO2 SERPL-SCNC: 24 MMOL/L — SIGNIFICANT CHANGE UP (ref 22–31)
CO2 SERPL-SCNC: 26 MMOL/L — SIGNIFICANT CHANGE UP (ref 22–31)
CO2 SERPL-SCNC: 27 MMOL/L — SIGNIFICANT CHANGE UP (ref 22–31)
CREAT SERPL-MCNC: 1.16 MG/DL — SIGNIFICANT CHANGE UP (ref 0.5–1.3)
CREAT SERPL-MCNC: 1.19 MG/DL — SIGNIFICANT CHANGE UP (ref 0.5–1.3)
CREAT SERPL-MCNC: 1.25 MG/DL — SIGNIFICANT CHANGE UP (ref 0.5–1.3)
EOSINOPHIL # BLD AUTO: 0.02 K/UL — SIGNIFICANT CHANGE UP (ref 0–0.5)
EOSINOPHIL NFR BLD AUTO: 0.1 % — SIGNIFICANT CHANGE UP (ref 0–6)
GLUCOSE SERPL-MCNC: 112 MG/DL — HIGH (ref 70–99)
GLUCOSE SERPL-MCNC: 114 MG/DL — HIGH (ref 70–99)
GLUCOSE SERPL-MCNC: 129 MG/DL — HIGH (ref 70–99)
HCT VFR BLD CALC: 35.3 % — LOW (ref 39–50)
HGB BLD-MCNC: 11.7 G/DL — LOW (ref 13–17)
IMM GRANULOCYTES NFR BLD AUTO: 2.4 % — HIGH (ref 0–1.5)
INR BLD: 2.12 RATIO — HIGH (ref 0.88–1.16)
INR BLD: 3.11 RATIO — HIGH (ref 0.88–1.16)
LYMPHOCYTES # BLD AUTO: 1.75 K/UL — SIGNIFICANT CHANGE UP (ref 1–3.3)
LYMPHOCYTES # BLD AUTO: 7.2 % — LOW (ref 13–44)
MCHC RBC-ENTMCNC: 31 PG — SIGNIFICANT CHANGE UP (ref 27–34)
MCHC RBC-ENTMCNC: 33.1 GM/DL — SIGNIFICANT CHANGE UP (ref 32–36)
MCV RBC AUTO: 93.6 FL — SIGNIFICANT CHANGE UP (ref 80–100)
MONOCYTES # BLD AUTO: 0.86 K/UL — SIGNIFICANT CHANGE UP (ref 0–0.9)
MONOCYTES NFR BLD AUTO: 3.6 % — SIGNIFICANT CHANGE UP (ref 2–14)
NEUTROPHILS # BLD AUTO: 20.86 K/UL — HIGH (ref 1.8–7.4)
NEUTROPHILS NFR BLD AUTO: 86.4 % — HIGH (ref 43–77)
NRBC # BLD: 0 /100 WBCS — SIGNIFICANT CHANGE UP (ref 0–0)
PLATELET # BLD AUTO: 474 K/UL — HIGH (ref 150–400)
POTASSIUM SERPL-MCNC: 3.4 MMOL/L — LOW (ref 3.5–5.3)
POTASSIUM SERPL-MCNC: 3.4 MMOL/L — LOW (ref 3.5–5.3)
POTASSIUM SERPL-MCNC: 3.7 MMOL/L — SIGNIFICANT CHANGE UP (ref 3.5–5.3)
POTASSIUM SERPL-SCNC: 3.4 MMOL/L — LOW (ref 3.5–5.3)
POTASSIUM SERPL-SCNC: 3.4 MMOL/L — LOW (ref 3.5–5.3)
POTASSIUM SERPL-SCNC: 3.7 MMOL/L — SIGNIFICANT CHANGE UP (ref 3.5–5.3)
PROT SERPL-MCNC: 6.6 G/DL — SIGNIFICANT CHANGE UP (ref 6–8.3)
PROTHROM AB SERPL-ACNC: 24.1 SEC — HIGH (ref 10–12.9)
PROTHROM AB SERPL-ACNC: 35.8 SEC — HIGH (ref 10–12.9)
RBC # BLD: 3.77 M/UL — LOW (ref 4.2–5.8)
RBC # FLD: 15.8 % — HIGH (ref 10.3–14.5)
SODIUM SERPL-SCNC: 147 MMOL/L — HIGH (ref 135–145)
SODIUM SERPL-SCNC: 150 MMOL/L — HIGH (ref 135–145)
SODIUM SERPL-SCNC: 150 MMOL/L — HIGH (ref 135–145)
WBC # BLD: 24.15 K/UL — HIGH (ref 3.8–10.5)
WBC # FLD AUTO: 24.15 K/UL — HIGH (ref 3.8–10.5)

## 2019-11-25 PROCEDURE — 99223 1ST HOSP IP/OBS HIGH 75: CPT

## 2019-11-25 PROCEDURE — 99497 ADVNCD CARE PLAN 30 MIN: CPT | Mod: 25

## 2019-11-25 PROCEDURE — 99232 SBSQ HOSP IP/OBS MODERATE 35: CPT

## 2019-11-25 PROCEDURE — 99233 SBSQ HOSP IP/OBS HIGH 50: CPT | Mod: GC

## 2019-11-25 RX ORDER — SODIUM CHLORIDE 9 MG/ML
1000 INJECTION, SOLUTION INTRAVENOUS
Refills: 0 | Status: DISCONTINUED | OUTPATIENT
Start: 2019-11-25 | End: 2019-11-25

## 2019-11-25 RX ORDER — PHYTONADIONE (VIT K1) 5 MG
5 TABLET ORAL ONCE
Refills: 0 | Status: COMPLETED | OUTPATIENT
Start: 2019-11-25 | End: 2019-11-25

## 2019-11-25 RX ORDER — POTASSIUM CHLORIDE 20 MEQ
10 PACKET (EA) ORAL
Refills: 0 | Status: COMPLETED | OUTPATIENT
Start: 2019-11-25 | End: 2019-11-25

## 2019-11-25 RX ORDER — SODIUM CHLORIDE 9 MG/ML
1000 INJECTION, SOLUTION INTRAVENOUS
Refills: 0 | Status: DISCONTINUED | OUTPATIENT
Start: 2019-11-25 | End: 2019-11-26

## 2019-11-25 RX ADMIN — SODIUM CHLORIDE 65 MILLILITER(S): 9 INJECTION, SOLUTION INTRAVENOUS at 09:40

## 2019-11-25 RX ADMIN — PIPERACILLIN AND TAZOBACTAM 25 GRAM(S): 4; .5 INJECTION, POWDER, LYOPHILIZED, FOR SOLUTION INTRAVENOUS at 02:04

## 2019-11-25 RX ADMIN — TAMSULOSIN HYDROCHLORIDE 0.4 MILLIGRAM(S): 0.4 CAPSULE ORAL at 21:25

## 2019-11-25 RX ADMIN — Medication 100 MILLIEQUIVALENT(S): at 20:18

## 2019-11-25 RX ADMIN — Medication 100 MILLIEQUIVALENT(S): at 18:56

## 2019-11-25 RX ADMIN — PIPERACILLIN AND TAZOBACTAM 25 GRAM(S): 4; .5 INJECTION, POWDER, LYOPHILIZED, FOR SOLUTION INTRAVENOUS at 09:11

## 2019-11-25 RX ADMIN — Medication 650 MILLIGRAM(S): at 21:26

## 2019-11-25 RX ADMIN — Medication 650 MILLIGRAM(S): at 19:50

## 2019-11-25 RX ADMIN — Medication 5 MILLIGRAM(S): at 20:20

## 2019-11-25 RX ADMIN — PIPERACILLIN AND TAZOBACTAM 25 GRAM(S): 4; .5 INJECTION, POWDER, LYOPHILIZED, FOR SOLUTION INTRAVENOUS at 17:34

## 2019-11-25 RX ADMIN — SODIUM CHLORIDE 100 MILLILITER(S): 9 INJECTION, SOLUTION INTRAVENOUS at 17:34

## 2019-11-25 RX ADMIN — Medication 100 MICROGRAM(S): at 05:44

## 2019-11-25 RX ADMIN — Medication 100 MILLIEQUIVALENT(S): at 21:25

## 2019-11-25 NOTE — PROGRESS NOTE ADULT - ASSESSMENT
Patient is a 89y Male with hypothyroidism, chronic venous insufficiency, recent UTI s/p antibiotics p/w fever, cough and worsening LE swelling/ warmth. Renal consulted for Elevated serum creatinine.    1. ARMIN- likely hemodynamically mediated in the setting of sepsis. Pt with improvement in renal function off Lisinopril/Lasix with IVF. CT chest with mild pulmonary edema and IVF d/c 11/18. Restarted 11/20 due to NPO status  CT abd/pelvis with no hydro, however Renal US with distended bladder; recc post void bladder scan. Strict I/Os. Avoid nephrotoxins/ NSAIDs/ RCA. Monitor BMP.  2. Hypernatremia- due to dehydration while NPO and  with zosyn (high sodium load).  Agree with changing IVF to D5W and increasing rate.    3. CKD-3- previous Scr 1.1; defer secondary w/u due to advanced age. Monitor lytes  4. Sepsis- 2/2 Cellulitis/ ?Acute blayne- c/w abx as per primary team/ ID.   5. LE edema- in the setting of cellulitis and hypoalbuminemia. Continue to hold Lasix as patient NPO.   6. HTN 2/2 CKD- BP acceptable; off Lisinopril due to ARMIN. Pt with Afib. If elevated BP and/or HR, consider BB or CCB for rate control. Monitor BP.

## 2019-11-25 NOTE — CONSULT NOTE ADULT - SUBJECTIVE AND OBJECTIVE BOX
HPI:  Patient is 89 year old male, has hha 24x7, ambulates with walker,  has medical history significant for hypothyroidism, chronic venous insufficiency came in to ED because of fever of 101 at home.   Per wife at bedside pt was in his usual health a week ago, when pt was started on abx for UTI by visiting physician. pt completed the abx of 5 days. Since yesterday pt is having fever at home tmax 101.4, associated with dry cough for 2 days, weakness, poor po intake, chills, dysuria. Pt also has b/l leg swelling, with warmness, and redness. per wife it's chronic and pt is on furosemide for leg swelling. Pt denies any dizziness, headache, chest pain, sob, n/v/d/c. (15 Nov 2019 22:05)    Interval history: wife reports patient more alert, patient denies pain. Wants a glass of water. NPO. For cholecystostomy by IR. Full code on file.       PAST MEDICAL & SURGICAL HISTORY:  Hypothyroid  Ulcers of both lower extremities  No significant past surgical history      SOCIAL HISTORY:  , has stepdaughter  Admitted from:  home         Surrogate/HCP/Guardian: Loida schmitz           Phone#: [phone]    FAMILY HISTORY:  No pertinent family history in first degree relatives    Baseline ADLs (prior to admission): ambulatory w walker, alert    Allergies    No Known Allergies    Intolerances      Present Symptoms:   Dyspnea: denies  Nausea/Vomiting: denies  Anxiety: denies  Depressed: denies  Fatigue: denies  Loss of appetite: denies  Pain:   denies        Review of Systems:  All others negative      MEDICATIONS  (STANDING):  dextrose 5%. 1000 milliLiter(s) (65 mL/Hr) IV Continuous <Continuous>  levothyroxine 100 MICROGram(s) Oral daily  piperacillin/tazobactam IVPB.. 3.375 Gram(s) IV Intermittent every 8 hours  tamsulosin 0.4 milliGRAM(s) Oral at bedtime    MEDICATIONS  (PRN):  acetaminophen   Tablet .. 650 milliGRAM(s) Oral every 6 hours PRN Temp greater or equal to 38C (100.4F), Moderate Pain (4 - 6)      PHYSICAL EXAM:    Vital Signs Last 24 Hrs  T(C): 36.6 (25 Nov 2019 13:05), Max: 38 (24 Nov 2019 16:08)  T(F): 97.8 (25 Nov 2019 13:05), Max: 100.4 (24 Nov 2019 16:08)  HR: 80 (25 Nov 2019 13:05) (80 - 100)  BP: 132/73 (25 Nov 2019 13:05) (112/64 - 134/84)  BP(mean): --  RR: 16 (25 Nov 2019 13:05) (16 - 18)  SpO2: 99% (25 Nov 2019 13:05) (93% - 99%)     Karnofsky Performance Score/Palliative Performance Status Version2:  60   %     GENERAL: alert, frail, NAD  HEENT: Atraumatic, oropharynx clear, neck supple  CHEST/LUNG: unlabored  HEART: Regular rate and rhythm    ABDOMEN: Soft, Nontender, Nondistended   MUSCULOSKELETAL:  No  edema   NERVOUS SYSTEM:  Alert & Oriented X3,  follows commands  SKIN: No rashes or lesions noted  Oral intake: npo       LABS:                        11.7   24.15 )-----------( 474      ( 25 Nov 2019 08:13 )             35.3     11-25    150<H>  |  119<H>  |  32<H>  ----------------------------<  129<H>  3.7   |  24  |  1.25    Ca    8.4      25 Nov 2019 08:13    TPro  6.6  /  Alb  1.4<L>  /  TBili  1.4<H>  /  DBili  x   /  AST  119<H>  /  ALT  129<H>  /  AlkPhos  242<H>  11-25        RADIOLOGY & ADDITIONAL STUDIES:    ADVANCE DIRECTIVES:

## 2019-11-25 NOTE — CONSULT NOTE ADULT - PROBLEM SELECTOR RECOMMENDATION 6
met with patient and wife at bedside regarding current condition, overall goals of care. They expressed that their goal is for the patient to get better and return home with 24hr aide. Patient's main goal is to be able to eat and drink again. Regarding advance directives, patient stated he leaves it up to his wife to decide. She expressed that she remains hopeful and has seen people get better after CPR.  . Risks/benefits of CPR/intubation in the context of patient's comorbidities and debility discussed. She wishes to keep him full code for now. Support provided.     Total advance care planning discussion time: 20 min

## 2019-11-25 NOTE — PROGRESS NOTE ADULT - SUBJECTIVE AND OBJECTIVE BOX
[facility] NEPHROLOGY- PROGRESS NOTE    Patient is a 89y Male with hypothyroidism, chronic venous insufficiency, recent UTI s/p antibiotics p/w fever, cough and worsening LE swelling/ warmth. Renal consulted for Elevated serum creatinine.  Pt NPO with gallbladder sludge;  HIDA for acute blayne    Hospital Medications: Medications reviewed.    REVIEW OF SYSTEMS:  CONSTITUTIONAL: No fevers No chills  RESPIRATORY: No shortness of breath  CARDIOVASCULAR: No chest pain.  GASTROINTESTINAL: No nausea, vomiting, or diarrhea. Denies abdominal pain.   VASCULAR: +bilateral lower extremity edema.       VITALS:  T(F): 97.8 (11-25-19 @ 13:05), Max: 97.8 (11-25-19 @ 05:18)  HR: 80 (11-25-19 @ 13:05)  BP: 132/73 (11-25-19 @ 13:05)  RR: 16 (11-25-19 @ 13:05)  SpO2: 99% (11-25-19 @ 13:05)  Wt(kg): --    11-24 @ 07:01  -  11-25 @ 07:00  --------------------------------------------------------  IN: 1200 mL / OUT: 0 mL / NET: 1200 mL    11-25 @ 07:01  -  11-25 @ 18:40  --------------------------------------------------------  IN: 2329 mL / OUT: 0 mL / NET: 2329 mL      PHYSICAL EXAM:  Gen: NAD, calm  Cards: irregular, +S1/S2, no M/G/R  Resp: CTAB  GI: soft, +epigastric tenderness NABS  Extremities: minimal nonpitting LE edema B/L with hyperpigmentation/ warmth        LABS:  11-25  150 <--, 150 <--, 149 <--, 143 <--, 140 <--, 139 <--, 136 <--  150<H>  |  118<H>  |  33<H>  ----------------------------<  112<H>  3.4<L>   |  26  |  1.16    Ca    8.4      25 Nov 2019 14:11    TPro  6.6  /  Alb  1.4<L>  /  TBili  1.4<H>  /  DBili      /  AST  119<H>  /  ALT  129<H>  /  AlkPhos  242<H>  11-25    Creatinine Trend: 1.16 <--, 1.25 <--, 1.29 <--, 1.24 <--, 1.29 <--, 1.27 <--, 1.52 <--, 1.26 <--                        11.7   24.15 )-----------( 474      ( 25 Nov 2019 08:13 )             35.3

## 2019-11-25 NOTE — PROGRESS NOTE ADULT - ATTENDING COMMENTS
[facility] NEPHROLOGY  Justin Orozco M.D.  Kj Fernandez D.O.  Tonia Sanches M.D.  Salina Case, MSN, ANP-C  [phone]    [address] [place] [address]

## 2019-11-25 NOTE — CHART NOTE - NSCHARTNOTEFT_GEN_A_CORE
Informed by RN patient had large black bowel movement  Vitals stable.   Will follow AM CBC.  Primary team to consider FOBT if indicated.

## 2019-11-25 NOTE — CHART NOTE - NSCHARTNOTEFT_GEN_A_CORE
Assessment:   Patient reports [ ] nausea  [ ] vomiting [ ] diarrhea [ ] constipation  [ ]chewing problems [ ] swallowing issues  [ ] other:   Pt visited. Pt is NPO= ~ 5 days. Previously on Dysphagia Mech soft Nectar Liquids. Pt with Acute Cholecystitis IR on case. D/W MD. Pt can eat  and NPO after Midnight .Pt is Asking  for water and C/O Feeling hungry.     PO intake:   Source for PO intake [ ] Patient/family [ ] chart [ ] staff     Current Weight:   % Weight Change Bed scale 197 lb     Pertinent Medications: MEDICATIONS  (STANDING):  dextrose 5%. 1000 milliLiter(s) (100 mL/Hr) IV Continuous <Continuous>  levothyroxine 100 MICROGram(s) Oral daily  phytonadione   Solution 5 milliGRAM(s) Oral once  piperacillin/tazobactam IVPB.. 3.375 Gram(s) IV Intermittent every 8 hours  tamsulosin 0.4 milliGRAM(s) Oral at bedtime    MEDICATIONS  (PRN):  acetaminophen   Tablet .. 650 milliGRAM(s) Oral every 6 hours PRN Temp greater or equal to 38C (100.4F), Moderate Pain (4 - 6)    Pertinent Labs:  11-25 Na150 mmol/L<H> Glu 112 mg/dL<H> K+ 3.4 mmol/L<L> Cr  1.16 mg/dL BUN 33 mg/dL<H> 11-23 Phos 2.6 mg/dL 11-25 Alb 1.4 g/dL<L> 11-16 [id number] 5.5 % 11-16 Chol 105 mg/dL LDL 56 mg/dL HDL 37 mg/dL<L> Trig 58 mg/dL      Skin:     Estimated Needs:   [ ] no change since previous assessment  [ ] recalculated:       Previous Nutrition Diagnosis:   [ ] Inadequate Energy Intake [ ]Inadequate Oral Intake [ ] Excessive Energy Intake   [ ] Underweight [ ] Increased Nutrient Needs [ ] Overweight/Obesity   [ ] Altered GI Function [ ] Unintended Weight Loss [ ] Food & Nutrition Related Knowledge Deficit [ ] Malnutrition   (x) Dysphagia     Nutrition Diagnosis is [ x] ongoing  [ ] resolved [ ] not applicable     New Nutrition Diagnosis: [ ] not applicable  [ ] Inadequate Energy Intake [ ]Inadequate Oral Intake [ ] Excessive Energy Intake   [ ] Underweight [ ] Increased Nutrient Needs [ ] Overweight/Obesity   [ ] Altered GI Function [ ] Unintended Weight Loss [ ] Food & Nutrition Related Knowledge Deficit [ ] Malnutrition     Related to:     As evidenced by:     Interventions:   Recommend  [ ] Change Diet To:  [x ] Nutrition Supplement Two win HN BID.   [ ] Nutrition Support  [x ] Other:  Mech soft W Nectar liquids   (x ) D/W RN     Monitoring and Evaluation:   [ ] PO intake [ ] Tolerance to diet prescription [ ] weights [x ] follow up per protocol  [ ] other:

## 2019-11-25 NOTE — PROGRESS NOTE ADULT - PROBLEM SELECTOR PLAN 1
Acute cholecystitis with sepsis  - on Zosyn  NPO  c/w IVF  Plavix has been discontinued  surgery held due to elevated INR; will give 3 units of FFP  f/u IR for percutaneous drainage

## 2019-11-25 NOTE — PROGRESS NOTE ADULT - SUBJECTIVE AND OBJECTIVE BOX
PGY 1 Note discussed with supervising resident and primary attending    Patient is a 89y old  Male who presents with a chief complaint of Fever (25 Nov 2019 10:15)    INTERVAL HPI/OVERNIGHT EVENTS: Patient seen and examined at the bedside. Patient's surgery cancelled due to high INR. Patient to receive 3 units of FFP to lower INR. Plan for IR guided percutaneous drainage. Patient complains of abdominal pain and lower extremity pain.     MEDICATIONS  (STANDING):  dextrose 5%. 1000 milliLiter(s) (65 mL/Hr) IV Continuous <Continuous>  levothyroxine 100 MICROGram(s) Oral daily  piperacillin/tazobactam IVPB.. 3.375 Gram(s) IV Intermittent every 8 hours  tamsulosin 0.4 milliGRAM(s) Oral at bedtime    MEDICATIONS  (PRN):  acetaminophen   Tablet .. 650 milliGRAM(s) Oral every 6 hours PRN Temp greater or equal to 38C (100.4F), Moderate Pain (4 - 6)    __________________________________________________  REVIEW OF SYSTEMS:  CONSTITUTIONAL: No fever  EYES: no visual disturbances  NECK: No pain   RESPIRATORY: No cough; No shortness of breath  CARDIOVASCULAR: No chest pain  GASTROINTESTINAL: Abdominal pain. No nausea or vomiting   NEUROLOGICAL: No headache  MUSCULOSKELETAL: lower extremity pain bilateral  GENITOURINARY: no dysuria  PSYCHIATRY: no anxiety  ALL OTHER  ROS negative        Vital Signs Last 24 Hrs  T(C): 36.6 (25 Nov 2019 05:18), Max: 38.3 (24 Nov 2019 13:22)  T(F): 97.8 (25 Nov 2019 05:18), Max: 101 (24 Nov 2019 13:22)  HR: 87 (25 Nov 2019 05:18) (87 - 103)  BP: 112/64 (25 Nov 2019 05:18) (112/64 - 134/84)  RR: 18 (25 Nov 2019 05:18) (16 - 18)  SpO2: 99% (25 Nov 2019 05:18) (93% - 99%)    ________________________________________________  PHYSICAL EXAM:  GENERAL: Patient seen resting in bed and in mild distress due to pain   HEENT: Normocephalic; conjunctivae and sclerae clear   NECK: supple  CHEST/LUNG: Clear to auscultation bilaterally with good air entry   HEART: S1 S2, irregular; no murmurs  ABDOMEN: Soft, Tenderness to palpation, Nondistended; Bowel sounds present  EXTREMITIES: no edema, chronic venous insufficiency, mild tenderness to palpation  SKIN: warm and dry; chronic venous insufficiency  NERVOUS SYSTEM:  Awake but oriented to person only; no new deficits    _________________________________________________  LABS:                        11.7   24.15 )-----------( 474      ( 25 Nov 2019 08:13 )             35.3     11-25    150<H>  |  119<H>  |  32<H>  ----------------------------<  129<H>  3.7   |  24  |  1.25    Ca    8.4      25 Nov 2019 08:13    TPro  6.6  /  Alb  1.4<L>  /  TBili  1.4<H>  /  DBili  x   /  AST  119<H>  /  ALT  129<H>  /  AlkPhos  242<H>  11-25    PT/INR - ( 25 Nov 2019 08:13 )   PT: 35.8 sec;   INR: 3.11 ratio         PTT - ( 25 Nov 2019 08:13 )  PTT:41.5 sec    RADIOLOGY & ADDITIONAL TESTS:    Imaging Personally Reviewed:  YES    No new imaging     Consultant(s) Notes Reviewed:   YES    Care Discussed with Consultants :     Plan of care was discussed with patient and /or primary care giver; all questions and concerns were addressed and care was aligned with patient's wishes.

## 2019-11-25 NOTE — PROGRESS NOTE ADULT - ATTENDING COMMENTS
I have seen and examined the patient. Case discussed with house staff in detail. I have reviewed and edited the note where appropriate.    Patient is a 89y Male with hypothyroidism, chronic venous insufficiency, recent UTI s/p antibiotics p/w fever, cough and worsening LE swelling admitted for cellulitis underwent HIDA scan 11/21 significant for cholecystitis.       Persistent elevated wbc and LFTS noted.  Hypernatremia on labs.  Surgery follow up noted. Discussed with Dr. Elias; will speak with IR,  Discussed with IR attending Dr. Abdi as well.    Had received 3 FFP anticipation of possible IR guided Cholecystomy.  Will give po vitamin K 5 mg today.      A/P  Acute Cholecystitis  Elevated LFT secondary to above  Elevated INR   Hypernatremia  Afib not on anticoagulation   HTN  PVD on Plavix; last dose on 11/21/19  CKD stage 3    Plan  NPO  IVF switched to D5 at 100 ml/hr overnight  vitamin K po 5 mg today  C/w IV Zosyn   F/u surgery/ IR    Discussed with spouse and daughter at bedside

## 2019-11-25 NOTE — CONSULT NOTE ADULT - PROBLEM SELECTOR RECOMMENDATION 2
ambulates w walker at home for short distances, has 24/7 HHA. Assist w ADls prn. PT eval as tolerated.

## 2019-11-25 NOTE — PROGRESS NOTE ADULT - SUBJECTIVE AND OBJECTIVE BOX
89y Male is under our care for acute cholecystitis, fevers and leukocytosis. Patient is currently very anxious and yelling that he doesn't want to be touched. Floor RN is also trying to get blood and he keeps yelling "you are hurting me".  Lap cholecystectomy is on hold now since INR is 3.11. No fevers today so far and leukocytosis is stable.     REVIEW OF SYSTEMS:  [ X ] Not able to illicit   Patient not cooperative     MEDS:  piperacillin/tazobactam IVPB.. 3.375 Gram(s) IV Intermittent every 8 hours    ALLERGIES: Allergies    No Known Allergies    Intolerances      VITALS:  Vital Signs Last 24 Hrs  T(C): 36.6 (25 Nov 2019 05:18), Max: 38.3 (24 Nov 2019 13:22)  T(F): 97.8 (25 Nov 2019 05:18), Max: 101 (24 Nov 2019 13:22)  HR: 87 (25 Nov 2019 05:18) (87 - 103)  BP: 112/64 (25 Nov 2019 05:18) (112/64 - 134/84)  BP(mean): --  RR: 18 (25 Nov 2019 05:18) (16 - 18)  SpO2: 99% (25 Nov 2019 05:18) (93% - 99%)      PHYSICAL EXAM:  HEENT: Patient is refusing exam and constantly yelling  Neck: supple   Respiratory: "  Cardiovascular: "  Gastrointestinal: "  Extremities: "  Skin: ''  Ortho: n/a  Neuro: awake, not cooperative and slightly combative      LABS/DIAGNOSTIC TESTS:                        11.7   24.15 )-----------( 474      ( 25 Nov 2019 08:13 )             35.3     WBC Count: 24.15 K/uL (11-25 @ 08:13)  WBC Count: 24.74 K/uL (11-24 @ 05:58)  WBC Count: 20.68 K/uL (11-23 @ 06:21)  WBC Count: 22.15 K/uL (11-22 @ 07:00)  WBC Count: 16.65 K/uL (11-21 @ 05:54)    11-25    150<H>  |  119<H>  |  32<H>  ----------------------------<  129<H>  3.7   |  24  |  1.25    Ca    8.4      25 Nov 2019 08:13    TPro  6.6  /  Alb  1.4<L>  /  TBili  1.4<H>  /  DBili  x   /  AST  119<H>  /  ALT  129<H>  /  AlkPhos  242<H>  11-25    Prothrombin Time and INR, Plasma in AM (11.25.19 @ 08:13)    Prothrombin Time, Plasma: 35.8: Effective October 30th, 2018 the reference range for PT has changed. sec    INR: 3.11: Recommended ranges for therapeutic INR:    2.0-3.0 for most medical and surgical thromboembolic states    2.0-3.0 for atrial fibrillation    2.0-3.0 for bileaflet mechanical valve in aortic position    2.5-3.5 for mechanical heart valves    Chest 2004;126:[id number]  The presence of direct thrombin inhibitors (argatroban, refludan)  may falsely increase results. ratio        CULTURES:   .Urine  11-16 @ 16:28   <10,000 CFU/mL Normal Urogenital Amanda  --  --      .Blood  11-16 @ 01:59   No growth at 5 days.  --  --        RADIOLOGY:  no new studies

## 2019-11-25 NOTE — PROGRESS NOTE ADULT - ASSESSMENT
1.	Acute Cholecystitis   2.	Leukocytosis - increased  ·	cont Zosyn 3.375 gms iv q8hrs  D7  ·	surgery on hold due to high INR

## 2019-11-26 LAB
ANION GAP SERPL CALC-SCNC: 5 MMOL/L — SIGNIFICANT CHANGE UP (ref 5–17)
APTT BLD: 34.1 SEC — SIGNIFICANT CHANGE UP (ref 27.5–36.3)
BUN SERPL-MCNC: 25 MG/DL — HIGH (ref 7–18)
CALCIUM SERPL-MCNC: 8 MG/DL — LOW (ref 8.4–10.5)
CHLORIDE SERPL-SCNC: 109 MMOL/L — HIGH (ref 96–108)
CO2 SERPL-SCNC: 27 MMOL/L — SIGNIFICANT CHANGE UP (ref 22–31)
CREAT SERPL-MCNC: 1.11 MG/DL — SIGNIFICANT CHANGE UP (ref 0.5–1.3)
GLUCOSE SERPL-MCNC: 105 MG/DL — HIGH (ref 70–99)
HCT VFR BLD CALC: 33.1 % — LOW (ref 39–50)
HGB BLD-MCNC: 10.8 G/DL — LOW (ref 13–17)
INR BLD: 1.61 RATIO — HIGH (ref 0.88–1.16)
MAGNESIUM SERPL-MCNC: 2.6 MG/DL — SIGNIFICANT CHANGE UP (ref 1.6–2.6)
MCHC RBC-ENTMCNC: 31.6 PG — SIGNIFICANT CHANGE UP (ref 27–34)
MCHC RBC-ENTMCNC: 32.6 GM/DL — SIGNIFICANT CHANGE UP (ref 32–36)
MCV RBC AUTO: 96.8 FL — SIGNIFICANT CHANGE UP (ref 80–100)
NRBC # BLD: 0 /100 WBCS — SIGNIFICANT CHANGE UP (ref 0–0)
PHOSPHATE SERPL-MCNC: 2.2 MG/DL — LOW (ref 2.5–4.5)
PLATELET # BLD AUTO: 449 K/UL — HIGH (ref 150–400)
POTASSIUM SERPL-MCNC: 3.5 MMOL/L — SIGNIFICANT CHANGE UP (ref 3.5–5.3)
POTASSIUM SERPL-SCNC: 3.5 MMOL/L — SIGNIFICANT CHANGE UP (ref 3.5–5.3)
PROTHROM AB SERPL-ACNC: 18.1 SEC — HIGH (ref 10–12.9)
RBC # BLD: 3.42 M/UL — LOW (ref 4.2–5.8)
RBC # FLD: 15.9 % — HIGH (ref 10.3–14.5)
SODIUM SERPL-SCNC: 141 MMOL/L — SIGNIFICANT CHANGE UP (ref 135–145)
WBC # BLD: 18.89 K/UL — HIGH (ref 3.8–10.5)
WBC # FLD AUTO: 18.89 K/UL — HIGH (ref 3.8–10.5)

## 2019-11-26 PROCEDURE — 47490 INCISION OF GALLBLADDER: CPT

## 2019-11-26 PROCEDURE — 99232 SBSQ HOSP IP/OBS MODERATE 35: CPT

## 2019-11-26 PROCEDURE — 99233 SBSQ HOSP IP/OBS HIGH 50: CPT | Mod: GC

## 2019-11-26 RX ORDER — POTASSIUM CHLORIDE 20 MEQ
10 PACKET (EA) ORAL ONCE
Refills: 0 | Status: COMPLETED | OUTPATIENT
Start: 2019-11-26 | End: 2019-11-26

## 2019-11-26 RX ORDER — POTASSIUM PHOSPHATE, MONOBASIC POTASSIUM PHOSPHATE, DIBASIC 236; 224 MG/ML; MG/ML
15 INJECTION, SOLUTION INTRAVENOUS ONCE
Refills: 0 | Status: COMPLETED | OUTPATIENT
Start: 2019-11-26 | End: 2019-11-26

## 2019-11-26 RX ADMIN — PIPERACILLIN AND TAZOBACTAM 25 GRAM(S): 4; .5 INJECTION, POWDER, LYOPHILIZED, FOR SOLUTION INTRAVENOUS at 01:21

## 2019-11-26 RX ADMIN — PIPERACILLIN AND TAZOBACTAM 25 GRAM(S): 4; .5 INJECTION, POWDER, LYOPHILIZED, FOR SOLUTION INTRAVENOUS at 12:28

## 2019-11-26 RX ADMIN — POTASSIUM PHOSPHATE, MONOBASIC POTASSIUM PHOSPHATE, DIBASIC 62.5 MILLIMOLE(S): 236; 224 INJECTION, SOLUTION INTRAVENOUS at 12:27

## 2019-11-26 RX ADMIN — Medication 100 MILLIEQUIVALENT(S): at 12:27

## 2019-11-26 RX ADMIN — SODIUM CHLORIDE 100 MILLILITER(S): 9 INJECTION, SOLUTION INTRAVENOUS at 01:20

## 2019-11-26 RX ADMIN — PIPERACILLIN AND TAZOBACTAM 25 GRAM(S): 4; .5 INJECTION, POWDER, LYOPHILIZED, FOR SOLUTION INTRAVENOUS at 18:11

## 2019-11-26 RX ADMIN — TAMSULOSIN HYDROCHLORIDE 0.4 MILLIGRAM(S): 0.4 CAPSULE ORAL at 22:24

## 2019-11-26 RX ADMIN — Medication 100 MICROGRAM(S): at 05:44

## 2019-11-26 NOTE — PROGRESS NOTE ADULT - SUBJECTIVE AND OBJECTIVE BOX
INTERVAL HPI/OVERNIGHT EVENTS:    No acute events overnight.   Pt resting comfortably. No acute complaints.   Denies abdominal pain    MEDICATIONS  (STANDING):  dextrose 5% + sodium chloride 0.45%. 1000 milliLiter(s) (65 mL/Hr) IV Continuous <Continuous>  enoxaparin Injectable 30 milliGRAM(s) SubCutaneous daily  levothyroxine 100 MICROGram(s) Oral daily  piperacillin/tazobactam IVPB.. 3.375 Gram(s) IV Intermittent every 8 hours  tamsulosin 0.4 milliGRAM(s) Oral at bedtime    MEDICATIONS  (PRN):  acetaminophen   Tablet .. 650 milliGRAM(s) Oral every 6 hours PRN Temp greater or equal to 38C (100.4F), Moderate Pain (4 - 6)    ICU Vital Signs Last 24 Hrs  T(C): 36.2 (26 Nov 2019 05:20), Max: 36.6 (25 Nov 2019 10:57)  T(F): 97.2 (26 Nov 2019 05:20), Max: 97.8 (25 Nov 2019 10:57)  HR: 69 (26 Nov 2019 05:20) (69 - 86)  BP: 127/68 (26 Nov 2019 05:20) (117/70 - 132/73)  BP(mean): --  ABP: --  ABP(mean): --  RR: 16 (26 Nov 2019 05:20) (16 - 17)  SpO2: 98% (26 Nov 2019 05:20) (96% - 99%)      Physical Exam:  General: in no acute distress  Skin: No jaundice, no icterus  Abdomen: soft, nondistended, no abdominal tenderness  Extremities: non-edematous, no calf pain bilaterally    LABS:                                   10.8   18.89 )-----------( 449      ( 26 Nov 2019 09:17 )             33.1     11-26    141  |  109<H>  |  25<H>  ----------------------------<  105<H>  3.5   |  27  |  1.11    Ca    8.0<L>      26 Nov 2019 09:17  Phos  2.2     11-26  Mg     2.6     11-26    TPro  6.6  /  Alb  1.4<L>  /  TBili  1.4<H>  /  DBili  x   /  AST  119<H>  /  ALT  129<H>  /  AlkPhos  242<H>  11-25

## 2019-11-26 NOTE — PROGRESS NOTE ADULT - SUBJECTIVE AND OBJECTIVE BOX
Interventional Radiology Pre-Procedure Note    Procedure: Percutaneous cholecystostomy tube placement    Diagnosis/Indication: Patient is a 89y old Male who presents with acute cholecystitis. Percutaneous cholecystostomy tube placement was requested.    PAST MEDICAL & SURGICAL HISTORY:  Hypothyroid  Ulcers of both lower extremities  No significant past surgical history    Allergies: No Known Allergies    LABS:  CBC Full  -  ( 26 Nov 2019 09:17 )  WBC Count : 18.89 K/uL  RBC Count : 3.42 M/uL  Hemoglobin : 10.8 g/dL  Hematocrit : 33.1 %  Platelet Count - Automated : 449 K/uL  Mean Cell Volume : 96.8 fl  Mean Cell Hemoglobin : 31.6 pg  Mean Cell Hemoglobin Concentration : 32.6 gm/dL    11-26    141  |  109<H>  |  25<H>  ----------------------------<  105<H>  3.5   |  27  |  1.11    Ca    8.0<L>      26 Nov 2019 09:17  Phos  2.2     11-26  Mg     2.6     11-26    TPro  6.6  /  Alb  1.4<L>  /  TBili  1.4<H>  /  DBili  x   /  AST  119<H>  /  ALT  129<H>  /  AlkPhos  242<H>  11-25    PT/INR - ( 26 Nov 2019 09:17 )   PT: 18.1 sec;   INR: 1.61 ratio      PTT - ( 26 Nov 2019 09:17 )  PTT:34.1 sec    Procedure/ risks/ benefits/ alternatives were discussed with the patient's wife, who verbalizes understanding, and witnessed informed consent was obtained.

## 2019-11-26 NOTE — PROGRESS NOTE ADULT - ASSESSMENT
1.	Acute Cholecystitis   2.	Leukocytosis - decreased  ·	cont zosyn 3.375 gms iv q8hrs  D8  ·	going to IR for Perc cholecystostomy tube placement

## 2019-11-26 NOTE — PROGRESS NOTE ADULT - PROBLEM SELECTOR PLAN 7
Con  Lovenox for DVT prophylaxis
VTE score 1  will start lovenox for dvt ppx

## 2019-11-26 NOTE — PROGRESS NOTE ADULT - ASSESSMENT
89 y/o male with sepsis, cholelithiasis, acute cholecystitis by BAILEY however pt is nontender    - keep npo  - cont ivf  - cont iv abx  - will d/w IR for Percutaneous cholecystostomy tube

## 2019-11-26 NOTE — PROGRESS NOTE ADULT - SUBJECTIVE AND OBJECTIVE BOX
PGY 1 Note discussed with supervising resident and primary attending    Patient is a 89y old  Male who presents with a chief complaint of Fever (26 Nov 2019 10:30)    INTERVAL HPI/OVERNIGHT EVENTS: Patient seen and examined at the bedside. Patient planned to be evaluated by IR today for percutaneous drainage for cholecystitis. INR has improved after FFP and Vitamin K 5mg yesterday.     MEDICATIONS  (STANDING):  levothyroxine 100 MICROGram(s) Oral daily  piperacillin/tazobactam IVPB.. 3.375 Gram(s) IV Intermittent every 8 hours  potassium chloride  10 mEq/100 mL IVPB 10 milliEquivalent(s) IV Intermittent once  potassium phosphate IVPB 15 milliMole(s) IV Intermittent once  tamsulosin 0.4 milliGRAM(s) Oral at bedtime    MEDICATIONS  (PRN):  acetaminophen   Tablet .. 650 milliGRAM(s) Oral every 6 hours PRN Temp greater or equal to 38C (100.4F), Moderate Pain (4 - 6)      __________________________________________________  REVIEW OF SYSTEMS:    CONSTITUTIONAL: No fever,   EYES: no acute visual disturbances  NECK: No pain or stiffness  RESPIRATORY: No cough; No shortness of breath  CARDIOVASCULAR: No chest pain, no palpitations  GASTROINTESTINAL: No pain. No nausea or vomiting; No diarrhea   NEUROLOGICAL: No headache or numbness, no tremors  MUSCULOSKELETAL: No joint pain, no muscle pain  GENITOURINARY: no dysuria, no frequency, no hesitancy  PSYCHIATRY: no depression , no anxiety  ALL OTHER  ROS negative        Vital Signs Last 24 Hrs  T(C): 36.2 (26 Nov 2019 05:20), Max: 36.6 (25 Nov 2019 13:05)  T(F): 97.2 (26 Nov 2019 05:20), Max: 97.8 (25 Nov 2019 13:05)  HR: 69 (26 Nov 2019 05:20) (69 - 86)  BP: 127/68 (26 Nov 2019 05:20) (117/70 - 132/73)  BP(mean): --  RR: 16 (26 Nov 2019 05:20) (16 - 17)  SpO2: 98% (26 Nov 2019 05:20) (96% - 99%)    ________________________________________________  PHYSICAL EXAM:  GENERAL: NAD  HEENT: Normocephalic;  conjunctivae and sclerae clear; moist mucous membranes;   NECK : supple  CHEST/LUNG: Clear to auscultation bilaterally with good air entry   HEART: S1 S2  regular; no murmurs, gallops or rubs  ABDOMEN: Soft, Nontender, Nondistended; Bowel sounds present  EXTREMITIES: no cyanosis; no edema; no calf tenderness  SKIN: warm and dry; no rash  NERVOUS SYSTEM:  Awake and alert; Oriented  to place, person and time ; no new deficits    _________________________________________________  LABS:                        10.8   18.89 )-----------( 449      ( 26 Nov 2019 09:17 )             33.1     11-26    141  |  109<H>  |  25<H>  ----------------------------<  105<H>  3.5   |  27  |  1.11    Ca    8.0<L>      26 Nov 2019 09:17  Phos  2.2     11-26  Mg     2.6     11-26    TPro  6.6  /  Alb  1.4<L>  /  TBili  1.4<H>  /  DBili  x   /  AST  119<H>  /  ALT  129<H>  /  AlkPhos  242<H>  11-25    PT/INR - ( 26 Nov 2019 09:17 )   PT: 18.1 sec;   INR: 1.61 ratio         PTT - ( 26 Nov 2019 09:17 )  PTT:34.1 sec    CAPILLARY BLOOD GLUCOSE            RADIOLOGY & ADDITIONAL TESTS:    Imaging Personally Reviewed:  YES/NO    Consultant(s) Notes Reviewed:   YES/ No    Care Discussed with Consultants :     Plan of care was discussed with patient and /or primary care giver; all questions and concerns were addressed and care was aligned with patient's wishes. PGY 1 Note discussed with supervising resident and primary attending    Patient is a 89y old  Male who presents with a chief complaint of Fever (26 Nov 2019 10:30)    INTERVAL HPI/OVERNIGHT EVENTS: Patient seen and examined at the bedside. Patient planned to be evaluated by IR today for percutaneous drainage for cholecystitis. INR has improved after FFP and Vitamin K 5mg yesterday.     MEDICATIONS  (STANDING):  levothyroxine 100 MICROGram(s) Oral daily  piperacillin/tazobactam IVPB.. 3.375 Gram(s) IV Intermittent every 8 hours  potassium chloride  10 mEq/100 mL IVPB 10 milliEquivalent(s) IV Intermittent once  potassium phosphate IVPB 15 milliMole(s) IV Intermittent once  tamsulosin 0.4 milliGRAM(s) Oral at bedtime    MEDICATIONS  (PRN):  acetaminophen   Tablet .. 650 milliGRAM(s) Oral every 6 hours PRN Temp greater or equal to 38C (100.4F), Moderate Pain (4 - 6)      __________________________________________________  REVIEW OF SYSTEMS:  CONSTITUTIONAL: No fever   EYES: no visual disturbances  NECK: No pain   RESPIRATORY: No cough; No shortness of breath  CARDIOVASCULAR: No chest pain  GASTROINTESTINAL: Mild pain. No nausea or vomiting   NEUROLOGICAL: No headache   MUSCULOSKELETAL: No joint pain, no muscle pain  GENITOURINARY: no dysuria  PSYCHIATRY: no anxiety  ALL OTHER  ROS negative        Vital Signs Last 24 Hrs  T(C): 36.2 (26 Nov 2019 05:20), Max: 36.6 (25 Nov 2019 13:05)  T(F): 97.2 (26 Nov 2019 05:20), Max: 97.8 (25 Nov 2019 13:05)  HR: 69 (26 Nov 2019 05:20) (69 - 86)  BP: 127/68 (26 Nov 2019 05:20) (117/70 - 132/73)  RR: 16 (26 Nov 2019 05:20) (16 - 17)  SpO2: 98% (26 Nov 2019 05:20) (96% - 99%)    ________________________________________________  PHYSICAL EXAM:  GENERAL: Patient seen resting in bed and in no acute distress  HEENT: Normocephalic; conjunctivae and sclerae clear  NECK: supple  CHEST/LUNG: Clear to auscultation bilaterally with good air entry   HEART: S1 S2, irregular; no murmurs  ABDOMEN: Soft, Tenderness to palpation diffuse but mostly in RUQ, Nondistended; Bowel sounds present  EXTREMITIES: chronic venous stasis; no calf tenderness  SKIN: warm and dry  NERVOUS SYSTEM: Awake but oriented to person only; no new deficits    _________________________________________________  LABS:                        10.8   18.89 )-----------( 449      ( 26 Nov 2019 09:17 )             33.1     11-26    141  |  109<H>  |  25<H>  ----------------------------<  105<H>  3.5   |  27  |  1.11    Ca    8.0<L>      26 Nov 2019 09:17  Phos  2.2     11-26  Mg     2.6     11-26    TPro  6.6  /  Alb  1.4<L>  /  TBili  1.4<H>  /  DBili  x   /  AST  119<H>  /  ALT  129<H>  /  AlkPhos  242<H>  11-25    PT/INR - ( 26 Nov 2019 09:17 )   PT: 18.1 sec;   INR: 1.61 ratio         PTT - ( 26 Nov 2019 09:17 )  PTT:34.1 sec      RADIOLOGY & ADDITIONAL TESTS:    Imaging Personally Reviewed:  YES     No new imaging     Consultant(s) Notes Reviewed:   YES     Care Discussed with Consultants :     Plan of care was discussed with patient and /or primary care giver; all questions and concerns were addressed and care was aligned with patient's wishes.

## 2019-11-26 NOTE — PROGRESS NOTE ADULT - SUBJECTIVE AND OBJECTIVE BOX
[facility] NEPHROLOGY- PROGRESS NOTE    Patient is a 89y Male with hypothyroidism, chronic venous insufficiency, recent UTI s/p antibiotics p/w fever, cough and worsening LE swelling/ warmth. Renal consulted for Elevated serum creatinine.  Pt NPO with gallbladder sludge;  HIDA for acute blayne    Pt denies pain.  He is tired today.    Hospital Medications: Medications reviewed.    REVIEW OF SYSTEMS:  CONSTITUTIONAL: No fevers No chills  RESPIRATORY: No shortness of breath  CARDIOVASCULAR: No chest pain.  GASTROINTESTINAL: No nausea, vomiting, or diarrhea. Denies abdominal pain.   : no dysuria      VITALS:  T(F): 97.3 (11-26-19 @ 12:10), Max: 97.4 (11-25-19 @ 20:49)  HR: 74 (11-26-19 @ 12:10)  BP: 114/70 (11-26-19 @ 12:10)  RR: 20 (11-26-19 @ 12:10)  SpO2: 97% (11-26-19 @ 12:10)  Wt(kg): --    11-25 @ 07:01  -  11-26 @ 07:00  --------------------------------------------------------  IN: 2429 mL / OUT: 0 mL / NET: 2429 mL        PHYSICAL EXAM:  Gen: NAD, calm  Cards: irregular, +S1/S2, no M/G/R  Resp: CTAB  GI: soft, +epigastric tenderness NABS  Extremities: minimal nonpitting LE edema B/L with hyperpigmentation/ warmth      LABS:  11-26  141 <--, 147 <--, 150 <--, 150 <--, 149 <--, 143 <--, 140 <--  141  |  109<H>  |  25<H>  ----------------------------<  105<H>  3.5   |  27  |  1.11    Ca    8.0<L>      26 Nov 2019 09:17  Phos  2.2     11-26  Mg     2.6     11-26    TPro  6.6  /  Alb  1.4<L>  /  TBili  1.4<H>  /  DBili      /  AST  119<H>  /  ALT  129<H>  /  AlkPhos  242<H>  11-25    Creatinine Trend: 1.11 <--, 1.19 <--, 1.16 <--, 1.25 <--, 1.29 <--, 1.24 <--, 1.29 <--, 1.27 <--, 1.52 <--                        10.8   18.89 )-----------( 449      ( 26 Nov 2019 09:17 )             33.1

## 2019-11-26 NOTE — PROGRESS NOTE ADULT - ATTENDING COMMENTS
[facility] NEPHROLOGY  Justin Orozco M.D.  Kj Fernandez D.O.  Tonia Sanches M.D.  Salina Case, MSN, ANP-C  [phone]    [address] [place] [address]

## 2019-11-26 NOTE — PROGRESS NOTE ADULT - SUBJECTIVE AND OBJECTIVE BOX
89y Male is under our care for acute cholecystitis and leukocytosis. Patient is in better spirits today and was able to answer some questions. Currently has no major complaints besides thirst from NPO. Going for percut cholecystostomy tube placement by IR today. No fevers in past 36 hours and wbc count has decreased.     REVIEW OF SYSTEMS:  [  ] Not able to illicit  General: no fevers no malaise +thirsty  Chest: no cough no sob  GI: no nvd  : no urinary sxs   Skin: no rashes  Musculoskeletal: no trauma no LBP  Neuro: no ha's no dizziness 	    MEDS:  piperacillin/tazobactam IVPB.. 3.375 Gram(s) IV Intermittent every 8 hours    ALLERGIES: Allergies    No Known Allergies    Intolerances      VITALS:  Vital Signs Last 24 Hrs  T(C): 36.2 (26 Nov 2019 05:20), Max: 36.6 (25 Nov 2019 13:05)  T(F): 97.2 (26 Nov 2019 05:20), Max: 97.8 (25 Nov 2019 13:05)  HR: 69 (26 Nov 2019 05:20) (69 - 86)  BP: 127/68 (26 Nov 2019 05:20) (117/70 - 132/73)  BP(mean): --  RR: 16 (26 Nov 2019 05:20) (16 - 17)  SpO2: 98% (26 Nov 2019 05:20) (96% - 99%)      PHYSICAL EXAM:  HEENT: n/a  Neck: supple no LN's   Respiratory: lungs clear no rales  Cardiovascular: S1 S2 reg no murmurs  Gastrointestinal: +BS with soft, mildly distended abdomen; nontender  Extremities: no edema  Skin: bilateral lower legs with chronic stasis dermatitis   Ortho: n/a  Neuro: alert and awake      LABS/DIAGNOSTIC TESTS:                        10.8   18.89 )-----------( 449      ( 26 Nov 2019 09:17 )             33.1     WBC Count: 18.89 K/uL (11-26 @ 09:17)  WBC Count: 24.15 K/uL (11-25 @ 08:13)  WBC Count: 24.74 K/uL (11-24 @ 05:58)  WBC Count: 20.68 K/uL (11-23 @ 06:21)  WBC Count: 22.15 K/uL (11-22 @ 07:00)    11-26    141  |  109<H>  |  25<H>  ----------------------------<  105<H>  3.5   |  27  |  1.11    Ca    8.0<L>      26 Nov 2019 09:17  Phos  2.2     11-26  Mg     2.6     11-26    TPro  6.6  /  Alb  1.4<L>  /  TBili  1.4<H>  /  DBili  x   /  AST  119<H>  /  ALT  129<H>  /  AlkPhos  242<H>  11-25      CULTURES:   .Blood  11-24 @ 22:28   No growth to date.  --  --      .Urine  11-16 @ 16:28   <10,000 CFU/mL Normal Urogenital Amanda  --  --      .Blood  11-16 @ 01:59   No growth at 5 days.  --  --        RADIOLOGY:  no new studies

## 2019-11-26 NOTE — PROGRESS NOTE ADULT - SUBJECTIVE AND OBJECTIVE BOX
Interventional Radiology Brief- Operative Note    Procedure: Percutaneous cholecystostomy tube placement with image guidance    Operators: DARIUS Abdi MD, MELISSA Headley    Anesthesia (type): Sedation by the anesthesiologist, and local    Contrast: 10 mL    EBL: < 2 mL    Findings/Follow up Plan of Care: 8.5Fr pigtail catheter successfully placed into the gallbladder. 90 mL dark red bilious fluid aspirated, with samples sent for culture. Follow up results.    Specimens Removed: as above    Implants: as above    Complications: none    Condition/Disposition: stable / PACU x1 hour, then back to floor IF cleared by anesthesia criteria.    Miscellaneous: HOLD prophylactic anticoagulation for 24-36 hours.    Please call Interventional Radiology with any questions, concerns, or issues.      Official report to follow.    Case d/w with the surgery team.

## 2019-11-26 NOTE — PROGRESS NOTE ADULT - ATTENDING COMMENTS
I have seen and examined the patient. Case discussed with house staff in detail. I have reviewed and edited the note where appropriate.    Patient is a 89y Male with hypothyroidism, chronic venous insufficiency, recent UTI s/p antibiotics p/w fever, cough and worsening LE swelling admitted for cellulitis underwent HIDA scan 11/21 significant for cholecystitis.     a/P    Leukocytosis improved  Hyponatremia resolved   INR normalized  Getting IR guided percutaneous cholecystostomy tube      A/P  Sepsis secondary to acute Cholecystitis  Cholelithiasis   Elevated LFT secondary to above  Afib not on anticoagulation   HTN  PVD on Plavix; last dose on 11/21/19  CKD stage 3  Elevated INR -normalized s/p vitamin K    Plan  NPO  C/w IV Zosyn   Resume low rate d5half NS  Monitor LFT and wbc  Discussed with family at bedside.

## 2019-11-26 NOTE — PROGRESS NOTE ADULT - ASSESSMENT
Patient is a 89y Male with hypothyroidism, chronic venous insufficiency, recent UTI s/p antibiotics p/w fever, cough and worsening LE swelling/ warmth. Renal consulted for Elevated serum creatinine.    1. ARMIN- likely hemodynamically mediated in the setting of sepsis. Pt with improvement in renal function off Lisinopril/Lasix with IVF. CT chest with mild pulmonary edema and IVF d/c 11/18. Restarted 11/20 due to NPO status  CT abd/pelvis with no hydro, however Renal US with distended bladder; recc post void bladder scan. Strict I/Os. Avoid nephrotoxins/ NSAIDs/ RCA. Monitor BMP.  2. Hypernatremia- due to dehydration while NPO and  with zosyn (high sodium load) improved.  Agree with changing IVF to D5W and increasing rate.    3. CKD-3- previous Scr 1.1; defer secondary w/u due to advanced age. Monitor lytes  4. Sepsis- 2/2 Cellulitis/ Acute blayne- c/w abx as per primary team/ ID.   5. LE edema- in the setting of cellulitis and hypoalbuminemia. Continue to hold Lasix as patient NPO.   6. HTN 2/2 CKD- BP acceptable; off Lisinopril due to ARMIN. Pt with Afib. If elevated BP and/or HR, consider BB or CCB for rate control. Monitor BP.

## 2019-11-26 NOTE — PROGRESS NOTE ADULT - PROBLEM SELECTOR PLAN 1
Acute cholecystitis with sepsis  - on Zosyn  NPO  c/w IVF  Plavix has been discontinued  INR improved this morning  f/u IR for percutaneous drainage  f/u surgery

## 2019-11-27 LAB
ALBUMIN SERPL ELPH-MCNC: 1.7 G/DL — LOW (ref 3.5–5)
ALP SERPL-CCNC: 163 U/L — HIGH (ref 40–120)
ALT FLD-CCNC: 78 U/L DA — HIGH (ref 10–60)
ANION GAP SERPL CALC-SCNC: 9 MMOL/L — SIGNIFICANT CHANGE UP (ref 5–17)
AST SERPL-CCNC: 72 U/L — HIGH (ref 10–40)
BASOPHILS # BLD AUTO: 0.03 K/UL — SIGNIFICANT CHANGE UP (ref 0–0.2)
BASOPHILS NFR BLD AUTO: 0.2 % — SIGNIFICANT CHANGE UP (ref 0–2)
BILIRUB SERPL-MCNC: 0.9 MG/DL — SIGNIFICANT CHANGE UP (ref 0.2–1.2)
BUN SERPL-MCNC: 25 MG/DL — HIGH (ref 7–18)
CALCIUM SERPL-MCNC: 8.4 MG/DL — SIGNIFICANT CHANGE UP (ref 8.4–10.5)
CHLORIDE SERPL-SCNC: 110 MMOL/L — HIGH (ref 96–108)
CO2 SERPL-SCNC: 23 MMOL/L — SIGNIFICANT CHANGE UP (ref 22–31)
CREAT SERPL-MCNC: 1.05 MG/DL — SIGNIFICANT CHANGE UP (ref 0.5–1.3)
EOSINOPHIL # BLD AUTO: 0.23 K/UL — SIGNIFICANT CHANGE UP (ref 0–0.5)
EOSINOPHIL NFR BLD AUTO: 1.7 % — SIGNIFICANT CHANGE UP (ref 0–6)
GLUCOSE SERPL-MCNC: 78 MG/DL — SIGNIFICANT CHANGE UP (ref 70–99)
GRAM STN FLD: SIGNIFICANT CHANGE UP
HCT VFR BLD CALC: 34.1 % — LOW (ref 39–50)
HGB BLD-MCNC: 11 G/DL — LOW (ref 13–17)
IMM GRANULOCYTES NFR BLD AUTO: 1.3 % — SIGNIFICANT CHANGE UP (ref 0–1.5)
LYMPHOCYTES # BLD AUTO: 1.36 K/UL — SIGNIFICANT CHANGE UP (ref 1–3.3)
LYMPHOCYTES # BLD AUTO: 10.3 % — LOW (ref 13–44)
MAGNESIUM SERPL-MCNC: 2.6 MG/DL — SIGNIFICANT CHANGE UP (ref 1.6–2.6)
MCHC RBC-ENTMCNC: 30.8 PG — SIGNIFICANT CHANGE UP (ref 27–34)
MCHC RBC-ENTMCNC: 32.3 GM/DL — SIGNIFICANT CHANGE UP (ref 32–36)
MCV RBC AUTO: 95.5 FL — SIGNIFICANT CHANGE UP (ref 80–100)
MONOCYTES # BLD AUTO: 0.33 K/UL — SIGNIFICANT CHANGE UP (ref 0–0.9)
MONOCYTES NFR BLD AUTO: 2.5 % — SIGNIFICANT CHANGE UP (ref 2–14)
NEUTROPHILS # BLD AUTO: 11.05 K/UL — HIGH (ref 1.8–7.4)
NEUTROPHILS NFR BLD AUTO: 84 % — HIGH (ref 43–77)
NRBC # BLD: 0 /100 WBCS — SIGNIFICANT CHANGE UP (ref 0–0)
PHOSPHATE SERPL-MCNC: 2.6 MG/DL — SIGNIFICANT CHANGE UP (ref 2.5–4.5)
PLATELET # BLD AUTO: 486 K/UL — HIGH (ref 150–400)
POTASSIUM SERPL-MCNC: 3.9 MMOL/L — SIGNIFICANT CHANGE UP (ref 3.5–5.3)
POTASSIUM SERPL-SCNC: 3.9 MMOL/L — SIGNIFICANT CHANGE UP (ref 3.5–5.3)
PROT SERPL-MCNC: 6.6 G/DL — SIGNIFICANT CHANGE UP (ref 6–8.3)
RBC # BLD: 3.57 M/UL — LOW (ref 4.2–5.8)
RBC # FLD: 15.8 % — HIGH (ref 10.3–14.5)
SODIUM SERPL-SCNC: 142 MMOL/L — SIGNIFICANT CHANGE UP (ref 135–145)
SPECIMEN SOURCE: SIGNIFICANT CHANGE UP
WBC # BLD: 13.17 K/UL — HIGH (ref 3.8–10.5)
WBC # FLD AUTO: 13.17 K/UL — HIGH (ref 3.8–10.5)

## 2019-11-27 PROCEDURE — 99232 SBSQ HOSP IP/OBS MODERATE 35: CPT

## 2019-11-27 PROCEDURE — 99233 SBSQ HOSP IP/OBS HIGH 50: CPT | Mod: GC

## 2019-11-27 RX ORDER — KETOROLAC TROMETHAMINE 30 MG/ML
15 SYRINGE (ML) INJECTION ONCE
Refills: 0 | Status: DISCONTINUED | OUTPATIENT
Start: 2019-11-27 | End: 2019-11-27

## 2019-11-27 RX ORDER — PIPERACILLIN AND TAZOBACTAM 4; .5 G/20ML; G/20ML
3.38 INJECTION, POWDER, LYOPHILIZED, FOR SOLUTION INTRAVENOUS EVERY 8 HOURS
Refills: 0 | Status: DISCONTINUED | OUTPATIENT
Start: 2019-11-27 | End: 2019-11-30

## 2019-11-27 RX ADMIN — Medication 100 MICROGRAM(S): at 05:58

## 2019-11-27 RX ADMIN — Medication 650 MILLIGRAM(S): at 06:41

## 2019-11-27 RX ADMIN — PIPERACILLIN AND TAZOBACTAM 25 GRAM(S): 4; .5 INJECTION, POWDER, LYOPHILIZED, FOR SOLUTION INTRAVENOUS at 22:15

## 2019-11-27 RX ADMIN — Medication 15 MILLIGRAM(S): at 08:55

## 2019-11-27 RX ADMIN — TAMSULOSIN HYDROCHLORIDE 0.4 MILLIGRAM(S): 0.4 CAPSULE ORAL at 22:15

## 2019-11-27 RX ADMIN — PIPERACILLIN AND TAZOBACTAM 25 GRAM(S): 4; .5 INJECTION, POWDER, LYOPHILIZED, FOR SOLUTION INTRAVENOUS at 15:05

## 2019-11-27 RX ADMIN — Medication 15 MILLIGRAM(S): at 08:38

## 2019-11-27 RX ADMIN — Medication 650 MILLIGRAM(S): at 07:46

## 2019-11-27 NOTE — PROGRESS NOTE ADULT - SUBJECTIVE AND OBJECTIVE BOX
PGY 1 Note discussed with supervising resident and primary attending    Patient is a 89y old  Male who presents with a chief complaint of Fever (27 Nov 2019 11:24)      INTERVAL HPI/OVERNIGHT EVENTS: Patient seen and examined at the bedside.     MEDICATIONS  (STANDING):  levothyroxine 100 MICROGram(s) Oral daily  piperacillin/tazobactam IVPB.. 3.375 Gram(s) IV Intermittent every 8 hours  tamsulosin 0.4 milliGRAM(s) Oral at bedtime    MEDICATIONS  (PRN):  acetaminophen   Tablet .. 650 milliGRAM(s) Oral every 6 hours PRN Temp greater or equal to 38C (100.4F), Moderate Pain (4 - 6)      __________________________________________________  REVIEW OF SYSTEMS:    CONSTITUTIONAL: No fever,   EYES: no acute visual disturbances  NECK: No pain or stiffness  RESPIRATORY: No cough; No shortness of breath  CARDIOVASCULAR: No chest pain, no palpitations  GASTROINTESTINAL: No pain. No nausea or vomiting; No diarrhea   NEUROLOGICAL: No headache or numbness, no tremors  MUSCULOSKELETAL: No joint pain, no muscle pain  GENITOURINARY: no dysuria, no frequency, no hesitancy  PSYCHIATRY: no depression , no anxiety  ALL OTHER  ROS negative        Vital Signs Last 24 Hrs  T(C): 36.2 (27 Nov 2019 04:55), Max: 36.7 (26 Nov 2019 19:13)  T(F): 97.2 (27 Nov 2019 04:55), Max: 98.1 (26 Nov 2019 19:13)  HR: 76 (27 Nov 2019 04:55) (65 - 78)  BP: 138/74 (27 Nov 2019 04:55) (109/60 - 138/74)  BP(mean): 86 (26 Nov 2019 16:25) (81 - 87)  RR: 16 (27 Nov 2019 04:55) (15 - 25)  SpO2: 94% (27 Nov 2019 04:55) (94% - 100%)    ________________________________________________  PHYSICAL EXAM:  GENERAL: NAD  HEENT: Normocephalic;  conjunctivae and sclerae clear; moist mucous membranes;   NECK : supple  CHEST/LUNG: Clear to auscultation bilaterally with good air entry   HEART: S1 S2  regular; no murmurs, gallops or rubs  ABDOMEN: Soft, Nontender, Nondistended; Bowel sounds present  EXTREMITIES: no cyanosis; no edema; no calf tenderness  SKIN: warm and dry; no rash  NERVOUS SYSTEM:  Awake and alert; Oriented  to place, person and time ; no new deficits    _________________________________________________  LABS:                        11.0   13.17 )-----------( 486      ( 27 Nov 2019 06:32 )             34.1     11-27    142  |  110<H>  |  25<H>  ----------------------------<  78  3.9   |  23  |  1.05    Ca    8.4      27 Nov 2019 06:32  Phos  2.6     11-27  Mg     2.6     11-27    TPro  6.6  /  Alb  1.7<L>  /  TBili  0.9  /  DBili  x   /  AST  72<H>  /  ALT  78<H>  /  AlkPhos  163<H>  11-27    PT/INR - ( 26 Nov 2019 09:17 )   PT: 18.1 sec;   INR: 1.61 ratio         PTT - ( 26 Nov 2019 09:17 )  PTT:34.1 sec    CAPILLARY BLOOD GLUCOSE            RADIOLOGY & ADDITIONAL TESTS:    Imaging Personally Reviewed:  YES/NO    Consultant(s) Notes Reviewed:   YES/ No    Care Discussed with Consultants :     Plan of care was discussed with patient and /or primary care giver; all questions and concerns were addressed and care was aligned with patient's wishes. PGY 1 Note discussed with supervising resident and primary attending    Patient is a 89y old  Male who presents with a chief complaint of Fever (27 Nov 2019 11:24)    INTERVAL HPI/OVERNIGHT EVENTS: Patient seen and examined at the bedside. Patient had IR guided cholecystostomy tube placed yesterday. Patient is feeling better today. No complaints at this time.     MEDICATIONS  (STANDING):  levothyroxine 100 MICROGram(s) Oral daily  piperacillin/tazobactam IVPB.. 3.375 Gram(s) IV Intermittent every 8 hours  tamsulosin 0.4 milliGRAM(s) Oral at bedtime    MEDICATIONS  (PRN):  acetaminophen   Tablet .. 650 milliGRAM(s) Oral every 6 hours PRN Temp greater or equal to 38C (100.4F), Moderate Pain (4 - 6)    __________________________________________________  REVIEW OF SYSTEMS:  CONSTITUTIONAL: No fever   EYES: no visual disturbances  NECK: No pain   RESPIRATORY: No cough; No shortness of breath  CARDIOVASCULAR: No chest pain  GASTROINTESTINAL: No pain. No nausea or vomiting   NEUROLOGICAL: No headache   MUSCULOSKELETAL: pain in lower extremities  GENITOURINARY: no dysuria  PSYCHIATRY: no anxiety  ALL OTHER  ROS negative        Vital Signs Last 24 Hrs  T(C): 36.2 (27 Nov 2019 04:55), Max: 36.7 (26 Nov 2019 19:13)  T(F): 97.2 (27 Nov 2019 04:55), Max: 98.1 (26 Nov 2019 19:13)  HR: 76 (27 Nov 2019 04:55) (65 - 78)  BP: 138/74 (27 Nov 2019 04:55) (109/60 - 138/74)  BP(mean): 86 (26 Nov 2019 16:25) (81 - 87)  RR: 16 (27 Nov 2019 04:55) (15 - 25)  SpO2: 94% (27 Nov 2019 04:55) (94% - 100%)    ________________________________________________  PHYSICAL EXAM:  GENERAL: Patient seen resting in bed and in no apparent distress  HEENT: Normocephalic; conjunctivae and sclerae clear   NECK: supple  CHEST/LUNG: Clear to auscultation bilaterally with good air entry   HEART: S1 S2, irregular; no murmurs  ABDOMEN: Soft, Mild tenderness to palpation, Nondistended; bowel sounds present  EXTREMITIES: chronic venous stasis; no changes  SKIN: warm and dry  NERVOUS SYSTEM: Awake and oriented to person only; no new deficits    _________________________________________________  LABS:                        11.0   13.17 )-----------( 486      ( 27 Nov 2019 06:32 )             34.1     11-27    142  |  110<H>  |  25<H>  ----------------------------<  78  3.9   |  23  |  1.05    Ca    8.4      27 Nov 2019 06:32  Phos  2.6     11-27  Mg     2.6     11-27    TPro  6.6  /  Alb  1.7<L>  /  TBili  0.9  /  DBili  x   /  AST  72<H>  /  ALT  78<H>  /  AlkPhos  163<H>  11-27    PT/INR - ( 26 Nov 2019 09:17 )   PT: 18.1 sec;   INR: 1.61 ratio         PTT - ( 26 Nov 2019 09:17 )  PTT:34.1 sec      RADIOLOGY & ADDITIONAL TESTS:    Imaging Personally Reviewed:  YES     < from: IR Cholecystostomy Perc \[facility] [place] [facility]\"" (11.26.19 @ 16:08) >  IMPRESSION: Successful ultrasound and fluoroscopic guided 8.5 Iranian   percutaneous cholecystostomy tube placement as described above.    < end of copied text >    Consultant(s) Notes Reviewed:   YES     Care Discussed with Consultants :     Plan of care was discussed with patient and /or primary care giver; all questions and concerns were addressed and care was aligned with patient's wishes.

## 2019-11-27 NOTE — PROGRESS NOTE ADULT - ASSESSMENT
89 y/o male with sepsis, cholelithiasis, acute cholecystitis by HIDA however pt is nontender    s/p IR blayne tube, 55cc bilious output  afeb, HDS    - adv diet as tolerated per primary  - cont IVF  - cont abx per ID rec  - continue IR drain, care per protocol with routine flushes  - no surgical intervention indicated, please reconsult as needed  - d/w attending

## 2019-11-27 NOTE — PROGRESS NOTE ADULT - ATTENDING COMMENTS
I have seen and examined the patient. Case discussed with house staff in detail. I have reviewed and edited the note where appropriate.     Patient is a 89y Male with hypothyroidism, chronic venous insufficiency, recent UTI s/p antibiotics p/w fever, cough and worsening LE swelling admitted for cellulitis underwent HIDA scan on 11/21 significant for cholecystitis s/p IR guided cholecystostomy tube 11/26.    Reports feeling better, RUQ pain is improved, Leukocytosis trending down.   Cholecystostomy tube draining   Patient is tolerating feeds    A/P  Sepsis secondary to acute Cholecystitis s/p IR guided cholecystostomy tube 11/26  Cholelithiasis   Elevated LFT secondary to above  Afib not on anticoagulation deferred to his advanced age  HTN  PVD on Plavix; last dose on 11/21/19  CKD stage 3  Elevated INR -normalized s/p vitamin K    Plan  advance diet as tolerated  C/w IV Zosyn   F/u culture  from GB fluid  Monitor LFT and wbc  PT evaluation pending to determine disposition  Discussed with ID, Zosyn till Friday the 29th

## 2019-11-27 NOTE — PROGRESS NOTE ADULT - ASSESSMENT
1.	Acute Cholecystitis - s/p perc cholecystostomy tube placement  2.	Leukocytosis - decreasing  ·	cont zosyn 3.375 gms iv q8hrs  D9  ·	awaiting bile culture results

## 2019-11-27 NOTE — PROGRESS NOTE ADULT - ASSESSMENT
Patient is a 89y Male with hypothyroidism, chronic venous insufficiency, recent UTI s/p antibiotics p/w fever, cough and worsening LE swelling/ warmth. Renal consulted for Elevated serum creatinine.    1. ARMIN- likely hemodynamically mediated in the setting of sepsis. Pt with improvement in renal function off Lisinopril/Lasix with IVF. CT chest with mild pulmonary edema and IVF d/c 11/18. Restarted 11/20 due to NPO status  CT abd/pelvis with no hydro, however Renal US with distended bladder; recc post void bladder scan. Strict I/Os. Avoid nephrotoxins/ NSAIDs/ RCA. Monitor BMP.  2. Hypernatremia- due to dehydration while NPO and  with zosyn (high sodium load) improved with D5W.  Na+ now stable and pt is restarted on diet.  Monitor off IVF for now.    3. CKD-3- previous Scr 1.1; defer secondary w/u due to advanced age. Monitor lytes  4. Sepsis- 2/2 Cellulitis/ Acute blayne- c/w abx as per primary team/ ID.   5. LE edema- in the setting of cellulitis and hypoalbuminemia. Continue to hold Lasix as patient NPO.   6. HTN 2/2 CKD- BP acceptable; off Lisinopril due to ARMIN. Pt with Afib. If elevated BP and/or HR, consider BB or CCB for rate control. Monitor BP.

## 2019-11-27 NOTE — PROGRESS NOTE ADULT - SUBJECTIVE AND OBJECTIVE BOX
89y Male is under our care for acute cholecystitis and leukocytosis. Patient underwent perc cholecystostomy tube placement yesterday. Bile culture is testing. At present, patient is stable and denies any complaints. No fevers and wbc count continues to trend downward.     REVIEW OF SYSTEMS:  [  ] Not able to illicit  General: no fevers no malaise  Chest: no cough no sob  GI: no nvd  Skin: no rashes  Musculoskeletal: no trauma no LBP  Neuro: no ha's no dizziness 	    MEDS:  piperacillin/tazobactam IVPB.. 3.375 Gram(s) IV Intermittent every 8 hours    ALLERGIES: Allergies    No Known Allergies    Intolerances      VITALS:  Vital Signs Last 24 Hrs  T(C): 36.8 (27 Nov 2019 13:30), Max: 36.8 (27 Nov 2019 13:30)  T(F): 98.2 (27 Nov 2019 13:30), Max: 98.2 (27 Nov 2019 13:30)  HR: 79 (27 Nov 2019 13:30) (65 - 79)  BP: 146/78 (27 Nov 2019 13:30) (109/60 - 146/78)  BP(mean): 86 (26 Nov 2019 16:25) (86 - 86)  RR: 18 (27 Nov 2019 13:30) (15 - 25)  SpO2: 97% (27 Nov 2019 13:30) (94% - 100%)      PHYSICAL EXAM:  HEENT: n/a  Neck: supple no LN's   Respiratory: lungs clear no rales  Cardiovascular: S1 S2 reg no murmurs  Gastrointestinal: +BS with soft, mildly distended abdomen; nontender +cholecystostomy tube with brown bilious fluid   Extremities: no edema  Skin: bilateral lower legs with chronic stasis dermatitis   Ortho: n/a  Neuro: alert and awake      LABS/DIAGNOSTIC TESTS:                         11.0   13.17 )-----------( 486      ( 27 Nov 2019 06:32 )             34.1   WBC Count: 13.17 K/uL (11-27 @ 06:32)  WBC Count: 18.89 K/uL (11-26 @ 09:17)  WBC Count: 24.15 K/uL (11-25 @ 08:13)  WBC Count: 24.74 K/uL (11-24 @ 05:58)  WBC Count: 20.68 K/uL (11-23 @ 06:21)    11-27    142  |  110<H>  |  25<H>  ----------------------------<  78  3.9   |  23  |  1.05    Ca    8.4      27 Nov 2019 06:32  Phos  2.6     11-27  Mg     2.6     11-27    TPro  6.6  /  Alb  1.7<L>  /  TBili  0.9  /  DBili  x   /  AST  72<H>  /  ALT  78<H>  /  AlkPhos  163<H>  11-27        CULTURES:   .Blood  11-24 @ 22:28   No growth to date.  --  --      .Urine  11-16 @ 16:28   <10,000 CFU/mL Normal Urogenital Amanda  --  --      .Blood  11-16 @ 01:59   No growth at 5 days.  --  --        RADIOLOGY:  no new studies

## 2019-11-27 NOTE — PROGRESS NOTE ADULT - SUBJECTIVE AND OBJECTIVE BOX
[facility] NEPHROLOGY- PROGRESS NOTE    Patient is a 89y Male with hypothyroidism, chronic venous insufficiency, recent UTI s/p antibiotics p/w fever, cough and worsening LE swelling/ warmth. Renal consulted for Elevated serum creatinine.  Pt NPO with gallbladder sludge;  HIDA for acute blayne  s/p IR blayne tube, 55cc bilious output    Pt denies pain.  +very tired    Hospital Medications: Medications reviewed.    REVIEW OF SYSTEMS:  CONSTITUTIONAL: No fevers No chills  RESPIRATORY: No shortness of breath  CARDIOVASCULAR: No chest pain.  GASTROINTESTINAL: No nausea, vomiting, or diarrhea. Denies abdominal pain.   : no dysuria      VITALS:  T(F): 97.7 (11-27-19 @ 20:45), Max: 98.2 (11-27-19 @ 13:30)  HR: 76 (11-27-19 @ 20:45)  BP: 127/66 (11-27-19 @ 20:45)  RR: 17 (11-27-19 @ 20:45)  SpO2: 97% (11-27-19 @ 20:45)  Wt(kg): --    11-26 @ 07:01  -  11-27 @ 07:00  --------------------------------------------------------  IN: 100 mL / OUT: 55 mL / NET: 45 mL    11-27 @ 07:01  -  11-27 @ 23:51  --------------------------------------------------------  IN: 0 mL / OUT: 40 mL / NET: -40 mL      PHYSICAL EXAM:  Gen: NAD, calm  Cards: irregular, +S1/S2, no M/G/R  Resp: CTAB  GI: soft, +epigastric tenderness NABS  Extremities: minimal nonpitting LE edema B/L with hyperpigmentation/ warmth        LABS:  11-27    142  |  110<H>  |  25<H>  ----------------------------<  78  3.9   |  23  |  1.05    Ca    8.4      27 Nov 2019 06:32  Phos  2.6     11-27  Mg     2.6     11-27    TPro  6.6  /  Alb  1.7<L>  /  TBili  0.9  /  DBili      /  AST  72<H>  /  ALT  78<H>  /  AlkPhos  163<H>  11-27    Creatinine Trend: 1.05 <--, 1.11 <--, 1.19 <--, 1.16 <--, 1.25 <--, 1.29 <--, 1.24 <--, 1.29 <--, 1.27 <--                        11.0   13.17 )-----------( 486      ( 27 Nov 2019 06:32 )             34.1

## 2019-11-27 NOTE — PROGRESS NOTE ADULT - PROBLEM SELECTOR PLAN 1
Acute cholecystitis with sepsis  - on Zosyn  s/p IR guided cholecystostomy tube 11/27  draining fluid currently  will start on diet and follow up to see if patient tolerates  Plavix can be restarted in about 24-36 hours as per IR  surgery f/u noted: no interventions at this time Acute cholecystitis with sepsis  - on Zosyn  s/p IR guided cholecystostomy tube 11/26  draining fluid currently  will start on diet and follow up to see if patient tolerates  Plavix can be restarted in about 24-36 hours as per IR  surgery f/u noted: no interventions at this time

## 2019-11-27 NOTE — PROGRESS NOTE ADULT - SUBJECTIVE AND OBJECTIVE BOX
INTERVAL HPI/OVERNIGHT EVENTS:    No acute events overnight.   Pt resting comfortably. No acute complaints.   Denies pain, n/v/f    MEDICATIONS  (STANDING):  levothyroxine 100 MICROGram(s) Oral daily  piperacillin/tazobactam IVPB.. 3.375 Gram(s) IV Intermittent every 8 hours  tamsulosin 0.4 milliGRAM(s) Oral at bedtime    MEDICATIONS  (PRN):  acetaminophen   Tablet .. 650 milliGRAM(s) Oral every 6 hours PRN Temp greater or equal to 38C (100.4F), Moderate Pain (4 - 6)      Vital Signs Last 24 Hrs  T(C): 36.2 (27 Nov 2019 04:55), Max: 36.7 (26 Nov 2019 19:13)  T(F): 97.2 (27 Nov 2019 04:55), Max: 98.1 (26 Nov 2019 19:13)  HR: 76 (27 Nov 2019 04:55) (65 - 78)  BP: 138/74 (27 Nov 2019 04:55) (109/60 - 138/74)  BP(mean): 86 (26 Nov 2019 16:25) (81 - 87)  RR: 16 (27 Nov 2019 04:55) (15 - 25)  SpO2: 94% (27 Nov 2019 04:55) (94% - 100%)      PHYSICAL EXAM  General: Alert and oriented, not in acute distress  Resp: Breathing unlabored  Abdomen: Soft, nondistended, nontender; IR blayne tube in place with bilious output    I&O's Detail    26 Nov 2019 07:01  -  27 Nov 2019 07:00  --------------------------------------------------------  IN:    IV PiggyBack: 100 mL  Total IN: 100 mL    OUT:    Drain: 55 mL  Total OUT: 55 mL    Total NET: 45 mL          LABS:                        11.0   13.17 )-----------( 486      ( 27 Nov 2019 06:32 )             34.1             11-27    142  |  110<H>  |  25<H>  ----------------------------<  78  3.9   |  23  |  1.05    Ca    8.4      27 Nov 2019 06:32  Phos  2.6     11-27  Mg     2.6     11-27    TPro  6.6  /  Alb  1.7<L>  /  TBili  0.9  /  DBili  x   /  AST  72<H>  /  ALT  78<H>  /  AlkPhos  163<H>  11-27

## 2019-11-27 NOTE — PROGRESS NOTE ADULT - ATTENDING COMMENTS
[facility] NEPHROLOGY  Justin Orozco M.D.  Kj Fernandez D.O.  Tonia Sanches M.D.  Salina Case, MSN, ANP-C  [phone]    [address] [place] [address]

## 2019-11-28 LAB
ALBUMIN SERPL ELPH-MCNC: 1.7 G/DL — LOW (ref 3.5–5)
ALP SERPL-CCNC: 155 U/L — HIGH (ref 40–120)
ALT FLD-CCNC: 62 U/L DA — HIGH (ref 10–60)
ANION GAP SERPL CALC-SCNC: 7 MMOL/L — SIGNIFICANT CHANGE UP (ref 5–17)
APTT BLD: 32.5 SEC — SIGNIFICANT CHANGE UP (ref 27.5–36.3)
AST SERPL-CCNC: 44 U/L — HIGH (ref 10–40)
BASOPHILS # BLD AUTO: 0.05 K/UL — SIGNIFICANT CHANGE UP (ref 0–0.2)
BASOPHILS NFR BLD AUTO: 0.4 % — SIGNIFICANT CHANGE UP (ref 0–2)
BILIRUB SERPL-MCNC: 0.8 MG/DL — SIGNIFICANT CHANGE UP (ref 0.2–1.2)
BUN SERPL-MCNC: 26 MG/DL — HIGH (ref 7–18)
CALCIUM SERPL-MCNC: 8 MG/DL — LOW (ref 8.4–10.5)
CHLORIDE SERPL-SCNC: 109 MMOL/L — HIGH (ref 96–108)
CO2 SERPL-SCNC: 24 MMOL/L — SIGNIFICANT CHANGE UP (ref 22–31)
CREAT SERPL-MCNC: 1.09 MG/DL — SIGNIFICANT CHANGE UP (ref 0.5–1.3)
EOSINOPHIL # BLD AUTO: 0.39 K/UL — SIGNIFICANT CHANGE UP (ref 0–0.5)
EOSINOPHIL NFR BLD AUTO: 3.1 % — SIGNIFICANT CHANGE UP (ref 0–6)
GLUCOSE SERPL-MCNC: 93 MG/DL — SIGNIFICANT CHANGE UP (ref 70–99)
HCT VFR BLD CALC: 33.3 % — LOW (ref 39–50)
HGB BLD-MCNC: 10.8 G/DL — LOW (ref 13–17)
IMM GRANULOCYTES NFR BLD AUTO: 1.7 % — HIGH (ref 0–1.5)
INR BLD: 1.24 RATIO — HIGH (ref 0.88–1.16)
LYMPHOCYTES # BLD AUTO: 1.55 K/UL — SIGNIFICANT CHANGE UP (ref 1–3.3)
LYMPHOCYTES # BLD AUTO: 12.1 % — LOW (ref 13–44)
MAGNESIUM SERPL-MCNC: 2.4 MG/DL — SIGNIFICANT CHANGE UP (ref 1.6–2.6)
MCHC RBC-ENTMCNC: 31.1 PG — SIGNIFICANT CHANGE UP (ref 27–34)
MCHC RBC-ENTMCNC: 32.4 GM/DL — SIGNIFICANT CHANGE UP (ref 32–36)
MCV RBC AUTO: 96 FL — SIGNIFICANT CHANGE UP (ref 80–100)
MONOCYTES # BLD AUTO: 0.34 K/UL — SIGNIFICANT CHANGE UP (ref 0–0.9)
MONOCYTES NFR BLD AUTO: 2.7 % — SIGNIFICANT CHANGE UP (ref 2–14)
NEUTROPHILS # BLD AUTO: 10.23 K/UL — HIGH (ref 1.8–7.4)
NEUTROPHILS NFR BLD AUTO: 80 % — HIGH (ref 43–77)
NRBC # BLD: 0 /100 WBCS — SIGNIFICANT CHANGE UP (ref 0–0)
PHOSPHATE SERPL-MCNC: 2.8 MG/DL — SIGNIFICANT CHANGE UP (ref 2.5–4.5)
PLATELET # BLD AUTO: 528 K/UL — HIGH (ref 150–400)
POTASSIUM SERPL-MCNC: 3.7 MMOL/L — SIGNIFICANT CHANGE UP (ref 3.5–5.3)
POTASSIUM SERPL-SCNC: 3.7 MMOL/L — SIGNIFICANT CHANGE UP (ref 3.5–5.3)
PROT SERPL-MCNC: 6.3 G/DL — SIGNIFICANT CHANGE UP (ref 6–8.3)
PROTHROM AB SERPL-ACNC: 13.9 SEC — HIGH (ref 10–12.9)
RBC # BLD: 3.47 M/UL — LOW (ref 4.2–5.8)
RBC # FLD: 15.7 % — HIGH (ref 10.3–14.5)
SODIUM SERPL-SCNC: 140 MMOL/L — SIGNIFICANT CHANGE UP (ref 135–145)
WBC # BLD: 12.78 K/UL — HIGH (ref 3.8–10.5)
WBC # FLD AUTO: 12.78 K/UL — HIGH (ref 3.8–10.5)

## 2019-11-28 PROCEDURE — 99232 SBSQ HOSP IP/OBS MODERATE 35: CPT | Mod: GC

## 2019-11-28 RX ORDER — CLOPIDOGREL BISULFATE 75 MG/1
75 TABLET, FILM COATED ORAL DAILY
Refills: 0 | Status: DISCONTINUED | OUTPATIENT
Start: 2019-11-28 | End: 2019-12-05

## 2019-11-28 RX ADMIN — PIPERACILLIN AND TAZOBACTAM 25 GRAM(S): 4; .5 INJECTION, POWDER, LYOPHILIZED, FOR SOLUTION INTRAVENOUS at 22:15

## 2019-11-28 RX ADMIN — Medication 100 MICROGRAM(S): at 05:19

## 2019-11-28 RX ADMIN — PIPERACILLIN AND TAZOBACTAM 25 GRAM(S): 4; .5 INJECTION, POWDER, LYOPHILIZED, FOR SOLUTION INTRAVENOUS at 05:19

## 2019-11-28 RX ADMIN — TAMSULOSIN HYDROCHLORIDE 0.4 MILLIGRAM(S): 0.4 CAPSULE ORAL at 22:15

## 2019-11-28 RX ADMIN — PIPERACILLIN AND TAZOBACTAM 25 GRAM(S): 4; .5 INJECTION, POWDER, LYOPHILIZED, FOR SOLUTION INTRAVENOUS at 14:45

## 2019-11-28 NOTE — PROGRESS NOTE ADULT - PROBLEM SELECTOR PLAN 1
Acute cholecystitis with sepsis  - on Zosyn; as per ID will finish abx tomorrow  s/p IR guided cholecystostomy tube 11/26  draining minimal fluid  cx of fluid negative so far   tolerating diet   resume plavix   LFTs downtrending, WBC downtrending, afebrile   surgery f/u noted: no interventions at this time  PT eval pending for disposition

## 2019-11-28 NOTE — PROGRESS NOTE ADULT - ATTENDING COMMENTS
[facility] NEPHROLOGY  Justin Orozco M.D.  Kj Fernandez D.O.  Tonia Sanches M.D.  Salina Case, MSN, ANP-C  [phone]    [address] [place] [address]

## 2019-11-28 NOTE — PROGRESS NOTE ADULT - SUBJECTIVE AND OBJECTIVE BOX
Pt interviewed and examined. Full note to follow. [facility] NEPHROLOGY- PROGRESS NOTE    Patient is a 89y Male with hypothyroidism, chronic venous insufficiency, recent UTI s/p antibiotics p/w fever, cough and worsening LE swelling/ warmth. Renal consulted for Elevated serum creatinine.  Pt NPO with gallbladder sludge;  HIDA for acute blayne  s/p IR blayne tube, 55cc bilious output    Pt denies pain.  +very tired    Hospital Medications: Medications reviewed.    REVIEW OF SYSTEMS:  CONSTITUTIONAL: No fevers No chills  RESPIRATORY: No shortness of breath  CARDIOVASCULAR: No chest pain.  GASTROINTESTINAL: No nausea, vomiting, or diarrhea. Denies abdominal pain.   : no dysuria    VITALS:  T(F): 97.9 (11-28-19 @ 12:56), Max: 97.9 (11-28-19 @ 12:56)  HR: 88 (11-28-19 @ 12:56)  BP: 113/62 (11-28-19 @ 12:56)  RR: 17 (11-28-19 @ 12:56)  SpO2: 99% (11-28-19 @ 12:56)  Wt(kg): --    11-27 @ 07:01  -  11-28 @ 07:00  --------------------------------------------------------  IN: 0 mL / OUT: 47.5 mL / NET: -47.5 mL        PHYSICAL EXAM:  Gen: NAD, calm  Cards: irregular, +S1/S2, no M/G/R  Resp: CTAB  GI: soft, +epigastric tenderness NABS, + cholecystostomy tube with dark drainage  Extremities: minimal nonpitting LE edema B/L with hyperpigmentation/ warmth      LABS:  11-28    140  |  109<H>  |  26<H>  ----------------------------<  93  3.7   |  24  |  1.09    Ca    8.0<L>      28 Nov 2019 05:52  Phos  2.8     11-28  Mg     2.4     11-28    TPro  6.3  /  Alb  1.7<L>  /  TBili  0.8  /  DBili      /  AST  44<H>  /  ALT  62<H>  /  AlkPhos  155<H>  11-28    Creatinine Trend: 1.09 <--, 1.05 <--, 1.11 <--, 1.19 <--, 1.16 <--, 1.25 <--, 1.29 <--, 1.24 <--, 1.29 <--                        10.8   12.78 )-----------( 528      ( 28 Nov 2019 05:52 )             33.3

## 2019-11-28 NOTE — PROGRESS NOTE ADULT - ATTENDING COMMENTS
Patient was seen and examined by myself. Case was discussed with house staff in details. I have reviewed and agree with the plan as outlined above with edits where appropriate.    Vital Signs Last 24 Hrs  T(C): 36.6 (28 Nov 2019 12:56), Max: 36.6 (28 Nov 2019 12:56)  T(F): 97.9 (28 Nov 2019 12:56), Max: 97.9 (28 Nov 2019 12:56)  HR: 88 (28 Nov 2019 12:56) (66 - 88)  BP: 113/62 (28 Nov 2019 12:56) (113/62 - 145/56)  BP(mean): --  RR: 17 (28 Nov 2019 12:56) (16 - 17)  SpO2: 99% (28 Nov 2019 12:56) (97% - 99%)                            10.8   12.78 )-----------( 528      ( 28 Nov 2019 05:52 )             33.3     11-28    140  |  109<H>  |  26<H>  ----------------------------<  93  3.7   |  24  |  1.09    Ca    8.0<L>      28 Nov 2019 05:52  Phos  2.8     11-28  Mg     2.4     11-28    TPro  6.3  /  Alb  1.7<L>  /  TBili  0.8  /  DBili  x   /  AST  44<H>  /  ALT  62<H>  /  AlkPhos  155<H>  11-28      A/P: 1.  Acute cholecystitis s/o cholecystostomy drain  2. Severe protein calorie malnutrition  3. Hypoalbuminemia  4. chronic AFIB  5. Essential HTN  6. Hypothyroidism      continue with antibiotics  ID/ surgery follow up   Supportive measures  Nutritional supplements as tolerated

## 2019-11-28 NOTE — PROGRESS NOTE ADULT - SUBJECTIVE AND OBJECTIVE BOX
PGY 1 Note discussed with supervising resident and primary attending    Patient is a 89y old  Male who presents with a chief complaint of Fever (27 Nov 2019 23:50)    INTERVAL HPI/OVERNIGHT EVENTS: Patient seen and examined at the bedside. No acute events overnight. Patient denies any abdominal pain, nausea or vomiting at this time. Cholecystostomy tube in place with minimal output. Denies any other concerns or complaints at this time. Tolerating diet.     MEDICATIONS  (STANDING):  levothyroxine 100 MICROGram(s) Oral daily  piperacillin/tazobactam IVPB.. 3.375 Gram(s) IV Intermittent every 8 hours  tamsulosin 0.4 milliGRAM(s) Oral at bedtime    MEDICATIONS  (PRN):  acetaminophen   Tablet .. 650 milliGRAM(s) Oral every 6 hours PRN Temp greater or equal to 38C (100.4F), Moderate Pain (4 - 6)      __________________________________________________  REVIEW OF SYSTEMS:  CONSTITUTIONAL: No fever   EYES: no visual disturbances  NECK: No pain   RESPIRATORY: Mild cough; No shortness of breath  CARDIOVASCULAR: No chest pain  GASTROINTESTINAL: No pain. No nausea or vomiting   NEUROLOGICAL: No headache  MUSCULOSKELETAL: chronic lower extremity pain   GENITOURINARY: no dysuria  PSYCHIATRY: no anxiety  ALL OTHER  ROS negative        Vital Signs Last 24 Hrs  T(C): 36.3 (28 Nov 2019 04:55), Max: 36.8 (27 Nov 2019 13:30)  T(F): 97.3 (28 Nov 2019 04:55), Max: 98.2 (27 Nov 2019 13:30)  HR: 66 (28 Nov 2019 04:55) (66 - 79)  BP: 145/56 (28 Nov 2019 04:55) (127/66 - 146/78)  RR: 16 (28 Nov 2019 04:55) (16 - 18)  SpO2: 98% (28 Nov 2019 04:55) (97% - 98%)    ________________________________________________  PHYSICAL EXAM:  GENERAL: Patient seen resting in bed and in no apparent distress  HEENT: Normocephalic; conjunctivae and sclerae clear   NECK: supple  CHEST/LUNG: Clear to auscultation bilaterally with good air entry   HEART: S1 S2, irregular; no murmurs  ABDOMEN: Soft, Nontender, Nondistended; Bowel sounds present  EXTREMITIES: chronic venous insufficiency bilateral; no edema; mild tenderness to palpation  SKIN: warm and dry  NERVOUS SYSTEM: Awake and oriented to person only; no new deficits    _________________________________________________  LABS:                        10.8   12.78 )-----------( 528      ( 28 Nov 2019 05:52 )             33.3     11-28    140  |  109<H>  |  26<H>  ----------------------------<  93  3.7   |  24  |  1.09    Ca    8.0<L>      28 Nov 2019 05:52  Phos  2.8     11-28  Mg     2.4     11-28    TPro  6.3  /  Alb  1.7<L>  /  TBili  0.8  /  DBili  x   /  AST  44<H>  /  ALT  62<H>  /  AlkPhos  155<H>  11-28    PT/INR - ( 28 Nov 2019 05:52 )   PT: 13.9 sec;   INR: 1.24 ratio         PTT - ( 28 Nov 2019 05:52 )  PTT:32.5 sec      RADIOLOGY & ADDITIONAL TESTS:    Imaging Personally Reviewed:  YES      No new imaging     Consultant(s) Notes Reviewed:   YES     Care Discussed with Consultants :     Plan of care was discussed with patient and /or primary care giver; all questions and concerns were addressed and care was aligned with patient's wishes.

## 2019-11-28 NOTE — PROGRESS NOTE ADULT - ASSESSMENT
Patient is a 89y Male with hypothyroidism, chronic venous insufficiency, recent UTI s/p antibiotics p/w fever, cough and worsening LE swelling/ warmth. Renal consulted for Elevated serum creatinine.    1. ARMIN- likely hemodynamically mediated in the setting of sepsis. Pt with improvement in renal function off Lisinopril/Lasix with IVF. CT chest with mild pulmonary edema and IVF d/c 11/18. Restarted 11/20 due to NPO status. Continue IVF  CT abd/pelvis with no hydro, however Renal US with distended bladder; recc post void bladder scan. Strict I/Os. Avoid nephrotoxins/ NSAIDs/ RCA. Monitor BMP.  2. Hypernatremia- due to dehydration while NPO and  with zosyn (high sodium load) improved with D5W.  Na+ now stable and pt is restarted on diet.  Monitor off IVF for now.    3. CKD-3- previous Scr 1.1; defer secondary w/u due to advanced age. Monitor lytes  4. Sepsis- 2/2 Cellulitis/ Acute blayne- c/w abx as per primary team/ ID.   5. LE edema- in the setting of cellulitis and hypoalbuminemia. Continue to hold Lasix as patient NPO.   6. HTN 2/2 CKD- BP acceptable; off Lisinopril due to ARMIN. Pt with Afib. If elevated BP and/or HR, consider BB or CCB for rate control. Monitor BP. Patient is a 89y Male with hypothyroidism, chronic venous insufficiency, recent UTI s/p antibiotics p/w fever, cough and worsening LE swelling/ warmth. Renal consulted for Elevated serum creatinine.    1. ARMIN- likely hemodynamically mediated in the setting of sepsis. Pt with improvement in renal function off Lisinopril/Lasix with IVF. CT chest with mild pulmonary edema and IVF d/c 11/18. Restarted 11/20 due to NPO status. Pt now on diet, off IVF.  CT abd/pelvis with no hydro, however Renal US with distended bladder; recc post void bladder scan. Strict I/Os. Avoid nephrotoxins/ NSAIDs/ RCA. Monitor BMP.  2. Hypernatremia- due to dehydration while NPO and  with zosyn (high sodium load) improved with D5W.  Na+ now stable and pt is restarted on diet.  Monitor off IVF for now.    3. CKD-3- previous Scr 1.1; defer secondary w/u due to advanced age. Monitor lytes  4. Sepsis- 2/2 Cellulitis/ Acute blayne- c/w abx as per primary team/ ID.   5. LE edema- in the setting of cellulitis and hypoalbuminemia. Continue to hold Lasix as patient NPO.   6. HTN 2/2 CKD- BP acceptable; off Lisinopril due to ARMIN. Pt with Afib. If elevated BP and/or HR, consider BB or CCB for rate control. Monitor BP.

## 2019-11-29 LAB
ALBUMIN SERPL ELPH-MCNC: 1.7 G/DL — LOW (ref 3.5–5)
ALP SERPL-CCNC: 148 U/L — HIGH (ref 40–120)
ALT FLD-CCNC: 55 U/L DA — SIGNIFICANT CHANGE UP (ref 10–60)
ANION GAP SERPL CALC-SCNC: 8 MMOL/L — SIGNIFICANT CHANGE UP (ref 5–17)
AST SERPL-CCNC: 45 U/L — HIGH (ref 10–40)
BASOPHILS # BLD AUTO: 0.05 K/UL — SIGNIFICANT CHANGE UP (ref 0–0.2)
BASOPHILS NFR BLD AUTO: 0.4 % — SIGNIFICANT CHANGE UP (ref 0–2)
BILIRUB SERPL-MCNC: 0.6 MG/DL — SIGNIFICANT CHANGE UP (ref 0.2–1.2)
BUN SERPL-MCNC: 23 MG/DL — HIGH (ref 7–18)
CALCIUM SERPL-MCNC: 7.8 MG/DL — LOW (ref 8.4–10.5)
CHLORIDE SERPL-SCNC: 110 MMOL/L — HIGH (ref 96–108)
CO2 SERPL-SCNC: 24 MMOL/L — SIGNIFICANT CHANGE UP (ref 22–31)
CREAT SERPL-MCNC: 1.07 MG/DL — SIGNIFICANT CHANGE UP (ref 0.5–1.3)
CULTURE RESULTS: SIGNIFICANT CHANGE UP
CULTURE RESULTS: SIGNIFICANT CHANGE UP
EOSINOPHIL # BLD AUTO: 0.5 K/UL — SIGNIFICANT CHANGE UP (ref 0–0.5)
EOSINOPHIL NFR BLD AUTO: 4 % — SIGNIFICANT CHANGE UP (ref 0–6)
GLUCOSE SERPL-MCNC: 80 MG/DL — SIGNIFICANT CHANGE UP (ref 70–99)
HCT VFR BLD CALC: 31.4 % — LOW (ref 39–50)
HGB BLD-MCNC: 10.2 G/DL — LOW (ref 13–17)
IMM GRANULOCYTES NFR BLD AUTO: 1.2 % — SIGNIFICANT CHANGE UP (ref 0–1.5)
LYMPHOCYTES # BLD AUTO: 1.39 K/UL — SIGNIFICANT CHANGE UP (ref 1–3.3)
LYMPHOCYTES # BLD AUTO: 11.1 % — LOW (ref 13–44)
MCHC RBC-ENTMCNC: 31.3 PG — SIGNIFICANT CHANGE UP (ref 27–34)
MCHC RBC-ENTMCNC: 32.5 GM/DL — SIGNIFICANT CHANGE UP (ref 32–36)
MCV RBC AUTO: 96.3 FL — SIGNIFICANT CHANGE UP (ref 80–100)
MONOCYTES # BLD AUTO: 0.32 K/UL — SIGNIFICANT CHANGE UP (ref 0–0.9)
MONOCYTES NFR BLD AUTO: 2.5 % — SIGNIFICANT CHANGE UP (ref 2–14)
NEUTROPHILS # BLD AUTO: 10.14 K/UL — HIGH (ref 1.8–7.4)
NEUTROPHILS NFR BLD AUTO: 80.8 % — HIGH (ref 43–77)
NRBC # BLD: 0 /100 WBCS — SIGNIFICANT CHANGE UP (ref 0–0)
PLATELET # BLD AUTO: 507 K/UL — HIGH (ref 150–400)
POTASSIUM SERPL-MCNC: 3.3 MMOL/L — LOW (ref 3.5–5.3)
POTASSIUM SERPL-SCNC: 3.3 MMOL/L — LOW (ref 3.5–5.3)
PROT SERPL-MCNC: 6 G/DL — SIGNIFICANT CHANGE UP (ref 6–8.3)
RBC # BLD: 3.26 M/UL — LOW (ref 4.2–5.8)
RBC # FLD: 15.8 % — HIGH (ref 10.3–14.5)
SODIUM SERPL-SCNC: 142 MMOL/L — SIGNIFICANT CHANGE UP (ref 135–145)
SPECIMEN SOURCE: SIGNIFICANT CHANGE UP
SPECIMEN SOURCE: SIGNIFICANT CHANGE UP
WBC # BLD: 12.55 K/UL — HIGH (ref 3.8–10.5)
WBC # FLD AUTO: 12.55 K/UL — HIGH (ref 3.8–10.5)

## 2019-11-29 PROCEDURE — 99233 SBSQ HOSP IP/OBS HIGH 50: CPT | Mod: GC

## 2019-11-29 RX ORDER — POTASSIUM CHLORIDE 20 MEQ
20 PACKET (EA) ORAL DAILY
Refills: 0 | Status: DISCONTINUED | OUTPATIENT
Start: 2019-11-29 | End: 2019-11-30

## 2019-11-29 RX ORDER — POTASSIUM CHLORIDE 20 MEQ
40 PACKET (EA) ORAL EVERY 4 HOURS
Refills: 0 | Status: COMPLETED | OUTPATIENT
Start: 2019-11-29 | End: 2019-11-29

## 2019-11-29 RX ADMIN — PIPERACILLIN AND TAZOBACTAM 25 GRAM(S): 4; .5 INJECTION, POWDER, LYOPHILIZED, FOR SOLUTION INTRAVENOUS at 21:24

## 2019-11-29 RX ADMIN — Medication 40 MILLIEQUIVALENT(S): at 17:27

## 2019-11-29 RX ADMIN — TAMSULOSIN HYDROCHLORIDE 0.4 MILLIGRAM(S): 0.4 CAPSULE ORAL at 21:24

## 2019-11-29 RX ADMIN — PIPERACILLIN AND TAZOBACTAM 25 GRAM(S): 4; .5 INJECTION, POWDER, LYOPHILIZED, FOR SOLUTION INTRAVENOUS at 06:16

## 2019-11-29 RX ADMIN — Medication 40 MILLIEQUIVALENT(S): at 11:30

## 2019-11-29 RX ADMIN — PIPERACILLIN AND TAZOBACTAM 25 GRAM(S): 4; .5 INJECTION, POWDER, LYOPHILIZED, FOR SOLUTION INTRAVENOUS at 13:34

## 2019-11-29 RX ADMIN — Medication 100 MICROGRAM(S): at 06:16

## 2019-11-29 RX ADMIN — Medication 100 MILLIGRAM(S): at 13:35

## 2019-11-29 RX ADMIN — CLOPIDOGREL BISULFATE 75 MILLIGRAM(S): 75 TABLET, FILM COATED ORAL at 11:33

## 2019-11-29 RX ADMIN — Medication 20 MILLIEQUIVALENT(S): at 17:27

## 2019-11-29 NOTE — PROGRESS NOTE ADULT - SUBJECTIVE AND OBJECTIVE BOX
[facility] NEPHROLOGY- PROGRESS NOTE    Patient is a 89y Male with hypothyroidism, chronic venous insufficiency, recent UTI s/p antibiotics p/w fever, cough and worsening LE swelling/ warmth. Renal consulted for Elevated serum creatinine.  Pt NPO with gallbladder sludge;  HIDA for acute blayne  s/p IR blayne tube, 55cc bilious output    Pt denies pain.    Hospital Medications: Medications reviewed.    REVIEW OF SYSTEMS:  CONSTITUTIONAL: No fevers No chills  RESPIRATORY: No shortness of breath  CARDIOVASCULAR: No chest pain.  GASTROINTESTINAL: No nausea, vomiting, or diarrhea. Denies abdominal pain.   : no dysuria    VITALS:  T(F): 97.7 (11-29-19 @ 20:57), Max: 97.7 (11-29-19 @ 20:57)  HR: 70 (11-29-19 @ 20:57)  BP: 111/64 (11-29-19 @ 20:57)  RR: 17 (11-29-19 @ 20:57)  SpO2: 94% (11-29-19 @ 20:57)  Wt(kg): --    11-28 @ 07:01  -  11-29 @ 07:00  --------------------------------------------------------  IN: 0 mL / OUT: 130 mL / NET: -130 mL    11-29 @ 07:01  -  11-29 @ 22:59  --------------------------------------------------------  IN: 0 mL / OUT: 100 mL / NET: -100 mL    PHYSICAL EXAM:  Gen: NAD, calm  Cards: irregular, +S1/S2, no M/G/R  Resp: CTAB  GI: soft, no epigastric tenderness, NABS, + cholecystostomy tube with dark drainage  Extremities: minimal nonpitting LE edema B/L with hyperpigmentation    LABS:  11-29    142  |  110<H>  |  23<H>  ----------------------------<  80  3.3<L>   |  24  |  1.07    Ca    7.8<L>      29 Nov 2019 07:10  Phos  2.8     11-28  Mg     2.4     11-28    TPro  6.0  /  Alb  1.7<L>  /  TBili  0.6  /  DBili      /  AST  45<H>  /  ALT  55  /  AlkPhos  148<H>  11-29    Creatinine Trend: 1.07 <--, 1.09 <--, 1.05 <--, 1.11 <--, 1.19 <--, 1.16 <--, 1.25 <--, 1.29 <--, 1.24 <--                        10.2   12.55 )-----------( 507      ( 29 Nov 2019 07:10 )             31.4

## 2019-11-29 NOTE — PHYSICAL THERAPY INITIAL EVALUATION ADULT - NS ASR RISK AREAS PT EVAL
Health Maintenance Summary     Topic Due On Due Status Completed On Postpone Until Reason    MAMMOGRAM - BREAST CANCER SCREENING Sep 20, 2018 Not Due Sep 20, 2016      Colorectal Cancer Screening - Colonoscopy Aug 23, 2015 Postponed Aug 23, 2005 [place] 3, 2017 Patient Refused    Immunization - Td/Tdap Aug 22, 2023 Not Due Aug 22, 2013      Immunization-Zoster Oct 28, 2015 Postponed  Win[place] 3, 2017 Patient Refused    Immunization - TDAP Pregnancy  Hidden       Immunization-Influenza  Completed Nov 14, 2016            Patient is due for topics as listed above, she wishes to decline at this time.  Patient dismissed from Dr. Mo as PCP starting 1/19/17 for all cares, no discussion for HM topics today.        fall

## 2019-11-29 NOTE — PROGRESS NOTE ADULT - ASSESSMENT
Patient is a 89y Male with hypothyroidism, chronic venous insufficiency, recent UTI s/p antibiotics p/w fever, cough and worsening LE swelling/ warmth. Renal consulted for Elevated serum creatinine.    1. ARMIN- likely hemodynamically mediated in the setting of sepsis. Pt with improvement in renal function off Lisinopril/Lasix with IVF. Renal fxn now at baseline.  CT abd/pelvis with no hydro, however Renal US with distended bladder; recc post void bladder scan. Strict I/Os. Avoid nephrotoxins/ NSAIDs/ RCA. Monitor BMP.  2. Hypernatremia- due to dehydration while NPO and  with zosyn (high sodium load) improved with D5W.  Na+ now stable and pt is restarted on diet.  Monitor off IVF for now.    3. CKD-3- previous Scr 1.1; defer secondary w/u due to advanced age. Monitor lytes  4. Sepsis- 2/2 Cellulitis/ Acute blayne- c/w abx as per primary team/ ID.   5. LE edema- in the setting of cellulitis and hypoalbuminemia. Continue to hold Lasix as patient NPO.   6. HTN 2/2 CKD- BP acceptable; off Lisinopril due to ARMIN. Pt with Afib. If elevated BP and/or HR, consider BB or CCB for rate control. Monitor BP.    Hypokalemia- replete K+ Patient is a 89y Male with hypothyroidism, chronic venous insufficiency, recent UTI s/p antibiotics p/w fever, cough and worsening LE swelling/ warmth. Renal consulted for Elevated serum creatinine.    1. ARMIN- likely hemodynamically mediated in the setting of sepsis. Pt with improvement in renal function off Lisinopril/Lasix with IVF. Renal fxn now at baseline.  CT abd/pelvis with no hydro, however Renal US with distended bladder; recc post void bladder scan. Strict I/Os. Avoid nephrotoxins/ NSAIDs/ RCA. Monitor BMP.  2. Hypernatremia- due to dehydration while NPO and  with zosyn (high sodium load) improved with D5W.  Na+ now stable and pt is restarted on diet.  Monitor off IVF for now.    3. CKD-3- previous Scr 1.1; defer secondary w/u due to advanced age. Monitor lytes  4. Sepsis- 2/2 Cellulitis/ Acute blayne- c/w abx as per primary team/ ID.   5. LE edema- in the setting of cellulitis and hypoalbuminemia. Continue to hold Lasix as patient NPO.   6. HTN 2/2 CKD- BP acceptable; off Lisinopril due to ARMIN. Pt with Afib. If elevated BP and/or HR, consider BB or CCB for rate control. Monitor BP.    Hypokalemia- replete K+    Will s/o. Please reconsult as necessary.

## 2019-11-29 NOTE — PHYSICAL THERAPY INITIAL EVALUATION ADULT - ACTIVE RANGE OF MOTION EXAMINATION, REHAB EVAL
bilateral upper extremity Active ROM was WFL (within functional limits)/bilateral  lower extremity Active ROM was WFL (within functional limits)/b/l hips and knee ~1/4 range

## 2019-11-29 NOTE — PROGRESS NOTE ADULT - SUBJECTIVE AND OBJECTIVE BOX
PGY 1 Note discussed with supervising resident and primary attending    Patient is a 89y old  Male who presents with a chief complaint of Fever (28 Nov 2019 11:44)      INTERVAL HPI/OVERNIGHT EVENTS: Patient seen and examined at the bedside.     MEDICATIONS  (STANDING):  clopidogrel Tablet 75 milliGRAM(s) Oral daily  levothyroxine 100 MICROGram(s) Oral daily  piperacillin/tazobactam IVPB.. 3.375 Gram(s) IV Intermittent every 8 hours  potassium chloride    Tablet ER 20 milliEquivalent(s) Oral daily  potassium chloride   Powder 40 milliEquivalent(s) Oral every 4 hours  tamsulosin 0.4 milliGRAM(s) Oral at bedtime    MEDICATIONS  (PRN):  acetaminophen   Tablet .. 650 milliGRAM(s) Oral every 6 hours PRN Temp greater or equal to 38C (100.4F), Moderate Pain (4 - 6)  guaiFENesin    Syrup 100 milliGRAM(s) Oral every 6 hours PRN Cough      __________________________________________________  REVIEW OF SYSTEMS:    CONSTITUTIONAL: No fever,   EYES: no acute visual disturbances  NECK: No pain or stiffness  RESPIRATORY: No cough; No shortness of breath  CARDIOVASCULAR: No chest pain, no palpitations  GASTROINTESTINAL: No pain. No nausea or vomiting; No diarrhea   NEUROLOGICAL: No headache or numbness, no tremors  MUSCULOSKELETAL: No joint pain, no muscle pain  GENITOURINARY: no dysuria, no frequency, no hesitancy  PSYCHIATRY: no depression , no anxiety  ALL OTHER  ROS negative        Vital Signs Last 24 Hrs  T(C): 36.3 (29 Nov 2019 04:40), Max: 36.7 (28 Nov 2019 20:51)  T(F): 97.4 (29 Nov 2019 04:40), Max: 98 (28 Nov 2019 20:51)  HR: 67 (29 Nov 2019 04:40) (67 - 88)  BP: 151/70 (29 Nov 2019 04:40) (113/62 - 151/70)  BP(mean): --  RR: 17 (29 Nov 2019 04:40) (17 - 17)  SpO2: 98% (29 Nov 2019 04:40) (96% - 99%)    ________________________________________________  PHYSICAL EXAM:  GENERAL: NAD  HEENT: Normocephalic;  conjunctivae and sclerae clear; moist mucous membranes;   NECK : supple  CHEST/LUNG: Clear to auscultation bilaterally with good air entry   HEART: S1 S2  regular; no murmurs, gallops or rubs  ABDOMEN: Soft, Nontender, Nondistended; Bowel sounds present  EXTREMITIES: no cyanosis; no edema; no calf tenderness  SKIN: warm and dry; no rash  NERVOUS SYSTEM:  Awake and alert; Oriented  to place, person and time ; no new deficits    _________________________________________________  LABS:                        10.2   12.55 )-----------( 507      ( 29 Nov 2019 07:10 )             31.4     11-29    142  |  110<H>  |  23<H>  ----------------------------<  80  3.3<L>   |  24  |  1.07    Ca    7.8<L>      29 Nov 2019 07:10  Phos  2.8     11-28  Mg     2.4     11-28    TPro  6.0  /  Alb  1.7<L>  /  TBili  0.6  /  DBili  x   /  AST  45<H>  /  ALT  55  /  AlkPhos  148<H>  11-29    PT/INR - ( 28 Nov 2019 05:52 )   PT: 13.9 sec;   INR: 1.24 ratio         PTT - ( 28 Nov 2019 05:52 )  PTT:32.5 sec    CAPILLARY BLOOD GLUCOSE            RADIOLOGY & ADDITIONAL TESTS:    Imaging Personally Reviewed:  YES/NO    Consultant(s) Notes Reviewed:   YES/ No    Care Discussed with Consultants :     Plan of care was discussed with patient and /or primary care giver; all questions and concerns were addressed and care was aligned with patient's wishes. PGY 1 Note discussed with supervising resident and primary attending    Patient is a 89y old  Male who presents with a chief complaint of Fever (28 Nov 2019 11:44)    INTERVAL HPI/OVERNIGHT EVENTS: Patient seen and examined at the bedside. No acute events overnight. Patient with cholecystostomy tube with minimal drainage present.     MEDICATIONS  (STANDING):  clopidogrel Tablet 75 milliGRAM(s) Oral daily  levothyroxine 100 MICROGram(s) Oral daily  piperacillin/tazobactam IVPB.. 3.375 Gram(s) IV Intermittent every 8 hours  potassium chloride    Tablet ER 20 milliEquivalent(s) Oral daily  potassium chloride   Powder 40 milliEquivalent(s) Oral every 4 hours  tamsulosin 0.4 milliGRAM(s) Oral at bedtime    MEDICATIONS  (PRN):  acetaminophen   Tablet .. 650 milliGRAM(s) Oral every 6 hours PRN Temp greater or equal to 38C (100.4F), Moderate Pain (4 - 6)  guaiFENesin    Syrup 100 milliGRAM(s) Oral every 6 hours PRN Cough      __________________________________________________  REVIEW OF SYSTEMS:  CONSTITUTIONAL: No fever  EYES: no visual disturbances  NECK: No pain  RESPIRATORY: No cough; No shortness of breath  CARDIOVASCULAR: No chest pain  GASTROINTESTINAL: No pain. No nausea or vomiting\  NEUROLOGICAL: No headache   MUSCULOSKELETAL: No joint pain, no muscle pain  GENITOURINARY: no dysuria  PSYCHIATRY: no anxiety  ALL OTHER  ROS negative        Vital Signs Last 24 Hrs  T(C): 36.3 (29 Nov 2019 04:40), Max: 36.7 (28 Nov 2019 20:51)  T(F): 97.4 (29 Nov 2019 04:40), Max: 98 (28 Nov 2019 20:51)  HR: 67 (29 Nov 2019 04:40) (67 - 88)  BP: 151/70 (29 Nov 2019 04:40) (113/62 - 151/70)  RR: 17 (29 Nov 2019 04:40) (17 - 17)  SpO2: 98% (29 Nov 2019 04:40) (96% - 99%)    ________________________________________________  PHYSICAL EXAM:  GENERAL: Patient seen resting in bed and in no apparent distress  HEENT: Normocephalic; conjunctivae and sclerae clear   NECK: supple  CHEST/LUNG: Clear to auscultation bilaterally with good air entry   HEART: S1 S2, irregular; no murmurs  ABDOMEN: Soft, Nontender, Nondistended; Bowel sounds present  EXTREMITIES: no edema; no calf tenderness; chronic venous stasis  SKIN: warm and dry  NERVOUS SYSTEM: Awake and alert; Oriented  to person; no new deficits    _________________________________________________  LABS:                        10.2   12.55 )-----------( 507      ( 29 Nov 2019 07:10 )             31.4     11-29    142  |  110<H>  |  23<H>  ----------------------------<  80  3.3<L>   |  24  |  1.07    Ca    7.8<L>      29 Nov 2019 07:10  Phos  2.8     11-28  Mg     2.4     11-28    TPro  6.0  /  Alb  1.7<L>  /  TBili  0.6  /  DBili  x   /  AST  45<H>  /  ALT  55  /  AlkPhos  148<H>  11-29    PT/INR - ( 28 Nov 2019 05:52 )   PT: 13.9 sec;   INR: 1.24 ratio         PTT - ( 28 Nov 2019 05:52 )  PTT:32.5 sec      RADIOLOGY & ADDITIONAL TESTS:    Imaging Personally Reviewed:  YES    No new imaging     Consultant(s) Notes Reviewed:   YES  Care Discussed with Consultants :     Plan of care was discussed with patient and /or primary care giver; all questions and concerns were addressed and care was aligned with patient's wishes.

## 2019-11-29 NOTE — PHYSICAL THERAPY INITIAL EVALUATION ADULT - PLANNED THERAPY INTERVENTIONS, PT EVAL
balance training/gait training/postural re-education/stretching/bed mobility training/strengthening/neuromuscular re-education/ROM/transfer training

## 2019-11-29 NOTE — PROGRESS NOTE ADULT - ATTENDING COMMENTS
[facility] NEPHROLOGY  Justin Orozco M.D.  Kj Fernandez D.O.  Tonia Sanches M.D.  Salina Case, MSN, ANP-C  [phone]    [address] [place] [address]

## 2019-11-29 NOTE — PHYSICAL THERAPY INITIAL EVALUATION ADULT - PASSIVE RANGE OF MOTION EXAMINATION, REHAB EVAL
bilateral upper extremity Passive ROM was WFL (within functional limits)/except b/l hips and knees to ~80 deg flx/bilateral lower extremity Passive ROM was WFL (within functional limits)

## 2019-11-29 NOTE — PHYSICAL THERAPY INITIAL EVALUATION ADULT - IMPAIRED TRANSFERS: SIT/STAND, REHAB EVAL
decreased ROM/decreased flexibility/abnormal muscle tone/impaired postural control/impaired balance/cognition/narrow base of support/decreased strength

## 2019-11-29 NOTE — PHYSICAL THERAPY INITIAL EVALUATION ADULT - NS ASR WT BEARING DETAIL LLE
weight-bearing as tolerated/throughout whole foot, as per DPM on call (pt. has Lt. 2nd prox. phalanx fx, allow to be WBAT if not pain - pt. denied any pain w/WB activities)

## 2019-11-29 NOTE — PROGRESS NOTE ADULT - ATTENDING COMMENTS
A/P  Sepsis secondary to acute Cholecystitis s/p IR guided cholecystostomy tube 11/26 resolving  Severe protein calorie malnutrition  Cholelithiasis   Afib not on anticoagulation deferred to his advanced age  HTN  PVD on Plavix  CKD stage 3  Elevated INR -normalized s/p vitamin K  Hypokalemia s/p repletion    Plan  Nutritional support  Disposition -[facility] planning

## 2019-11-29 NOTE — PHYSICAL THERAPY INITIAL EVALUATION ADULT - CRITERIA FOR SKILLED THERAPEUTIC INTERVENTIONS
therapy frequency/anticipated discharge recommendation/impairments found/functional limitations in following categories/risk reduction/prevention/rehab potential/predicted duration of therapy intervention

## 2019-11-29 NOTE — PHYSICAL THERAPY INITIAL EVALUATION ADULT - IMPAIRMENTS FOUND, PT EVAL
tone/ROM/integumentary integrity/muscle strength/posture/gait, locomotion, and balance/cognitive impairment

## 2019-11-29 NOTE — PHYSICAL THERAPY INITIAL EVALUATION ADULT - GENERAL OBSERVATIONS, REHAB EVAL
Consult received, chart reviewed. Patient received supine in bed, NAD, +IV, +blayne tube. Patient agreed to EVALUATION from Physical Therapist.

## 2019-11-29 NOTE — PHYSICAL THERAPY INITIAL EVALUATION ADULT - IMPAIRMENTS CONTRIBUTING IMPAIRED BED MOBILITY, REHAB EVAL
impaired postural control/cognition/decreased ROM/decreased strength/impaired balance/decreased flexibility/impaired motor control

## 2019-11-29 NOTE — PHYSICAL THERAPY INITIAL EVALUATION ADULT - ADDITIONAL COMMENTS
able to walk in home distances w/o assist with rolling walker    was unable to negotiate stairs at baseline, had services take him in and out of home

## 2019-11-29 NOTE — PROGRESS NOTE ADULT - PROBLEM SELECTOR PLAN 1
Acute cholecystitis with sepsis  - on Zosyn; as per ID will finish abx today  s/p IR guided cholecystostomy tube 11/26  draining minimal fluid  cx of fluid negative    tolerating diet   continue plavix   LFTs downtrending, WBC downtrending, afebrile   surgery f/u noted: no interventions at this time  PT eval pending; patient refusing

## 2019-11-30 PROCEDURE — 99233 SBSQ HOSP IP/OBS HIGH 50: CPT

## 2019-11-30 RX ORDER — POTASSIUM CHLORIDE 20 MEQ
20 PACKET (EA) ORAL ONCE
Refills: 0 | Status: COMPLETED | OUTPATIENT
Start: 2019-11-30 | End: 2019-11-30

## 2019-11-30 RX ADMIN — Medication 20 MILLIEQUIVALENT(S): at 21:30

## 2019-11-30 RX ADMIN — PIPERACILLIN AND TAZOBACTAM 25 GRAM(S): 4; .5 INJECTION, POWDER, LYOPHILIZED, FOR SOLUTION INTRAVENOUS at 14:03

## 2019-11-30 RX ADMIN — Medication 100 MICROGRAM(S): at 05:42

## 2019-11-30 RX ADMIN — Medication 100 MILLIGRAM(S): at 20:44

## 2019-11-30 RX ADMIN — PIPERACILLIN AND TAZOBACTAM 25 GRAM(S): 4; .5 INJECTION, POWDER, LYOPHILIZED, FOR SOLUTION INTRAVENOUS at 05:42

## 2019-11-30 RX ADMIN — Medication 20 MILLIEQUIVALENT(S): at 11:50

## 2019-11-30 RX ADMIN — TAMSULOSIN HYDROCHLORIDE 0.4 MILLIGRAM(S): 0.4 CAPSULE ORAL at 21:30

## 2019-11-30 RX ADMIN — CLOPIDOGREL BISULFATE 75 MILLIGRAM(S): 75 TABLET, FILM COATED ORAL at 11:50

## 2019-11-30 RX ADMIN — PIPERACILLIN AND TAZOBACTAM 25 GRAM(S): 4; .5 INJECTION, POWDER, LYOPHILIZED, FOR SOLUTION INTRAVENOUS at 21:31

## 2019-11-30 NOTE — PROGRESS NOTE ADULT - ATTENDING COMMENTS
A/P  Sepsis secondary to acute Cholecystitis s/p IR guided cholecystostomy tube 11/26 resolving s/p completion of IV Zosyn  Severe protein calorie malnutrition  Cholelithiasis   Afib not on anticoagulation deferred to his advanced age  HTN  PVD on Plavix  CKD stage 3  Elevated INR -normalized s/p vitamin K  Hypokalemia s/p repletion    Plan  Nutritional support  Disposition -[facility], choices were given to the family Friday  DC planning

## 2019-11-30 NOTE — PROGRESS NOTE ADULT - PROBLEM SELECTOR PLAN 1
Acute cholecystitis with sepsis  - on Zosyn  s/p IR guided cholecystostomy tube 11/26  draining minimal fluid  cx of fluid negative    tolerating diet   LFTs downtrending, WBC downtrending, afebrile

## 2019-11-30 NOTE — PROGRESS NOTE ADULT - SUBJECTIVE AND OBJECTIVE BOX
Patient is a 89y old  Male who presents with a chief complaint of Fever (28 Nov 2019 11:44)    INTERVAL HPI/OVERNIGHT EVENTS:   No acute events overnight.   Patient with cholecystostomy tube with some drainage present.   Fluid culture prelim negative    MEDICATIONS  (STANDING):  clopidogrel Tablet 75 milliGRAM(s) Oral daily  levothyroxine 100 MICROGram(s) Oral daily  piperacillin/tazobactam IVPB.. 3.375 Gram(s) IV Intermittent every 8 hours  potassium chloride    Tablet ER 20 milliEquivalent(s) Oral daily  potassium chloride   Powder 40 milliEquivalent(s) Oral every 4 hours  tamsulosin 0.4 milliGRAM(s) Oral at bedtime    MEDICATIONS  (PRN):  acetaminophen   Tablet .. 650 milliGRAM(s) Oral every 6 hours PRN Temp greater or equal to 38C (100.4F), Moderate Pain (4 - 6)  guaiFENesin    Syrup 100 milliGRAM(s) Oral every 6 hours PRN Cough      __________________________________________________  REVIEW OF SYSTEMS:  CONSTITUTIONAL: No fever  EYES: no visual disturbances  NECK: No pain  RESPIRATORY: No cough; No shortness of breath  CARDIOVASCULAR: No chest pain  GASTROINTESTINAL: No pain. No nausea or vomiting\  NEUROLOGICAL: No headache   MUSCULOSKELETAL: No joint pain, no muscle pain  GENITOURINARY: no dysuria  PSYCHIATRY: no anxiety  ALL OTHER  ROS negative        Vital Signs Last 24 Hrs  T(C): 36.1 (30 Nov 2019 13:08), Max: 36.5 (29 Nov 2019 20:57)  T(F): 97 (30 Nov 2019 13:08), Max: 97.7 (29 Nov 2019 20:57)  HR: 75 (30 Nov 2019 13:08) (70 - 87)  BP: 124/78 (30 Nov 2019 13:08) (111/64 - 151/82)  BP(mean): --  RR: 18 (30 Nov 2019 13:08) (17 - 18)  SpO2: 97% (30 Nov 2019 13:08) (94% - 99%)    ________________________________________________  PHYSICAL EXAM:  GENERAL: Patient seen resting in bed and in no apparent distress  CHEST/LUNG: Clear to auscultation bilaterally with good air entry   HEART: S1 S2, irregular; no murmurs  ABDOMEN: Soft, Nontender, Nondistended; Bowel sounds present, RUQ  cholecystostomy tube with some dark color drainage   EXTREMITIES: no edema; no calf tenderness; chronic venous stasis  NERVOUS SYSTEM: Awake and alert; Oriented  to person;     _________________________________________________  LABS:                          10.2   12.55 )-----------( 507      ( 29 Nov 2019 07:10 )             31.4       11-29    142  |  110<H>  |  23<H>  ----------------------------<  80  3.3<L>   |  24  |  1.07    Ca    7.8<L>      29 Nov 2019 07:10    TPro  6.0  /  Alb  1.7<L>  /  TBili  0.6  /  DBili  x   /  AST  45<H>  /  ALT  55  /  AlkPhos  148<H>  11-29               Plan of care was discussed with patient and /or primary care giver; all questions and concerns were addressed and care was aligned with patient's wishes.

## 2019-11-30 NOTE — PROGRESS NOTE ADULT - ASSESSMENT
Patient is 89 year old male, has hha 24x7, ambulates with walker,  has medical history significant for hypothyroidism, chronic venous insufficiency came in to ED because of fever found to have acute Cholecystitis s/p IR guided cholecystostomy tube placement

## 2019-12-01 LAB
ALBUMIN SERPL ELPH-MCNC: 2 G/DL — LOW (ref 3.5–5)
ALP SERPL-CCNC: 151 U/L — HIGH (ref 40–120)
ALT FLD-CCNC: 52 U/L DA — SIGNIFICANT CHANGE UP (ref 10–60)
ANION GAP SERPL CALC-SCNC: 5 MMOL/L — SIGNIFICANT CHANGE UP (ref 5–17)
AST SERPL-CCNC: 40 U/L — SIGNIFICANT CHANGE UP (ref 10–40)
BILIRUB SERPL-MCNC: 0.6 MG/DL — SIGNIFICANT CHANGE UP (ref 0.2–1.2)
BUN SERPL-MCNC: 21 MG/DL — HIGH (ref 7–18)
CALCIUM SERPL-MCNC: 8.5 MG/DL — SIGNIFICANT CHANGE UP (ref 8.4–10.5)
CHLORIDE SERPL-SCNC: 111 MMOL/L — HIGH (ref 96–108)
CO2 SERPL-SCNC: 25 MMOL/L — SIGNIFICANT CHANGE UP (ref 22–31)
CREAT SERPL-MCNC: 1.15 MG/DL — SIGNIFICANT CHANGE UP (ref 0.5–1.3)
GLUCOSE SERPL-MCNC: 77 MG/DL — SIGNIFICANT CHANGE UP (ref 70–99)
MAGNESIUM SERPL-MCNC: 2.5 MG/DL — SIGNIFICANT CHANGE UP (ref 1.6–2.6)
PHOSPHATE SERPL-MCNC: 2.8 MG/DL — SIGNIFICANT CHANGE UP (ref 2.5–4.5)
POTASSIUM SERPL-MCNC: 4.9 MMOL/L — SIGNIFICANT CHANGE UP (ref 3.5–5.3)
POTASSIUM SERPL-SCNC: 4.9 MMOL/L — SIGNIFICANT CHANGE UP (ref 3.5–5.3)
PROT SERPL-MCNC: 6.6 G/DL — SIGNIFICANT CHANGE UP (ref 6–8.3)
SODIUM SERPL-SCNC: 141 MMOL/L — SIGNIFICANT CHANGE UP (ref 135–145)

## 2019-12-01 PROCEDURE — 99232 SBSQ HOSP IP/OBS MODERATE 35: CPT | Mod: GC

## 2019-12-01 RX ADMIN — Medication 100 MILLIGRAM(S): at 05:55

## 2019-12-01 RX ADMIN — TAMSULOSIN HYDROCHLORIDE 0.4 MILLIGRAM(S): 0.4 CAPSULE ORAL at 21:08

## 2019-12-01 RX ADMIN — CLOPIDOGREL BISULFATE 75 MILLIGRAM(S): 75 TABLET, FILM COATED ORAL at 12:25

## 2019-12-01 RX ADMIN — Medication 100 MICROGRAM(S): at 05:55

## 2019-12-01 NOTE — PROGRESS NOTE ADULT - PROBLEM SELECTOR PLAN 1
Acute cholecystitis with sepsis  -completed course of zosyn  s/p IR guided cholecystostomy tube 11/26  draining bilious fluid  cx of fluid negative    tolerating diet   PT eval noted: recommending [facility]  patient is dc planning to rehab; f/u case management

## 2019-12-01 NOTE — PROGRESS NOTE ADULT - ATTENDING COMMENTS
A/P  Sepsis secondary to acute Cholecystitis s/p IR guided cholecystostomy tube 11/26 resolving s/p completion of IV Zosyn  Severe protein calorie malnutrition  Cholelithiasis   Afib not on anticoagulation deferred to his advanced age  HTN  PVD on Plavix  CKD stage 3  Elevated INR -normalized s/p vitamin K  Hypokalemia s/p repletion- resolved    Plan  Nutritional support  Disposition -[facility], choices were given to the family Friday  DC planning  No labs tomorrow

## 2019-12-01 NOTE — PROGRESS NOTE ADULT - SUBJECTIVE AND OBJECTIVE BOX
PGY 1 Note discussed with supervising resident and primary attending    Patient is a 89y old  Male who presents with a chief complaint of Fever (30 Nov 2019 13:14)    INTERVAL HPI/OVERNIGHT EVENTS: Patient seen and examined at the bedside. No acute events overnight. Patient is dc planning to rehab facility.     MEDICATIONS  (STANDING):  clopidogrel Tablet 75 milliGRAM(s) Oral daily  levothyroxine 100 MICROGram(s) Oral daily  tamsulosin 0.4 milliGRAM(s) Oral at bedtime    MEDICATIONS  (PRN):  acetaminophen   Tablet .. 650 milliGRAM(s) Oral every 6 hours PRN Temp greater or equal to 38C (100.4F), Moderate Pain (4 - 6)  guaiFENesin    Syrup 100 milliGRAM(s) Oral every 6 hours PRN Cough      __________________________________________________  REVIEW OF SYSTEMS:  CONSTITUTIONAL: No fever   EYES: no visual disturbances  NECK: No pain   RESPIRATORY: No cough; No shortness of breath  CARDIOVASCULAR: No chest pain  GASTROINTESTINAL: No pain. No nausea or vomiting   NEUROLOGICAL: No headache   MUSCULOSKELETAL: lower extremity pain  GENITOURINARY: no dysuria  PSYCHIATRY: no depression  ALL OTHER  ROS negative        Vital Signs Last 24 Hrs  T(C): 36.6 (30 Nov 2019 20:42), Max: 36.6 (30 Nov 2019 20:42)  T(F): 97.8 (30 Nov 2019 20:42), Max: 97.8 (30 Nov 2019 20:42)  HR: 76 (30 Nov 2019 20:42) (72 - 76)  BP: 151/75 (30 Nov 2019 20:42) (124/78 - 151/82)  RR: 17 (30 Nov 2019 20:42) (17 - 18)  SpO2: 97% (30 Nov 2019 20:42) (97% - 99%)    ________________________________________________  PHYSICAL EXAM:  GENERAL: Patient seen resting in bed and in no apparent distress  HEENT: Normocephalic; conjunctivae and sclerae clear   NECK: supple  CHEST/LUNG: Clear to auscultation bilaterally   HEART: S1 S2, irregular; no murmurs  ABDOMEN: Soft, Nontender, Nondistended; Bowel sounds present; cholecystostomy tube in place with some bile drainage  EXTREMITIES: chronic venous stasis  SKIN: warm and dry  NERVOUS SYSTEM: Awake and alert; Oriented to person only; no new deficits    _________________________________________________  LABS:                        10.2   12.55 )-----------( 507      ( 29 Nov 2019 07:10 )             31.4     11-29    142  |  110<H>  |  23<H>  ----------------------------<  80  3.3<L>   |  24  |  1.07    Ca    7.8<L>      29 Nov 2019 07:10    TPro  6.0  /  Alb  1.7<L>  /  TBili  0.6  /  DBili  x   /  AST  45<H>  /  ALT  55  /  AlkPhos  148<H>  11-29      RADIOLOGY & ADDITIONAL TESTS:    Imaging Personally Reviewed:  YES     No new imaging     Consultant(s) Notes Reviewed:   YES     Care Discussed with Consultants :     Plan of care was discussed with patient and /or primary care giver; all questions and concerns were addressed and care was aligned with patient's wishes.

## 2019-12-02 LAB
ALBUMIN SERPL ELPH-MCNC: 2 G/DL — LOW (ref 3.5–5)
ALP SERPL-CCNC: 148 U/L — HIGH (ref 40–120)
ALT FLD-CCNC: 42 U/L DA — SIGNIFICANT CHANGE UP (ref 10–60)
ANION GAP SERPL CALC-SCNC: 7 MMOL/L — SIGNIFICANT CHANGE UP (ref 5–17)
AST SERPL-CCNC: 30 U/L — SIGNIFICANT CHANGE UP (ref 10–40)
BASOPHILS # BLD AUTO: 0.01 K/UL — SIGNIFICANT CHANGE UP (ref 0–0.2)
BASOPHILS NFR BLD AUTO: 0.1 % — SIGNIFICANT CHANGE UP (ref 0–2)
BILIRUB SERPL-MCNC: 0.7 MG/DL — SIGNIFICANT CHANGE UP (ref 0.2–1.2)
BUN SERPL-MCNC: 18 MG/DL — SIGNIFICANT CHANGE UP (ref 7–18)
CALCIUM SERPL-MCNC: 8.4 MG/DL — SIGNIFICANT CHANGE UP (ref 8.4–10.5)
CHLORIDE SERPL-SCNC: 110 MMOL/L — HIGH (ref 96–108)
CO2 SERPL-SCNC: 22 MMOL/L — SIGNIFICANT CHANGE UP (ref 22–31)
CREAT SERPL-MCNC: 1.01 MG/DL — SIGNIFICANT CHANGE UP (ref 0.5–1.3)
CULTURE RESULTS: SIGNIFICANT CHANGE UP
EOSINOPHIL # BLD AUTO: 0.28 K/UL — SIGNIFICANT CHANGE UP (ref 0–0.5)
EOSINOPHIL NFR BLD AUTO: 3.8 % — SIGNIFICANT CHANGE UP (ref 0–6)
GLUCOSE SERPL-MCNC: 83 MG/DL — SIGNIFICANT CHANGE UP (ref 70–99)
HCT VFR BLD CALC: 36.6 % — LOW (ref 39–50)
HGB BLD-MCNC: 11.7 G/DL — LOW (ref 13–17)
IMM GRANULOCYTES NFR BLD AUTO: 0.9 % — SIGNIFICANT CHANGE UP (ref 0–1.5)
LYMPHOCYTES # BLD AUTO: 1.48 K/UL — SIGNIFICANT CHANGE UP (ref 1–3.3)
LYMPHOCYTES # BLD AUTO: 19.9 % — SIGNIFICANT CHANGE UP (ref 13–44)
MAGNESIUM SERPL-MCNC: 2.4 MG/DL — SIGNIFICANT CHANGE UP (ref 1.6–2.6)
MCHC RBC-ENTMCNC: 31.4 PG — SIGNIFICANT CHANGE UP (ref 27–34)
MCHC RBC-ENTMCNC: 32 GM/DL — SIGNIFICANT CHANGE UP (ref 32–36)
MCV RBC AUTO: 98.1 FL — SIGNIFICANT CHANGE UP (ref 80–100)
MONOCYTES # BLD AUTO: 0.29 K/UL — SIGNIFICANT CHANGE UP (ref 0–0.9)
MONOCYTES NFR BLD AUTO: 3.9 % — SIGNIFICANT CHANGE UP (ref 2–14)
NEUTROPHILS # BLD AUTO: 5.32 K/UL — SIGNIFICANT CHANGE UP (ref 1.8–7.4)
NEUTROPHILS NFR BLD AUTO: 71.4 % — SIGNIFICANT CHANGE UP (ref 43–77)
NRBC # BLD: 0 /100 WBCS — SIGNIFICANT CHANGE UP (ref 0–0)
PHOSPHATE SERPL-MCNC: 2.9 MG/DL — SIGNIFICANT CHANGE UP (ref 2.5–4.5)
PLATELET # BLD AUTO: 535 K/UL — HIGH (ref 150–400)
POTASSIUM SERPL-MCNC: 4.3 MMOL/L — SIGNIFICANT CHANGE UP (ref 3.5–5.3)
POTASSIUM SERPL-SCNC: 4.3 MMOL/L — SIGNIFICANT CHANGE UP (ref 3.5–5.3)
PROT SERPL-MCNC: 6.6 G/DL — SIGNIFICANT CHANGE UP (ref 6–8.3)
RBC # BLD: 3.73 M/UL — LOW (ref 4.2–5.8)
RBC # FLD: 16.4 % — HIGH (ref 10.3–14.5)
SODIUM SERPL-SCNC: 139 MMOL/L — SIGNIFICANT CHANGE UP (ref 135–145)
SPECIMEN SOURCE: SIGNIFICANT CHANGE UP
WBC # BLD: 7.45 K/UL — SIGNIFICANT CHANGE UP (ref 3.8–10.5)
WBC # FLD AUTO: 7.45 K/UL — SIGNIFICANT CHANGE UP (ref 3.8–10.5)

## 2019-12-02 PROCEDURE — 99232 SBSQ HOSP IP/OBS MODERATE 35: CPT

## 2019-12-02 RX ORDER — ENOXAPARIN SODIUM 100 MG/ML
30 INJECTION SUBCUTANEOUS DAILY
Refills: 0 | Status: DISCONTINUED | OUTPATIENT
Start: 2019-12-02 | End: 2019-12-05

## 2019-12-02 RX ADMIN — ENOXAPARIN SODIUM 30 MILLIGRAM(S): 100 INJECTION SUBCUTANEOUS at 11:47

## 2019-12-02 RX ADMIN — Medication 100 MILLIGRAM(S): at 05:31

## 2019-12-02 RX ADMIN — CLOPIDOGREL BISULFATE 75 MILLIGRAM(S): 75 TABLET, FILM COATED ORAL at 11:46

## 2019-12-02 RX ADMIN — TAMSULOSIN HYDROCHLORIDE 0.4 MILLIGRAM(S): 0.4 CAPSULE ORAL at 21:18

## 2019-12-02 RX ADMIN — Medication 100 MICROGRAM(S): at 05:31

## 2019-12-02 NOTE — PROGRESS NOTE ADULT - ATTENDING COMMENTS
Patient seen and examined.   T(C): 36.7 (12-02-19 @ 13:02), Max: 36.7 (12-02-19 @ 13:02)  HR: 73 (12-02-19 @ 13:02) (73 - 73)  BP: 145/89 (12-02-19 @ 13:02) (145/89 - 145/89)  RR: 17 (12-02-19 @ 13:02) (17 - 17)  SpO2: 95% (12-02-19 @ 13:02) (95% - 95%)    Acute Cholecystitis s/p cholecystostomy tube completed Zosyn, awaiting [facility] placement.

## 2019-12-02 NOTE — PROGRESS NOTE ADULT - SUBJECTIVE AND OBJECTIVE BOX
PGY 1 Note discussed with supervising resident and primary attending    Patient is a 89y old  Male who presents with a chief complaint of Fever (01 Dec 2019 01:40)      INTERVAL HPI/OVERNIGHT EVENTS: Patient was seen and examined at bed side , no new complains noted . DC planning. no more labs for patient     MEDICATIONS  (STANDING):  clopidogrel Tablet 75 milliGRAM(s) Oral daily  enoxaparin Injectable 30 milliGRAM(s) SubCutaneous daily  levothyroxine 100 MICROGram(s) Oral daily  tamsulosin 0.4 milliGRAM(s) Oral at bedtime    MEDICATIONS  (PRN):  acetaminophen   Tablet .. 650 milliGRAM(s) Oral every 6 hours PRN Temp greater or equal to 38C (100.4F), Moderate Pain (4 - 6)  guaiFENesin    Syrup 100 milliGRAM(s) Oral every 6 hours PRN Cough      __________________________________________________  REVIEW OF SYSTEMS:    CONSTITUTIONAL: No fever,   EYES: no acute visual disturbances  NECK: No pain or stiffness  RESPIRATORY: No cough; No shortness of breath  CARDIOVASCULAR: No chest pain, no palpitations  GASTROINTESTINAL: No pain. No nausea or vomiting; No diarrhea   NEUROLOGICAL: No headache or numbness, no tremors  MUSCULOSKELETAL: No joint pain, no muscle pain  GENITOURINARY: no dysuria, no frequency, no hesitancy  PSYCHIATRY: no depression , no anxiety  ALL OTHER  ROS negative        Vital Signs Last 24 Hrs  T(C): 36.6 (02 Dec 2019 05:25), Max: 36.6 (01 Dec 2019 13:08)  T(F): 97.9 (02 Dec 2019 05:25), Max: 97.9 (02 Dec 2019 05:25)  HR: 71 (02 Dec 2019 05:25) (71 - 77)  BP: 138/89 (02 Dec 2019 05:25) (134/72 - 140/73)  BP(mean): --  RR: 17 (02 Dec 2019 05:25) (16 - 17)  SpO2: 99% (02 Dec 2019 05:25) (98% - 99%)    ________________________________________________  PHYSICAL EXAM:  GENERAL: NAD, patient was comfortably lying in bed  HEENT: Normocephalic;  conjunctivae and sclerae clear; moist mucous membranes;   NECK : supple  CHEST/LUNG: Clear to auscultation bilaterally with good air entry   HEART: S1 S2  regular; no murmurs, gallops or rubs  ABDOMEN: Soft, Nontender, Nondistended; Bowel sounds present, cholecystostomy tube in place with drainage of bile noted.  EXTREMITIES: has venous stasis, no calf tenderness  SKIN: warm and dry; no rash  NERVOUS SYSTEM:  Awake and alert; AAO X 2. no new deficits  _________________________________________________  LABS:                        11.7   7.45  )-----------( 535      ( 02 Dec 2019 08:22 )             36.6     12-02    139  |  110<H>  |  18  ----------------------------<  83  4.3   |  22  |  1.01    Ca    8.4      02 Dec 2019 08:22  Phos  2.9     12-02  Mg     2.4     12-02    TPro  6.6  /  Alb  2.0<L>  /  TBili  0.7  /  DBili  x   /  AST  30  /  ALT  42  /  AlkPhos  148<H>  12-02        CAPILLARY BLOOD GLUCOSE            RADIOLOGY & ADDITIONAL TESTS:    Imaging Personally Reviewed:  YES/NO    Consultant(s) Notes Reviewed:   YES/ No    Care Discussed with Consultants :     Plan of care was discussed with patient and /or primary care giver; all questions and concerns were addressed and care was aligned with patient's wishes.

## 2019-12-02 NOTE — PROGRESS NOTE ADULT - GASTROINTESTINAL DETAILS
soft/no distention/no rebound tenderness/no rigidity/no guarding/bowel sounds normal
bowel sounds normal/nontender/no guarding/soft/no rebound tenderness/no distention/no rigidity

## 2019-12-02 NOTE — PROGRESS NOTE ADULT - SUBJECTIVE AND OBJECTIVE BOX
89y Male    Meds:    Allergies    No Known Allergies    Intolerances        VITALS:  Vital Signs Last 24 Hrs  T(C): 36.7 (02 Dec 2019 13:02), Max: 36.7 (02 Dec 2019 13:02)  T(F): 98.1 (02 Dec 2019 13:02), Max: 98.1 (02 Dec 2019 13:02)  HR: 73 (02 Dec 2019 13:02) (71 - 75)  BP: 145/89 (02 Dec 2019 13:02) (138/89 - 145/89)  BP(mean): --  RR: 17 (02 Dec 2019 13:02) (17 - 17)  SpO2: 95% (02 Dec 2019 13:02) (95% - 99%)    LABS/DIAGNOSTIC TESTS:                          11.7   7.45  )-----------( 535      ( 02 Dec 2019 08:22 )             36.6         12-02    139  |  110<H>  |  18  ----------------------------<  83  4.3   |  22  |  1.01    Ca    8.4      02 Dec 2019 08:22  Phos  2.9     12-02  Mg     2.4     12-02    TPro  6.6  /  Alb  2.0<L>  /  TBili  0.7  /  DBili  x   /  AST  30  /  ALT  42  /  AlkPhos  148<H>  12-02      LIVER FUNCTIONS - ( 02 Dec 2019 08:22 )  Alb: 2.0 g/dL / Pro: 6.6 g/dL / ALK PHOS: 148 U/L / ALT: 42 U/L DA / AST: 30 U/L / GGT: x             CULTURES: .Body Fluid cholecystosstomy tube bile  11-27 @ 10:07   No growth at 5 days  --    Rare polymorphonuclear leukocytes per low power field  No organisms seen per oil power field      .Blood  11-24 @ 22:28   No growth at 5 days.  --  --      .Urine  11-16 @ 16:28   <10,000 CFU/mL Normal Urogenital Amanda  --  --      .Blood  11-16 @ 01:59   No growth at 5 days.  --  --            RADIOLOGY:      ROS:  [  ] UNABLE TO ELICIT 89y Male who is doing well, he has no abdominal pain, he has no fevers or chills, no diarrhea, his WBC count has normalized and his bile culture is negative. His abxs have been stopped as he has completed his course.    Meds:    Allergies    No Known Allergies    Intolerances        VITALS:  Vital Signs Last 24 Hrs  T(C): 36.7 (02 Dec 2019 13:02), Max: 36.7 (02 Dec 2019 13:02)  T(F): 98.1 (02 Dec 2019 13:02), Max: 98.1 (02 Dec 2019 13:02)  HR: 73 (02 Dec 2019 13:02) (71 - 75)  BP: 145/89 (02 Dec 2019 13:02) (138/89 - 145/89)  BP(mean): --  RR: 17 (02 Dec 2019 13:02) (17 - 17)  SpO2: 95% (02 Dec 2019 13:02) (95% - 99%)    LABS/DIAGNOSTIC TESTS:                          11.7   7.45  )-----------( 535      ( 02 Dec 2019 08:22 )             36.6         12-02    139  |  110<H>  |  18  ----------------------------<  83  4.3   |  22  |  1.01    Ca    8.4      02 Dec 2019 08:22  Phos  2.9     12-02  Mg     2.4     12-02    TPro  6.6  /  Alb  2.0<L>  /  TBili  0.7  /  DBili  x   /  AST  30  /  ALT  42  /  AlkPhos  148<H>  12-02      LIVER FUNCTIONS - ( 02 Dec 2019 08:22 )  Alb: 2.0 g/dL / Pro: 6.6 g/dL / ALK PHOS: 148 U/L / ALT: 42 U/L DA / AST: 30 U/L / GGT: x             CULTURES: .Body Fluid cholecystosstomy tube bile  11-27 @ 10:07   No growth at 5 days  --    Rare polymorphonuclear leukocytes per low power field  No organisms seen per oil power field      .Blood  11-24 @ 22:28   No growth at 5 days.  --  --      .Urine  11-16 @ 16:28   <10,000 CFU/mL Normal Urogenital Amanda  --  --      .Blood  11-16 @ 01:59   No growth at 5 days.  --  --            RADIOLOGY:      ROS:  [  ] UNABLE TO ELICIT

## 2019-12-02 NOTE — PROGRESS NOTE ADULT - ASSESSMENT
Acute Cholecystitis - s/p cholecystomy tube placement  Leukocytosis - normalized    Plan - completed course of antibiotics  Reconsult prn.

## 2019-12-03 PROCEDURE — 99232 SBSQ HOSP IP/OBS MODERATE 35: CPT | Mod: GC

## 2019-12-03 RX ORDER — CALAMINE AND ZINC OXIDE AND PHENOL 160; 10 MG/ML; MG/ML
1 LOTION TOPICAL DAILY
Refills: 0 | Status: DISCONTINUED | OUTPATIENT
Start: 2019-12-03 | End: 2019-12-05

## 2019-12-03 RX ADMIN — CALAMINE AND ZINC OXIDE AND PHENOL 1 APPLICATION(S): 160; 10 LOTION TOPICAL at 11:57

## 2019-12-03 RX ADMIN — CLOPIDOGREL BISULFATE 75 MILLIGRAM(S): 75 TABLET, FILM COATED ORAL at 11:52

## 2019-12-03 RX ADMIN — ENOXAPARIN SODIUM 30 MILLIGRAM(S): 100 INJECTION SUBCUTANEOUS at 11:52

## 2019-12-03 RX ADMIN — Medication 100 MICROGRAM(S): at 05:42

## 2019-12-03 RX ADMIN — TAMSULOSIN HYDROCHLORIDE 0.4 MILLIGRAM(S): 0.4 CAPSULE ORAL at 21:58

## 2019-12-03 NOTE — PROGRESS NOTE ADULT - ATTENDING COMMENTS
Sepsis secondary to acute Cholecystitis s/p IR guided cholecystostomy tube 11/26 resolved s/p completion of IV Zosyn  Severe protein calorie malnutrition  Cholelithiasis   Afib not on anticoagulation deferred to his advanced age  HTN  PVD on Plavix  CKD stage 3  Elevated INR -normalized s/p vitamin K  Hypokalemia s/p repletion- resolved    Plan  Disposition -[facility], awaits Auth  DC planning

## 2019-12-03 NOTE — PROGRESS NOTE ADULT - SUBJECTIVE AND OBJECTIVE BOX
PGY 1 Note discussed with supervising resident and primary attending    Patient is a 89y old  Male who presents with a chief complaint of Fever (02 Dec 2019 18:33)      INTERVAL HPI/OVERNIGHT EVENTS:  Patient was seen and examined at bedside, no new complains offered. bile was draining through cholecystostomy tube.    MEDICATIONS  (STANDING):  clopidogrel Tablet 75 milliGRAM(s) Oral daily  enoxaparin Injectable 30 milliGRAM(s) SubCutaneous daily  levothyroxine 100 MICROGram(s) Oral daily  tamsulosin 0.4 milliGRAM(s) Oral at bedtime    MEDICATIONS  (PRN):  acetaminophen   Tablet .. 650 milliGRAM(s) Oral every 6 hours PRN Temp greater or equal to 38C (100.4F), Moderate Pain (4 - 6)  guaiFENesin    Syrup 100 milliGRAM(s) Oral every 6 hours PRN Cough      __________________________________________________  REVIEW OF SYSTEMS:    CONSTITUTIONAL: No fever,   EYES: no acute visual disturbances  NECK: No pain or stiffness  RESPIRATORY: No cough; No shortness of breath  CARDIOVASCULAR: No chest pain, no palpitations  GASTROINTESTINAL: No pain. No nausea or vomiting; No diarrhea   NEUROLOGICAL: No headache or numbness, no tremors  MUSCULOSKELETAL: No joint pain, no muscle pain  GENITOURINARY: no dysuria, no frequency, no hesitancy  PSYCHIATRY: no depression , no anxiety  ALL OTHER  ROS negative        Vital Signs Last 24 Hrs  T(C): 36.2 (03 Dec 2019 04:35), Max: 36.8 (02 Dec 2019 20:36)  T(F): 97.2 (03 Dec 2019 04:35), Max: 98.2 (02 Dec 2019 20:36)  HR: 72 (03 Dec 2019 04:35) (72 - 73)  BP: 144/75 (03 Dec 2019 04:35) (142/74 - 145/89)  BP(mean): --  RR: 16 (03 Dec 2019 04:35) (14 - 17)  SpO2: 98% (03 Dec 2019 04:35) (95% - 100%)    ________________________________________________  PHYSICAL EXAM:  GENERAL: NAD  HEENT: Normocephalic;  conjunctivae and sclerae clear; moist mucous membranes;   NECK : supple  CHEST/LUNG: Clear to auscultation bilaterally with good air entry   HEART: S1 S2  regular; no murmurs, gallops or rubs  ABDOMEN: Soft, Nontender, Nondistended; Bowel sounds present, bile draining from cholecystostomy tube.  EXTREMITIES: no cyanosis; no edema; no calf tenderness  SKIN: warm and dry; no rash  NERVOUS SYSTEM:  Awake and alert; Oriented  to place, person not to time ; no new deficits    _________________________________________________  LABS:                        11.7   7.45  )-----------( 535      ( 02 Dec 2019 08:22 )             36.6     12-02    139  |  110<H>  |  18  ----------------------------<  83  4.3   |  22  |  1.01    Ca    8.4      02 Dec 2019 08:22  Phos  2.9     12-02  Mg     2.4     12-02    TPro  6.6  /  Alb  2.0<L>  /  TBili  0.7  /  DBili  x   /  AST  30  /  ALT  42  /  AlkPhos  148<H>  12-02        CAPILLARY BLOOD GLUCOSE            RADIOLOGY & ADDITIONAL TESTS:    Imaging Personally Reviewed:  YES/NO    Consultant(s) Notes Reviewed:   YES/ No    Care Discussed with Consultants :     Plan of care was discussed with patient and /or primary care giver; all questions and concerns were addressed and care was aligned with patient's wishes. PGY 1 Note discussed with supervising resident and primary attending    Patient is a 89y old  Male who presents with a chief complaint of Fever (02 Dec 2019 18:33)      INTERVAL HPI/OVERNIGHT EVENTS:  Patient was seen and examined at bedside, no new complains offered. bile was draining through cholecystostomy tube.    MEDICATIONS  (STANDING):  clopidogrel Tablet 75 milliGRAM(s) Oral daily  enoxaparin Injectable 30 milliGRAM(s) SubCutaneous daily  levothyroxine 100 MICROGram(s) Oral daily  tamsulosin 0.4 milliGRAM(s) Oral at bedtime    MEDICATIONS  (PRN):  acetaminophen   Tablet .. 650 milliGRAM(s) Oral every 6 hours PRN Temp greater or equal to 38C (100.4F), Moderate Pain (4 - 6)  guaiFENesin    Syrup 100 milliGRAM(s) Oral every 6 hours PRN Cough      __________________________________________________  REVIEW OF SYSTEMS:    CONSTITUTIONAL: No fever,   EYES: no acute visual disturbances  NECK: No pain or stiffness  RESPIRATORY: No cough; No shortness of breath  CARDIOVASCULAR: No chest pain, no palpitations  GASTROINTESTINAL: No pain. No nausea or vomiting; No diarrhea   NEUROLOGICAL: No headache or numbness, no tremors  MUSCULOSKELETAL: No joint pain, no muscle pain  GENITOURINARY: no dysuria, no frequency, no hesitancy  PSYCHIATRY: no depression , no anxiety  ALL OTHER  ROS negative        Vital Signs Last 24 Hrs  T(C): 36.2 (03 Dec 2019 04:35), Max: 36.8 (02 Dec 2019 20:36)  T(F): 97.2 (03 Dec 2019 04:35), Max: 98.2 (02 Dec 2019 20:36)  HR: 72 (03 Dec 2019 04:35) (72 - 73)  BP: 144/75 (03 Dec 2019 04:35) (142/74 - 145/89)  BP(mean): --  RR: 16 (03 Dec 2019 04:35) (14 - 17)  SpO2: 98% (03 Dec 2019 04:35) (95% - 100%)    ________________________________________________  PHYSICAL EXAM:  GENERAL: NAD  HEENT: Normocephalic;  conjunctivae and sclerae clear; moist mucous membranes;   NECK : supple  CHEST/LUNG: Clear to auscultation bilaterally with good air entry   HEART: S1 S2  regular; no murmurs, gallops or rubs  ABDOMEN: Soft, Nontender, Nondistended; Bowel sounds present, bile draining from cholecystostomy tube.  EXTREMITIES: no cyanosis; no edema; no calf tenderness  SKIN: dry, itchy rash on both lower extremities.  NERVOUS SYSTEM:  Awake and alert; Oriented  to place, person not to time ; no new deficits    _________________________________________________  LABS:                        11.7   7.45  )-----------( 535      ( 02 Dec 2019 08:22 )             36.6     12-02    139  |  110<H>  |  18  ----------------------------<  83  4.3   |  22  |  1.01    Ca    8.4      02 Dec 2019 08:22  Phos  2.9     12-02  Mg     2.4     12-02    TPro  6.6  /  Alb  2.0<L>  /  TBili  0.7  /  DBili  x   /  AST  30  /  ALT  42  /  AlkPhos  148<H>  12-02        CAPILLARY BLOOD GLUCOSE            RADIOLOGY & ADDITIONAL TESTS:    Imaging Personally Reviewed:  YES/NO    Consultant(s) Notes Reviewed:   YES/ No    Care Discussed with Consultants :     Plan of care was discussed with patient and /or primary care giver; all questions and concerns were addressed and care was aligned with patient's wishes.

## 2019-12-04 PROCEDURE — 99232 SBSQ HOSP IP/OBS MODERATE 35: CPT | Mod: GC

## 2019-12-04 RX ADMIN — ENOXAPARIN SODIUM 30 MILLIGRAM(S): 100 INJECTION SUBCUTANEOUS at 12:40

## 2019-12-04 RX ADMIN — TAMSULOSIN HYDROCHLORIDE 0.4 MILLIGRAM(S): 0.4 CAPSULE ORAL at 22:19

## 2019-12-04 RX ADMIN — Medication 100 MICROGRAM(S): at 06:48

## 2019-12-04 RX ADMIN — CALAMINE AND ZINC OXIDE AND PHENOL 1 APPLICATION(S): 160; 10 LOTION TOPICAL at 12:42

## 2019-12-04 RX ADMIN — CLOPIDOGREL BISULFATE 75 MILLIGRAM(S): 75 TABLET, FILM COATED ORAL at 12:39

## 2019-12-04 NOTE — PROGRESS NOTE ADULT - SUBJECTIVE AND OBJECTIVE BOX
PGY 1 Note discussed with supervising resident and primary attending    Patient is a 89y old  Male who presents with a chief complaint of Fever (03 Dec 2019 09:52)      INTERVAL HPI/OVERNIGHT EVENTS: offers no new complaints; current symptoms resolving    MEDICATIONS  (STANDING):  calamine Lotion 1 Application(s) Topical daily  clopidogrel Tablet 75 milliGRAM(s) Oral daily  enoxaparin Injectable 30 milliGRAM(s) SubCutaneous daily  levothyroxine 100 MICROGram(s) Oral daily  tamsulosin 0.4 milliGRAM(s) Oral at bedtime    MEDICATIONS  (PRN):  acetaminophen   Tablet .. 650 milliGRAM(s) Oral every 6 hours PRN Temp greater or equal to 38C (100.4F), Moderate Pain (4 - 6)  guaiFENesin    Syrup 100 milliGRAM(s) Oral every 6 hours PRN Cough      __________________________________________________  REVIEW OF SYSTEMS:    CONSTITUTIONAL: No fever,   EYES: no acute visual disturbances  NECK: No pain or stiffness  RESPIRATORY: No cough; No shortness of breath  CARDIOVASCULAR: No chest pain, no palpitations  GASTROINTESTINAL: No pain. No nausea or vomiting; No diarrhea   NEUROLOGICAL: No headache or numbness, no tremors  MUSCULOSKELETAL: No joint pain, no muscle pain  GENITOURINARY: no dysuria, no frequency, no hesitancy  PSYCHIATRY: no depression , no anxiety  ALL OTHER  ROS negative        Vital Signs Last 24 Hrs  T(C): 36.3 (04 Dec 2019 06:02), Max: 36.6 (03 Dec 2019 13:41)  T(F): 97.3 (04 Dec 2019 06:02), Max: 97.8 (03 Dec 2019 13:41)  HR: 67 (04 Dec 2019 06:02) (67 - 82)  BP: 157/86 (04 Dec 2019 06:02) (128/84 - 157/86)  BP(mean): --  RR: 18 (04 Dec 2019 06:02) (14 - 18)  SpO2: 94% (04 Dec 2019 06:02) (94% - 98%)    ________________________________________________  PHYSICAL EXAM:  GENERAL: NAD  HEENT: Normocephalic;  conjunctivae and sclerae clear; moist mucous membranes;   NECK : supple  CHEST/LUNG: Clear to auscultation bilaterally with good air entry   HEART: S1 S2  regular; no murmurs, gallops or rubs  ABDOMEN: Soft, Nontender, Nondistended; Bowel sounds present, cholecystostomy tube present  EXTREMITIES: no cyanosis; no edema; no calf tenderness  SKIN: warm and dry; no rash  NERVOUS SYSTEM:  Awake and alert; AAO X 2 ; no new deficits    _________________________________________________  LABS:              CAPILLARY BLOOD GLUCOSE            RADIOLOGY & ADDITIONAL TESTS:    Imaging Personally Reviewed:  YES/NO    Consultant(s) Notes Reviewed:   YES/ No    Care Discussed with Consultants :     Plan of care was discussed with patient and /or primary care giver; all questions and concerns were addressed and care was aligned with patient's wishes.

## 2019-12-04 NOTE — PROGRESS NOTE ADULT - ATTENDING COMMENTS
A/P  Sepsis secondary to acute Cholecystitis s/p IR guided cholecystostomy tube 11/26 -resolved s/p completion of IV Zosyn  Severe protein calorie malnutrition  Cholelithiasis   Afib not on anticoagulation deferred to his advanced age  HTN  PVD on Plavix  CKD stage 3  Elevated INR -normalized s/p vitamin K  Hypokalemia s/p repletion- resolved    Plan  Disposition -[facility], awaits Auth  DC planning

## 2019-12-05 ENCOUNTER — TRANSCRIPTION ENCOUNTER (OUTPATIENT)
Age: 84
End: 2019-12-05

## 2019-12-05 VITALS
DIASTOLIC BLOOD PRESSURE: 83 MMHG | HEART RATE: 83 BPM | RESPIRATION RATE: 17 BRPM | SYSTOLIC BLOOD PRESSURE: 137 MMHG | OXYGEN SATURATION: 98 % | TEMPERATURE: 98 F

## 2019-12-05 PROCEDURE — 82306 VITAMIN D 25 HYDROXY: CPT

## 2019-12-05 PROCEDURE — 80053 COMPREHEN METABOLIC PANEL: CPT

## 2019-12-05 PROCEDURE — 85027 COMPLETE CBC AUTOMATED: CPT

## 2019-12-05 PROCEDURE — 83605 ASSAY OF LACTIC ACID: CPT

## 2019-12-05 PROCEDURE — 92610 EVALUATE SWALLOWING FUNCTION: CPT

## 2019-12-05 PROCEDURE — 71250 CT THORAX DX C-: CPT

## 2019-12-05 PROCEDURE — 76770 US EXAM ABDO BACK WALL COMP: CPT

## 2019-12-05 PROCEDURE — 82746 ASSAY OF FOLIC ACID SERUM: CPT

## 2019-12-05 PROCEDURE — 86900 BLOOD TYPING SEROLOGIC ABO: CPT

## 2019-12-05 PROCEDURE — 82248 BILIRUBIN DIRECT: CPT

## 2019-12-05 PROCEDURE — 76705 ECHO EXAM OF ABDOMEN: CPT

## 2019-12-05 PROCEDURE — 82607 VITAMIN B-12: CPT

## 2019-12-05 PROCEDURE — 96365 THER/PROPH/DIAG IV INF INIT: CPT

## 2019-12-05 PROCEDURE — 80076 HEPATIC FUNCTION PANEL: CPT

## 2019-12-05 PROCEDURE — 86140 C-REACTIVE PROTEIN: CPT

## 2019-12-05 PROCEDURE — 82570 ASSAY OF URINE CREATININE: CPT

## 2019-12-05 PROCEDURE — 85730 THROMBOPLASTIN TIME PARTIAL: CPT

## 2019-12-05 PROCEDURE — 86850 RBC ANTIBODY SCREEN: CPT

## 2019-12-05 PROCEDURE — C1729: CPT

## 2019-12-05 PROCEDURE — 83735 ASSAY OF MAGNESIUM: CPT

## 2019-12-05 PROCEDURE — 87631 RESP VIRUS 3-5 TARGETS: CPT

## 2019-12-05 PROCEDURE — 93005 ELECTROCARDIOGRAM TRACING: CPT

## 2019-12-05 PROCEDURE — 80048 BASIC METABOLIC PNL TOTAL CA: CPT

## 2019-12-05 PROCEDURE — P9011: CPT

## 2019-12-05 PROCEDURE — 81001 URINALYSIS AUTO W/SCOPE: CPT

## 2019-12-05 PROCEDURE — A9537: CPT

## 2019-12-05 PROCEDURE — 85610 PROTHROMBIN TIME: CPT

## 2019-12-05 PROCEDURE — 99285 EMERGENCY DEPT VISIT HI MDM: CPT | Mod: 25

## 2019-12-05 PROCEDURE — 36430 TRANSFUSION BLD/BLD COMPNT: CPT

## 2019-12-05 PROCEDURE — 87205 SMEAR GRAM STAIN: CPT

## 2019-12-05 PROCEDURE — 71045 X-RAY EXAM CHEST 1 VIEW: CPT

## 2019-12-05 PROCEDURE — 86901 BLOOD TYPING SEROLOGIC RH(D): CPT

## 2019-12-05 PROCEDURE — 97530 THERAPEUTIC ACTIVITIES: CPT

## 2019-12-05 PROCEDURE — 93306 TTE W/DOPPLER COMPLETE: CPT

## 2019-12-05 PROCEDURE — 87075 CULTR BACTERIA EXCEPT BLOOD: CPT

## 2019-12-05 PROCEDURE — 92526 ORAL FUNCTION THERAPY: CPT

## 2019-12-05 PROCEDURE — 83036 HEMOGLOBIN GLYCOSYLATED A1C: CPT

## 2019-12-05 PROCEDURE — 86985 SPLIT BLOOD OR PRODUCTS: CPT

## 2019-12-05 PROCEDURE — 84443 ASSAY THYROID STIM HORMONE: CPT

## 2019-12-05 PROCEDURE — 97162 PT EVAL MOD COMPLEX 30 MIN: CPT

## 2019-12-05 PROCEDURE — 84100 ASSAY OF PHOSPHORUS: CPT

## 2019-12-05 PROCEDURE — 99239 HOSP IP/OBS DSCHRG MGMT >30: CPT | Mod: GC

## 2019-12-05 PROCEDURE — 74176 CT ABD & PELVIS W/O CONTRAST: CPT

## 2019-12-05 PROCEDURE — 73630 X-RAY EXAM OF FOOT: CPT

## 2019-12-05 PROCEDURE — 85652 RBC SED RATE AUTOMATED: CPT

## 2019-12-05 PROCEDURE — 87086 URINE CULTURE/COLONY COUNT: CPT

## 2019-12-05 PROCEDURE — 87040 BLOOD CULTURE FOR BACTERIA: CPT

## 2019-12-05 PROCEDURE — 80061 LIPID PANEL: CPT

## 2019-12-05 PROCEDURE — 47490 INCISION OF GALLBLADDER: CPT

## 2019-12-05 PROCEDURE — 76942 ECHO GUIDE FOR BIOPSY: CPT

## 2019-12-05 PROCEDURE — 84540 ASSAY OF URINE/UREA-N: CPT

## 2019-12-05 PROCEDURE — 84300 ASSAY OF URINE SODIUM: CPT

## 2019-12-05 PROCEDURE — 82436 ASSAY OF URINE CHLORIDE: CPT

## 2019-12-05 PROCEDURE — 78226 HEPATOBILIARY SYSTEM IMAGING: CPT

## 2019-12-05 PROCEDURE — 73590 X-RAY EXAM OF LOWER LEG: CPT

## 2019-12-05 PROCEDURE — 36415 COLL VENOUS BLD VENIPUNCTURE: CPT

## 2019-12-05 PROCEDURE — 87070 CULTURE OTHR SPECIMN AEROBIC: CPT

## 2019-12-05 PROCEDURE — P9059: CPT

## 2019-12-05 RX ORDER — CALAMINE AND ZINC OXIDE AND PHENOL 160; 10 MG/ML; MG/ML
1 LOTION TOPICAL
Qty: 0 | Refills: 0 | DISCHARGE
Start: 2019-12-05

## 2019-12-05 RX ORDER — FUROSEMIDE 40 MG
1 TABLET ORAL
Qty: 0 | Refills: 0 | DISCHARGE

## 2019-12-05 RX ORDER — ACETAMINOPHEN 500 MG
2 TABLET ORAL
Qty: 0 | Refills: 0 | DISCHARGE
Start: 2019-12-05

## 2019-12-05 RX ORDER — LANOLIN ALCOHOL/MO/W.PET/CERES
1 CREAM (GRAM) TOPICAL AT BEDTIME
Refills: 0 | Status: DISCONTINUED | OUTPATIENT
Start: 2019-12-05 | End: 2019-12-05

## 2019-12-05 RX ORDER — TAMSULOSIN HYDROCHLORIDE 0.4 MG/1
1 CAPSULE ORAL
Qty: 0 | Refills: 0 | DISCHARGE
Start: 2019-12-05

## 2019-12-05 RX ADMIN — CLOPIDOGREL BISULFATE 75 MILLIGRAM(S): 75 TABLET, FILM COATED ORAL at 12:03

## 2019-12-05 RX ADMIN — Medication 1 MILLIGRAM(S): at 02:46

## 2019-12-05 RX ADMIN — Medication 100 MICROGRAM(S): at 06:04

## 2019-12-05 RX ADMIN — ENOXAPARIN SODIUM 30 MILLIGRAM(S): 100 INJECTION SUBCUTANEOUS at 12:03

## 2019-12-05 RX ADMIN — CALAMINE AND ZINC OXIDE AND PHENOL 1 APPLICATION(S): 160; 10 LOTION TOPICAL at 12:04

## 2019-12-05 NOTE — PROGRESS NOTE ADULT - PROBLEM SELECTOR PLAN 4
AFIB- rate controlled. Monitor.  Plavix held for surgery
AFIB- rate controlled. Monitor.  continue Plavix
AFIB- rate controlled. Monitor.  resuming Plavix
Home medication 100mcg of levothyroxine  TSH- wnl
Pt has hx of afib  EKG: Afib rate controlled   Not on any rate control medication  -Patient refused AC on previous admission with new onset afib, however patient on clopidogrel as confirmed with pharmacy
Pt has hx of afib  EKG: Afib rate controlled   Not on any rate control medication  -Patient refused AC on previous admission with new onset afib, however patient on clopidogrel as confirmed with pharmacy
Pt has hx of afib  EKG: Afib rate controlled   Not on any rate control medication  -Patient refused AC on previous admission with new onset afib, however patient on clopidogrel(?) as confirmed with pharmacy
Pt has hx of afib  EKG: Afib rate controlled   Not on any rate control medication  -Patient refused AC on previous admission with new onset afib, however patient on clopidogrel(?) as confirmed with pharmacy
Pt has hx of afib  EKG: Afib rate controlled   Not on any rate control medication  patient on plavix for PVD, PCP office unsure why patient not on AC
Pt has hx of afib  EKG: Afib rate controlled   Not on any rate control medication  -Patient refused AC on previous admission with new onset afib, however patient on clopidogrel as confirmed with pharmacy
AFIB- rate controlled. Monitor.  Plavix held for surgery
AFIB- rate controlled. Monitor.  Plavix held for surgery  may restart in 24-36 hours as per IR
AFIB- rate controlled. Monitor.  continue Plavix

## 2019-12-05 NOTE — PROGRESS NOTE ADULT - PROBLEM SELECTOR PROBLEM 2
ARMIN (acute kidney injury)
Cellulitis
Hypernatremia
Sepsis
Sepsis
Cellulitis
Hypernatremia

## 2019-12-05 NOTE — PROGRESS NOTE ADULT - PROBLEM SELECTOR PROBLEM 1
Acute cholecystitis
Sepsis
Acute cholecystitis

## 2019-12-05 NOTE — PROGRESS NOTE ADULT - SUBJECTIVE AND OBJECTIVE BOX
PGY 1 Note discussed with supervising resident and primary attending    Patient is a 89y old  Male who presents with a chief complaint of Fever (04 Dec 2019 10:29)      INTERVAL HPI/OVERNIGHT EVENTS: offers no new complaints; current symptoms resolving    MEDICATIONS  (STANDING):  calamine Lotion 1 Application(s) Topical daily  clopidogrel Tablet 75 milliGRAM(s) Oral daily  enoxaparin Injectable 30 milliGRAM(s) SubCutaneous daily  levothyroxine 100 MICROGram(s) Oral daily  melatonin 1 milliGRAM(s) Oral at bedtime  tamsulosin 0.4 milliGRAM(s) Oral at bedtime    MEDICATIONS  (PRN):  acetaminophen   Tablet .. 650 milliGRAM(s) Oral every 6 hours PRN Temp greater or equal to 38C (100.4F), Moderate Pain (4 - 6)  guaiFENesin    Syrup 100 milliGRAM(s) Oral every 6 hours PRN Cough      __________________________________________________  Vital Signs Last 24 Hrs  T(C): 36.6 (05 Dec 2019 05:18), Max: 36.6 (05 Dec 2019 05:18)  T(F): 97.8 (05 Dec 2019 05:18), Max: 97.8 (05 Dec 2019 05:18)  HR: 64 (05 Dec 2019 05:18) (64 - 84)  BP: 104/53 (05 Dec 2019 05:18) (104/53 - 144/73)  BP(mean): --  RR: 16 (05 Dec 2019 05:18) (14 - 17)  SpO2: 98% (05 Dec 2019 05:18) (98% - 99%)    ________________________________________________  PHYSICAL EXAM:  GENERAL: Elderly male in NAd  HEENT: Normocephalic;  conjunctivae and sclerae clear; moist mucous membranes;   NECK : supple  CHEST/LUNG: Clear to auscultation bilaterally with good air entry   HEART: S1 S2  regular; no murmurs, gallops or rubs  ABDOMEN: Soft, Nontender, Nondistended; Bowel sounds present, cholecystostomy tube present.  EXTREMITIES: no cyanosis; no edema; no calf tenderness  SKIN: warm and dry; no rash  NERVOUS SYSTEM:  Awake and alert;  ; no new deficits    _________________________________________________  LABS:              CAPILLARY BLOOD GLUCOSE            RADIOLOGY & ADDITIONAL TESTS:    Imaging Personally Reviewed:  YES/NO    Consultant(s) Notes Reviewed:   YES/ No    Care Discussed with Consultants :     Plan of care was discussed with patient and /or primary care giver; all questions and concerns were addressed and care was aligned with patient's wishes. PGY 1 Note discussed with supervising resident and primary attending    Patient is a 89y old  Male who presents with a chief complaint of Fever (04 Dec 2019 10:29)      INTERVAL HPI/OVERNIGHT EVENTS:  Patient was seen and examined at bedside , no new complains offered , was comfortably ;lying in bed .    MEDICATIONS  (STANDING):  calamine Lotion 1 Application(s) Topical daily  clopidogrel Tablet 75 milliGRAM(s) Oral daily  enoxaparin Injectable 30 milliGRAM(s) SubCutaneous daily  levothyroxine 100 MICROGram(s) Oral daily  melatonin 1 milliGRAM(s) Oral at bedtime  tamsulosin 0.4 milliGRAM(s) Oral at bedtime    MEDICATIONS  (PRN):  acetaminophen   Tablet .. 650 milliGRAM(s) Oral every 6 hours PRN Temp greater or equal to 38C (100.4F), Moderate Pain (4 - 6)  guaiFENesin    Syrup 100 milliGRAM(s) Oral every 6 hours PRN Cough      __________________________________________________  Vital Signs Last 24 Hrs  T(C): 36.6 (05 Dec 2019 05:18), Max: 36.6 (05 Dec 2019 05:18)  T(F): 97.8 (05 Dec 2019 05:18), Max: 97.8 (05 Dec 2019 05:18)  HR: 64 (05 Dec 2019 05:18) (64 - 84)  BP: 104/53 (05 Dec 2019 05:18) (104/53 - 144/73)  BP(mean): --  RR: 16 (05 Dec 2019 05:18) (14 - 17)  SpO2: 98% (05 Dec 2019 05:18) (98% - 99%)    ________________________________________________  PHYSICAL EXAM:  GENERAL: Elderly male in NAd  HEENT: Normocephalic;  conjunctivae and sclerae clear; moist mucous membranes;   NECK : supple  CHEST/LUNG: Clear to auscultation bilaterally with good air entry   HEART: S1 S2  regular; no murmurs, gallops or rubs  ABDOMEN: Soft, Nontender, Nondistended; Bowel sounds present, cholecystostomy tube present.  EXTREMITIES: no cyanosis; no edema; no calf tenderness  SKIN: warm and dry; no rash  NERVOUS SYSTEM:  Awake and alert;  ; no new deficits    _________________________________________________  LABS:              CAPILLARY BLOOD GLUCOSE            RADIOLOGY & ADDITIONAL TESTS:    Imaging Personally Reviewed:  YES/NO    Consultant(s) Notes Reviewed:   YES/ No    Care Discussed with Consultants :     Plan of care was discussed with patient and /or primary care giver; all questions and concerns were addressed and care was aligned with patient's wishes.

## 2019-12-05 NOTE — PROGRESS NOTE ADULT - PROBLEM SELECTOR PROBLEM 3
ARMIN (acute kidney injury)
Afib
ARMIN (acute kidney injury)

## 2019-12-05 NOTE — PROGRESS NOTE ADULT - REASON FOR ADMISSION
Fever

## 2019-12-05 NOTE — DISCHARGE NOTE NURSING/CASE MANAGEMENT/SOCIAL WORK - PATIENT PORTAL LINK FT
You can access the FollowMyHealth Patient Portal offered by [facility] by registering at the following website: [email or web address]. By joining [employer]’s FollowMyHealth portal, you will also be able to view your health information using other applications (apps) compatible with our system.

## 2019-12-06 ENCOUNTER — INPATIENT (INPATIENT)
Facility: HOSPITAL | Age: 84
LOS: 5 days | Discharge: NURSING, REHAB OR LONG-TERM CARE | DRG: 871 | End: 2019-12-12
Attending: INTERNAL MEDICINE | Admitting: INTERNAL MEDICINE
Payer: MEDICARE

## 2019-12-06 VITALS
WEIGHT: 160.06 LBS | OXYGEN SATURATION: 91 % | DIASTOLIC BLOOD PRESSURE: 66 MMHG | HEART RATE: 122 BPM | RESPIRATION RATE: 24 BRPM | SYSTOLIC BLOOD PRESSURE: 127 MMHG | HEIGHT: 62 IN

## 2019-12-06 LAB
ALBUMIN SERPL ELPH-MCNC: 2.4 G/DL — LOW (ref 3.5–5)
ALP SERPL-CCNC: 144 U/L — HIGH (ref 40–120)
ALT FLD-CCNC: 29 U/L DA — SIGNIFICANT CHANGE UP (ref 10–60)
ANION GAP SERPL CALC-SCNC: 9 MMOL/L — SIGNIFICANT CHANGE UP (ref 5–17)
APTT BLD: 33 SEC — SIGNIFICANT CHANGE UP (ref 27.5–36.3)
AST SERPL-CCNC: 24 U/L — SIGNIFICANT CHANGE UP (ref 10–40)
BASOPHILS # BLD AUTO: 0.03 K/UL — SIGNIFICANT CHANGE UP (ref 0–0.2)
BASOPHILS NFR BLD AUTO: 0.2 % — SIGNIFICANT CHANGE UP (ref 0–2)
BILIRUB SERPL-MCNC: 0.6 MG/DL — SIGNIFICANT CHANGE UP (ref 0.2–1.2)
BUN SERPL-MCNC: 22 MG/DL — HIGH (ref 7–18)
CALCIUM SERPL-MCNC: 8.6 MG/DL — SIGNIFICANT CHANGE UP (ref 8.4–10.5)
CHLORIDE SERPL-SCNC: 111 MMOL/L — HIGH (ref 96–108)
CO2 SERPL-SCNC: 20 MMOL/L — LOW (ref 22–31)
CREAT SERPL-MCNC: 1.18 MG/DL — SIGNIFICANT CHANGE UP (ref 0.5–1.3)
EOSINOPHIL # BLD AUTO: 0.03 K/UL — SIGNIFICANT CHANGE UP (ref 0–0.5)
EOSINOPHIL NFR BLD AUTO: 0.2 % — SIGNIFICANT CHANGE UP (ref 0–6)
GLUCOSE SERPL-MCNC: 139 MG/DL — HIGH (ref 70–99)
HCT VFR BLD CALC: 39 % — SIGNIFICANT CHANGE UP (ref 39–50)
HGB BLD-MCNC: 12.1 G/DL — LOW (ref 13–17)
IMM GRANULOCYTES NFR BLD AUTO: 0.6 % — SIGNIFICANT CHANGE UP (ref 0–1.5)
INR BLD: 1.22 RATIO — HIGH (ref 0.88–1.16)
LACTATE SERPL-SCNC: 2.9 MMOL/L — HIGH (ref 0.7–2)
LYMPHOCYTES # BLD AUTO: 1.11 K/UL — SIGNIFICANT CHANGE UP (ref 1–3.3)
LYMPHOCYTES # BLD AUTO: 6.2 % — LOW (ref 13–44)
MCHC RBC-ENTMCNC: 30.9 PG — SIGNIFICANT CHANGE UP (ref 27–34)
MCHC RBC-ENTMCNC: 31 GM/DL — LOW (ref 32–36)
MCV RBC AUTO: 99.7 FL — SIGNIFICANT CHANGE UP (ref 80–100)
MONOCYTES # BLD AUTO: 0.41 K/UL — SIGNIFICANT CHANGE UP (ref 0–0.9)
MONOCYTES NFR BLD AUTO: 2.3 % — SIGNIFICANT CHANGE UP (ref 2–14)
NEUTROPHILS # BLD AUTO: 16.27 K/UL — HIGH (ref 1.8–7.4)
NEUTROPHILS NFR BLD AUTO: 90.5 % — HIGH (ref 43–77)
NRBC # BLD: 0 /100 WBCS — SIGNIFICANT CHANGE UP (ref 0–0)
PLATELET # BLD AUTO: 476 K/UL — HIGH (ref 150–400)
POTASSIUM SERPL-MCNC: 4.6 MMOL/L — SIGNIFICANT CHANGE UP (ref 3.5–5.3)
POTASSIUM SERPL-SCNC: 4.6 MMOL/L — SIGNIFICANT CHANGE UP (ref 3.5–5.3)
PROT SERPL-MCNC: 7.2 G/DL — SIGNIFICANT CHANGE UP (ref 6–8.3)
PROTHROM AB SERPL-ACNC: 13.6 SEC — HIGH (ref 10–12.9)
RBC # BLD: 3.91 M/UL — LOW (ref 4.2–5.8)
RBC # FLD: 17.1 % — HIGH (ref 10.3–14.5)
SODIUM SERPL-SCNC: 140 MMOL/L — SIGNIFICANT CHANGE UP (ref 135–145)
WBC # BLD: 17.95 K/UL — HIGH (ref 3.8–10.5)
WBC # FLD AUTO: 17.95 K/UL — HIGH (ref 3.8–10.5)

## 2019-12-06 PROCEDURE — 71045 X-RAY EXAM CHEST 1 VIEW: CPT | Mod: 26

## 2019-12-06 RX ORDER — SODIUM CHLORIDE 9 MG/ML
2000 INJECTION INTRAMUSCULAR; INTRAVENOUS; SUBCUTANEOUS ONCE
Refills: 0 | Status: COMPLETED | OUTPATIENT
Start: 2019-12-06 | End: 2019-12-06

## 2019-12-06 RX ORDER — DILTIAZEM HCL 120 MG
20 CAPSULE, EXT RELEASE 24 HR ORAL ONCE
Refills: 0 | Status: COMPLETED | OUTPATIENT
Start: 2019-12-06 | End: 2019-12-06

## 2019-12-06 RX ORDER — ACETAMINOPHEN 500 MG
1000 TABLET ORAL ONCE
Refills: 0 | Status: COMPLETED | OUTPATIENT
Start: 2019-12-06 | End: 2019-12-06

## 2019-12-06 RX ORDER — VANCOMYCIN HCL 1 G
1000 VIAL (EA) INTRAVENOUS ONCE
Refills: 0 | Status: COMPLETED | OUTPATIENT
Start: 2019-12-06 | End: 2019-12-06

## 2019-12-06 RX ORDER — AZITHROMYCIN 500 MG/1
500 TABLET, FILM COATED ORAL ONCE
Refills: 0 | Status: COMPLETED | OUTPATIENT
Start: 2019-12-06 | End: 2019-12-06

## 2019-12-06 RX ORDER — CEFEPIME 1 G/1
1000 INJECTION, POWDER, FOR SOLUTION INTRAMUSCULAR; INTRAVENOUS ONCE
Refills: 0 | Status: COMPLETED | OUTPATIENT
Start: 2019-12-06 | End: 2019-12-06

## 2019-12-06 RX ORDER — ONDANSETRON 8 MG/1
4 TABLET, FILM COATED ORAL ONCE
Refills: 0 | Status: COMPLETED | OUTPATIENT
Start: 2019-12-06 | End: 2019-12-06

## 2019-12-06 RX ADMIN — Medication 400 MILLIGRAM(S): at 19:15

## 2019-12-06 RX ADMIN — Medication 200 MILLIGRAM(S): at 19:16

## 2019-12-06 RX ADMIN — AZITHROMYCIN 255 MILLIGRAM(S): 500 TABLET, FILM COATED ORAL at 19:16

## 2019-12-06 RX ADMIN — Medication 250 MILLIGRAM(S): at 21:36

## 2019-12-06 RX ADMIN — ONDANSETRON 4 MILLIGRAM(S): 8 TABLET, FILM COATED ORAL at 19:20

## 2019-12-06 RX ADMIN — SODIUM CHLORIDE 2000 MILLILITER(S): 9 INJECTION INTRAMUSCULAR; INTRAVENOUS; SUBCUTANEOUS at 18:50

## 2019-12-06 RX ADMIN — CEFEPIME 100 MILLIGRAM(S): 1 INJECTION, POWDER, FOR SOLUTION INTRAMUSCULAR; INTRAVENOUS at 21:36

## 2019-12-06 RX ADMIN — Medication 20 MILLIGRAM(S): at 20:24

## 2019-12-06 NOTE — ED PROVIDER NOTE - PROGRESS NOTE DETAILS
(Mira) - s/o to f/u CT and admit. Admitting attending (Dr Juárez) accepted pt and will f/u results of CT scan.

## 2019-12-06 NOTE — ED ADULT NURSE NOTE - NSIMPLEMENTINTERV_GEN_ALL_ED
Implemented All Fall with Harm Risk Interventions:  [place] to call system. Call bell, personal items and telephone within reach. Instruct patient to call for assistance. Room bathroom lighting operational. Non-slip footwear when patient is off stretcher. Physically safe environment: no spills, clutter or unnecessary equipment. Stretcher in lowest position, wheels locked, appropriate side rails in place. Provide visual cue, wrist band, yellow gown, etc. Monitor gait and stability. Monitor for mental status changes and reorient to person, place, and time. Review medications for side effects contributing to fall risk. Reinforce activity limits and safety measures with patient and family. Provide visual clues: red socks.

## 2019-12-06 NOTE — ED PROVIDER NOTE - CLINICAL SUMMARY MEDICAL DECISION MAKING FREE TEXT BOX
patient presenting with fever and cough, vital sig for tachycardia in setting of afib. known afib in prior ekg. will obtain lab, fluid, sepsis protocol, xray and reassess

## 2019-12-07 DIAGNOSIS — I10 ESSENTIAL (PRIMARY) HYPERTENSION: ICD-10-CM

## 2019-12-07 DIAGNOSIS — I73.9 PERIPHERAL VASCULAR DISEASE, UNSPECIFIED: ICD-10-CM

## 2019-12-07 DIAGNOSIS — A41.9 SEPSIS, UNSPECIFIED ORGANISM: ICD-10-CM

## 2019-12-07 DIAGNOSIS — R50.9 FEVER, UNSPECIFIED: ICD-10-CM

## 2019-12-07 DIAGNOSIS — Z29.9 ENCOUNTER FOR PROPHYLACTIC MEASURES, UNSPECIFIED: ICD-10-CM

## 2019-12-07 DIAGNOSIS — I48.91 UNSPECIFIED ATRIAL FIBRILLATION: ICD-10-CM

## 2019-12-07 DIAGNOSIS — E03.9 HYPOTHYROIDISM, UNSPECIFIED: ICD-10-CM

## 2019-12-07 LAB
ALBUMIN SERPL ELPH-MCNC: 2.3 G/DL — LOW (ref 3.5–5)
ALP SERPL-CCNC: 134 U/L — HIGH (ref 40–120)
ALT FLD-CCNC: 26 U/L DA — SIGNIFICANT CHANGE UP (ref 10–60)
ANION GAP SERPL CALC-SCNC: 7 MMOL/L — SIGNIFICANT CHANGE UP (ref 5–17)
APPEARANCE UR: CLEAR — SIGNIFICANT CHANGE UP
AST SERPL-CCNC: 26 U/L — SIGNIFICANT CHANGE UP (ref 10–40)
BASE EXCESS BLDA CALC-SCNC: -1.1 MMOL/L — SIGNIFICANT CHANGE UP (ref -2–2)
BASOPHILS # BLD AUTO: 0 K/UL — SIGNIFICANT CHANGE UP (ref 0–0.2)
BASOPHILS NFR BLD AUTO: 0 % — SIGNIFICANT CHANGE UP (ref 0–2)
BILIRUB SERPL-MCNC: 1.5 MG/DL — HIGH (ref 0.2–1.2)
BILIRUB UR-MCNC: NEGATIVE — SIGNIFICANT CHANGE UP
BLOOD GAS COMMENTS ARTERIAL: SIGNIFICANT CHANGE UP
BUN SERPL-MCNC: 23 MG/DL — HIGH (ref 7–18)
CALCIUM SERPL-MCNC: 8.2 MG/DL — LOW (ref 8.4–10.5)
CHLORIDE SERPL-SCNC: 110 MMOL/L — HIGH (ref 96–108)
CHOLEST SERPL-MCNC: 117 MG/DL — SIGNIFICANT CHANGE UP (ref 10–199)
CO2 SERPL-SCNC: 24 MMOL/L — SIGNIFICANT CHANGE UP (ref 22–31)
COLOR SPEC: YELLOW — SIGNIFICANT CHANGE UP
CREAT SERPL-MCNC: 1.48 MG/DL — HIGH (ref 0.5–1.3)
DIFF PNL FLD: NEGATIVE — SIGNIFICANT CHANGE UP
EOSINOPHIL # BLD AUTO: 0 K/UL — SIGNIFICANT CHANGE UP (ref 0–0.5)
EOSINOPHIL NFR BLD AUTO: 0 % — SIGNIFICANT CHANGE UP (ref 0–6)
FLU A RESULT: SIGNIFICANT CHANGE UP
FLU A RESULT: SIGNIFICANT CHANGE UP
FLUAV AG NPH QL: SIGNIFICANT CHANGE UP
FLUBV AG NPH QL: SIGNIFICANT CHANGE UP
FOLATE SERPL-MCNC: 13.2 NG/ML — SIGNIFICANT CHANGE UP
GLUCOSE SERPL-MCNC: 120 MG/DL — HIGH (ref 70–99)
GLUCOSE UR QL: NEGATIVE — SIGNIFICANT CHANGE UP
HBA1C BLD-MCNC: 5.5 % — SIGNIFICANT CHANGE UP (ref 4–5.6)
HCO3 BLDA-SCNC: 22 MMOL/L — LOW (ref 23–27)
HCT VFR BLD CALC: 37.5 % — LOW (ref 39–50)
HDLC SERPL-MCNC: 56 MG/DL — SIGNIFICANT CHANGE UP
HGB BLD-MCNC: 11.4 G/DL — LOW (ref 13–17)
HOROWITZ INDEX BLDA+IHG-RTO: 21 — SIGNIFICANT CHANGE UP
KETONES UR-MCNC: NEGATIVE — SIGNIFICANT CHANGE UP
LACTATE SERPL-SCNC: 2.7 MMOL/L — HIGH (ref 0.7–2)
LACTATE SERPL-SCNC: 3.1 MMOL/L — HIGH (ref 0.7–2)
LACTATE SERPL-SCNC: 4.2 MMOL/L — CRITICAL HIGH (ref 0.7–2)
LEUKOCYTE ESTERASE UR-ACNC: ABNORMAL
LIPID PNL WITH DIRECT LDL SERPL: 48 MG/DL — SIGNIFICANT CHANGE UP
LYMPHOCYTES # BLD AUTO: 0.86 K/UL — LOW (ref 1–3.3)
LYMPHOCYTES # BLD AUTO: 4 % — LOW (ref 13–44)
MAGNESIUM SERPL-MCNC: 2.1 MG/DL — SIGNIFICANT CHANGE UP (ref 1.6–2.6)
MCHC RBC-ENTMCNC: 30.4 GM/DL — LOW (ref 32–36)
MCHC RBC-ENTMCNC: 31.5 PG — SIGNIFICANT CHANGE UP (ref 27–34)
MCV RBC AUTO: 103.6 FL — HIGH (ref 80–100)
MONOCYTES # BLD AUTO: 0.43 K/UL — SIGNIFICANT CHANGE UP (ref 0–0.9)
MONOCYTES NFR BLD AUTO: 2 % — SIGNIFICANT CHANGE UP (ref 2–14)
NEUTROPHILS # BLD AUTO: 20.22 K/UL — HIGH (ref 1.8–7.4)
NEUTROPHILS NFR BLD AUTO: 94 % — HIGH (ref 43–77)
NITRITE UR-MCNC: NEGATIVE — SIGNIFICANT CHANGE UP
PCO2 BLDA: 32 MMHG — SIGNIFICANT CHANGE UP (ref 32–46)
PH BLDA: 7.44 — SIGNIFICANT CHANGE UP (ref 7.35–7.45)
PH UR: 6 — SIGNIFICANT CHANGE UP (ref 5–8)
PHOSPHATE SERPL-MCNC: 3 MG/DL — SIGNIFICANT CHANGE UP (ref 2.5–4.5)
PLATELET # BLD AUTO: 411 K/UL — HIGH (ref 150–400)
PO2 BLDA: 73 MMHG — LOW (ref 74–108)
POTASSIUM SERPL-MCNC: 4.6 MMOL/L — SIGNIFICANT CHANGE UP (ref 3.5–5.3)
POTASSIUM SERPL-SCNC: 4.6 MMOL/L — SIGNIFICANT CHANGE UP (ref 3.5–5.3)
PROT SERPL-MCNC: 6.8 G/DL — SIGNIFICANT CHANGE UP (ref 6–8.3)
PROT UR-MCNC: NEGATIVE — SIGNIFICANT CHANGE UP
RAPID RVP RESULT: SIGNIFICANT CHANGE UP
RBC # BLD: 3.62 M/UL — LOW (ref 4.2–5.8)
RBC # FLD: 17.6 % — HIGH (ref 10.3–14.5)
RSV RESULT: SIGNIFICANT CHANGE UP
RSV RNA RESP QL NAA+PROBE: SIGNIFICANT CHANGE UP
SAO2 % BLDA: 95 % — SIGNIFICANT CHANGE UP (ref 92–96)
SODIUM SERPL-SCNC: 141 MMOL/L — SIGNIFICANT CHANGE UP (ref 135–145)
SP GR SPEC: 1.01 — SIGNIFICANT CHANGE UP (ref 1.01–1.02)
TOTAL CHOLESTEROL/HDL RATIO MEASUREMENT: 2.1 RATIO — LOW (ref 3.4–9.6)
TRIGL SERPL-MCNC: 64 MG/DL — SIGNIFICANT CHANGE UP (ref 10–149)
TROPONIN I SERPL-MCNC: 0.03 NG/ML — SIGNIFICANT CHANGE UP (ref 0–0.04)
TROPONIN I SERPL-MCNC: <0.015 NG/ML — SIGNIFICANT CHANGE UP (ref 0–0.04)
TSH SERPL-MCNC: 3.81 UU/ML — SIGNIFICANT CHANGE UP (ref 0.34–4.82)
UROBILINOGEN FLD QL: NEGATIVE — SIGNIFICANT CHANGE UP
VIT B12 SERPL-MCNC: 612 PG/ML — SIGNIFICANT CHANGE UP (ref 232–1245)
WBC # BLD: 21.51 K/UL — HIGH (ref 3.8–10.5)
WBC # FLD AUTO: 21.51 K/UL — HIGH (ref 3.8–10.5)

## 2019-12-07 PROCEDURE — 74177 CT ABD & PELVIS W/CONTRAST: CPT | Mod: 26

## 2019-12-07 PROCEDURE — 99285 EMERGENCY DEPT VISIT HI MDM: CPT

## 2019-12-07 PROCEDURE — 71260 CT THORAX DX C+: CPT | Mod: 26

## 2019-12-07 PROCEDURE — 99223 1ST HOSP IP/OBS HIGH 75: CPT

## 2019-12-07 RX ORDER — PIPERACILLIN AND TAZOBACTAM 4; .5 G/20ML; G/20ML
3.38 INJECTION, POWDER, LYOPHILIZED, FOR SOLUTION INTRAVENOUS ONCE
Refills: 0 | Status: DISCONTINUED | OUTPATIENT
Start: 2019-12-07 | End: 2019-12-07

## 2019-12-07 RX ORDER — SODIUM CHLORIDE 9 MG/ML
500 INJECTION INTRAMUSCULAR; INTRAVENOUS; SUBCUTANEOUS ONCE
Refills: 0 | Status: COMPLETED | OUTPATIENT
Start: 2019-12-07 | End: 2019-12-07

## 2019-12-07 RX ORDER — PIPERACILLIN AND TAZOBACTAM 4; .5 G/20ML; G/20ML
3.38 INJECTION, POWDER, LYOPHILIZED, FOR SOLUTION INTRAVENOUS ONCE
Refills: 0 | Status: COMPLETED | OUTPATIENT
Start: 2019-12-07 | End: 2019-12-07

## 2019-12-07 RX ORDER — ENOXAPARIN SODIUM 100 MG/ML
40 INJECTION SUBCUTANEOUS DAILY
Refills: 0 | Status: DISCONTINUED | OUTPATIENT
Start: 2019-12-07 | End: 2019-12-12

## 2019-12-07 RX ORDER — TAMSULOSIN HYDROCHLORIDE 0.4 MG/1
0.4 CAPSULE ORAL AT BEDTIME
Refills: 0 | Status: DISCONTINUED | OUTPATIENT
Start: 2019-12-07 | End: 2019-12-12

## 2019-12-07 RX ORDER — IPRATROPIUM/ALBUTEROL SULFATE 18-103MCG
3 AEROSOL WITH ADAPTER (GRAM) INHALATION EVERY 6 HOURS
Refills: 0 | Status: DISCONTINUED | OUTPATIENT
Start: 2019-12-07 | End: 2019-12-12

## 2019-12-07 RX ORDER — LEVOTHYROXINE SODIUM 125 MCG
100 TABLET ORAL DAILY
Refills: 0 | Status: DISCONTINUED | OUTPATIENT
Start: 2019-12-07 | End: 2019-12-07

## 2019-12-07 RX ORDER — FENTANYL CITRATE 50 UG/ML
25 INJECTION INTRAVENOUS
Refills: 0 | Status: DISCONTINUED | OUTPATIENT
Start: 2019-12-07 | End: 2019-12-07

## 2019-12-07 RX ORDER — SODIUM CHLORIDE 9 MG/ML
1000 INJECTION, SOLUTION INTRAVENOUS
Refills: 0 | Status: DISCONTINUED | OUTPATIENT
Start: 2019-12-07 | End: 2019-12-07

## 2019-12-07 RX ORDER — INFLUENZA VIRUS VACCINE 15; 15; 15; 15 UG/.5ML; UG/.5ML; UG/.5ML; UG/.5ML
0.5 SUSPENSION INTRAMUSCULAR ONCE
Refills: 0 | Status: COMPLETED | OUTPATIENT
Start: 2019-12-07 | End: 2019-12-11

## 2019-12-07 RX ORDER — PIPERACILLIN AND TAZOBACTAM 4; .5 G/20ML; G/20ML
3.38 INJECTION, POWDER, LYOPHILIZED, FOR SOLUTION INTRAVENOUS EVERY 8 HOURS
Refills: 0 | Status: DISCONTINUED | OUTPATIENT
Start: 2019-12-07 | End: 2019-12-09

## 2019-12-07 RX ORDER — PIPERACILLIN AND TAZOBACTAM 4; .5 G/20ML; G/20ML
3.38 INJECTION, POWDER, LYOPHILIZED, FOR SOLUTION INTRAVENOUS EVERY 8 HOURS
Refills: 0 | Status: DISCONTINUED | OUTPATIENT
Start: 2019-12-07 | End: 2019-12-07

## 2019-12-07 RX ORDER — VANCOMYCIN HCL 1 G
1000 VIAL (EA) INTRAVENOUS EVERY 12 HOURS
Refills: 0 | Status: DISCONTINUED | OUTPATIENT
Start: 2019-12-07 | End: 2019-12-07

## 2019-12-07 RX ADMIN — PIPERACILLIN AND TAZOBACTAM 25 GRAM(S): 4; .5 INJECTION, POWDER, LYOPHILIZED, FOR SOLUTION INTRAVENOUS at 13:29

## 2019-12-07 RX ADMIN — PIPERACILLIN AND TAZOBACTAM 200 GRAM(S): 4; .5 INJECTION, POWDER, LYOPHILIZED, FOR SOLUTION INTRAVENOUS at 03:52

## 2019-12-07 RX ADMIN — PIPERACILLIN AND TAZOBACTAM 25 GRAM(S): 4; .5 INJECTION, POWDER, LYOPHILIZED, FOR SOLUTION INTRAVENOUS at 21:09

## 2019-12-07 RX ADMIN — SODIUM CHLORIDE 500 MILLILITER(S): 9 INJECTION INTRAMUSCULAR; INTRAVENOUS; SUBCUTANEOUS at 05:59

## 2019-12-07 RX ADMIN — SODIUM CHLORIDE 100 MILLILITER(S): 9 INJECTION, SOLUTION INTRAVENOUS at 18:00

## 2019-12-07 RX ADMIN — Medication 250 MILLIGRAM(S): at 06:00

## 2019-12-07 RX ADMIN — ENOXAPARIN SODIUM 40 MILLIGRAM(S): 100 INJECTION SUBCUTANEOUS at 13:10

## 2019-12-07 RX ADMIN — SODIUM CHLORIDE 1000 MILLILITER(S): 9 INJECTION INTRAMUSCULAR; INTRAVENOUS; SUBCUTANEOUS at 01:25

## 2019-12-07 RX ADMIN — SODIUM CHLORIDE 100 MILLILITER(S): 9 INJECTION, SOLUTION INTRAVENOUS at 12:00

## 2019-12-07 NOTE — PATIENT PROFILE ADULT - CAREGIVER ADDRESS
[address] [address]    PATIENT NAME:  JACKSON PALMA  YOB: 1953  MRN:  [id number]  ATTENDING PHYSICIAN:  Rajat Craig M.D.  ROOM:  [id number]  DICTATING PHYSICIAN:  Jose Boucher D.P.M.  UNIT#/ACCOUNT#:  [id number]  DATE OF SURGERY:  01/10/2018    PREOPERATIVE DIAGNOSIS(ES):  1. Foreign body abscess, plantar aspect, right foot.  2. Cellulitis, right foot.  3. Diabetes mellitus type 2.    POSTOPERATIVE DIAGNOSIS(ES):  1. Foreign body abscess, plantar aspect, right foot.  2. Cellulitis, right foot.  3. Diabetes mellitus type 2.    OPERATION:  1. Incision and drainage, plantar aspect, right foot.  2. Excision of foreign body, plantar aspect, right foot.    SURGEON:  Jose Boucher D.P.M.    ASSISTANT:  Pillo Hernandez D.P.M.    SURGEON:  Jose Boucher D.P.M.    ANESTHESIA:  MAC.    INDICATIONS:  A 64-year-old female, who is admitted with cellulitis of the  right foot.  The patient stated she was barefoot in her bathroom and had a  broken tile in the bathroom and stepped at that area and thinks she had a  piece of grout in the bottom aspect of the right foot.  She tried to remove it  herself.  She went to a podiatrist in [place], Dr. Shannon, who started her on  clindamycin, it was not getting any better, so she returned to him and he  recommended she be admitted.  She was admitted by Dr. Craig for primary  care and was started on vancomycin and cefepime IVPB.  She has been seen by  Infectious Disease.  The cellulitis has improved.  There was an MRI of the  right foot, which shows likely foreign body in the 1st interspace of the right  foot.  The patient presents to the Surgery today for the incision and drainage  and removal of foreign body.  I reviewed the procedures with the patient,  risks and complications, postoperative course, delayed healing, nonhealing,  chronic pain and swelling, vascular and  neurological impairment, wound DVT,  neurovascular impairment, continued infection, delayed healing, nonhealing,  and possible need for further surgery.  The patient states understanding.  Informed consent signed by the patient.  The surgical site was marked by the  patient as well as myself in the preoperative area.  The patient was continued  on her current antibiotic regimen of vancomycin and cefepime.    OPERATIVE PROCEDURE:  The patient was placed on the operating room table in  supine position.  Under IV sedation, local anesthetic was administered via  50:50 mixture of 0.5% Marcaine and 2% Xylocaine plain in an ankle block to the  right ankle.  Following adequate anesthesia, the patient's foot was prepped  and draped in the usual aseptic manner.  Attention was directed to the right  foot where Pasquale's bandage was used for the same of the foot and pneumatic  ankle tourniquet was inflated to 250 mmHg of the right ankle.  Attention was  directed to the plantar aspect of the right foot, where 2 semi-elliptical  converging skin incisions were made in a longitudinal manner in the 1st  interspace area on the plantar aspect of the right foot surrounding the  puncture site.  This initial skin incision was deepened via sharp and blunt  dissection with care taken to retract vital structures and ligate any  superficial veins as necessary.  This intervening wedge of tissue was sent to  pathology for a tissue culture.  There was purulence noted.  When this was  excised and a swab culture was taken of the abscess and sent to microbiology.  This dissection was continued and a foreign body was noted which appeared to  be a tooth pick.  It was 2 cm in length.  It extended dorsal or plantarly to  dorsal in the foot and the interspaces did not communicate with the joint of  the first MPJ of the 2nd MPJ.  The foreign body was excised and this was sent  to pathology for examination.  The tissue surrounding the foreign body  was  also excised and then, this was irrigated copiously with saline with 50,000  units of bacitracin per 1 liter of fluid.  A quarter-inch iodoform gauze and  was packed into the site extending out the incision.  This was loosely sutured  on the dorsal and the distal and plantar aspect of the incision with 3-0  Prolene simple interrupted sutures, and the central area was left open where  the quarter inch iodophor packing was.  Tourniquet was released after 15  minutes.  Normal flush and tone returned to digits 1 through 5 of the right  foot.  Mepitel, 4x4s, and Ortega was applied in a mildly compressive Ace wrap.  The patient tolerated the procedure and anesthesia well, left the operating  room to the recovery room.  However, vital signs were stable and capillary  refilling time within normals 1 through 5 of the right foot.  The patient will  continue on the antibiotics per ID, which currently is vancomycin and cefepime  IVPB.  The patient will be currently nonweightbearing, discussed the findings  with the patient.      Shan Boucher D.P.M.    Author ID:  [id number]  [facility] / CAMN: [id number] / Job#: [id number]  D: 01/10/2018 16:12:52  T: 01/11/2018 13:59:11           Electronically Signed On 01/11/2018 15:13  __________________________________________________   SHAN REN     [id number], [place] [address]

## 2019-12-07 NOTE — H&P ADULT - NSHPPHYSICALEXAM_GEN_ALL_CORE
Vital Signs Last 24 Hrs  T(C): 37.2 (07 Dec 2019 01:24), Max: 38.6 (06 Dec 2019 18:45)  T(F): 99 (07 Dec 2019 01:24), Max: 101.5 (06 Dec 2019 18:45)  HR: 106 (07 Dec 2019 01:24) (82 - 136)  BP: 131/52 (07 Dec 2019 01:24) (121/94 - 131/52)  BP(mean): --  RR: 22 (07 Dec 2019 01:24) (20 - 24)  SpO2: 91% (07 Dec 2019 01:24) (91% - 98%)

## 2019-12-07 NOTE — CONSULT NOTE ADULT - SUBJECTIVE AND OBJECTIVE BOX
[  ] STAT REQUEST              [ X ] ROUTINE REQUEST    Patient is a 89 year old male with food impaction in esophagus. GI consulted to evaluate.          HPI:  89 year old male with multiple medical problems from nursing home presented with cough. patient found to have food impaction in esophagus on CT-Scan. No abdominal pain. nausea, vomiting, hematemesis, hematochezia, melena, fever, chills, chest pain, SOB, hematuria, dysuria or diarrhea reported.              PAIN MANAGEMENT:  Pain Scale:                0 /10  Pain Location:      Prior Colonoscopy:  No prior colonoscopy reported    PAST MEDICAL HISTORY  Acalculous cholecystitis  HTN   Hypothyroid  Ulcers of both lower extremities      PAST SURGICAL HISTORY  No significant past surgical history      Allergies    No Known Allergies          MEDICATIONS  (STANDING):  enoxaparin Injectable 40 milliGRAM(s) SubCutaneous daily  piperacillin/tazobactam IVPB.. 3.375 Gram(s) IV Intermittent every 8 hours  tamsulosin Oral Tab/Cap - Peds 0.4 milliGRAM(s) Oral at bedtime    MEDICATIONS  (PRN):  albuterol/ipratropium for Nebulization 3 milliLiter(s) Nebulizer every 6 hours PRN Shortness of Breath and/or Wheezing  guaiFENesin  milliGRAM(s) Oral every 12 hours PRN Cough      SOCIAL HISTORY  Advanced Directives:       [ X ] Full Code       [  ] DNR  Marital Status:         [ X ] M      [  ] S      [  ] D       [  ] W  Children:       [ X ] Yes      [  ] No  Occupation:        [  ] Employed       [ X ] Unemployed       [  ] Retired  Diet:       [ X ] Regular       [  ] PEG feeding          [  ] NG tube feeding  Drug Use:           [X  ] Patient denied          [  ] Yes  Alcohol:           [X  ] No             [  ] Yes (socially)         [  ] Yes (chronic)  Tobacco:           [  ] Yes           [ X ] No        FAMILY HISTORY  [ X ] Heart Disease            [  ] Diabetes             [  ] HTN             [  ] Colon Cancer             [  ] Stomach Cancer              [  ] Pancreatic Cancer        VITAL SIGNS   Vital Signs Last 24 Hrs  T(C): 36.3 (07 Dec 2019 09:03), Max: 38.6 (06 Dec 2019 18:45)  T(F): 97.3 (07 Dec 2019 09:03), Max: 101.5 (06 Dec 2019 18:45)  HR: 80 (07 Dec 2019 09:03) (63 - 136)  BP: 114/68 (07 Dec 2019 09:03) (114/68 - 131/52)   RR: 20 (07 Dec 2019 09:03) (18 - 24)  SpO2: 98% (07 Dec 2019 08:58) (91% - 100%)  Daily Height in cm: 157.48 (06 Dec 2019 18:13)              CBC Full  -  ( 06 Dec 2019 19:21 )  WBC Count : 17.95 K/uL  RBC Count : 3.91 M/uL  Hemoglobin : 12.1 g/dL  Hematocrit : 39.0 %  Platelet Count - Automated : 476 K/uL  Mean Cell Volume : 99.7 fl  Mean Cell Hemoglobin : 30.9 pg  Mean Cell Hemoglobin Concentration : 31.0 gm/dL  Auto Neutrophil # : 16.27 K/uL  Auto Lymphocyte # : 1.11 K/uL  Auto Monocyte # : 0.41 K/uL  Auto Eosinophil # : 0.03 K/uL  Auto Basophil # : 0.03 K/uL  Auto Neutrophil % : 90.5 %  Auto Lymphocyte % : 6.2 %  Auto Monocyte % : 2.3 %  Auto Eosinophil % : 0.2 %  Auto Basophil % : 0.2 %      12-07    141  |  110<H>  |  23<H>  ----------------------------<  120<H>  4.6   |  24  |  1.48<H>    Ca    8.2<L>      07 Dec 2019 06:47  Phos  3.0     12-07  Mg     2.1     12-07    TPro  6.8  /  Alb  2.3<L>  /  TBili  1.5<H>  /  DBili  x   /  AST  26  /  ALT  26  /  AlkPhos  134<H>  12-07     PT/INR - ( 06 Dec 2019 19:21 )   PT: 13.6 sec;   INR: 1.22 ratio     PTT - ( 06 Dec 2019 19:21 )  PTT:33.0 sec      Urinalysis (12.07.19 @ 01:09)    pH Urine: 6.0    Blood, Urine: Negative    Glucose Qualitative, Urine: Negative    Color: Yellow    Urine Appearance: Clear    Bilirubin: Negative    Ketone - Urine: Negative    Specific Gravity: 1.015    Protein, Urine: Negative    Urobilinogen: Negative    Nitrite: Negative    Leukocyte Esterase Concentration: Trace      ECG  Ventricular Rate 85 BPM    Atrial Rate 78 BPM    QRS Duration 74 ms    Q-T Interval 380 ms    QTC Calculation(Bezet) 452 ms    R Axis 33 degrees    T Axis 4 degrees    Diagnosis Line Atrial fibrillation  Nonspecific ST and T wave abnormality  Abnormal ECG        RADIOLOGY/IMAGING                EXAM:  CT ABDOMEN AND PELVIS IC                          EXAM:  CT CHEST IC                            PROCEDURE DATE:  12/07/2019          INTERPRETATION:  [facility] RADIOLOGIST PRELIMINARY ADDENDUM REPORT  Reviewed by NAVJOT Abarca M.D.    Addendum created by Boy Mcfarlane MD on 12/7/2019 6:00:08 AM EST     Findings discussed with Dr. Goyal at 5:59 AM on 12/7/19  Initial report created on 12/7/2019 5:42:11 AM EST     PROCEDURE INFORMATION:   Exam: CT Chest With Contrast   Exam date and time: 12/7/2019 3:34 AM   Age: 89 years old   Clinical history: Abdominal pain; Chest pain     TECHNIQUE:   Imaging protocol: Computed tomography of the chest with intravenous   contrast.   3D rendering: MIP reconstructed images were created and reviewed.     COMPARISON:   CT CHEST 11/18/2019 10:23 AM     FINDINGS:   Lungs: Moderate increased patchy dependent opacity lung bases.   Pleural space: Unremarkable. No pneumothorax. No pleural effusion.   Heart: Unremarkable. No cardiomegaly. No pericardial effusion.   Mediastinum: Marked food debris distention of the esophagus down to the   GE   junction and there is soft tissue thickening. This is new relative to a   recent   CT imaging from 11/18/2019. There is evidence of a paraesophageal hernia   resulting inthe proximal esophageal and obstruction. Further evaluation   recommended.  Aorta: Unremarkable. No aortic aneurysm.   Lymph nodes: Unremarkable. No enlarged lymph nodes.   Bones/joints: Unremarkable. No acute fracture.   Soft tissues: Unremarkable.     IMPRESSION:   1. Marked food debris distention of the esophagus down to the junction   where   there is soft tissue thickening at the site of moderate paraesophageal    hiatal hernia. See image 70 series 10.  This could be secondary to inflammation versus possible food impaction.   Further   evaluation with endoscopy could be considered.   2. Increased reticular and patchy density in the lung bases suggesting   atelectasis however aspiration not excluded.       =========================  PROCEDURE INFORMATION:   Exam: CT Abdomen With Contrast   Exam date and time: 12/7/2019 3:34 AM   Age: 89 years old   Clinical history: Abdominal pain; Chest pain     TECHNIQUE:   Imaging protocol: Computed tomography images of the abdomen with   intravenous   contrast.   3D rendering: MIP reconstructed images were created and reviewed.     COMPARISON:   CT CHEST 11/18/2019 10:23 AM     FINDINGS:   Liver: Normal. No mass.   Gallbladder and bile ducts: Interval placement of cholecystostomy tube in   the  decompressed gallbladder.   Pancreas: Normal. No ductal dilation.   Spleen: Normal. No splenomegaly.   Adrenals: Normal. No mass.   Kidneys and ureters: Left renal cyst measuring 3 cm, otherwise normal   kidneys.   Stomach and bowel: Normal. No obstruction. No mucosal thickening.     Intraperitoneal space: Unremarkable. No free air. No significant fluid   collection.   Lymph nodes: Unremarkable. No enlarged lymph nodes.   Vasculature: Unremarkable. No abdominal aortic aneurysm.   Reproductive: Enlarged prostate measuring 5.5 cm diameter and bladder   base.   Bones/joints: Multilevel degenerative changes thoracolumbar spine.   Soft tissues: Unremarkable.     IMPRESSION:   Interval placement of cholecystostomy tube with decompressed gallbladder.    Critical value  discussed with nurse practitioner Rickey on 12/7/2019 at   8:40 AM with read back.  Hospital policies for critical values including read back   policy were followed.  The verbal communication of the critical value   supplementsthis written report.

## 2019-12-07 NOTE — H&P ADULT - PROBLEM SELECTOR PLAN 2
precipitated by sepsis as above  HR now stable around 100 without intervention  monitor vitals closely  tele monitoring   will check cardiac enzymes  PANKAJDSVASc 3 - will hold off on full dose ac for now and reassess if pt is a candidate precipitated by sepsis as above  HR now stable around 100 s/p cardizem 20mg IV push x1 in ED  monitor vitals closely  tele monitoring   will check cardiac enzymes  PANKAJDSVASc 3 - will hold off on full dose ac for now and reassess if pt is a candidate

## 2019-12-07 NOTE — CONSULT NOTE ADULT - ASSESSMENT
1. Food impaction in esophagus  2. R/o esophageal stricture  3. R/o esophageal cancer    Suggestions:    1. NPO  2. EGD  3. IVF hydration  4. Protonix IV  5. Avoid NSAID  6. DVT prophylaxis

## 2019-12-07 NOTE — H&P ADULT - ASSESSMENT
89 y/o male admitted to [facility] for sepsis likely 2/2 to pneumonia, but will look for other potential sources as well. Patient initially in Afib with RVR on admission, now rate better controlled around 100bpm. Will treat pna and other potential infectious sources, i.e. cholecystomy tube, with broad spectrum coverage.

## 2019-12-07 NOTE — H&P ADULT - PROBLEM SELECTOR PLAN 1
p/w T101.5, HR>100, WBC 17.9K, lactate 2.9-->4.2  CXR shows now clear infiltrate, but pt having productive cough  UA negative  f/u urine cx, blood cx  flu negative  check RVP  s/p vanc and cefepime in ED - will c/w vanc and zosyn at this time  f/u CT chest and abdomen to r/o other potential causes  o2 support prn p/w T101.5, HR>100, WBC 17.9K, lactate 2.9-->4.2  s/p total of 2.5L NS bolus thus far  CXR shows now clear infiltrate, but pt having productive cough  UA negative  f/u urine cx, blood cx  flu negative  check RVP  s/p vanc and cefepime in ED - will c/w vanc and zosyn at this time  f/u CT chest and abdomen to r/o other potential causes  o2 support prn  f/u repeat lactate - will c/w small 250cc boluses as needed to reduce risk of pulmonary edema p/w T101.5, HR>100, WBC 17.9K, lactate 2.9-->4.2  s/p total of 2.5L NS bolus thus far  CXR shows now clear infiltrate, but pt having productive cough  UA negative  f/u urine cx, blood cx  flu negative  check RVP  s/p vanc, zithromax and cefepime in ED - will c/w vanc and zosyn at this time  f/u CT chest and abdomen to r/o other potential causes  o2 support prn  f/u repeat lactate - will c/w small 250cc boluses as needed to reduce risk of pulmonary edema p/w T101.5, HR>100, WBC 17.9K, lactate 2.9-->4.2  s/p total of 2.5L NS bolus thus far  CXR shows now clear infiltrate, but pt having productive cough  UA negative  f/u urine cx, blood cx  flu negative  check RVP  s/p vanc, zithromax and cefepime in ED - will c/w vanc and zosyn at this time  f/u CT chest and abdomen to r/o other potential causes  o2 support prn  f/u repeat lactate - will c/w small 250cc boluses as needed to reduce risk of pulmonary edema  f/u legionella and strep ag  ID consulted - Dr. Palomo

## 2019-12-07 NOTE — H&P ADULT - ATTENDING COMMENTS
Patient seen and examined ; case was discussed with the admitting resident    ROS: as in the HPI; all other ROS negative    SH and family history as above    Vital Signs Last 24 Hrs  T(C): 37.3 (06 Dec 2019 21:11), Max: 38.6 (06 Dec 2019 18:45)  T(F): 99.2 (06 Dec 2019 21:11), Max: 101.5 (06 Dec 2019 18:45)  HR: 82 (06 Dec 2019 21:11) (82 - 136)  BP: 121/94 (06 Dec 2019 21:11) (121/94 - 127/66)  BP(mean): --  RR: 20 (06 Dec 2019 21:11) (20 - 24)  SpO2: 98% (06 Dec 2019 21:11) (91% - 98%)    GEN: NAD  HEENT- normocephalic; mouth moist  CVS- S1S2+  LUNGS- clear to auscultation; no wheezing  ABD: Soft , nontender, nondistended, Bowel sounds are present  EXTREMITY: no calf tenderness, no cyanosis, no edema  NEURO: AAOx3; non focal neurologic exam; cranial nerves grossly intact  PSYCH: normal affect and behavior  BACK: no swelling or mass;   VASCULAR: ++ distal peripheral pulses  SKIN: warm and dry.       Labs Reviewed:                         12.1   17.95 )-----------( 476      ( 06 Dec 2019 19:21 )             39.0     12-06    140  |  111<H>  |  22<H>  ----------------------------<  139<H>  4.6   |  20<L>  |  1.18    Ca    8.6      06 Dec 2019 19:21    TPro  7.2  /  Alb  2.4<L>  /  TBili  0.6  /  DBili  x   /  AST  24  /  ALT  29  /  AlkPhos  144<H>  12-06    CXR reviewed  EKG Reviewed: a fib w rvr     89 y/o M with recent admission for sepsis, acute cholecystitis s/p cholecystomy 11/26/19 admitted with cough and fever. Although hepatic enzymes are wnl, patient with sepsis and recent cholecystomy placement warrants imaging to eval placement and r/o intrabdominal source of infection. Unfortunately CT is delayed and still pending. He will not let me look at the drain but abdomen is soft and nontender.     1. Acute hypoxic respiratory failure due to CAP- check nasal pcr mrsa, continue broad spectrum abx, f/u chest/abd CT      Plan of care discussed with patient ;  all questions and concerns were addressed.  Discussed with Patient's family Patient seen and examined ; case was discussed with the admitting resident  ROS: as in the HPI; all other ROS negative  SH and family history as above  Vital Signs Last 24 Hrs  T(C): 37.3 (06 Dec 2019 21:11), Max: 38.6 (06 Dec 2019 18:45)  T(F): 99.2 (06 Dec 2019 21:11), Max: 101.5 (06 Dec 2019 18:45)  HR: 82 (06 Dec 2019 21:11) (82 - 136)  BP: 121/94 (06 Dec 2019 21:11) (121/94 - 127/66)  BP(mean): --  RR: 20 (06 Dec 2019 21:11) (20 - 24)  SpO2: 98% (06 Dec 2019 21:11) (91% - 98%)    GEN: NAD  HEENT- normocephalic; mouth moist  CVS- S1S2+  LUNGS- clear to auscultation; no wheezing  ABD: Soft , nontender, nondistended, Bowel sounds are present, drain dressing c/d/i, drain bag with 100cc biliary fluid   EXTREMITY: no calf tenderness, no cyanosis, no edema  NEURO: AAOx3; non focal neurologic exam; cranial nerves grossly intact  PSYCH: normal affect and behavior  BACK: no swelling or mass;   VASCULAR: ++ distal peripheral pulses  SKIN: warm and dry.       Labs Reviewed:                         12.1   17.95 )-----------( 476      ( 06 Dec 2019 19:21 )             39.0     12-06    140  |  111<H>  |  22<H>  ----------------------------<  139<H>  4.6   |  20<L>  |  1.18    Ca    8.6      06 Dec 2019 19:21    TPro  7.2  /  Alb  2.4<L>  /  TBili  0.6  /  DBili  x   /  AST  24  /  ALT  29  /  AlkPhos  144<H>  12-06    CXR reviewed  EKG Reviewed: a fib w rvr     89 y/o M with a fib, PVD, severe pulm HTN, recent admission for sepsis, acute cholecystitis s/p cholecystomy 11/26/19 admitted with cough and fever. Although hepatic enzymes are wnl, patient with sepsis and recent cholecystomy placement warrants imaging to eval placement and r/o intrabdominal source of infection. Unfortunately CT is delayed and still pending. He will not let me remove dressing but abdomen is soft and nontender, dressing is c/d/i . CXR without obvious infiltrate, O2 sat documented at 91% on 4L. Patient is an admission to our service as he was recently discharged and I was contacted by the ED for admission, private attending was seeing at [facility], will discuss with private attending in AM if he prefers to resume care or keep on our service.      1. Acute hypoxic respiratory failure due to CAP, possible aspiration pna, pneumonitis- check nasal pcr mrsa, abg, flu negative, continue broad spectrum abx vanc and zosyn to cover g-/g+/anaerobes in setting of drain and possible intraabdominal process, f/u chest/abd CT. Appreciate ID consultation.   2. CAP- check legionella, step pneumo and sputum cx  3. Sepsis, severe- due to cap, full infectious workup pending   4. Elevated lactate- pulm congestion on exam, caution with fluids, uptrended after 2.5L bolus, will give small bolus and reassess   6. Increased anion gap metabolic acidosis-  7. A fib with RVR- rate controlled not on ac per previous admission, will not start now given acute issues above, reassess risks: benefits once acute issues stabilize. Chadsvasc of at least 3  8. PVD- can cont plavix pending results of CT, dysphagia screen   9. Severe protein calorie malnutrition- nutrition consult   10. Thrombocytosis   11. Anemia   12. Hypothyroid   Plan of care discussed with patient ;  all questions and concerns were addressed. Patient seen and examined ; case was discussed with the admitting resident  ROS: as in the HPI; all other ROS negative  SH and family history as above  Vital Signs Last 24 Hrs  T(C): 37.3 (06 Dec 2019 21:11), Max: 38.6 (06 Dec 2019 18:45)  T(F): 99.2 (06 Dec 2019 21:11), Max: 101.5 (06 Dec 2019 18:45)  HR: 82 (06 Dec 2019 21:11) (82 - 136)  BP: 121/94 (06 Dec 2019 21:11) (121/94 - 127/66)  BP(mean): --  RR: 20 (06 Dec 2019 21:11) (20 - 24)  SpO2: 98% (06 Dec 2019 21:11) (91% - 98%)    GEN: NAD  HEENT- normocephalic; mouth moist  CVS- S1S2+  LUNGS- clear to auscultation; no wheezing  ABD: Soft , nontender, nondistended, Bowel sounds are present, drain dressing c/d/i, drain bag with 100cc biliary fluid   EXTREMITY: no calf tenderness, no cyanosis, no edema  NEURO: AAOx3; non focal neurologic exam; cranial nerves grossly intact  PSYCH: normal affect and behavior  BACK: no swelling or mass;   VASCULAR: ++ distal peripheral pulses  SKIN: warm and dry.       Labs Reviewed:                         12.1   17.95 )-----------( 476      ( 06 Dec 2019 19:21 )             39.0     12-06    140  |  111<H>  |  22<H>  ----------------------------<  139<H>  4.6   |  20<L>  |  1.18    Ca    8.6      06 Dec 2019 19:21    TPro  7.2  /  Alb  2.4<L>  /  TBili  0.6  /  DBili  x   /  AST  24  /  ALT  29  /  AlkPhos  144<H>  12-06    CXR reviewed  EKG Reviewed: a fib w rvr     89 y/o M with a fib, PVD, severe pulm HTN, recent admission for sepsis, acute cholecystitis s/p cholecystomy 11/26/19 admitted with cough and fever. Although hepatic enzymes are wnl, patient with sepsis and recent cholecystomy placement warrants imaging to eval placement and r/o intrabdominal source of infection. Unfortunately CT is delayed and still pending. He will not let me remove dressing but abdomen is soft and nontender, dressing is c/d/i . CXR without obvious infiltrate, O2 sat documented at 91% on 4L. Patient is an admission to our service as he was recently discharged and I was contacted by the ED for admission, private attending was seeing at [facility], will discuss with private attending in AM if he prefers to resume care or keep on our service.      1. Acute hypoxic respiratory failure due to CAP, possible aspiration pna, pneumonitis- check nasal pcr mrsa, abg, flu negative, continue broad spectrum abx vanc and zosyn to cover g-/g+/anaerobes in setting of drain and possible intraabdominal process, f/u chest/abd CT. Appreciate ID consultation.   2. CAP- check legionella, step pneumo and sputum cx  3. Sepsis, severe- poa- leukocytosis, tachycardia, fever, due to cap, full infectious workup pending   4. Elevated lactate- pulm congestion on exam, caution with fluids, uptrended after 2.5L bolus, will give small bolus and reassess   6. Increased anion gap metabolic acidosis  7. A fib with RVR- rate controlled not on ac per previous admission, will not start now given acute issues above, reassess risks: benefits once acute issues stabilize. Chadsvasc of at least 3  8. PVD- can cont plavix pending results of CT, dysphagia screen   9. Severe protein calorie malnutrition- nutrition consult   10. Thrombocytosis   11. Anemia   12. Hypothyroid   Plan of care discussed with patient ;  all questions and concerns were addressed. Patient seen and examined ; case was discussed with the admitting resident  ROS: as in the HPI; all other ROS negative  SH and family history as above  Vital Signs Last 24 Hrs  T(C): 37.3 (06 Dec 2019 21:11), Max: 38.6 (06 Dec 2019 18:45)  T(F): 99.2 (06 Dec 2019 21:11), Max: 101.5 (06 Dec 2019 18:45)  HR: 82 (06 Dec 2019 21:11) (82 - 136)  BP: 121/94 (06 Dec 2019 21:11) (121/94 - 127/66)  BP(mean): --  RR: 20 (06 Dec 2019 21:11) (20 - 24)  SpO2: 98% (06 Dec 2019 21:11) (91% - 98%)    GEN: NAD  HEENT- normocephalic; mouth moist  CVS- S1S2+  LUNGS- clear to auscultation; no wheezing  ABD: Soft , nontender, nondistended, Bowel sounds are present, drain dressing c/d/i, drain bag with 100cc biliary fluid   EXTREMITY: no calf tenderness, no cyanosis, no edema  NEURO: AAOx3; non focal neurologic exam; cranial nerves grossly intact  PSYCH: normal affect and behavior  BACK: no swelling or mass;   VASCULAR: ++ distal peripheral pulses  SKIN: warm and dry.       Labs Reviewed:                         12.1   17.95 )-----------( 476      ( 06 Dec 2019 19:21 )             39.0     12-06    140  |  111<H>  |  22<H>  ----------------------------<  139<H>  4.6   |  20<L>  |  1.18    Ca    8.6      06 Dec 2019 19:21    TPro  7.2  /  Alb  2.4<L>  /  TBili  0.6  /  DBili  x   /  AST  24  /  ALT  29  /  AlkPhos  144<H>  12-06    CXR reviewed  EKG Reviewed: a fib w rvr     89 y/o M with a fib, PVD, severe pulm HTN, recent admission for sepsis, acute cholecystitis s/p cholecystomy 11/26/19 admitted with cough and fever. Although hepatic enzymes are wnl, patient with sepsis and recent cholecystomy placement warrants imaging to eval placement and r/o intrabdominal source of infection. Unfortunately CT is delayed and still pending. He will not let me remove dressing but abdomen is soft and nontender, dressing is c/d/i . CXR without obvious infiltrate, O2 sat documented at 91% on 4L. Patient is an admission to our service as he was recently discharged and I was contacted by the ED for admission, private attending was seeing at [facility], will discuss with private attending in AM if he prefers to resume care or keep on our service.      1. Acute hypoxic respiratory failure due to CAP, possible aspiration pna, pneumonitis- check nasal pcr mrsa, abg, flu negative, continue broad spectrum abx vanc and zosyn to cover g-/g+/anaerobes in setting of drain and possible intraabdominal process, f/u chest/abd CT. Appreciate ID consultation.   2. CAP- check legionella, step pneumo and sputum cx  3. Sepsis, severe- poa- leukocytosis, tachycardia, fever, due to cap, full infectious workup pending   4. Elevated lactate- pulm congestion on exam, caution with fluids, uptrended after 2.5L bolus, will give small bolus and reassess   6. Increased anion gap metabolic acidosis  7. A fib with RVR- rate controlled not on ac per previous admission, will not start now given acute issues above, reassess risks: benefits once acute issues stabilize. Chadsvasc of at least 3  8. PVD- can cont plavix pending results of CT, dysphagia screen   9. Severe protein calorie malnutrition- nutrition consult   10. Thrombocytosis   11. Anemia   12. Hypothyroid     Addendum-   1. Acute food impaction- CT prelim read with dilated fluid filled esophagus concerning for food impaction. Resident discussed with GI on call who will scope patient this AM. Discussed with patient, strict NPO for now, keep HOB elevated.   GB decompression on CT abdomen.     Plan of care discussed with patient ;  all questions and concerns were addressed.

## 2019-12-07 NOTE — CHART NOTE - NSCHARTNOTEFT_GEN_A_CORE
CT chest images and preliminary report reviewed with concern for impacted food bolus in distal esophagus. Discussed findings with on-call GI Dr. Denton. Patient will go for endoscopy this AM. NPO.

## 2019-12-07 NOTE — H&P ADULT - PROBLEM SELECTOR PLAN 6
IMPROVE VTE Individual Risk Assessment          RISK                                                          Points  [  ] Previous VTE                                                3  [  ] Thrombophilia                                             2  [  ] Lower limb paralysis                                   2        (unable to hold up >15 seconds)    [  ] Current Cancer                                             2         (within 6 months)  [ X ] Immobilization > 24 hrs                              1  [  ] ICU/CCU stay > 24 hours                             1  [ X ] Age > 60                                                         1    IMPROVE VTE Score: 2    lovenox subq for dvt ppx

## 2019-12-07 NOTE — H&P ADULT - HISTORY OF PRESENT ILLNESS
89 y/o male from [facility] with PMHx of HTN, hypothyroidism, chronic venous insufficiency came in to ED because of fever of 101 at home.   Per wife at bedside pt was in his usual health a week ago, when pt was started on abx for UTI by visiting physician. pt completed the abx of 5 days. Since yesterday pt is having fever at home tmax 101.4, associated with dry cough for 2 days, weakness, poor po intake, chills, dysuria. Pt also has b/l leg swelling, with warmness, and redness. per wife it's chronic and pt is on furosemide for leg swelling. Pt denies any dizziness, headache, chest pain, sob, n/v/d/c. 89 y/o male from [facility] with PMHx of HTN, hypothyroidism, HTN chronic venous insufficiency presents to the ED with cough x1 day. Family unavailable at present to provide further history, but patient is able to state that he has been having a cough for one day in rehab. He denies any fever, but does endorse chills. He denies any chest pain, sob, nausea, vomiting or any other complaints at this time. Patient was recently discharged 2 days ago - was here for acute cholecystitis and is s/p percutaneous cholecystostomy tube on 11/26/19. Patient was discharged to [facility], but now returns to the ED>    ED course:  -septic on admission with fever of 101.5F, , WBC 17.9K, lactate 2.9-->4.2  -s/p vanc and cefepime 89 y/o male from [facility] with PMHx of HTN, hypothyroidism, HTN chronic venous insufficiency presents to the ED with cough x1 day. Family unavailable at present to provide further history, but patient is able to state that he has been having a cough for one day in rehab. He denies any fever, but does endorse chills. He denies any chest pain, sob, nausea, vomiting or any other complaints at this time. Patient was recently discharged 2 days ago - was here for acute cholecystitis and is s/p percutaneous cholecystostomy tube on 11/26/19. Patient was discharged to [facility], but now returns to the ED.    ED course:  -septic on admission with fever of 101.5F, , WBC 17.9K, lactate 2.9-->4.2  -s/p vanc and cefepime

## 2019-12-07 NOTE — CHART NOTE - NSCHARTNOTEFT_GEN_A_CORE
Spoke with covering ID covering physician Dr. Palomo- tono dc vancomycin given normal appearance of GB/ cholecystomy tube on CT abd/pelvis, continue Zosyn.

## 2019-12-08 DIAGNOSIS — R13.10 DYSPHAGIA, UNSPECIFIED: ICD-10-CM

## 2019-12-08 LAB
ANION GAP SERPL CALC-SCNC: 6 MMOL/L — SIGNIFICANT CHANGE UP (ref 5–17)
BASOPHILS # BLD AUTO: 0.05 K/UL — SIGNIFICANT CHANGE UP (ref 0–0.2)
BASOPHILS NFR BLD AUTO: 0.4 % — SIGNIFICANT CHANGE UP (ref 0–2)
BUN SERPL-MCNC: 23 MG/DL — HIGH (ref 7–18)
CALCIUM SERPL-MCNC: 8.5 MG/DL — SIGNIFICANT CHANGE UP (ref 8.4–10.5)
CHLORIDE SERPL-SCNC: 110 MMOL/L — HIGH (ref 96–108)
CO2 SERPL-SCNC: 25 MMOL/L — SIGNIFICANT CHANGE UP (ref 22–31)
CREAT SERPL-MCNC: 1.23 MG/DL — SIGNIFICANT CHANGE UP (ref 0.5–1.3)
CULTURE RESULTS: SIGNIFICANT CHANGE UP
EOSINOPHIL # BLD AUTO: 0.21 K/UL — SIGNIFICANT CHANGE UP (ref 0–0.5)
EOSINOPHIL NFR BLD AUTO: 1.7 % — SIGNIFICANT CHANGE UP (ref 0–6)
GLUCOSE SERPL-MCNC: 91 MG/DL — SIGNIFICANT CHANGE UP (ref 70–99)
GRAM STN FLD: SIGNIFICANT CHANGE UP
HCT VFR BLD CALC: 34.1 % — LOW (ref 39–50)
HGB BLD-MCNC: 10.4 G/DL — LOW (ref 13–17)
IMM GRANULOCYTES NFR BLD AUTO: 0.6 % — SIGNIFICANT CHANGE UP (ref 0–1.5)
LACTATE SERPL-SCNC: 1.3 MMOL/L — SIGNIFICANT CHANGE UP (ref 0.7–2)
LYMPHOCYTES # BLD AUTO: 1.19 K/UL — SIGNIFICANT CHANGE UP (ref 1–3.3)
LYMPHOCYTES # BLD AUTO: 9.5 % — LOW (ref 13–44)
MCHC RBC-ENTMCNC: 30.5 GM/DL — LOW (ref 32–36)
MCHC RBC-ENTMCNC: 31 PG — SIGNIFICANT CHANGE UP (ref 27–34)
MCV RBC AUTO: 101.5 FL — HIGH (ref 80–100)
MONOCYTES # BLD AUTO: 0.4 K/UL — SIGNIFICANT CHANGE UP (ref 0–0.9)
MONOCYTES NFR BLD AUTO: 3.2 % — SIGNIFICANT CHANGE UP (ref 2–14)
NEUTROPHILS # BLD AUTO: 10.6 K/UL — HIGH (ref 1.8–7.4)
NEUTROPHILS NFR BLD AUTO: 84.6 % — HIGH (ref 43–77)
NRBC # BLD: 0 /100 WBCS — SIGNIFICANT CHANGE UP (ref 0–0)
PLATELET # BLD AUTO: 330 K/UL — SIGNIFICANT CHANGE UP (ref 150–400)
POTASSIUM SERPL-MCNC: 4.1 MMOL/L — SIGNIFICANT CHANGE UP (ref 3.5–5.3)
POTASSIUM SERPL-SCNC: 4.1 MMOL/L — SIGNIFICANT CHANGE UP (ref 3.5–5.3)
RBC # BLD: 3.36 M/UL — LOW (ref 4.2–5.8)
RBC # FLD: 17.6 % — HIGH (ref 10.3–14.5)
SODIUM SERPL-SCNC: 141 MMOL/L — SIGNIFICANT CHANGE UP (ref 135–145)
SPECIMEN SOURCE: SIGNIFICANT CHANGE UP
SPECIMEN SOURCE: SIGNIFICANT CHANGE UP
WBC # BLD: 12.52 K/UL — HIGH (ref 3.8–10.5)
WBC # FLD AUTO: 12.52 K/UL — HIGH (ref 3.8–10.5)

## 2019-12-08 PROCEDURE — 99233 SBSQ HOSP IP/OBS HIGH 50: CPT | Mod: GC

## 2019-12-08 PROCEDURE — 99223 1ST HOSP IP/OBS HIGH 75: CPT

## 2019-12-08 RX ORDER — SODIUM CHLORIDE 9 MG/ML
1000 INJECTION INTRAMUSCULAR; INTRAVENOUS; SUBCUTANEOUS
Refills: 0 | Status: DISCONTINUED | OUTPATIENT
Start: 2019-12-08 | End: 2019-12-08

## 2019-12-08 RX ADMIN — PIPERACILLIN AND TAZOBACTAM 25 GRAM(S): 4; .5 INJECTION, POWDER, LYOPHILIZED, FOR SOLUTION INTRAVENOUS at 06:28

## 2019-12-08 RX ADMIN — ENOXAPARIN SODIUM 40 MILLIGRAM(S): 100 INJECTION SUBCUTANEOUS at 13:09

## 2019-12-08 RX ADMIN — PIPERACILLIN AND TAZOBACTAM 25 GRAM(S): 4; .5 INJECTION, POWDER, LYOPHILIZED, FOR SOLUTION INTRAVENOUS at 13:09

## 2019-12-08 RX ADMIN — Medication 600 MILLIGRAM(S): at 22:37

## 2019-12-08 RX ADMIN — PIPERACILLIN AND TAZOBACTAM 25 GRAM(S): 4; .5 INJECTION, POWDER, LYOPHILIZED, FOR SOLUTION INTRAVENOUS at 22:36

## 2019-12-08 RX ADMIN — TAMSULOSIN HYDROCHLORIDE 0.4 MILLIGRAM(S): 0.4 CAPSULE ORAL at 22:36

## 2019-12-08 NOTE — PROGRESS NOTE ADULT - PROBLEM SELECTOR PLAN 6
IMPROVE VTE Individual Risk Assessment          RISK                                                          Points  [  ] Previous VTE                                                3  [  ] Thrombophilia                                             2  [  ] Lower limb paralysis                                   2        (unable to hold up >15 seconds)    [  ] Current Cancer                                             2         (within 6 months)  [ X ] Immobilization > 24 hrs                              1  [  ] ICU/CCU stay > 24 hours                             1  [ X ] Age > 60                                                         1    IMPROVE VTE Score: 2    lovenox subq for dvt ppx plavix and asa on hold pending speech and swallow

## 2019-12-08 NOTE — PROGRESS NOTE ADULT - PROBLEM SELECTOR PLAN 2
precipitated by sepsis as above  HR now stable around 70s  monitor vitals closely  tele monitoring   Trops negative  CHADSVASc 3 - will hold off on full dose ac for now and reassess if pt is a candidate  PT eval - may be fall risk Food bolus found on CT on admission.   Pt reports eating steak prior to admission  s/p EGD to clear food impaction  Passed full liquids (apple sauce) on bedside swallow. Unable to tolerate thin liquids  Adv to nectar thick liquid diet  Pend official S/S eval  aspiration precautions  GI - Golyan

## 2019-12-08 NOTE — PHYSICAL THERAPY INITIAL EVALUATION ADULT - SITTING BALANCE: DYNAMIC
Number Of Freeze-Thaw Cycles: 1 freeze-thaw cycle Consent: The patient's consent was obtained including but not limited to risks of crusting, scabbing, blistering, scarring, darker or lighter pigmentary change, recurrence, incomplete removal and infection. Post-Care Instructions: I reviewed with the patient in detail post-care instructions. Patient is to wear sunprotection, and avoid picking at any of the treated lesions. Pt may apply Vaseline to crusted or scabbing areas. Detail Level: Detailed Render Post-Care Instructions In Note?: no Duration Of Freeze Thaw-Cycle (Seconds): 5 fair balance

## 2019-12-08 NOTE — PROGRESS NOTE ADULT - PROBLEM SELECTOR PLAN 5
plavix and asa on hold pending speech and swallow holding lisinopril for now given borderline bp  monitor vitals

## 2019-12-08 NOTE — PHYSICAL THERAPY INITIAL EVALUATION ADULT - IMPAIRED TRANSFERS: SIT/STAND, REHAB EVAL
decreased flexibility/impaired balance/abnormal muscle tone/decreased ROM/cognition/narrow base of support/impaired postural control/decreased strength

## 2019-12-08 NOTE — PROGRESS NOTE ADULT - SUBJECTIVE AND OBJECTIVE BOX
PGY1 Note discussed with supervising resident and primary attending.    Patient is a 89y old  Male who presents with a chief complaint of Cough (07 Dec 2019 09:15)      INTERVAL HPI/OVERNIGHT EVENTS:  Pt seen and examined at bedside.   No acute events overnight  Reports no complaints but frustrated that he is back in hospital  States "It is all because [he] ate steak"  EGD showed food impaction in esophagus.  GI appreciated.     MEDICATIONS  (STANDING):  enoxaparin Injectable 40 milliGRAM(s) SubCutaneous daily  influenza   Vaccine 0.5 milliLiter(s) IntraMuscular once  piperacillin/tazobactam IVPB.. 3.375 Gram(s) IV Intermittent every 8 hours  tamsulosin Oral Tab/Cap - Peds 0.4 milliGRAM(s) Oral at bedtime    MEDICATIONS  (PRN):  albuterol/ipratropium for Nebulization 3 milliLiter(s) Nebulizer every 6 hours PRN Shortness of Breath and/or Wheezing  guaiFENesin  milliGRAM(s) Oral every 12 hours PRN Cough        Allergies    No Known Allergies    Intolerances        REVIEW OF SYSTEMS:  CONSTITUTIONAL: No fever, weight loss, or fatigue  RESPIRATORY: Cough - productive, no cough, wheezing, chills or hemoptysis; No shortness of breath  CARDIOVASCULAR: No chest pain, palpitations, dizziness, or leg swelling  GASTROINTESTINAL: No abdominal or epigastric pain. No nausea, vomiting, or hematemesis; No diarrhea or constipation. No melena or hematochezia.  NEUROLOGICAL: No headaches, memory loss, loss of strength, numbness, or tremors  SKIN: No itching, burning, rashes, or lesions     Vital Signs Last 24 Hrs  T(C): 36.6 (08 Dec 2019 08:23), Max: 36.8 (08 Dec 2019 04:48)  T(F): 97.9 (08 Dec 2019 08:23), Max: 98.3 (08 Dec 2019 04:48)  HR: 75 (08 Dec 2019 08:23) (72 - 113)  BP: 102/64 (08 Dec 2019 08:23) (97/60 - 138/71)  BP(mean): 63 (07 Dec 2019 12:03) (56 - 88)  RR: 18 (08 Dec 2019 08:23) (14 - 24)  SpO2: 100% (08 Dec 2019 08:23) (94% - 100%)    PHYSICAL EXAM:  GENERAL: NAD, well-groomed, well-developed  HEAD:  Atraumatic, Normocephalic  EYES: EOMI, PERRLA, conjunctiva and sclera clear  NECK: Supple, No JVD, Normal thyroid  CHEST/LUNG: Clear to percussion bilaterally; No rales, rhonchi, wheezing, or rubs, cough with greenish sputum production  HEART: Regular rate and rhythm; No murmurs, rubs, or gallops  ABDOMEN: Soft, Nontender, Nondistended; Bowel sounds present  NERVOUS SYSTEM:  Alert & Oriented X3, Good concentration; Motor Strength 5/5 B/L   EXTREMITIES:  2+ Peripheral Pulses, No clubbing, cyanosis, or edema      LABS:                        10.4   12.52 )-----------( 330      ( 08 Dec 2019 06:24 )             34.1     12-08    141  |  110<H>  |  23<H>  ----------------------------<  91  4.1   |  25  |  1.23    Ca    8.5      08 Dec 2019 06:24  Phos  3.0     12-  Mg     2.1     12-    TPro  6.8  /  Alb  2.3<L>  /  TBili  1.5<H>  /  DBili  x   /  AST  26  /  ALT  26  /  AlkPhos  134<H>  12-07    PT/INR - ( 06 Dec 2019 19:21 )   PT: 13.6 sec;   INR: 1.22 ratio         PTT - ( 06 Dec 2019 19:21 )  PTT:33.0 sec  Urinalysis Basic - ( 07 Dec 2019 01:09 )    Color: Yellow / Appearance: Clear / S.015 / pH: x  Gluc: x / Ketone: Negative  / Bili: Negative / Urobili: Negative   Blood: x / Protein: Negative / Nitrite: Negative   Leuk Esterase: Trace / RBC: 0-2 /HPF / WBC 0-2 /HPF   Sq Epi: x / Non Sq Epi: Few /HPF / Bacteria: Trace /HPF      CAPILLARY BLOOD GLUCOSE          RADIOLOGY & ADDITIONAL TESTS:    Imaging Personally Reviewed:  [ ] YES  [ ] NO    Consultant(s) Notes Reviewed:  [ ] YES  [ ] NO PGY1 Note discussed with supervising resident and primary attending.    Patient is a 89y old  Male who presents with a chief complaint of Cough (07 Dec 2019 09:15)      INTERVAL HPI/OVERNIGHT EVENTS:  Pt seen and examined at bedside.   No acute events overnight  Reports no complaints but frustrated that he is back in hospital  States "It is all because [he] ate steak"  EGD showed food impaction in esophagus.  GI appreciated.     MEDICATIONS  (STANDING):  enoxaparin Injectable 40 milliGRAM(s) SubCutaneous daily  influenza   Vaccine 0.5 milliLiter(s) IntraMuscular once  piperacillin/tazobactam IVPB.. 3.375 Gram(s) IV Intermittent every 8 hours  tamsulosin Oral Tab/Cap - Peds 0.4 milliGRAM(s) Oral at bedtime    MEDICATIONS  (PRN):  albuterol/ipratropium for Nebulization 3 milliLiter(s) Nebulizer every 6 hours PRN Shortness of Breath and/or Wheezing  guaiFENesin  milliGRAM(s) Oral every 12 hours PRN Cough        Allergies    No Known Allergies    Intolerances        REVIEW OF SYSTEMS:  CONSTITUTIONAL: No fever, weight loss, or fatigue  RESPIRATORY: Cough - productive, no cough, wheezing, chills or hemoptysis; No shortness of breath  CARDIOVASCULAR: No chest pain, palpitations, dizziness, or leg swelling  GASTROINTESTINAL: No abdominal or epigastric pain. No nausea, vomiting, or hematemesis; No diarrhea or constipation. No melena or hematochezia.  NEUROLOGICAL: No headaches, memory loss, loss of strength, numbness, or tremors  SKIN: No itching, burning, rashes, or lesions     Vital Signs Last 24 Hrs  T(C): 36.6 (08 Dec 2019 08:23), Max: 36.8 (08 Dec 2019 04:48)  T(F): 97.9 (08 Dec 2019 08:23), Max: 98.3 (08 Dec 2019 04:48)  HR: 75 (08 Dec 2019 08:23) (72 - 113)  BP: 102/64 (08 Dec 2019 08:23) (97/60 - 138/71)  BP(mean): 63 (07 Dec 2019 12:03) (56 - 88)  RR: 18 (08 Dec 2019 08:23) (14 - 24)  SpO2: 100% (08 Dec 2019 08:23) (94% - 100%)    PHYSICAL EXAM:  GENERAL: NAD, well-groomed, well-developed  HEAD:  Atraumatic, Normocephalic  EYES: EOMI, PERRLA, conjunctiva and sclera clear  NECK: Supple, No JVD, Normal thyroid  CHEST/LUNG: Clear to percussion bilaterally; No rales, rhonchi, wheezing, or rubs, cough with greenish sputum production  HEART: Regular rate and rhythm; No murmurs, rubs, or gallops  ABDOMEN: Soft, Nontender, Nondistended; Bowel sounds present  NERVOUS SYSTEM:  Alert & Oriented X3, Good concentration; Motor Strength 5/5 B/L   EXTREMITIES:  2+ Peripheral Pulses, No clubbing, cyanosis, or edema      LABS:                        10.4   12.52 )-----------( 330      ( 08 Dec 2019 06:24 )             34.1     12-08    141  |  110<H>  |  23<H>  ----------------------------<  91  4.1   |  25  |  1.23    Ca    8.5      08 Dec 2019 06:24  Phos  3.0     12-07  Mg     2.1     12-07    TPro  6.8  /  Alb  2.3<L>  /  TBili  1.5<H>  /  DBili  x   /  AST  26  /  ALT  26  /  AlkPhos  134<H>  12-07    Lactate, Blood: 3.1: Elevated lactate. Consider ordering follow-up lactate to trend. mmol/L (12.07.19 @ 06:47)        PT/INR - ( 06 Dec 2019 19:21 )   PT: 13.6 sec;   INR: 1.22 ratio         PTT - ( 06 Dec 2019 19:21 )  PTT:33.0 sec  Urinalysis Basic - ( 07 Dec 2019 01:09 )    Color: Yellow / Appearance: Clear / S.015 / pH: x  Gluc: x / Ketone: Negative  / Bili: Negative / Urobili: Negative   Blood: x / Protein: Negative / Nitrite: Negative   Leuk Esterase: Trace / RBC: 0-2 /HPF / WBC 0-2 /HPF   Sq Epi: x / Non Sq Epi: Few /HPF / Bacteria: Trace /HPF      CAPILLARY BLOOD GLUCOSE          RADIOLOGY & ADDITIONAL TESTS:    Imaging Personally Reviewed:  [ ] YES  [ ] NO    Consultant(s) Notes Reviewed:  [ ] YES  [ ] NO PGY1 Note discussed with supervising resident and primary attending.    Patient is a 89y old  Male who presents with a chief complaint of Cough (07 Dec 2019 09:15)      INTERVAL HPI/OVERNIGHT EVENTS:  Pt seen and examined at bedside.   No acute events overnight  Reports no complaints but frustrated that he is back in hospital  States "It is all because [he] ate steak"  EGD showed food impaction in esophagus.  Able to tolerate full liquids only on bedside swallow  GI appreciated.     MEDICATIONS  (STANDING):  enoxaparin Injectable 40 milliGRAM(s) SubCutaneous daily  influenza   Vaccine 0.5 milliLiter(s) IntraMuscular once  piperacillin/tazobactam IVPB.. 3.375 Gram(s) IV Intermittent every 8 hours  tamsulosin Oral Tab/Cap - Peds 0.4 milliGRAM(s) Oral at bedtime    MEDICATIONS  (PRN):  albuterol/ipratropium for Nebulization 3 milliLiter(s) Nebulizer every 6 hours PRN Shortness of Breath and/or Wheezing  guaiFENesin  milliGRAM(s) Oral every 12 hours PRN Cough        Allergies    No Known Allergies    Intolerances        REVIEW OF SYSTEMS:  CONSTITUTIONAL: No fever, weight loss, or fatigue  RESPIRATORY: Cough - productive, no cough, wheezing, chills or hemoptysis; No shortness of breath  CARDIOVASCULAR: No chest pain, palpitations, dizziness, or leg swelling  GASTROINTESTINAL: No abdominal or epigastric pain. No nausea, vomiting, or hematemesis; No diarrhea or constipation. No melena or hematochezia.  NEUROLOGICAL: No headaches, memory loss, loss of strength, numbness, or tremors  SKIN: No itching, burning, rashes, or lesions     Vital Signs Last 24 Hrs  T(C): 36.6 (08 Dec 2019 08:23), Max: 36.8 (08 Dec 2019 04:48)  T(F): 97.9 (08 Dec 2019 08:23), Max: 98.3 (08 Dec 2019 04:48)  HR: 75 (08 Dec 2019 08:23) (72 - 113)  BP: 102/64 (08 Dec 2019 08:23) (97/60 - 138/71)  BP(mean): 63 (07 Dec 2019 12:03) (56 - 88)  RR: 18 (08 Dec 2019 08:23) (14 - 24)  SpO2: 100% (08 Dec 2019 08:23) (94% - 100%)    PHYSICAL EXAM:  GENERAL: NAD, well-groomed, well-developed  HEAD:  Atraumatic, Normocephalic  EYES: EOMI, PERRLA, conjunctiva and sclera clear  NECK: Supple, No JVD, Normal thyroid  CHEST/LUNG: Clear to percussion bilaterally; No rales, rhonchi, wheezing, or rubs, cough with greenish sputum production  HEART: Regular rate and rhythm; No murmurs, rubs, or gallops  ABDOMEN: Soft, Nontender, Nondistended; Bowel sounds present  NERVOUS SYSTEM:  Alert & Oriented X3, Good concentration; Motor Strength 5/5 B/L   EXTREMITIES:  2+ Peripheral Pulses, No clubbing, cyanosis, or edema      LABS:                        10.4   12.52 )-----------( 330      ( 08 Dec 2019 06:24 )             34.1     12-08    141  |  110<H>  |  23<H>  ----------------------------<  91  4.1   |  25  |  1.23    Ca    8.5      08 Dec 2019 06:24  Phos  3.0     12-  Mg     2.1     12-07    TPro  6.8  /  Alb  2.3<L>  /  TBili  1.5<H>  /  DBili  x   /  AST  26  /  ALT  26  /  AlkPhos  134<H>  12-07    Lactate, Blood: 3.1: Elevated lactate. Consider ordering follow-up lactate to trend. mmol/L (12.07.19 @ 06:47)        PT/INR - ( 06 Dec 2019 19:21 )   PT: 13.6 sec;   INR: 1.22 ratio         PTT - ( 06 Dec 2019 19:21 )  PTT:33.0 sec  Urinalysis Basic - ( 07 Dec 2019 01:09 )    Color: Yellow / Appearance: Clear / S.015 / pH: x  Gluc: x / Ketone: Negative  / Bili: Negative / Urobili: Negative   Blood: x / Protein: Negative / Nitrite: Negative   Leuk Esterase: Trace / RBC: 0-2 /HPF / WBC 0-2 /HPF   Sq Epi: x / Non Sq Epi: Few /HPF / Bacteria: Trace /HPF      CAPILLARY BLOOD GLUCOSE          RADIOLOGY & ADDITIONAL TESTS:    Imaging Personally Reviewed:  [ ] YES  [ ] NO    Consultant(s) Notes Reviewed:  [ ] YES  [ ] NO PGY1 Note discussed with supervising resident and primary attending.    Patient is a 89y old  Male who presents with a chief complaint of Cough (07 Dec 2019 09:15)    INTERVAL HPI/OVERNIGHT EVENTS:  Pt seen and examined at bedside.   No acute events overnight  Reports no complaints but frustrated that he is back in hospital  States "It is all because [he] ate steak"  EGD showed food impaction in esophagus.  Able to tolerate full liquids only on bedside swallow  GI appreciated.     MEDICATIONS  (STANDING):  enoxaparin Injectable 40 milliGRAM(s) SubCutaneous daily  influenza   Vaccine 0.5 milliLiter(s) IntraMuscular once  piperacillin/tazobactam IVPB.. 3.375 Gram(s) IV Intermittent every 8 hours  tamsulosin Oral Tab/Cap - Peds 0.4 milliGRAM(s) Oral at bedtime    MEDICATIONS  (PRN):  albuterol/ipratropium for Nebulization 3 milliLiter(s) Nebulizer every 6 hours PRN Shortness of Breath and/or Wheezing  guaiFENesin  milliGRAM(s) Oral every 12 hours PRN Cough        Allergies    No Known Allergies    Intolerances        REVIEW OF SYSTEMS:  CONSTITUTIONAL: No fever, weight loss, or fatigue  RESPIRATORY: Cough - productive, no cough, wheezing, chills or hemoptysis; No shortness of breath  CARDIOVASCULAR: No chest pain, palpitations, dizziness, or leg swelling  GASTROINTESTINAL: No abdominal or epigastric pain. No nausea, vomiting, or hematemesis; No diarrhea or constipation. No melena or hematochezia.  NEUROLOGICAL: No headaches, memory loss, loss of strength, numbness, or tremors  SKIN: No itching, burning, rashes, or lesions     Vital Signs Last 24 Hrs  T(C): 36.6 (08 Dec 2019 08:23), Max: 36.8 (08 Dec 2019 04:48)  T(F): 97.9 (08 Dec 2019 08:23), Max: 98.3 (08 Dec 2019 04:48)  HR: 75 (08 Dec 2019 08:23) (72 - 113)  BP: 102/64 (08 Dec 2019 08:23) (97/60 - 138/71)  BP(mean): 63 (07 Dec 2019 12:03) (56 - 88)  RR: 18 (08 Dec 2019 08:23) (14 - 24)  SpO2: 100% (08 Dec 2019 08:23) (94% - 100%)    PHYSICAL EXAM:  GENERAL: NAD, well-groomed, well-developed  HEAD:  Atraumatic, Normocephalic  EYES: EOMI, PERRLA, conjunctiva and sclera clear  NECK: Supple, No JVD, Normal thyroid  CHEST/LUNG: Clear to percussion bilaterally; No rales, rhonchi, wheezing, or rubs, cough with greenish sputum production  HEART: Regular rate and rhythm; No murmurs, rubs, or gallops  ABDOMEN: Soft, Nontender, Nondistended; Bowel sounds present  NERVOUS SYSTEM:  Alert & Oriented X3, Good concentration; Motor Strength 5/5 B/L   EXTREMITIES:  2+ Peripheral Pulses, No clubbing, cyanosis, or edema      LABS:                        10.4   12.52 )-----------( 330      ( 08 Dec 2019 06:24 )             34.1     12-08    141  |  110<H>  |  23<H>  ----------------------------<  91  4.1   |  25  |  1.23    Ca    8.5      08 Dec 2019 06:24  Phos  3.0     12-  Mg     2.1     12-07    TPro  6.8  /  Alb  2.3<L>  /  TBili  1.5<H>  /  DBili  x   /  AST  26  /  ALT  26  /  AlkPhos  134<H>  12-07    Lactate, Blood: 3.1: Elevated lactate. Consider ordering follow-up lactate to trend. mmol/L (12.07.19 @ 06:47)        PT/INR - ( 06 Dec 2019 19:21 )   PT: 13.6 sec;   INR: 1.22 ratio         PTT - ( 06 Dec 2019 19:21 )  PTT:33.0 sec  Urinalysis Basic - ( 07 Dec 2019 01:09 )    Color: Yellow / Appearance: Clear / S.015 / pH: x  Gluc: x / Ketone: Negative  / Bili: Negative / Urobili: Negative   Blood: x / Protein: Negative / Nitrite: Negative   Leuk Esterase: Trace / RBC: 0-2 /HPF / WBC 0-2 /HPF   Sq Epi: x / Non Sq Epi: Few /HPF / Bacteria: Trace /HPF      CAPILLARY BLOOD GLUCOSE          RADIOLOGY & ADDITIONAL TESTS:    Imaging Personally Reviewed:  [ ] YES  [ ] NO    Consultant(s) Notes Reviewed:  [ ] YES  [ ] NO

## 2019-12-08 NOTE — PHYSICAL THERAPY INITIAL EVALUATION ADULT - LIVES WITH, PROFILE
Currently from rehab, lives in private house w/spouse with a few stairs to enter does not negotiate stairs at baseline)

## 2019-12-08 NOTE — PROGRESS NOTE ADULT - ASSESSMENT
1. Food impaction Resolved by endoscopy  2. Esophageal ulcer  3. Gastritis    4. Anemia  5. No evidence of acute GI bleeding    Suggestions:    1. Advance diet as tolerated  2. Monitor H/H  3. Transfuse PRBC as needed  4. Protonix daily  5. Avoid NSAID  6. DVT prophylaxis

## 2019-12-08 NOTE — PROGRESS NOTE ADULT - SUBJECTIVE AND OBJECTIVE BOX
[   ] ICU                                          [   ] CCU                                      [ x  ] Medical Floor      Patient is comfortable. No new complaints reported, No abdominal pain, N/V, hematemesis, hematochezia, melena, fever, chills, chest pain, SOB, cough or diarrhea reported.    VITALS  Vital Signs Last 24 Hrs  T(C): 36.4 (08 Dec 2019 15:22), Max: 36.8 (08 Dec 2019 04:48)  T(F): 97.6 (08 Dec 2019 15:22), Max: 98.3 (08 Dec 2019 04:48)  HR: 88 (08 Dec 2019 15:22) (72 - 88)  BP: 114/58 (08 Dec 2019 15:22) (102/64 - 139/70)   RR: 18 (08 Dec 2019 15:22) (18 - 19)  SpO2: 98% (08 Dec 2019 15:22) (93% - 100%)       MEDICATIONS  (STANDING):  enoxaparin Injectable 40 milliGRAM(s) SubCutaneous daily  influenza   Vaccine 0.5 milliLiter(s) IntraMuscular once  piperacillin/tazobactam IVPB.. 3.375 Gram(s) IV Intermittent every 8 hours  tamsulosin Oral Tab/Cap - Peds 0.4 milliGRAM(s) Oral at bedtime    MEDICATIONS  (PRN):  albuterol/ipratropium for Nebulization 3 milliLiter(s) Nebulizer every 6 hours PRN Shortness of Breath and/or Wheezing  guaiFENesin  milliGRAM(s) Oral every 12 hours PRN Cough                            10.4   12.52 )-----------( 330      ( 08 Dec 2019 06:24 )             34.1       12-08    141  |  110<H>  |  23<H>  ----------------------------<  91  4.1   |  25  |  1.23    Ca    8.5      08 Dec 2019 06:24  Phos  3.0     12-07  Mg     2.1     12-07    TPro  6.8  /  Alb  2.3<L>  /  TBili  1.5<H>  /  DBili  x   /  AST  26  /  ALT  26  /  AlkPhos  134<H>  12-07      PT/INR - ( 06 Dec 2019 19:21 )   PT: 13.6 sec;   INR: 1.22 ratio         PTT - ( 06 Dec 2019 19:21 )  PTT:33.0 sec

## 2019-12-08 NOTE — CONSULT NOTE ADULT - ASSESSMENT
89 y/o male from [facility] with PMHx of HTN, hypothyroidism, HTN chronic venous insufficiency presented to the ED with cough x1 day PTA. He denied any fever, but did endorse chills. He denied any chest pain, sob, nausea, vomiting or any other complaints at this time. Patient was recently discharged - was here for acute cholecystitis and is s/p percutaneous cholecystostomy tube on 11/26/19. On admission, patient dx with severe sepsis--temp of 101.5F, , WBC 17.9K, lactate 2.9-->4.2--. Also found to have food impaction in distal esophagus and underwent removal of obstruction 12/7/19. Patient placed on pip/tazo for suspicion of aspiration pneumonia based on CT chest. 89 y/o male from [facility] with PMHx of HTN, hypothyroidism, HTN chronic venous insufficiency presented to the ED with cough x1 day PTA. He denied any fever, but did endorse chills. He denied any chest pain, sob, nausea, vomiting or any other complaints on admission and at the time of my evaluation. Patient was recently discharged - dx with acute cholecystitis and s/p percutaneous cholecystostomy tube on 11/26/19. On this admission, patient dx with severe sepsis--temp of 101.5F, , WBC 17.9K, lactate 2.9-->4.2. Also found to have food impaction in distal esophagus and underwent removal of obstruction 12/7/19. Patient placed on pip/tazo for suspicion of aspiration pneumonia based on CT chest. Patient clinically improved; WBC decreased; no increased temps noted.     #1 Severe sepsis/aspiration pneumonia. Concerned about the possibility of more resistant organisms, in view of recent hospitalization. Would continue pip/tazo until patient assessed for ability to swallow  #2 Cholecystitis-- surgical/ f/u recommended

## 2019-12-08 NOTE — PROGRESS NOTE ADULT - PROBLEM SELECTOR PLAN 1
p/w T101.5, HR>100, WBC 17.9K, lactate 2.9-->4.2  s/p total of 2.5L NS bolus  Afebrile  CXR shows now clear infiltrate, but pt still having productive cough  UA negative  UCx and bcx ngtd  flu negative  check RVP  s/p vanc, zithromax and cefepime in ED - will c/w vanc and zosyn at this time  f/u CT chest and abdomen to r/o other potential causes  o2 support prn  f/u repeat lactate - will c/w small 250cc boluses as needed to reduce risk of pulmonary edema  f/u legionella and strep ag  ID consulted - Dr. Palomo p/w T101.5, HR>100, WBC 17.9K, lactate 2.9-->4.2  s/p total of 2.5L NS bolus  Afebrile  CXR shows now clear infiltrate, but pt still having productive cough  UA negative  UCx and bcx ngtd  flu negative  check RVP  s/p vanc, zithromax and cefepime in ED - will c/w vanc and zosyn at this time  f/u CT chest and abdomen to r/o other potential causes  o2 support prn  f/u repeat lactate stat. Pt refusing draw for now - gentle IVF  f/u legionella and strep ag  ID consulted - Dr. Palomo p/w T101.5, HR>100, WBC 17.9K, lactate 2.9-->4.2  s/p total of 2.5L NS bolus  Afebrile  CXR shows now clear infiltrate, but pt still having productive cough  UA negative  UCx and bcx ngtd  flu negative  check RVP  s/p vanc, zithromax and cefepime in ED - will c/w vanc and zosyn at this time  f/u CT chest and abdomen to r/o other potential causes  o2 support prn  Lactate 1.3  f/u legionella and strep ag  ID consulted - Dr. Palomo

## 2019-12-08 NOTE — PHYSICAL THERAPY INITIAL EVALUATION ADULT - ACTIVE RANGE OF MOTION EXAMINATION, REHAB EVAL
bilateral upper extremity Active ROM was WFL (within functional limits)/bilateral  lower extremity Active ROM was WFL (within functional limits)/b/l hips ~1/4 range. Shoulders to ~1/3 range

## 2019-12-08 NOTE — PHYSICAL THERAPY INITIAL EVALUATION ADULT - PASSIVE RANGE OF MOTION EXAMINATION, REHAB EVAL
bilateral upper extremity Passive ROM was WFL (within functional limits)/bilateral lower extremity Passive ROM was WFL (within functional limits)/except b/l hips and knees to ~85 deg flx. Shoulders to ~1/3 range

## 2019-12-08 NOTE — PROGRESS NOTE ADULT - ASSESSMENT
89 y/o male admitted to [facility] for sepsis likely 2/2 to pneumonia, but will look for other potential sources as well. Patient initially in Afib with RVR on admission, now rate better controlled around 100bpm. Will treat pna and other potential infectious sources, i.e. cholecystomy tube, with broad spectrum coverage.

## 2019-12-08 NOTE — PHYSICAL THERAPY INITIAL EVALUATION ADULT - PLANNED THERAPY INTERVENTIONS, PT EVAL
neuromuscular re-education/postural re-education/stretching/transfer training/balance training/bed mobility training/gait training/strengthening/ROM

## 2019-12-08 NOTE — PHYSICAL THERAPY INITIAL EVALUATION ADULT - CRITERIA FOR SKILLED THERAPEUTIC INTERVENTIONS
rehab potential/predicted duration of therapy intervention/functional limitations in following categories/impairments found/risk reduction/prevention/therapy frequency/anticipated discharge recommendation

## 2019-12-08 NOTE — CONSULT NOTE ADULT - SUBJECTIVE AND OBJECTIVE BOX
HP/Course in Hospital:  89 y/o male from [facility] with PMHx of HTN, hypothyroidism, HTN chronic venous insufficiency presented to the ED with cough x1 day PTA. He denied any fever, but did endorse chills. He denied any chest pain, sob, nausea, vomiting or any other complaints at this time. Patient was recently discharged - was here for acute cholecystitis and is s/p percutaneous cholecystostomy tube on 19. On admission, patient dx with severe sepsis--temp of 101.5F, , WBC 17.9K, lactate 2.9-->4.2--. Also found to have food impaction in distal esophagus and underwent removal of obstruction 19. Patient placed on pip/tazo for suspicion of aspiration pneumonia based on CT chest findings.    PAST MEDICAL & SURGICAL HISTORY:  Acalculous cholecystitis: s/p percutaneous cholecystostomy  HTN (hypertension)  Hypothyroid  Ulcers of both lower extremities  No significant past surgical history      ALL: No Known Allergies    Meds:  albuterol/ipratropium for Nebulization 3 milliLiter(s) Nebulizer every 6 hours PRN  enoxaparin Injectable 40 milliGRAM(s) SubCutaneous daily  guaiFENesin  milliGRAM(s) Oral every 12 hours PRN  influenza   Vaccine 0.5 milliLiter(s) IntraMuscular once  piperacillin/tazobactam IVPB.. 3.375 Gram(s) IV Intermittent every 8 hours  sodium chloride 0.9%. 1000 milliLiter(s) IV Continuous <Continuous>  tamsulosin Oral Tab/Cap - Peds 0.4 milliGRAM(s) Oral at bedtime    SOCIAL HISTORY:   pt never smoked, denies alcohol use or substance use  pt lives with wife has 24 x7 hha      FAMILY HISTORY:  non-contributory to patient's clinical presentation      REVIEW OF SYSTEMS      General:	    Skin/Breast:  	  Ophthalmologic:  	  ENMT:	    Respiratory and Thorax:  	  Cardiovascular:	    Gastrointestinal:	    Genitourinary:	    Musculoskeletal:	    Neurological:	    Psychiatric:	    Hematology/Lymphatics:	    Endocrine:	    Allergic/Immunologic:	    VITALS:  Vital Signs Last 24 Hrs  T(C): 36.6 (08 Dec 2019 08:23), Max: 36.8 (08 Dec 2019 04:48)  T(F): 97.9 (08 Dec 2019 08:23), Max: 98.3 (08 Dec 2019 04:48)  HR: 75 (08 Dec 2019 08:23) (72 - 113)  BP: 102/64 (08 Dec 2019 08:23) (97/60 - 138/71)  BP(mean): 63 (07 Dec 2019 12:03) (56 - 88)  RR: 18 (08 Dec 2019 08:23) (14 - 24)  SpO2: 100% (08 Dec 2019 08:23) (94% - 100%)    PHYSICAL EXAM:      Constitutional:    Eyes:    ENMT:    Neck:    Breasts:    Back:    Respiratory:    Cardiovascular:    Gastrointestinal:    Genitourinary:    Rectal:    Extremities:    Vascular:    Neurological:    Skin:    Lymph Nodes:    Musculoskeletal:    Psychiatric:        LABS/DIAGNOSTIC TESTS:                          10.4   12.52 )-----------( 330      ( 08 Dec 2019 06:24 )             34.1     WBC Count: 12.52 K/uL ( @ 06:24)  WBC Count: 21.51 K/uL ( @ 06:48)  WBC Count: 17.95 K/uL ( @ 19:21)          141  |  110<H>  |  23<H>  ----------------------------<  91  4.1   |  25  |  1.23    Ca    8.5      08 Dec 2019 06:24  Phos  3.0       Mg     2.1         TPro  6.8  /  Alb  2.3<L>  /  TBili  1.5<H>  /  DBili  x   /  AST  26  /  ALT  26  /  AlkPhos  134<H>        Urinalysis Basic - ( 07 Dec 2019 01:09 )    Color: Yellow / Appearance: Clear / S.015 / pH: x  Gluc: x / Ketone: Negative  / Bili: Negative / Urobili: Negative   Blood: x / Protein: Negative / Nitrite: Negative   Leuk Esterase: Trace / RBC: 0-2 /HPF / WBC 0-2 /HPF   Sq Epi: x / Non Sq Epi: Few /HPF / Bacteria: Trace /HPF      LIVER FUNCTIONS - ( 07 Dec 2019 06:47 )  Alb: 2.3 g/dL / Pro: 6.8 g/dL / ALK PHOS: 134 U/L / ALT: 26 U/L DA / AST: 26 U/L / GGT: x             PT/INR - ( 06 Dec 2019 19:21 )   PT: 13.6 sec;   INR: 1.22 ratio         PTT - ( 06 Dec 2019 19:21 )  PTT:33.0 sec    LACTATE:Lactate, Blood (19 @ 00:42)   Lactate, Blood: 4.2: TYPE:(C=Critical, N=Notification, A=Abnormal) c  Lactate, Blood (19 @ 04:53)  Lactate, Blood: 2.7: Elevated lactate. Consider ordering follow-up lactate to trend. mmol/L      ABG - ABG - ( 07 Dec 2019 01:53 )  pH, Arterial: 7.44  pH, Blood: x     /  pCO2: 32    /  pO2: 73    / HCO3: 22    / Base Excess: -1.1  /  SaO2: 95          CULTURES:     Culture - Urine (collected 19 @ 10:15)  Source: .Urine  Final Report (19 @ 05:50):    <10,000 CFU/mL Normal Urogenital Amanda    Culture - Blood (collected 19 @ 01:39)  Source: .Blood  Preliminary Report (19 @ 02:03):    No growth to date.    Culture - Blood (collected 19 @ 01:39)  Source: .Blood  Preliminary Report (19 @ 02:03):    No growth to date.    Culture - Body Fluid with Gram Stain (collected 19 @ 10:07)  Source: .Body Fluid cholecystosstomy tube bile  Gram Stain (19 @ 12:38):    Rare polymorphonuclear leukocytes per low power field    No organisms seen per oil power field  Final Report (19 @ 12:36):    No growth at 5 days    Culture - Blood (collected 19 @ 22:28)  Source: .Blood  Final Report (19 @ 23:00):    No growth at 5 days.    Culture - Blood (collected 19 @ 22:28)  Source: .Blood  Final Report (19 @ 23:00):    No growth at 5 days.    Culture - Urine (collected 19 @ 16:28)  Source: .Urine  Final Report (19 @ 14:42):    <10,000 CFU/mL Normal Urogenital Amanda    Culture - Blood (collected 19 @ 01:59)  Source: .Blood  Final Report (19 @ 02:00):    No growth at 5 days.    Culture - Blood (collected 19 @ 01:59)  Source: .Blood  Final Report (19 @ 02:00):    No growth at 5 days.        RADIOLOGY  < from: CT Chest w/ IV Cont (19 @ 03:47) >    EXAM:  CT ABDOMEN AND PELVIS IC                          EXAM:  CT CHEST IC                            PROCEDURE DATE:  2019        INTERPRETATION:  [facility] RADIOLOGIST PRELIMINARY ADDENDUM REPORT  Reviewed by NAVJOT Abarca M.D.    Addendum created by Boy Mcfarlane MD on 2019 6:00:08 AM EST     Findings discussed with Dr. Goyal at 5:59 AM on 19  Initial report created on 2019 5:42:11 AM EST     PROCEDURE INFORMATION:   Exam: CT Chest With Contrast   Exam date and time: 2019 3:34 AM   Age: 89 years old   Clinical history: Abdominal pain; Chest pain     TECHNIQUE:   Imaging protocol: Computed tomography of the chest with intravenous   contrast.   3D rendering: MIP reconstructed images were created and reviewed.     COMPARISON:   CT CHEST 2019 10:23 AM     FINDINGS:   Lungs: Moderate increased patchy dependent opacity lung bases.   Pleural space: Unremarkable. No pneumothorax. No pleural effusion.   Heart: Unremarkable. No cardiomegaly. No pericardial effusion.   Mediastinum: Marked food debris distention of the esophagus down to the   GE   junction and there is soft tissue thickening. This is new relative to a   recent   < from: CT Chest w/ IV Cont (19 @ 03:47) >  CT imaging from 2019. There is evidence of a paraesophageal hernia   resulting inthe proximal esophageal and obstruction. Further evaluation   recommended.  Aorta: Unremarkable. No aortic aneurysm.   Lymph nodes: Unremarkable. No enlarged lymph nodes.   Bones/joints: Unremarkable. No acute fracture.   Soft tissues: Unremarkable.     IMPRESSION:   1. Marked food debris distention of the esophagus down to the junction   where   there is soft tissue thickening at the site of moderate paraesophageal    hiatal hernia. See image 70 series 10.  This could be secondary to inflammation versus possible food impaction.   Further   evaluation with endoscopy could be considered.   2. Increased reticular and patchy density in the lung bases suggesting   atelectasis however aspiration not excluded.     =========================  PROCEDURE INFORMATION:   Exam: CT Abdomen With Contrast   Exam date and time: 2019 3:34 AM   Age: 89 years old   Clinical history: Abdominal pain; Chest pain     TECHNIQUE:   Imaging protocol: Computed tomography images of the abdomen with   intravenous   contrast.   3D rendering: MIP reconstructed images were created and reviewed.     COMPARISON:   CT CHEST 2019 10:23 AM     FINDINGS:   Liver: Normal. No mass.   Gallbladder and bile ducts: Interval placement of cholecystostomy tube in   the  decompressed gallbladder.   Pancreas: Normal. No ductal dilation.   Spleen: Normal. No splenomegaly.   Adrenals: Normal. No mass.   Kidneys and ureters: Left renal cyst measuring 3 cm, otherwise normal   kidneys.   Stomach and bowel: Normal. No obstruction. No mucosal thickening.     Intraperitoneal space: Unremarkable. No free air. No significant fluid   collection.   Lymph nodes: Unremarkable. No enlarged lymph nodes.   Vasculature: Unremarkable. No abdominal aortic aneurysm.   Reproductive: Enlarged prostate measuring 5.5 cm diameter and bladder   base.   Bones/joints: Multilevel degenerative changes thoracolumbar spine.   Soft tissues: Unremarkable.     IMPRESSION:   Interval placement of cholecystostomy tube with decompressed gallbladder.    < end of copied text > HP/Course in Hospital:  89 y/o male from [facility] with PMHx of HTN, hypothyroidism, HTN chronic venous insufficiency presented to the ED with cough x1 day PTA. He denied any fever, but did endorse chills. He denied any chest pain, sob, nausea, vomiting or any other complaints at this time. Patient was recently discharged - was here for acute cholecystitis and is s/p percutaneous cholecystostomy tube on 19. On admission, patient dx with severe sepsis--temp of 101.5F, , WBC 17.9K, lactate 2.9-->4.2--. Also found to have food impaction in distal esophagus and underwent removal of obstruction 19. Patient placed on pip/tazo for suspicion of aspiration pneumonia based on CT chest findings.    PAST MEDICAL & SURGICAL HISTORY:  Acalculous cholecystitis: s/p percutaneous cholecystostomy  HTN (hypertension)  Hypothyroid  Ulcers of both lower extremities  No significant past surgical history      ALL: No Known Allergies    Meds:  albuterol/ipratropium for Nebulization 3 milliLiter(s) Nebulizer every 6 hours PRN  enoxaparin Injectable 40 milliGRAM(s) SubCutaneous daily  guaiFENesin  milliGRAM(s) Oral every 12 hours PRN  influenza   Vaccine 0.5 milliLiter(s) IntraMuscular once  piperacillin/tazobactam IVPB.. 3.375 Gram(s) IV Intermittent every 8 hours  sodium chloride 0.9%. 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 [employer]    FAMILY HISTORY:  non-contributory to patient's clinical presentation    REVIEW OF SYSTEMS      General: malaise	  	  Ophthalmologic:  	  ENMT:	no complaints    Respiratory and Thorax: cough (see HPI)  	  Cardiovascular:	denies CP, SOB    Gastrointestinal:	denies N, V, diarrhea; no abdominal pain    Genitourinary:	no c/o dysuria    Musculoskeletal:	 no complaints    VITALS:  Vital Signs Last 24 Hrs  T(C): 36.6 (08 Dec 2019 08:23), Max: 36.8 (08 Dec 2019 04:48)  T(F): 97.9 (08 Dec 2019 08:23), Max: 98.3 (08 Dec 2019 04:48)  HR: 75 (08 Dec 2019 08:23) (72 - 113)  BP: 102/64 (08 Dec 2019 08:23) (97/60 - 138/71)  BP(mean): 63 (07 Dec 2019 12:03) (56 - 88)  RR: 18 (08 Dec 2019 08:23) (14 - 24)  SpO2: 100% (08 Dec 2019 08:23) (94% - 100%)    PHYSICAL EXAM:    Constitutional: WDWN W male in NAD    HEENT: EOMI; conj clear; mouth without oral lesions    Neck: supple    Respiratory: bibasilar rales    Cardiovascular: S1S2 irregular; cd not hear any murmurs, gallops, rubs    Gastrointestinal: R sided abd drain in place with bilious fluid in bag; BS active; soft and non-tender to palpation    Genitourinary: scrotal edema    Extremities: hyperpigmentation noted bilat legs with areas of dried ulcer; + bilat LE swelling; onychomycosis on toes    Neurological: Alert; oriented to place; did not know day of week, month, or season; UE tremor    Musculoskeletal: no joint swelling noted      LABS/DIAGNOSTIC TESTS:                        10.4   12.52 )-----------( 330      ( 08 Dec 2019 06:24 )             34.1     WBC Count: 12.52 K/uL ( @ 06:24)  WBC Count: 21.51 K/uL ( @ 06:48)  WBC Count: 17.95 K/uL ( @ 19:21)          141  |  110<H>  |  23<H>  ----------------------------<  91  4.1   |  25  |  1.23    Ca    8.5      08 Dec 2019 06:24  Phos  3.0       Mg     2.1         TPro  6.8  /  Alb  2.3<L>  /  TBili  1.5<H>  /  DBili  x   /  AST  26  /  ALT  26  /  AlkPhos  134<H>        Urinalysis Basic - ( 07 Dec 2019 01:09 )    Color: Yellow / Appearance: Clear / S.015 / pH: x  Gluc: x / Ketone: Negative  / Bili: Negative / Urobili: Negative   Blood: x / Protein: Negative / Nitrite: Negative   Leuk Esterase: Trace / RBC: 0-2 /HPF / WBC 0-2 /HPF   Sq Epi: x / Non Sq Epi: Few /HPF / Bacteria: Trace /HPF      LIVER FUNCTIONS - ( 07 Dec 2019 06:47 )  Alb: 2.3 g/dL / Pro: 6.8 g/dL / ALK PHOS: 134 U/L / ALT: 26 U/L DA / AST: 26 U/L / GGT: x             PT/INR - ( 06 Dec 2019 19:21 )   PT: 13.6 sec;   INR: 1.22 ratio         PTT - ( 06 Dec 2019 19:21 )  PTT:33.0 sec    LACTATE:Lactate, Blood (19 @ 00:42)   Lactate, Blood: 4.2: TYPE:(C=Critical, N=Notification, A=Abnormal) c  Lactate, Blood (19 @ 04:53)  Lactate, Blood: 2.7: Elevated lactate. Consider ordering follow-up lactate to trend. mmol/L      ABG - ABG - ( 07 Dec 2019 01:53 )  pH, Arterial: 7.44  pH, Blood: x     /  pCO2: 32    /  pO2: 73    / HCO3: 22    / Base Excess: -1.1  /  SaO2: 95          CULTURES:     Culture - Urine (collected 19 @ 10:15)  Source: .Urine  Final Report (19 @ 05:50):    <10,000 CFU/mL Normal Urogenital Amanda    Culture - Blood (collected 19 @ 01:39)  Source: .Blood  Preliminary Report (19 @ 02:03):    No growth to date.    Culture - Blood (collected 19 @ 01:39)  Source: .Blood  Preliminary Report (19 @ 02:03):    No growth to date.    Culture - Body Fluid with Gram Stain (collected 19 @ 10:07)  Source: .Body Fluid cholecystostomy tube bile  Gram Stain (19 @ 12:38):    Rare polymorphonuclear leukocytes per low power field    No organisms seen per oil power field  Final Report (19 @ 12:36):    No growth at 5 days    Culture - Blood (collected 19 @ 22:28)  Source: .Blood  Final Report (19 @ 23:00):    No growth at 5 days.    Culture - Blood (collected 19 @ 22:28)  Source: .Blood  Final Report (19 @ 23:00):    No growth at 5 days.    Culture - Urine (collected 19 @ 16:28)  Source: .Urine  Final Report (19 @ 14:42):    <10,000 CFU/mL Normal Urogenital Amanda    Culture - Blood (collected 19 @ 01:59)  Source: .Blood  Final Report (19 @ 02:00):    No growth at 5 days.    Culture - Blood (collected 19 @ 01:59)  Source: .Blood  Final Report (19 @ 02:00):    No growth at 5 days.        RADIOLOGY  < from: CT Chest w/ IV Cont (19 @ 03:47) >    EXAM:  CT ABDOMEN AND PELVIS IC                          EXAM:  CT CHEST IC                            PROCEDURE DATE:  2019        INTERPRETATION:  [facility] RADIOLOGIST PRELIMINARY ADDENDUM REPORT  Reviewed by NAVJOT Abarca M.D.    Addendum created by Boy Mcfarlane MD on 2019 6:00:08 AM EST     Findings discussed with Dr. Goyal at 5:59 AM on 19  Initial report created on 2019 5:42:11 AM EST     PROCEDURE INFORMATION:   Exam: CT Chest With Contrast   Exam date and time: 2019 3:34 AM   Age: 89 years old   Clinical history: Abdominal pain; Chest pain     TECHNIQUE:   Imaging protocol: Computed tomography of the chest with intravenous   contrast.   3D rendering: MIP reconstructed images were created and reviewed.     COMPARISON:   CT CHEST 2019 10:23 AM     FINDINGS:   Lungs: Moderate increased patchy dependent opacity lung bases.   Pleural space: Unremarkable. No pneumothorax. No pleural effusion.   Heart: Unremarkable. No cardiomegaly. No pericardial effusion.   Mediastinum: Marked food debris distention of the esophagus down to the   GE   junction and there is soft tissue thickening. This is new relative to a   recent   < from: CT Chest w/ IV Cont (19 @ 03:47) >  CT imaging from 2019. There is evidence of a paraesophageal hernia   resulting inthe proximal esophageal and obstruction. Further evaluation   recommended.  Aorta: Unremarkable. No aortic aneurysm.   Lymph nodes: Unremarkable. No enlarged lymph nodes.   Bones/joints: Unremarkable. No acute fracture.   Soft tissues: Unremarkable.     IMPRESSION:   1. Marked food debris distention of the esophagus down to the junction   where   there is soft tissue thickening at the site of moderate paraesophageal    hiatal hernia. See image 70 series 10.  This could be secondary to inflammation versus possible food impaction.   Further   evaluation with endoscopy could be considered.   2. Increased reticular and patchy density in the lung bases suggesting   atelectasis however aspiration not excluded.     =========================  PROCEDURE INFORMATION:   Exam: CT Abdomen With Contrast   Exam date and time: 2019 3:34 AM   Age: 89 years old   Clinical history: Abdominal pain; Chest pain     TECHNIQUE:   Imaging protocol: Computed tomography images of the abdomen with   intravenous   contrast.   3D rendering: MIP reconstructed images were created and reviewed.     COMPARISON:   CT CHEST 2019 10:23 AM     FINDINGS:   Liver: Normal. No mass.   Gallbladder and bile ducts: Interval placement of cholecystostomy tube in   the  decompressed gallbladder.   Pancreas: Normal. No ductal dilation.   Spleen: Normal. No splenomegaly.   Adrenals: Normal. No mass.   Kidneys and ureters: Left renal cyst measuring 3 cm, otherwise normal   kidneys.   Stomach and bowel: Normal. No obstruction. No mucosal thickening.     Intraperitoneal space: Unremarkable. No free air. No significant fluid   collection.   Lymph nodes: Unremarkable. No enlarged lymph nodes.   Vasculature: Unremarkable. No abdominal aortic aneurysm.   Reproductive: Enlarged prostate measuring 5.5 cm diameter and bladder   base.   Bones/joints: Multilevel degenerative changes thoracolumbar spine.   Soft tissues: Unremarkable.     IMPRESSION:   Interval placement of cholecystostomy tube with decompressed gallbladder.    < end of copied text >

## 2019-12-08 NOTE — PHYSICAL THERAPY INITIAL EVALUATION ADULT - ADDITIONAL COMMENTS
Prior to previous hospitalization note PLOF. At rehab, tp reports not yet ambulating    able to walk in home distances w/o assist with rolling walker  was unable to negotiate stairs at baseline, had services take him in and out of home

## 2019-12-08 NOTE — PHYSICAL THERAPY INITIAL EVALUATION ADULT - IMPAIRMENTS FOUND, PT EVAL
gait, locomotion, and balance/tone/muscle strength/posture/ROM/cognitive impairment/integumentary integrity

## 2019-12-08 NOTE — PROGRESS NOTE ADULT - PROBLEM SELECTOR PLAN 4
holding lisinopril for now given borderline bp  monitor vitals holding synthroid pending speech/swallow

## 2019-12-08 NOTE — PHYSICAL THERAPY INITIAL EVALUATION ADULT - IMPAIRMENTS CONTRIBUTING IMPAIRED BED MOBILITY, REHAB EVAL
impaired balance/cognition/impaired postural control/decreased flexibility/decreased ROM/impaired motor control/decreased strength

## 2019-12-08 NOTE — PROGRESS NOTE ADULT - PROBLEM SELECTOR PLAN 3
holding synthroid pending speech/swallow precipitated by sepsis as above  HR now stable around 70s  monitor vitals closely  tele monitoring   Trops negative  CHADSVASc 3 - will hold off on full dose ac for now and reassess if pt is a candidate  PT eval - may be fall risk

## 2019-12-09 ENCOUNTER — TRANSCRIPTION ENCOUNTER (OUTPATIENT)
Age: 84
End: 2019-12-09

## 2019-12-09 ENCOUNTER — INBOUND DOCUMENT (OUTPATIENT)
Age: 84
End: 2019-12-09

## 2019-12-09 DIAGNOSIS — Z04.6 ENCOUNTER FOR GENERAL PSYCHIATRIC EXAMINATION, REQUESTED BY AUTHORITY: ICD-10-CM

## 2019-12-09 DIAGNOSIS — F43.21 ADJUSTMENT DISORDER WITH DEPRESSED MOOD: ICD-10-CM

## 2019-12-09 LAB — S PNEUM AG SER QL: SIGNIFICANT CHANGE UP

## 2019-12-09 PROCEDURE — 99233 SBSQ HOSP IP/OBS HIGH 50: CPT | Mod: GC

## 2019-12-09 PROCEDURE — 99232 SBSQ HOSP IP/OBS MODERATE 35: CPT | Mod: GC

## 2019-12-09 PROCEDURE — 99223 1ST HOSP IP/OBS HIGH 75: CPT

## 2019-12-09 RX ORDER — CEFPODOXIME PROXETIL 100 MG
200 TABLET ORAL EVERY 12 HOURS
Refills: 0 | Status: DISCONTINUED | OUTPATIENT
Start: 2019-12-09 | End: 2019-12-12

## 2019-12-09 RX ADMIN — TAMSULOSIN HYDROCHLORIDE 0.4 MILLIGRAM(S): 0.4 CAPSULE ORAL at 21:27

## 2019-12-09 RX ADMIN — ENOXAPARIN SODIUM 40 MILLIGRAM(S): 100 INJECTION SUBCUTANEOUS at 12:14

## 2019-12-09 RX ADMIN — PIPERACILLIN AND TAZOBACTAM 25 GRAM(S): 4; .5 INJECTION, POWDER, LYOPHILIZED, FOR SOLUTION INTRAVENOUS at 05:35

## 2019-12-09 NOTE — BEHAVIORAL HEALTH ASSESSMENT NOTE - NSBHCHARTREVIEWVS_PSY_A_CORE FT
Vital Signs Last 24 Hrs  T(C): 36.6 (09 Dec 2019 15:50), Max: 37.1 (09 Dec 2019 09:57)  T(F): 97.9 (09 Dec 2019 15:50), Max: 98.8 (09 Dec 2019 09:57)  HR: 90 (09 Dec 2019 15:50) (71 - 90)  BP: 128/87 (09 Dec 2019 15:50) (115/56 - 144/84)  BP(mean): --  RR: 16 (09 Dec 2019 15:50) (16 - 20)  SpO2: 90% (09 Dec 2019 15:50) (90% - 100%)

## 2019-12-09 NOTE — SWALLOW BEDSIDE ASSESSMENT ADULT - CONSISTENCIES ADMINISTERED
puree/apple sauce x 4 apple sauce & merlene crackers x 3 bites/mech soft honey thick nectar thick thin liquid

## 2019-12-09 NOTE — PROGRESS NOTE ADULT - PROBLEM SELECTOR PLAN 2
Food bolus found on CT on admission.   Pt reports eating steak prior to admission  s/p EGD to clear food impaction  Passed full liquids (apple sauce) on bedside swallow. Unable to tolerate thin liquids  Adv to nectar thick liquid diet  Pend official S/S eval  aspiration precautions  GI - Golyan

## 2019-12-09 NOTE — PROGRESS NOTE ADULT - PROBLEM SELECTOR PLAN 1
p/w T101.5, HR>100, WBC 17.9K, lactate 2.9-->4.2  s/p total of 2.5L NS bolus  Afebrile  CXR shows now clear infiltrate  Cough resolved  UA negative  UCx and bcx ngtd  flu negative  s/p vanc, zithromax and cefepime in ED  c/w zosyn  Pt refusing abx blood draws or IV placement at this time  ID consulted - Dr. Palomo

## 2019-12-09 NOTE — BEHAVIORAL HEALTH ASSESSMENT NOTE - SUMMARY
89M,  and living with wife in [place], with no reported PHx and MHx of HTN, hypothyroidism, chronic venous insufficiency  and recent admission to this hospital for acute cholecystitis (s/p percutaneous cholecystostomy tube on 11/26/19) from which he was DC'd to [facility], BIBEMS from [facility] with cough x1 day and found to have PNA. Psych consult was requested due to report of pt's wife to primary team that he had seemed depressed at home several weeks ago. On exam today, pt presents cooperative, linear and appropriate and reasonably well-oriented. He denies depression, SI/HI/AVH,  confirming that he was experiencing depressed mood i/s/o acute medical problems but describing current mood as much improved. Pt does not appear to meet clinical criteria for diagnosis of MDD, and he denies interest in psychiatric f/u or medications at this time. Pt does not appear to present an acute risk of harm to self or others at the time of assessment, and does not appear to be in need of admission to  psych at the time of assessment.

## 2019-12-09 NOTE — SWALLOW BEDSIDE ASSESSMENT ADULT - PHARYNGEAL PHASE
Delayed pharyngeal swallow Within functional limits Delayed pharyngeal swallow/Delayed cough post oral intake/Cough post oral intake

## 2019-12-09 NOTE — SWALLOW BEDSIDE ASSESSMENT ADULT - DIET PRIOR TO ADMI
Pt recommended puree & nectar on 11/29 on his last admit; pt received regular diet at [facility] upon admission, pt aspirated on steak.

## 2019-12-09 NOTE — PROGRESS NOTE ADULT - SUBJECTIVE AND OBJECTIVE BOX
Patient is a 89y old  Male who presents with a chief complaint of Cough    Subjective: Pt's vitals are stable, pt is afebrile, Wbc count improving, Lactate normalized, currently pt is AOA x2, not able to tell current date, month or year.     Meds:  piperacillin/tazobactam IVPB.. 3.375 Gram(s) IV Intermittent every 8 hours    Allergies    No Known Allergies    Intolerances    Review of system:  General: malaise	  ENMT:	no complaints  Respiratory and Thorax: cough  Cardiovascular:	denies CP, SOB  Gastrointestinal:	denies N, V, diarrhea; no abdominal pain  Genitourinary:	no c/o dysuria  Musculoskeletal:	 no complaints      VITALS:  Vital Signs Last 24 Hrs  T(C): 36.8 (09 Dec 2019 11:47), Max: 37.1 (09 Dec 2019 09:57)  T(F): 98.3 (09 Dec 2019 11:47), Max: 98.8 (09 Dec 2019 09:57)  HR: 79 (09 Dec 2019 11:47) (71 - 88)  BP: 134/73 (09 Dec 2019 11:47) (114/58 - 144/84)  BP(mean): --  RR: 18 (09 Dec 2019 11:47) (18 - 20)  SpO2: 100% (09 Dec 2019 11:47) (95% - 100%)      PHYSICAL EXAM:    Constitutional: WDWN W male in NAD  HEENT: EOMI; conjunctiva clear; mouth without oral lesions  Neck: supple  Respiratory: bibasilar rales  Cardiovascular: S1S2 irregular; cd not hear any murmurs, gallops, rubs  Gastrointestinal: R sided abd drain in place with bilious fluid in bag; BS active; soft and non-tender to palpation  Genitourinary: scrotal edema  Extremities: hyperpigmentation noted bilat legs with areas of dried ulcer; + bilat LE swelling; onychomycosis on toes  Neurological: Alert; oriented to place; did not know day of week, month, or season; UE tremor  Musculoskeletal: no joint swelling noted        LABS/DIAGNOSTIC TESTS:                          10.4   12.52 )-----------( 330      ( 08 Dec 2019 06:24 )             34.1         12-08    141  |  110<H>  |  23<H>  ----------------------------<  91  4.1   |  25  |  1.23    Ca    8.5      08 Dec 2019 06:24            CULTURES: .Sputum trap  12-08 @ 15:14 --  --    Rare polymorphonuclear leukocytes  Moderate Squamous epithelial cells  Few Gram Positive Rods  Few Gram positive cocci in pairs  Rare Yeast like cells  Few Gram Negative Diplococci      .Urine  12-07 @ 10:15   <10,000 CFU/mL Normal Urogenital Amanda  --  --      .Blood  12-07 @ 01:39   No growth to date.  --  --      .Body Fluid cholecystosstomy tube bile  11-27 @ 10:07   No growth at 5 days  --    Rare polymorphonuclear leukocytes per low power field  No organisms seen per oil power field      .Blood  11-24 @ 22:28   No growth at 5 days.  --  --      .Urine  11-16 @ 16:28   <10,000 CFU/mL Normal Urogenital Amanda  --  --      .Blood  11-16 @ 01:59   No growth at 5 days.  --  --            RADIOLOGY:      EXAM:  CT ABDOMEN AND PELVIS IC                          EXAM:  CT CHEST IC                            PROCEDURE DATE:  12/07/2019          INTERPRETATION:  [facility] RADIOLOGIST PRELIMINARY ADDENDUM REPORT  Reviewed by NAVJOT Abarca M.D.    Addendum created by Boy Mcfarlane MD on 12/7/2019 6:00:08 AM EST     Findings discussed with Dr. Goyal at 5:59 AM on 12/7/19  Initial report created on 12/7/2019 5:42:11 AM EST     PROCEDURE INFORMATION:   Exam: CT Chest With Contrast   Exam date and time: 12/7/2019 3:34 AM   Age: 89 years old   Clinical history: Abdominal pain; Chest pain     TECHNIQUE:   Imaging protocol: Computed tomography of the chest with intravenous   contrast.   3D rendering: MIP reconstructed images were created and reviewed.     COMPARISON:   CT CHEST 11/18/2019 10:23 AM     FINDINGS:   Lungs: Moderate increased patchy dependent opacity lung bases.   Pleural space: Unremarkable. No pneumothorax. No pleural effusion.   Heart: Unremarkable. No cardiomegaly. No pericardial effusion.   Mediastinum: Marked food debris distention of the esophagus down to the   GE   junction and there is soft tissue thickening. This is new relative to a   recent   CT imaging from 11/18/2019. There is evidence of a paraesophageal hernia   resulting inthe proximal esophageal and obstruction. Further evaluation   recommended.  Aorta: Unremarkable. No aortic aneurysm.   Lymph nodes: Unremarkable. No enlarged lymph nodes.   Bones/joints: Unremarkable. No acute fracture.   Soft tissues: Unremarkable.     IMPRESSION:   1. Marked food debris distention of the esophagus down to the junction   where   there is soft tissue thickening at the site of moderate paraesophageal    hiatal hernia. See image 70 series 10.  This could be secondary to inflammation versus possible food impaction.   Further   evaluation with endoscopy could be considered.   2. Increased reticular and patchy density in the lung bases suggesting   atelectasis however aspiration not excluded.       =========================  PROCEDURE INFORMATION:   Exam: CT Abdomen With Contrast   Exam date and time: 12/7/2019 3:34 AM   Age: 89 years old   Clinical history: Abdominal pain; Chest pain     TECHNIQUE:   Imaging protocol: Computed tomography images of the abdomen with   intravenous   contrast.   3D rendering: MIP reconstructed images were created and reviewed.     COMPARISON:   CT CHEST 11/18/2019 10:23 AM     FINDINGS:   Liver: Normal. No mass.   Gallbladder and bile ducts: Interval placement of cholecystostomy tube in   the  decompressed gallbladder.   Pancreas: Normal. No ductal dilation.   Spleen: Normal. No splenomegaly.   Adrenals: Normal. No mass.   Kidneys and ureters: Left renal cyst measuring 3 cm, otherwise normal   kidneys.   Stomach and bowel: Normal. No obstruction. No mucosal thickening.     Intraperitoneal space: Unremarkable. No free air. No significant fluid   collection.   Lymph nodes: Unremarkable. No enlarged lymph nodes.   Vasculature: Unremarkable. No abdominal aortic aneurysm.   Reproductive: Enlarged prostate measuring 5.5 cm diameter and bladder   base.   Bones/joints: Multilevel degenerative changes thoracolumbar spine.   Soft tissues: Unremarkable.     IMPRESSION:   Interval placement of cholecystostomy tube with decompressed gallbladder.    Critical value  discussed with nurse practitioner Rickey on 12/7/2019 at   8:40 AM with read back.  Hospital policies for critical values including read back   policy were followed.  The verbal communication of the critical value   supplementsthis written report.                 MARCELLUS ABARCA M.D., ATTENDING RADIOLOGIST  This document has been electronically signed. Dec  7 2019  8:44AM Subjective: Patient without specific complaints today. Does state that he wants to die.    Meds:  piperacillin/tazobactam IVPB.. 3.375 Gram(s) IV Intermittent every 8 hours (D3)    Allergies--No Known Allergies    VITALS:  Vital Signs Last 24 Hrs  T(C): 36.8 (09 Dec 2019 11:47), Max: 37.1 (09 Dec 2019 09:57)  T(F): 98.3 (09 Dec 2019 11:47), Max: 98.8 (09 Dec 2019 09:57)  HR: 79 (09 Dec 2019 11:47) (71 - 88)  BP: 134/73 (09 Dec 2019 11:47) (114/58 - 144/84)  BP(mean): --  RR: 18 (09 Dec 2019 11:47) (18 - 20)  SpO2: 100% (09 Dec 2019 11:47) (95% - 100%)      PHYSICAL EXAM:    Constitutional: WDWN W male in NAD  HEENT: EOMI; conjunctivae clear; mouth without oral lesions  Neck: supple  Respiratory: bibasilar rales  Cardiovascular: S1S2 irregular; cd not hear any murmurs, gallops, rubs  Gastrointestinal: R sided abd drain in place with bilious fluid in bag; BS active; soft and non-tender to palpation  Genitourinary: scrotal edema  Extremities: hyperpigmentation noted bilat legs with areas of dried ulcer; + bilat LE swelling; onychomycosis on toes  Neurological: Alert; oriented to place; did not know day of week, month, or season; UE tremor  Musculoskeletal: no joint swelling noted      LABS/DIAGNOSTIC TESTS:                        10.4   12.52 )-----------( 330      ( 08 Dec 2019 06:24 )            34.1         12-08    141  |  110<H>  |  23<H>  ----------------------------<  91  4.1   |  25  |  1.23    Ca    8.5      08 Dec 2019 06:24      CULTURES: .Sputum trap  12-08 @ 15:14 --  --    Rare polymorphonuclear leukocytes  Moderate Squamous epithelial cells  Few Gram Positive Rods  Few Gram positive cocci in pairs  Rare Yeast like cells  Few Gram Negative Diplococci      .Urine  12-07 @ 10:15   <10,000 CFU/mL Normal Urogenital Amanda  --  --      .Blood  12-07 @ 01:39   No growth to date.  --  --      .Body Fluid cholecystosstomy tube bile  11-27 @ 10:07   No growth at 5 days  --    Rare polymorphonuclear leukocytes per low power field  No organisms seen per oil power field      .Blood  11-24 @ 22:28   No growth at 5 days.  --  --      .Urine  11-16 @ 16:28   <10,000 CFU/mL Normal Urogenital Amanda  --  --      .Blood  11-16 @ 01:59   No growth at 5 days.  --  --            RADIOLOGY:      EXAM:  CT ABDOMEN AND PELVIS IC                          EXAM:  CT CHEST IC                            PROCEDURE DATE:  12/07/2019          INTERPRETATION:  [facility] RADIOLOGIST PRELIMINARY ADDENDUM REPORT  Reviewed by NAVJOT Abarca M.D.    Addendum created by Boy Mcfarlane MD on 12/7/2019 6:00:08 AM EST     Findings discussed with Dr. Goyal at 5:59 AM on 12/7/19  Initial report created on 12/7/2019 5:42:11 AM EST     PROCEDURE INFORMATION:   Exam: CT Chest With Contrast   Exam date and time: 12/7/2019 3:34 AM   Age: 89 years old   Clinical history: Abdominal pain; Chest pain     TECHNIQUE:   Imaging protocol: Computed tomography of the chest with intravenous   contrast.   3D rendering: MIP reconstructed images were created and reviewed.     COMPARISON:   CT CHEST 11/18/2019 10:23 AM     FINDINGS:   Lungs: Moderate increased patchy dependent opacity lung bases.   Pleural space: Unremarkable. No pneumothorax. No pleural effusion.   Heart: Unremarkable. No cardiomegaly. No pericardial effusion.   Mediastinum: Marked food debris distention of the esophagus down to the   GE   junction and there is soft tissue thickening. This is new relative to a   recent   CT imaging from 11/18/2019. There is evidence of a paraesophageal hernia   resulting inthe proximal esophageal and obstruction. Further evaluation   recommended.  Aorta: Unremarkable. No aortic aneurysm.   Lymph nodes: Unremarkable. No enlarged lymph nodes.   Bones/joints: Unremarkable. No acute fracture.   Soft tissues: Unremarkable.     IMPRESSION:   1. Marked food debris distention of the esophagus down to the junction   where   there is soft tissue thickening at the site of moderate paraesophageal    hiatal hernia. See image 70 series 10.  This could be secondary to inflammation versus possible food impaction.   Further   evaluation with endoscopy could be considered.   2. Increased reticular and patchy density in the lung bases suggesting   atelectasis however aspiration not excluded.       =========================  PROCEDURE INFORMATION:   Exam: CT Abdomen With Contrast   Exam date and time: 12/7/2019 3:34 AM   Age: 89 years old   Clinical history: Abdominal pain; Chest pain     TECHNIQUE:   Imaging protocol: Computed tomography images of the abdomen with   intravenous   contrast.   3D rendering: MIP reconstructed images were created and reviewed.     COMPARISON:   CT CHEST 11/18/2019 10:23 AM     FINDINGS:   Liver: Normal. No mass.   Gallbladder and bile ducts: Interval placement of cholecystostomy tube in   the  decompressed gallbladder.   Pancreas: Normal. No ductal dilation.   Spleen: Normal. No splenomegaly.   Adrenals: Normal. No mass.   Kidneys and ureters: Left renal cyst measuring 3 cm, otherwise normal   kidneys.   Stomach and bowel: Normal. No obstruction. No mucosal thickening.     Intraperitoneal space: Unremarkable. No free air. No significant fluid   collection.   Lymph nodes: Unremarkable. No enlarged lymph nodes.   Vasculature: Unremarkable. No abdominal aortic aneurysm.   Reproductive: Enlarged prostate measuring 5.5 cm diameter and bladder   base.   Bones/joints: Multilevel degenerative changes thoracolumbar spine.   Soft tissues: Unremarkable.     IMPRESSION:   Interval placement of cholecystostomy tube with decompressed gallbladder.    Critical value  discussed with nurse practitioner Rickey on 12/7/2019 at   8:40 AM with read back.  Hospital policies for critical values including read back   policy were followed.  The verbal communication of the critical value   supplementsthis written report.                 MARCELLUS ABARCA M.D., ATTENDING RADIOLOGIST  This document has been electronically signed. Dec  7 2019  8:44AM

## 2019-12-09 NOTE — DIETITIAN INITIAL EVALUATION ADULT. - PERTINENT LABORATORY DATA
12-08 Na141 mmol/L Glu 91 mg/dL K+ 4.1 mmol/L Cr  1.23 mg/dL BUN 23 mg/dL<H> 12-07 Phos 3.0 mg/dL 12-07 Alb 2.3 g/dL<L> 12-07 [id number] 5.5 % 12-07 Chol 117 mg/dL LDL 48 mg/dL HDL 56 mg/dL Trig 64 mg/dL

## 2019-12-09 NOTE — PROGRESS NOTE ADULT - ASSESSMENT
89 y/o male admitted to [facility] for sepsis likely 2/2 to pneumonia, but will look for other potential sources as well. Patient initially in Afib with RVR on admission, now rate better controlled around 100bpm. Will treat pna and other potential infectious sources, i.e. cholecystomy tube, with broad spectrum coverage.

## 2019-12-09 NOTE — DISCHARGE NOTE PROVIDER - CARE PROVIDER_API CALL
Mariano Alarcon  Phone: [phone]  Fax: (   )    -  Established Patient  Follow Up Time: 1 week

## 2019-12-09 NOTE — DISCHARGE NOTE PROVIDER - PROVIDER TOKENS
FREE:[LAST:[Agnes],FIRST:[Mariano],PHONE:[phone]],FAX:[(   )    -],FOLLOWUP:[1 week],ESTABLISHEDPATIENT:[T]]

## 2019-12-09 NOTE — BEHAVIORAL HEALTH ASSESSMENT NOTE - DIFFERENTIAL
Adjustment disorder with depressed mood, in remission  Encounter for general psychiatric examination, requested by authority

## 2019-12-09 NOTE — CHART NOTE - NSCHARTNOTEFT_GEN_A_CORE
Care plan discussed with patient and wife at bedside.    He has named his wife Criselda Woodward ([phone]) as his health care proxy.     Code status, intubation, resucitation, and other emergent procedures were discussed at length with patient and wife. He is clear that he would like to remain FULL CODE with everything to be done in case of emergency. He expresses clear understanding of risks and benefits of such measures and would like everything to be done. His wife is in agreement with this decision.     Forms are completed and placed in chart. Care plan discussed with patient and wife at bedside.    He has named his wife Criselda Woodward ([phone]) as his health care proxy.     Code status, intubation, resucitation, and other emergent procedures were discussed at length with patient and wife. He is clear that he would like to remain FULL CODE with everything to be done in case of emergency. He expresses clear understanding of risks and benefits of such measures and would like everything to be done. His wife is in agreement with this decision.     Forms are completed and placed in chart.    Agree with above  Dr Whiting

## 2019-12-09 NOTE — BEHAVIORAL HEALTH ASSESSMENT NOTE - NSBHSOCIALHXDETAILSFT_PSY_A_CORE
B/R in [place]. Formerly  but  30 yrs ago, remarried to spouse who is 81 yo. No children. Lives with wife in [place] with private-hire HHA (family friend) for about 6 hrs/wk.

## 2019-12-09 NOTE — DISCHARGE NOTE PROVIDER - HOSPITAL COURSE
89 y/o male from [facility] with PMHx of HTN, hypothyroidism, HTN chronic venous insufficiency presents to the ED with cough x1 day. Family unavailable at present to provide further history, but patient is able to state that he has been having a cough for one day in rehab. He denied any fever, but endorsed chills. He denied any chest pain, sob, nausea, vomiting or any other complaints. Patient was recently discharged 2 days ago - was here for acute cholecystitis and is s/p percutaneous cholecystostomy tube on 11/26/19. Patient was discharged to [facility], but now returns to the ED.        In ED he was found to be septic with temp of 101.5, HR>100, WBC 17.9K, lactate 2.9-->4.2. He received vancomycin, zithromax and cefepime in the ED and then was managed with zosyn on the floor.        Chest CT showed impacted food bolus which was cleared with EGD. He received speech and swallow evaluation and was recommended mechanical soft diet nectar thick ground liquids. He has been switched to oral antibiotics.          His chest xray resolved. UA and cultures were negative. He symptomatically resolved.         His home medications have been continued.         He was seen by psychiatry for concern for depression.        PT recommended return to [facility].

## 2019-12-09 NOTE — DIETITIAN INITIAL EVALUATION ADULT. - PROBLEM SELECTOR PLAN 2
precipitated by sepsis as above  HR now stable around 100 s/p cardizem 20mg IV push x1 in ED  monitor vitals closely  tele monitoring   will check cardiac enzymes  PANKAJDSVASc 3 - will hold off on full dose ac for now and reassess if pt is a candidate

## 2019-12-09 NOTE — BEHAVIORAL HEALTH ASSESSMENT NOTE - NSBHCONSULTRECOMMENDOTHER_PSY_A_CORE FT
1. Pt does not meet clinical criteria for diagnosis of MDD and does not appear to be depressed  2. No indication for standing or PRN psychotropic medications at this time  3. Pt denies interest in psych f/u during admission or subsequently  4. Psychiatry is signing off. Reconsult if additional issues arise as inpatient  5. Case d/w Dr. Hughes of primary team    Leah Stevens MD  Director, Consultation-Liaison Psychiatry Service  [phone]

## 2019-12-09 NOTE — BEHAVIORAL HEALTH ASSESSMENT NOTE - NSBHCHARTREVIEWIMAGING_PSY_A_CORE FT
< from: CT Chest w/ IV Cont (12.07.19 @ 03:47) >    IMPRESSION:   1. Marked food debris distention of the esophagus down to the junction   where   there is soft tissue thickening at the site of moderate paraesophageal    hiatal hernia. See image 70 series 10.  This could be secondary to inflammation versus possible food impaction.   Further   evaluation with endoscopy could be considered.   2. Increased reticular and patchy density in the lung bases suggesting   atelectasis however aspiration not excluded.       < end of copied text >  < from: CT Chest w/ IV Cont (12.07.19 @ 03:47) >    FINDINGS:   Liver: Normal. No mass.   Gallbladder and bile ducts: Interval placement of cholecystostomy tube in   the  decompressed gallbladder.   Pancreas: Normal. No ductal dilation.   Spleen: Normal. No splenomegaly.   Adrenals: Normal. No mass.   Kidneys and ureters: Left renal cyst measuring 3 cm, otherwise normal   kidneys.   Stomach and bowel: Normal. No obstruction. No mucosal thickening.     Intraperitoneal space: Unremarkable. No free air. No significant fluid   collection.   Lymph nodes: Unremarkable. No enlarged lymph nodes.   Vasculature: Unremarkable. No abdominal aortic aneurysm.   Reproductive: Enlarged prostate measuring 5.5 cm diameter and bladder   base.   Bones/joints: Multilevel degenerative changes thoracolumbar spine.   Soft tissues: Unremarkable.     < end of copied text >

## 2019-12-09 NOTE — PROGRESS NOTE ADULT - PROBLEM SELECTOR PLAN 3
precipitated by sepsis as above  HR now stable around 70s  monitor vitals closely  tele monitoring   Trops negative  CHADSVASc 3 - will hold off on full dose ac for now and reassess if pt is a candidate  PT eval - may be fall risk

## 2019-12-09 NOTE — DIETITIAN INITIAL EVALUATION ADULT. - FACTORS AFF FOOD INTAKE
difficulty swallowing/seen by swallow On (12/9/19) where a mechanical soft, thin liquids diet was recommended

## 2019-12-09 NOTE — BEHAVIORAL HEALTH ASSESSMENT NOTE - SUICIDE PROTECTIVE FACTORS
Has future plans/Cultural, spiritual and/or moral attitudes against suicide/Responsibility to family and others/Identifies reasons for living/Supportive social network of family or friends

## 2019-12-09 NOTE — DIETITIAN INITIAL EVALUATION ADULT. - PROBLEM SELECTOR PLAN 1
p/w T101.5, HR>100, WBC 17.9K, lactate 2.9-->4.2  s/p total of 2.5L NS bolus thus far  CXR shows now clear infiltrate, but pt having productive cough  UA negative  f/u urine cx, blood cx  flu negative  check RVP  s/p vanc, zithromax and cefepime in ED - will c/w vanc and zosyn at this time  f/u CT chest and abdomen to r/o other potential causes  o2 support prn  f/u repeat lactate - will c/w small 250cc boluses as needed to reduce risk of pulmonary edema  f/u legionella and strep ag  ID consulted - Dr. Palomo

## 2019-12-09 NOTE — SWALLOW BEDSIDE ASSESSMENT ADULT - SLP PERTINENT HISTORY OF CURRENT PROBLEM
89 y/o M from [facility] with PMHx of HTN, hypothyroidism, HTN chronic venous insufficiency presents to the ED with cough x1 day. Patient was recently discharged 2 days ago - was here for acute cholecystitis and is s/p percutaneous cholecystostomy tube on 11/26/19. admitted to [facility] for sepsis likely 2/2 to pneumonia, but will look for other potential sources as well. Food bolus found on CT on admission. Pt reports eating steak prior to admission. s/p EGD to clear food impaction

## 2019-12-09 NOTE — SWALLOW BEDSIDE ASSESSMENT ADULT - SWALLOW EVAL: RECOMMENDED FEEDING/EATING TECHNIQUES
check mouth frequently for oral residue/pocketing/crush medication (when feasible)/hard swallow w/ each bite or sip/allow for swallow between intakes/maintain upright posture during/after eating for 30 mins/oral hygiene/position upright (90 degrees)/alternate food with liquid

## 2019-12-09 NOTE — DISCHARGE NOTE PROVIDER - NSDCCPCAREPLAN_GEN_ALL_CORE_FT
PRINCIPAL DISCHARGE DIAGNOSIS  Diagnosis: Fever  Assessment and Plan of Treatment: Treated in the hospital with respiratory therapy and IV antibiotics:  With significant imporvemnet to respiratory status. Will continue a course of oral antibiotics for 5 days.  Make sure you follow up with Primary Care physician within 1-2 weeks of discharge to inform of your recent admission.      SECONDARY DISCHARGE DIAGNOSES  Diagnosis: Dysphagia  Assessment and Plan of Treatment: You were found to have a food bolus impacted in your esophagus. This was cleared with endoscopy. Speech and swallow has recommended Mechanical Soft, Ground diet with nectar thick liquids. Please continue aspiration precautions in order to prevent development of aspiration pneumonia.    Diagnosis: HTN (hypertension)  Assessment and Plan of Treatment: Continue with blood pressure medication. Maintain a healthy diet that consist of low sugar, low fat, low sodium diet. Exercise frequently if possible.  Follow up with primary care physician in one week after discharge.    Diagnosis: Atrial fibrillation with rapid ventricular response  Assessment and Plan of Treatment: Please continue with your anticoagulation medication, eliquis, as instructed in the medication reconciliation and your primary care physician.  Please continue with you rate control medication, metoprolol, as instructed in the medication reconciliation and your primary care physician.    Diagnosis: Hypothyroid  Assessment and Plan of Treatment: Your medications were continued throughout your stay. Please follow up with your primary care provider to ensure continued optimal management. PRINCIPAL DISCHARGE DIAGNOSIS  Diagnosis: Fever  Assessment and Plan of Treatment: Treated in the hospital with respiratory therapy and IV antibiotics:  With significant imporvemnet to respiratory status. Will continue a course of oral antibiotics for 3 days.  Make sure you follow up with Primary Care physician within 1-2 weeks of discharge to inform of your recent admission.      SECONDARY DISCHARGE DIAGNOSES  Diagnosis: Dysphagia  Assessment and Plan of Treatment: You were found to have a food bolus impacted in your esophagus. This was cleared with endoscopy. Speech and swallow has recommended Mechanical Soft, Ground diet with nectar thick liquids. Please continue aspiration precautions in order to prevent development of aspiration pneumonia.    Diagnosis: HTN (hypertension)  Assessment and Plan of Treatment: Continue with blood pressure medication. Maintain a healthy diet that consist of low sugar, low fat, low sodium diet. Exercise frequently if possible.  Follow up with primary care physician in one week after discharge.    Diagnosis: Atrial fibrillation with rapid ventricular response  Assessment and Plan of Treatment: Please continue with your anticoagulation medication, eliquis, as instructed in the medication reconciliation and your primary care physician.  Please continue with you rate control medication, metoprolol, as instructed in the medication reconciliation and your primary care physician.    Diagnosis: Hypothyroid  Assessment and Plan of Treatment: Your medications were continued throughout your stay. Please follow up with your primary care provider to ensure continued optimal management. PRINCIPAL DISCHARGE DIAGNOSIS  Diagnosis: Sepsis due to pneumonia  Assessment and Plan of Treatment: Treated in the hospital with respiratory therapy and IV antibiotics:  With significant imporvemnet to respiratory status. Will continue a course of oral antibiotics for 3 days.  Make sure you follow up with Primary Care physician within 1-2 weeks of discharge to inform of your recent admission.      SECONDARY DISCHARGE DIAGNOSES  Diagnosis: Dysphagia  Assessment and Plan of Treatment: You were found to have a food bolus impacted in your esophagus. This was cleared with endoscopy. Speech and swallow has recommended Mechanical Soft, Ground diet with nectar thick liquids. Please continue aspiration precautions in order to prevent development of aspiration pneumonia.    Diagnosis: HTN (hypertension)  Assessment and Plan of Treatment: Continue with blood pressure medication. Maintain a healthy diet that consist of low sugar, low fat, low sodium diet. Exercise frequently if possible.  Follow up with primary care physician in one week after discharge.    Diagnosis: Atrial fibrillation with rapid ventricular response  Assessment and Plan of Treatment: Please continue with your anticoagulation medication, eliquis, as instructed in the medication reconciliation and your primary care physician.  Please continue with you rate control medication, metoprolol, as instructed in the medication reconciliation and your primary care physician.    Diagnosis: Hypothyroid  Assessment and Plan of Treatment: Your medications were continued throughout your stay. Please follow up with your primary care provider to ensure continued optimal management.

## 2019-12-09 NOTE — DISCHARGE NOTE PROVIDER - NSDCCAREPROVSEEN_GEN_ALL_CORE_FT
Henok, Meka Andre, Analia Thurston Henok, Meka Andre, Nadege Palomo, Analia Mcwilliams, [facility]

## 2019-12-09 NOTE — PROGRESS NOTE ADULT - SUBJECTIVE AND OBJECTIVE BOX
[   ] ICU                                          [   ] CCU                                      [ X  ] Medical Floor      Patient is comfortable. No new complaints reported, No abdominal pain, N/V, hematemesis, hematochezia, melena, fever, chills, chest pain, SOB, cough or diarrhea reported.    VITALS  Vital Signs Last 24 Hrs  T(C): 36.6 (09 Dec 2019 15:50), Max: 37.1 (09 Dec 2019 09:57)  T(F): 97.9 (09 Dec 2019 15:50), Max: 98.8 (09 Dec 2019 09:57)  HR: 90 (09 Dec 2019 15:50) (71 - 90)  BP: 128/87 (09 Dec 2019 15:50) (115/56 - 144/84)   RR: 16 (09 Dec 2019 15:50) (16 - 20)  SpO2: 90% (09 Dec 2019 15:50) (90% - 100%)       MEDICATIONS  (STANDING):  cefpodoxime 200 milliGRAM(s) Oral every 12 hours  enoxaparin Injectable 40 milliGRAM(s) SubCutaneous daily  influenza   Vaccine 0.5 milliLiter(s) IntraMuscular once  tamsulosin Oral Tab/Cap - Peds 0.4 milliGRAM(s) Oral at bedtime    MEDICATIONS  (PRN):  albuterol/ipratropium for Nebulization 3 milliLiter(s) Nebulizer every 6 hours PRN Shortness of Breath and/or Wheezing  guaiFENesin  milliGRAM(s) Oral every 12 hours PRN Cough                            10.4   12.52 )-----------( 330      ( 08 Dec 2019 06:24 )             34.1       12-08    141  |  110<H>  |  23<H>  ----------------------------<  91  4.1   |  25  |  1.23    Ca    8.5      08 Dec 2019 06:24

## 2019-12-09 NOTE — SWALLOW BEDSIDE ASSESSMENT ADULT - SWALLOW EVAL: DIAGNOSIS
Pt p/w oropharyngeal dysphagia c/b increased mastication time, poor bolus formation, impaired A-P transport, and suspected premature posterior spillage. Overt s/s of aspiration observed on thin liquids (pt coughed extensively).

## 2019-12-09 NOTE — PROGRESS NOTE ADULT - SUBJECTIVE AND OBJECTIVE BOX
PGY1 Note discussed with supervising resident and primary attending.    Patient is a 89y old  Male who presents with a chief complaint of Cough (07 Dec 2019 09:15)    INTERVAL HPI/OVERNIGHT EVENTS:  Pt seen and examined at bedside.   No acute events overnight  Reports resolution of cough and sputum.  Feels much improved and now refusing antibiotics, IV placement, or blood draws  States he would rather "take [his] chances" than continue antibiotic therapy at this point    MEDICATIONS  (STANDING):  enoxaparin Injectable 40 milliGRAM(s) SubCutaneous daily  influenza   Vaccine 0.5 milliLiter(s) IntraMuscular once  piperacillin/tazobactam IVPB.. 3.375 Gram(s) IV Intermittent every 8 hours  tamsulosin Oral Tab/Cap - Peds 0.4 milliGRAM(s) Oral at bedtime    MEDICATIONS  (PRN):  albuterol/ipratropium for Nebulization 3 milliLiter(s) Nebulizer every 6 hours PRN Shortness of Breath and/or Wheezing  guaiFENesin  milliGRAM(s) Oral every 12 hours PRN Cough          Allergies    No Known Allergies    Intolerances        REVIEW OF SYSTEMS:  CONSTITUTIONAL: No fever, weight loss, or fatigue  RESPIRATORY: Cough - productive, no cough, wheezing, chills or hemoptysis; No shortness of breath  CARDIOVASCULAR: No chest pain, palpitations, dizziness, or leg swelling  GASTROINTESTINAL: No abdominal or epigastric pain. No nausea, vomiting, or hematemesis; No diarrhea or constipation. No melena or hematochezia.  NEUROLOGICAL: No headaches, memory loss, loss of strength, numbness, or tremors  SKIN: No itching, burning, rashes, or lesions     Vital Signs Last 24 Hrs  T(C): 37 (09 Dec 2019 07:46), Max: 37 (08 Dec 2019 19:17)  T(F): 98.6 (09 Dec 2019 07:46), Max: 98.6 (08 Dec 2019 19:17)  HR: 71 (09 Dec 2019 07:46) (71 - 88)  BP: 127/75 (09 Dec 2019 07:46) (114/58 - 144/84)  BP(mean): --  RR: 18 (09 Dec 2019 07:46) (18 - 18)  SpO2: 97% (09 Dec 2019 07:46) (93% - 100%)    PHYSICAL EXAM:  GENERAL: NAD, well-groomed, well-developed  HEAD:  Atraumatic, Normocephalic  EYES: EOMI, PERRLA, conjunctiva and sclera clear  NECK: Supple, No JVD, Normal thyroid  CHEST/LUNG: Clear to percussion bilaterally; No rales, rhonchi, wheezing, or rubs, cough resolved  HEART: Regular rate and rhythm; No murmurs, rubs, or gallops  ABDOMEN: Soft, Nontender, Nondistended; Bowel sounds present  NERVOUS SYSTEM:  Alert & Oriented X3, Good concentration; Motor Strength 5/5 B/L   EXTREMITIES:  2+ Peripheral Pulses, No clubbing, cyanosis, or edema      LABS:             Refused AM labs      Urinalysis Basic - ( 07 Dec 2019 01:09 )    Color: Yellow / Appearance: Clear / S.015 / pH: x  Gluc: x / Ketone: Negative  / Bili: Negative / Urobili: Negative   Blood: x / Protein: Negative / Nitrite: Negative   Leuk Esterase: Trace / RBC: 0-2 /HPF / WBC 0-2 /HPF   Sq Epi: x / Non Sq Epi: Few /HPF / Bacteria: Trace /HPF      CAPILLARY BLOOD GLUCOSE          RADIOLOGY & ADDITIONAL TESTS:    Imaging Personally Reviewed:  [ ] YES  [ ] NO    Consultant(s) Notes Reviewed:  [ ] YES  [ ] NO

## 2019-12-09 NOTE — DISCHARGE NOTE PROVIDER - ATTENDING COMMENTS
Seen and examined this morning.        Vital Signs Last 24 Hrs    T(C): 36.8 (12 Dec 2019 11:17), Max: 37 (12 Dec 2019 07:45)    T(F): 98.2 (12 Dec 2019 11:17), Max: 98.6 (12 Dec 2019 07:45)    HR: 66 (12 Dec 2019 11:17) (66 - 76)    BP: 129/53 (12 Dec 2019 11:17) (113/47 - 129/53)    BP(mean): --    RR: 18 (12 Dec 2019 11:17) (18 - 18)    SpO2: 94% (12 Dec 2019 11:17) (91% - 98%)        OE    NAD    S1s2    Clear lungs    soft , NT abdomen        A/P    Sepsis secondary to Aspiration Pneumonia    Dysphagia s/p food impaction s/p EGD    Afib    HTN        Plan    Advised to continue cefpodoxime for total of 7 days    Medically stable to discharge back to [facility]

## 2019-12-09 NOTE — SWALLOW BEDSIDE ASSESSMENT ADULT - COMMENTS
Pt seen at bedside with HOB elevated to 90 degrees. Pt awake, alert, and oriented to environment. Pt confused at times and began talking about ransom topics. Pt kyphotic and cough present at baseline. Pt received trials of  puree, mech soft, honey, nectar, and thin.

## 2019-12-09 NOTE — PROGRESS NOTE ADULT - ASSESSMENT
89 y/o male from [facility] with PMHx of HTN, hypothyroidism, HTN chronic venous insufficiency presented to the ED with cough x1 day PTA. He denied any fever, but did endorse chills. He denied any chest pain, sob, nausea, vomiting or any other complaints on admission and at the time of my evaluation. Patient was recently discharged - dx with acute cholecystitis and s/p percutaneous cholecystostomy tube on 11/26/19. On this admission, patient dx with severe sepsis--temp of 101.5F, , WBC 17.9K-->21K-->12K, lactate 2.9-->4.2-->1.3. Also found to have food impaction in distal esophagus and underwent removal of obstruction 12/7/19. Patient was placed on pip/tazo for suspicion of aspiration pneumonia based on CT chest but now pt is switched to cefpodoxime 200mg q12 hours after speech and swallow evaluation to complete total of 7 days of antibiotics. Patient clinically improved; WBC decreased; patient is afebrile for more than 48 hours.     #1 Severe sepsis/aspiration pneumonia. Concerned about the possibility of more resistant organisms, in view of recent hospitalization. Antibiotics changed to Cefpodoxime 200mg bid for 5 days to complete total of 7 days of abx course as pt can take mechanical soft diet as per speech and swallow evaluation.     #2 Cholecystitis-- surgical/ f/u recommended 89 y/o male from [facility] with PMHx of HTN, hypothyroidism, HTN chronic venous insufficiency presented to the ED with cough x1 day PTA. Patient was recently discharged - dx with acute cholecystitis and s/p percutaneous cholecystostomy tube on 11/26/19. On this admission, patient dx with severe sepsis--temp of 101.5F, , WBC 17.9K-->21K-->12K, lactate 2.9-->4.2-->1.3. Also found to have food impaction in distal esophagus and underwent removal of obstruction 12/7/19. Patient was placed on pip/tazo for suspicion of aspiration pneumonia based on CT chest. Received 2 d of pip/tazo but now pt is switched to cefpodoxime 200mg q12 hours after speech and swallow evaluation to complete total of 7 days of antibiotics. Patient clinically improved; WBC decreased; patient is afebrile for more than 48 hours.     #1 Severe sepsis/aspiration pneumonia. Concerned about the possibility of more resistant organisms, in view of recent hospitalization. Patient clinically improved; also refusing IV Ab. Antibiotics changed to Cefpodoxime 200mg p.o. bid for 5 days to complete total of 7 days of abx course (pt can take mechanical soft diet as per speech and swallow evaluation).     #2 Cholecystitis-- surgical/ f/u recommended    #3 Depression-- spoke with patient about pursuing his interests in art and  Scientology upon discharge. Patient may benefit from speaking to a therapist as well (communicated to hospitalist who is planning to have pt evaluated by psychiatry)     Please call again if needed.

## 2019-12-09 NOTE — SWALLOW BEDSIDE ASSESSMENT ADULT - ORAL PREPARATORY PHASE
increase mastication time Decreased mastication ability/Reduced oral grading Within functional limits

## 2019-12-09 NOTE — BEHAVIORAL HEALTH ASSESSMENT NOTE - RISK ASSESSMENT
Low Acute Suicide Risk Risk factors: Advanced age, multiple medical problems. Protective factors: Absent h/o SI/SA/SIB, stable domicile with supportive spouse, future oriented. Risk level appears low at the time of assessment.

## 2019-12-09 NOTE — DISCHARGE NOTE PROVIDER - NSDCMRMEDTOKEN_GEN_ALL_CORE_FT
acetaminophen 325 mg oral tablet: 2 tab(s) orally every 6 hours, As needed, Temp greater or equal to 38C (100.4F), Moderate Pain (4 - 6)  calamine topical lotion: 1 application topically once a day  clopidogrel 75 mg oral tablet: 1 tab(s) orally once a day  levothyroxine 100 mcg (0.1 mg) oral tablet: 1 tab(s) orally once a day  lisinopril 2.5 mg oral tablet: 1 tab(s) orally once a day  tamsulosin 0.4 mg oral capsule: 1 cap(s) orally once a day (at bedtime) acetaminophen 325 mg oral tablet: 2 tab(s) orally every 6 hours, As needed, Temp greater or equal to 38C (100.4F), Moderate Pain (4 - 6)  calamine topical lotion: 1 application topically once a day  cefpodoxime 200 mg oral tablet: 1 tab(s) orally every 12 hours  clopidogrel 75 mg oral tablet: 1 tab(s) orally once a day  levothyroxine 100 mcg (0.1 mg) oral tablet: 1 tab(s) orally once a day  lisinopril 2.5 mg oral tablet: 1 tab(s) orally once a day  tamsulosin 0.4 mg oral capsule: 1 cap(s) orally once a day (at bedtime)

## 2019-12-09 NOTE — BEHAVIORAL HEALTH ASSESSMENT NOTE - NSBHCHARTREVIEWLAB_PSY_A_CORE FT
12-08    141  |  110<H>  |  23<H>  ----------------------------<  91  4.1   |  25  |  1.23    Ca    8.5      08 Dec 2019 06:24    Complete Blood Count + Automated Diff in AM (12.08.19 @ 06:24)    WBC Count: 12.52 K/uL    RBC Count: 3.36 M/uL    Hemoglobin: 10.4 g/dL    Hematocrit: 34.1 %    Mean Cell Volume: 101.5 fl    Mean Cell Hemoglobin: 31.0 pg    Mean Cell Hemoglobin Conc: 30.5 gm/dL    Red Cell Distrib Width: 17.6 %    Platelet Count - Automated: 330 K/uL    Auto Neutrophil #: 10.60 K/uL    Auto Lymphocyte #: 1.19 K/uL    Auto Monocyte #: 0.40 K/uL    Auto Eosinophil #: 0.21 K/uL    Auto Basophil #: 0.05 K/uL    Auto Neutrophil %: 84.6: Differential percentages must be correlated with absolute numbers for  clinical significance. %    Auto Lymphocyte %: 9.5 %    Auto Monocyte %: 3.2 %    Auto Eosinophil %: 1.7 %    Auto Basophil %: 0.4 %    Auto Immature Granulocyte %: 0.6 %    Nucleated RBC: 0 /100 WBCs

## 2019-12-10 LAB
ANION GAP SERPL CALC-SCNC: 6 MMOL/L — SIGNIFICANT CHANGE UP (ref 5–17)
BUN SERPL-MCNC: 13 MG/DL — SIGNIFICANT CHANGE UP (ref 7–18)
CALCIUM SERPL-MCNC: 8.2 MG/DL — LOW (ref 8.4–10.5)
CHLORIDE SERPL-SCNC: 109 MMOL/L — HIGH (ref 96–108)
CO2 SERPL-SCNC: 25 MMOL/L — SIGNIFICANT CHANGE UP (ref 22–31)
CREAT SERPL-MCNC: 0.99 MG/DL — SIGNIFICANT CHANGE UP (ref 0.5–1.3)
CULTURE RESULTS: SIGNIFICANT CHANGE UP
GLUCOSE SERPL-MCNC: 79 MG/DL — SIGNIFICANT CHANGE UP (ref 70–99)
HCT VFR BLD CALC: 34.1 % — LOW (ref 39–50)
HGB BLD-MCNC: 10.7 G/DL — LOW (ref 13–17)
MCHC RBC-ENTMCNC: 31.4 GM/DL — LOW (ref 32–36)
MCHC RBC-ENTMCNC: 31.4 PG — SIGNIFICANT CHANGE UP (ref 27–34)
MCV RBC AUTO: 100 FL — SIGNIFICANT CHANGE UP (ref 80–100)
NRBC # BLD: 0 /100 WBCS — SIGNIFICANT CHANGE UP (ref 0–0)
PLATELET # BLD AUTO: 303 K/UL — SIGNIFICANT CHANGE UP (ref 150–400)
POTASSIUM SERPL-MCNC: 3.7 MMOL/L — SIGNIFICANT CHANGE UP (ref 3.5–5.3)
POTASSIUM SERPL-SCNC: 3.7 MMOL/L — SIGNIFICANT CHANGE UP (ref 3.5–5.3)
RBC # BLD: 3.41 M/UL — LOW (ref 4.2–5.8)
RBC # FLD: 17.1 % — HIGH (ref 10.3–14.5)
SODIUM SERPL-SCNC: 140 MMOL/L — SIGNIFICANT CHANGE UP (ref 135–145)
SPECIMEN SOURCE: SIGNIFICANT CHANGE UP
WBC # BLD: 5.64 K/UL — SIGNIFICANT CHANGE UP (ref 3.8–10.5)
WBC # FLD AUTO: 5.64 K/UL — SIGNIFICANT CHANGE UP (ref 3.8–10.5)

## 2019-12-10 PROCEDURE — 99233 SBSQ HOSP IP/OBS HIGH 50: CPT | Mod: GC

## 2019-12-10 RX ADMIN — Medication 200 MILLIGRAM(S): at 17:27

## 2019-12-10 RX ADMIN — ENOXAPARIN SODIUM 40 MILLIGRAM(S): 100 INJECTION SUBCUTANEOUS at 11:46

## 2019-12-10 RX ADMIN — TAMSULOSIN HYDROCHLORIDE 0.4 MILLIGRAM(S): 0.4 CAPSULE ORAL at 21:27

## 2019-12-10 RX ADMIN — Medication 200 MILLIGRAM(S): at 07:47

## 2019-12-10 NOTE — PROGRESS NOTE ADULT - SUBJECTIVE AND OBJECTIVE BOX
[   ] ICU                                          [   ] CCU                                      [  X ] Medical Floor    Patient is comfortable. No new complaints reported, No abdominal pain, N/V, hematemesis, hematochezia, melena, fever, chills, chest pain, SOB, cough or diarrhea reported.    VITALS  Vital Signs Last 24 Hrs  T(C): 36.8 (10 Dec 2019 15:18), Max: 36.8 (10 Dec 2019 05:10)  T(F): 98.3 (10 Dec 2019 15:18), Max: 98.3 (10 Dec 2019 15:18)  HR: 63 (10 Dec 2019 15:18) (63 - 90)  BP: 130/57 (10 Dec 2019 15:18) (127/71 - 148/92)   RR: 18 (10 Dec 2019 15:18) (16 - 18)  SpO2: 98% (10 Dec 2019 15:18) (91% - 99%)       MEDICATIONS  (STANDING):  cefpodoxime 200 milliGRAM(s) Oral every 12 hours  enoxaparin Injectable 40 milliGRAM(s) SubCutaneous daily  influenza   Vaccine 0.5 milliLiter(s) IntraMuscular once  tamsulosin Oral Tab/Cap - Peds 0.4 milliGRAM(s) Oral at bedtime    MEDICATIONS  (PRN):  albuterol/ipratropium for Nebulization 3 milliLiter(s) Nebulizer every 6 hours PRN Shortness of Breath and/or Wheezing  guaiFENesin  milliGRAM(s) Oral every 12 hours PRN Cough                            10.7   5.64  )-----------( 303      ( 10 Dec 2019 07:58 )             34.1       12-10    140  |  109<H>  |  13  ----------------------------<  79  3.7   |  25  |  0.99    Ca    8.2<L>      10 Dec 2019 07:58

## 2019-12-10 NOTE — PROGRESS NOTE ADULT - PROBLEM SELECTOR PLAN 1
p/w T101.5, HR>100, WBC 17.9K, lactate 2.9-->4.2  s/p total of 2.5L NS bolus  Afebrile  CXR shows now clear infiltrate  Cough resolved  UA negative  UCx and bcx ngtd  flu negative  s/p vanc, zithromax and cefepime in ED  c/w cefpodoxime  Pt refusing abx blood draws or IV placement at this time  ID consulted - Dr. Palomo

## 2019-12-10 NOTE — PROGRESS NOTE ADULT - SUBJECTIVE AND OBJECTIVE BOX
PGY1 Note discussed with supervising resident and primary attending.    Patient is a 89y old  Male who presents with a chief complaint of Cough (07 Dec 2019 09:15)    INTERVAL HPI/OVERNIGHT EVENTS:  Pt seen and examined at bedside.   No acute events overnight  Reports resolution of cough and sputum.  Feels much improved and now refusing oral antibiotics, IV placement, or blood draws  Pending Authorization as per CM      MEDICATIONS  (STANDING):  cefpodoxime 200 milliGRAM(s) Oral every 12 hours  enoxaparin Injectable 40 milliGRAM(s) SubCutaneous daily  influenza   Vaccine 0.5 milliLiter(s) IntraMuscular once  tamsulosin Oral Tab/Cap - Peds 0.4 milliGRAM(s) Oral at bedtime    MEDICATIONS  (PRN):  albuterol/ipratropium for Nebulization 3 milliLiter(s) Nebulizer every 6 hours PRN Shortness of Breath and/or Wheezing  guaiFENesin  milliGRAM(s) Oral every 12 hours PRN Cough          Allergies    No Known Allergies    Intolerances        REVIEW OF SYSTEMS:  CONSTITUTIONAL: No fever, weight loss, or fatigue  RESPIRATORY: Cough - productive, no cough, wheezing, chills or hemoptysis; No shortness of breath  CARDIOVASCULAR: No chest pain, palpitations, dizziness, or leg swelling  GASTROINTESTINAL: No abdominal or epigastric pain. No nausea, vomiting, or hematemesis; No diarrhea or constipation. No melena or hematochezia.  NEUROLOGICAL: No headaches, memory loss, loss of strength, numbness, or tremors  SKIN: No itching, burning, rashes, or lesions     Vital Signs Last 24 Hrs  T(C): 36.7 (10 Dec 2019 07:46), Max: 36.8 (09 Dec 2019 11:47)  T(F): 98 (10 Dec 2019 07:46), Max: 98.3 (09 Dec 2019 11:47)  HR: 75 (10 Dec 2019 07:46) (75 - 90)  BP: 148/92 (10 Dec 2019 07:46) (127/71 - 148/92)  BP(mean): --  RR: 18 (10 Dec 2019 07:46) (16 - 18)  SpO2: 99% (10 Dec 2019 07:46) (90% - 100%)    PHYSICAL EXAM:  GENERAL: NAD, well-groomed, well-developed  HEAD:  Atraumatic, Normocephalic  EYES: EOMI, PERRLA, conjunctiva and sclera clear  NECK: Supple, No JVD, Normal thyroid  CHEST/LUNG: Clear to percussion bilaterally; No rales, rhonchi, wheezing, or rubs, cough resolved  HEART: Regular rate and rhythm; No murmurs, rubs, or gallops  ABDOMEN: Soft, Nontender, Nondistended; Bowel sounds present  NERVOUS SYSTEM:  Alert & Oriented X3, Good concentration; Motor Strength 5/5 B/L   EXTREMITIES:  2+ Peripheral Pulses, No clubbing, cyanosis, or edema      LABS:             Refused AM labs      Urinalysis Basic - ( 07 Dec 2019 01:09 )    Color: Yellow / Appearance: Clear / S.015 / pH: x  Gluc: x / Ketone: Negative  / Bili: Negative / Urobili: Negative   Blood: x / Protein: Negative / Nitrite: Negative   Leuk Esterase: Trace / RBC: 0-2 /HPF / WBC 0-2 /HPF   Sq Epi: x / Non Sq Epi: Few /HPF / Bacteria: Trace /HPF      CAPILLARY BLOOD GLUCOSE          RADIOLOGY & ADDITIONAL TESTS:    Imaging Personally Reviewed:  [ ] YES  [ ] NO    Consultant(s) Notes Reviewed:  [ ] YES  [ ] NO

## 2019-12-10 NOTE — PROGRESS NOTE ADULT - ASSESSMENT
89 y/o male admitted to [facility] for sepsis likely 2/2 to pneumonia, but will look for other potential sources as well. Patient initially in Afib with RVR on admission, now rate better controlled around 100bpm. Will treat pna and other potential infectious sources, i.e. cholecystomy tube, with broad spectrum coverage.

## 2019-12-10 NOTE — PROGRESS NOTE ADULT - PROBLEM SELECTOR PLAN 2
Food bolus found on CT on admission.   Pt reports eating steak prior to admission  s/p EGD to clear food impaction  diet as per s/s  aspiration precautions  GI - Golyan

## 2019-12-11 LAB
ANION GAP SERPL CALC-SCNC: 5 MMOL/L — SIGNIFICANT CHANGE UP (ref 5–17)
BUN SERPL-MCNC: 12 MG/DL — SIGNIFICANT CHANGE UP (ref 7–18)
CALCIUM SERPL-MCNC: 8.6 MG/DL — SIGNIFICANT CHANGE UP (ref 8.4–10.5)
CHLORIDE SERPL-SCNC: 107 MMOL/L — SIGNIFICANT CHANGE UP (ref 96–108)
CO2 SERPL-SCNC: 28 MMOL/L — SIGNIFICANT CHANGE UP (ref 22–31)
CREAT SERPL-MCNC: 0.93 MG/DL — SIGNIFICANT CHANGE UP (ref 0.5–1.3)
GLUCOSE SERPL-MCNC: 93 MG/DL — SIGNIFICANT CHANGE UP (ref 70–99)
HCT VFR BLD CALC: 35 % — LOW (ref 39–50)
HGB BLD-MCNC: 11 G/DL — LOW (ref 13–17)
MCHC RBC-ENTMCNC: 31.1 PG — SIGNIFICANT CHANGE UP (ref 27–34)
MCHC RBC-ENTMCNC: 31.4 GM/DL — LOW (ref 32–36)
MCV RBC AUTO: 98.9 FL — SIGNIFICANT CHANGE UP (ref 80–100)
NRBC # BLD: 0 /100 WBCS — SIGNIFICANT CHANGE UP (ref 0–0)
PLATELET # BLD AUTO: 311 K/UL — SIGNIFICANT CHANGE UP (ref 150–400)
POTASSIUM SERPL-MCNC: 3.8 MMOL/L — SIGNIFICANT CHANGE UP (ref 3.5–5.3)
POTASSIUM SERPL-SCNC: 3.8 MMOL/L — SIGNIFICANT CHANGE UP (ref 3.5–5.3)
RBC # BLD: 3.54 M/UL — LOW (ref 4.2–5.8)
RBC # FLD: 16.7 % — HIGH (ref 10.3–14.5)
SODIUM SERPL-SCNC: 140 MMOL/L — SIGNIFICANT CHANGE UP (ref 135–145)
WBC # BLD: 6.18 K/UL — SIGNIFICANT CHANGE UP (ref 3.8–10.5)
WBC # FLD AUTO: 6.18 K/UL — SIGNIFICANT CHANGE UP (ref 3.8–10.5)

## 2019-12-11 PROCEDURE — 99233 SBSQ HOSP IP/OBS HIGH 50: CPT | Mod: GC

## 2019-12-11 RX ORDER — ACETAMINOPHEN 500 MG
650 TABLET ORAL EVERY 6 HOURS
Refills: 0 | Status: DISCONTINUED | OUTPATIENT
Start: 2019-12-11 | End: 2019-12-12

## 2019-12-11 RX ADMIN — TAMSULOSIN HYDROCHLORIDE 0.4 MILLIGRAM(S): 0.4 CAPSULE ORAL at 21:04

## 2019-12-11 RX ADMIN — Medication 200 MILLIGRAM(S): at 17:27

## 2019-12-11 RX ADMIN — INFLUENZA VIRUS VACCINE 0.5 MILLILITER(S): 15; 15; 15; 15 SUSPENSION INTRAMUSCULAR at 13:18

## 2019-12-11 RX ADMIN — Medication 200 MILLIGRAM(S): at 06:25

## 2019-12-11 RX ADMIN — Medication 650 MILLIGRAM(S): at 22:36

## 2019-12-11 RX ADMIN — ENOXAPARIN SODIUM 40 MILLIGRAM(S): 100 INJECTION SUBCUTANEOUS at 11:37

## 2019-12-11 NOTE — PROGRESS NOTE ADULT - SUBJECTIVE AND OBJECTIVE BOX
PGY1 Note discussed with supervising resident and primary attending.    Patient is a 89y old  Male who presents with a chief complaint of Cough (07 Dec 2019 09:15)    INTERVAL HPI/OVERNIGHT EVENTS:  Pt seen and examined at bedside.   No acute events overnight  Reports resolution of cough and sputum.  Pending Authorization as per CM      MEDICATIONS  (STANDING):  cefpodoxime 200 milliGRAM(s) Oral every 12 hours  enoxaparin Injectable 40 milliGRAM(s) SubCutaneous daily  influenza   Vaccine 0.5 milliLiter(s) IntraMuscular once  tamsulosin Oral Tab/Cap - Peds 0.4 milliGRAM(s) Oral at bedtime    MEDICATIONS  (PRN):  albuterol/ipratropium for Nebulization 3 milliLiter(s) Nebulizer every 6 hours PRN Shortness of Breath and/or Wheezing  guaiFENesin  milliGRAM(s) Oral every 12 hours PRN Cough          Allergies    No Known Allergies    Intolerances        REVIEW OF SYSTEMS:  CONSTITUTIONAL: No fever, weight loss, or fatigue  RESPIRATORY: Cough - productive, no cough, wheezing, chills or hemoptysis; No shortness of breath  CARDIOVASCULAR: No chest pain, palpitations, dizziness, or leg swelling  GASTROINTESTINAL: No abdominal or epigastric pain. No nausea, vomiting, or hematemesis; No diarrhea or constipation. No melena or hematochezia.  NEUROLOGICAL: No headaches, memory loss, loss of strength, numbness, or tremors  SKIN: No itching, burning, rashes, or lesions     Vital Signs Last 24 Hrs  T(C): 36.2 (11 Dec 2019 08:26), Max: 37 (10 Dec 2019 19:13)  T(F): 97.1 (11 Dec 2019 08:26), Max: 98.6 (10 Dec 2019 19:13)  HR: 81 (11 Dec 2019 08:26) (63 - 81)  BP: 145/87 (11 Dec 2019 08:26) (130/57 - 166/81)  BP(mean): --  RR: 18 (11 Dec 2019 08:26) (18 - 18)  SpO2: 97% (11 Dec 2019 08:26) (93% - 98%)    PHYSICAL EXAM:  GENERAL: NAD, well-groomed, well-developed  HEAD:  Atraumatic, Normocephalic  EYES: EOMI, PERRLA, conjunctiva and sclera clear  NECK: Supple, No JVD, Normal thyroid  CHEST/LUNG: Clear to percussion bilaterally; No rales, rhonchi, wheezing, or rubs, cough resolved  HEART: Regular rate and rhythm; No murmurs, rubs, or gallops  ABDOMEN: Soft, Nontender, Nondistended; Bowel sounds present  NERVOUS SYSTEM:  Alert & Oriented X3, Good concentration; Motor Strength 5/5 B/L   EXTREMITIES:  2+ Peripheral Pulses, No clubbing, cyanosis, or edema      LABS:                   Urinalysis Basic - ( 07 Dec 2019 01:09 )    Color: Yellow / Appearance: Clear / S.015 / pH: x  Gluc: x / Ketone: Negative  / Bili: Negative / Urobili: Negative   Blood: x / Protein: Negative / Nitrite: Negative   Leuk Esterase: Trace / RBC: 0-2 /HPF / WBC 0-2 /HPF   Sq Epi: x / Non Sq Epi: Few /HPF / Bacteria: Trace /HPF      CAPILLARY BLOOD GLUCOSE          RADIOLOGY & ADDITIONAL TESTS:    Imaging Personally Reviewed:  [ ] YES  [ ] NO    Consultant(s) Notes Reviewed:  [ ] YES  [ ] NO

## 2019-12-11 NOTE — PROGRESS NOTE ADULT - ASSESSMENT
89 y/o male admitted to [facility] for sepsis likely 2/2 to pneumonia, but will look for other potential sources as well. Patient initially in Afib with RVR on admission, now rate better controlled around 100bpm. Will treat pna and other potential infectious sources, i.e. cholecystomy tube, with broad spectrum coverage.

## 2019-12-11 NOTE — PROGRESS NOTE ADULT - PROBLEM SELECTOR PLAN 1
p/w T101.5, HR>100, WBC 17.9K, lactate 2.9-->4.2  s/p total of 2.5L NS bolus  Afebrile  CXR shows now clear infiltrate  Cough resolved  UA negative  UCx and bcx ngtd  flu negative  s/p vanc, zithromax and cefepime in ED  c/w cefpodoxime  ID consulted - Dr. Palomo

## 2019-12-11 NOTE — PROGRESS NOTE ADULT - NEUROLOGICAL DETAILS
responds to verbal commands/sensation intact/responds to pain
responds to verbal commands/sensation intact/cranial nerves intact/responds to pain
responds to pain/sensation intact/responds to verbal commands

## 2019-12-11 NOTE — PROGRESS NOTE ADULT - SUBJECTIVE AND OBJECTIVE BOX
[   ] ICU                                          [   ] CCU                                      [  x ] Medical Floor      Patient is comfortable. No new complaints reported, No abdominal pain, N/V, hematemesis, hematochezia, melena, fever, chills, chest pain, SOB, cough or diarrhea reported.    VITALS  Vital Signs Last 24 Hrs  T(C): 36.4 (11 Dec 2019 15:06), Max: 37 (10 Dec 2019 19:13)  T(F): 97.5 (11 Dec 2019 15:06), Max: 98.6 (10 Dec 2019 19:13)  HR: 73 (11 Dec 2019 15:06) (62 - 81)  BP: 123/74 (11 Dec 2019 15:06) (123/74 - 166/81)   RR: 18 (11 Dec 2019 15:06) (18 - 18)  SpO2: 91% (11 Dec 2019 15:06) (91% - 99%)       MEDICATIONS  (STANDING):  cefpodoxime 200 milliGRAM(s) Oral every 12 hours  enoxaparin Injectable 40 milliGRAM(s) SubCutaneous daily  tamsulosin Oral Tab/Cap - Peds 0.4 milliGRAM(s) Oral at bedtime    MEDICATIONS  (PRN):  albuterol/ipratropium for Nebulization 3 milliLiter(s) Nebulizer every 6 hours PRN Shortness of Breath and/or Wheezing  guaiFENesin  milliGRAM(s) Oral every 12 hours PRN Cough                            11.0   6.18  )-----------( 311      ( 11 Dec 2019 07:43 )             35.0       12-11    140  |  107  |  12  ----------------------------<  93  3.8   |  28  |  0.93    Ca    8.6      11 Dec 2019 07:43

## 2019-12-11 NOTE — PROGRESS NOTE ADULT - ATTENDING COMMENTS
Patient fully re-evaluated, including chart review and examination at the bedside.
89 yo M, wife at bedside. Recently discharged with cholecystostomy tube due to cholecystitis. Pt was readmitted after eating steak and having a cough. Pt found to have impacted esophagus. Pt required removing via EGD. Pt is on Zosyn for suspected aspiration pneumonia but now refusing lab draws and iv antibiotics. Pt is pending official speech and swallow eval.  Psychiatry consulted for concern of depression.
Agree with above   patient seen and examined. Wife and friend were at bedside.   No complains. Laying in bed, wants to walk again. In between tells stories of his childhood.   Vital signs and labs reviewed     Vital Signs Last 24 Hrs  T(C): 36.8 (10 Dec 2019 15:18), Max: 36.8 (10 Dec 2019 05:10)  T(F): 98.3 (10 Dec 2019 15:18), Max: 98.3 (10 Dec 2019 15:18)  HR: 63 (10 Dec 2019 15:18) (63 - 90)  BP: 130/57 (10 Dec 2019 15:18) (127/71 - 148/92)  BP(mean): --  RR: 18 (10 Dec 2019 15:18) (16 - 18)  SpO2: 98% (10 Dec 2019 15:18) (91% - 99%)    1. Dysphagia secondary to impacted food. s/p EGD   On Dysphagia diet, doing well     2. Sepsis on admission: concern was for aspiration PNA. resolved: c/w cefpodoxime   3.  s/p percutaneous cholecystostomy tube on 11/26/19 (prior admission) the tube is still in place.   4. Macrocytic anemia   5. Debility  6. DVT prophylaxis     Stable to discharge back to rehab.   Discussed plan with patients wife
Patient was seen and examined. No complains, eager to go back to rehab and after to go home.   Was able to participate in PT.     ROS: negative   PE:   General: fragile elderly male, sitting on the bed. NAD   Cardio: Irregular rhythm, murmur (+)   Pulm: clear breath sounds, no wheezing   GI: abdomen is soft, cholecystotomy tube in place   Extr; no edema, skin changes   neuro: awake, alert, oriented x2, no focal weakness     Vital Signs Last 24 Hrs  T(C): 36.4 (11 Dec 2019 15:06), Max: 37 (10 Dec 2019 19:13)  T(F): 97.5 (11 Dec 2019 15:06), Max: 98.6 (10 Dec 2019 19:13)  HR: 73 (11 Dec 2019 15:06) (62 - 81)  BP: 123/74 (11 Dec 2019 15:06) (123/74 - 166/81)  BP(mean): --  RR: 18 (11 Dec 2019 15:06) (18 - 18)  SpO2: 91% (11 Dec 2019 15:06) (91% - 99%)    1. Dysphagia secondary to food impaction.  s/p EGD. On dysphagia diet   2. Sepsis on admission - resolved. On Cefpodoxime for aspiration PNA   3. Afib with RVR: rate controlled off medications.   4. HTN: BP controlled   5. DVT prophylaxis     Spoke to CM: rehab requested repeat PT evaluation. Repeat PT eval done. Tomorrow going to [facility].
89 yo M, wife at bedside. Recently discharged with cholecystostomy tube due to cholecystitis. Pt was readmitted after eating steak and having a cough. Pt found to have impacted esophagus. Pt required removing via EGD. Pt is on Zosyn for suspected aspiration pneumonia. Pt is pending official speech and swallow eval.

## 2019-12-12 ENCOUNTER — TRANSCRIPTION ENCOUNTER (OUTPATIENT)
Age: 84
End: 2019-12-12

## 2019-12-12 VITALS — DIASTOLIC BLOOD PRESSURE: 78 MMHG | HEART RATE: 76 BPM | SYSTOLIC BLOOD PRESSURE: 136 MMHG

## 2019-12-12 LAB
CULTURE RESULTS: SIGNIFICANT CHANGE UP
CULTURE RESULTS: SIGNIFICANT CHANGE UP
SPECIMEN SOURCE: SIGNIFICANT CHANGE UP
SPECIMEN SOURCE: SIGNIFICANT CHANGE UP

## 2019-12-12 PROCEDURE — 85027 COMPLETE CBC AUTOMATED: CPT

## 2019-12-12 PROCEDURE — 90686 IIV4 VACC NO PRSV 0.5 ML IM: CPT

## 2019-12-12 PROCEDURE — 97162 PT EVAL MOD COMPLEX 30 MIN: CPT

## 2019-12-12 PROCEDURE — 87631 RESP VIRUS 3-5 TARGETS: CPT

## 2019-12-12 PROCEDURE — 71260 CT THORAX DX C+: CPT

## 2019-12-12 PROCEDURE — 84484 ASSAY OF TROPONIN QUANT: CPT

## 2019-12-12 PROCEDURE — 83735 ASSAY OF MAGNESIUM: CPT

## 2019-12-12 PROCEDURE — 99239 HOSP IP/OBS DSCHRG MGMT >30: CPT | Mod: GC

## 2019-12-12 PROCEDURE — 87486 CHLMYD PNEUM DNA AMP PROBE: CPT

## 2019-12-12 PROCEDURE — 93005 ELECTROCARDIOGRAM TRACING: CPT

## 2019-12-12 PROCEDURE — 84443 ASSAY THYROID STIM HORMONE: CPT

## 2019-12-12 PROCEDURE — 82746 ASSAY OF FOLIC ACID SERUM: CPT

## 2019-12-12 PROCEDURE — 71045 X-RAY EXAM CHEST 1 VIEW: CPT

## 2019-12-12 PROCEDURE — 87899 AGENT NOS ASSAY W/OPTIC: CPT

## 2019-12-12 PROCEDURE — 36415 COLL VENOUS BLD VENIPUNCTURE: CPT

## 2019-12-12 PROCEDURE — 87798 DETECT AGENT NOS DNA AMP: CPT

## 2019-12-12 PROCEDURE — 97530 THERAPEUTIC ACTIVITIES: CPT

## 2019-12-12 PROCEDURE — 87086 URINE CULTURE/COLONY COUNT: CPT

## 2019-12-12 PROCEDURE — 82607 VITAMIN B-12: CPT

## 2019-12-12 PROCEDURE — 82803 BLOOD GASES ANY COMBINATION: CPT

## 2019-12-12 PROCEDURE — 87633 RESP VIRUS 12-25 TARGETS: CPT

## 2019-12-12 PROCEDURE — 85730 THROMBOPLASTIN TIME PARTIAL: CPT

## 2019-12-12 PROCEDURE — 80061 LIPID PANEL: CPT

## 2019-12-12 PROCEDURE — 80048 BASIC METABOLIC PNL TOTAL CA: CPT

## 2019-12-12 PROCEDURE — 92610 EVALUATE SWALLOWING FUNCTION: CPT

## 2019-12-12 PROCEDURE — 81001 URINALYSIS AUTO W/SCOPE: CPT

## 2019-12-12 PROCEDURE — 84100 ASSAY OF PHOSPHORUS: CPT

## 2019-12-12 PROCEDURE — 83036 HEMOGLOBIN GLYCOSYLATED A1C: CPT

## 2019-12-12 PROCEDURE — 87040 BLOOD CULTURE FOR BACTERIA: CPT

## 2019-12-12 PROCEDURE — 87581 M.PNEUMON DNA AMP PROBE: CPT

## 2019-12-12 PROCEDURE — 96374 THER/PROPH/DIAG INJ IV PUSH: CPT

## 2019-12-12 PROCEDURE — 83605 ASSAY OF LACTIC ACID: CPT

## 2019-12-12 PROCEDURE — 87070 CULTURE OTHR SPECIMN AEROBIC: CPT

## 2019-12-12 PROCEDURE — 80053 COMPREHEN METABOLIC PANEL: CPT

## 2019-12-12 PROCEDURE — 96375 TX/PRO/DX INJ NEW DRUG ADDON: CPT

## 2019-12-12 PROCEDURE — 99285 EMERGENCY DEPT VISIT HI MDM: CPT | Mod: 25

## 2019-12-12 PROCEDURE — 85610 PROTHROMBIN TIME: CPT

## 2019-12-12 PROCEDURE — 74177 CT ABD & PELVIS W/CONTRAST: CPT

## 2019-12-12 RX ORDER — CEFPODOXIME PROXETIL 100 MG
1 TABLET ORAL
Qty: 6 | Refills: 0
Start: 2019-12-12 | End: 2019-12-14

## 2019-12-12 RX ADMIN — Medication 200 MILLIGRAM(S): at 05:30

## 2019-12-12 RX ADMIN — Medication 600 MILLIGRAM(S): at 11:28

## 2019-12-12 RX ADMIN — ENOXAPARIN SODIUM 40 MILLIGRAM(S): 100 INJECTION SUBCUTANEOUS at 11:27

## 2019-12-12 RX ADMIN — Medication 650 MILLIGRAM(S): at 00:30

## 2019-12-12 NOTE — PROGRESS NOTE ADULT - MS EXT PE MLT D E PC
no cyanosis/no clubbing
no cyanosis/no clubbing
no clubbing/no cyanosis
no clubbing/no cyanosis
no cyanosis/no clubbing

## 2019-12-12 NOTE — DISCHARGE NOTE NURSING/CASE MANAGEMENT/SOCIAL WORK - PATIENT PORTAL LINK FT
You can access the FollowMyHealth Patient Portal offered by [facility] by registering at the following website: [email or web address]. By joining [facility]’s FollowMyHealth portal, you will also be able to view your health information using other applications (apps) compatible with our system.

## 2019-12-12 NOTE — PROGRESS NOTE ADULT - GASTROINTESTINAL DETAILS
nontender/soft/no rebound tenderness/no rigidity/no distention/no guarding/bowel sounds normal
no guarding/soft/nontender/no rebound tenderness/no rigidity/bowel sounds normal/no distention
bowel sounds normal/no rigidity/nontender/no guarding/no distention/no rebound tenderness/soft
nontender/no rebound tenderness/bowel sounds normal/no distention/no rigidity/no guarding/soft
soft/no rigidity/bowel sounds normal/nontender/no distention/no rebound tenderness/no guarding

## 2019-12-12 NOTE — PROGRESS NOTE ADULT - RS GEN PE MLT RESP DETAILS PC
airway patent/breath sounds equal/good air movement/no rhonchi/no rales
no wheezes/breath sounds equal/good air movement/airway patent
airway patent/breath sounds equal/good air movement/no chest wall tenderness
good air movement/breath sounds equal/no wheezes/airway patent
no wheezes/breath sounds equal/airway patent/good air movement/respirations non-labored

## 2019-12-12 NOTE — PROGRESS NOTE ADULT - SUBJECTIVE AND OBJECTIVE BOX
[   ] ICU                                          [   ] CCU                                      [ X ] Medical Floor   (patient seen and examined earlier in the morning)    VITALS  Vital Signs Last 24 Hrs  T(C): 36.8 (12 Dec 2019 11:17), Max: 37 (12 Dec 2019 07:45)  T(F): 98.2 (12 Dec 2019 11:17), Max: 98.6 (12 Dec 2019 07:45)  HR: 76 (12 Dec 2019 14:35) (66 - 76)  BP: 136/78 (12 Dec 2019 14:35) (113/47 - 136/78)   RR: 18 (12 Dec 2019 11:17) (18 - 18)  SpO2: 94% (12 Dec 2019 11:17) (94% - 98%)       MEDICATIONS  (STANDING):  cefpodoxime 200 milliGRAM(s) Oral every 12 hours  enoxaparin Injectable 40 milliGRAM(s) SubCutaneous daily  tamsulosin Oral Tab/Cap - Peds 0.4 milliGRAM(s) Oral at bedtime    MEDICATIONS  (PRN):  acetaminophen   Tablet .. 650 milliGRAM(s) Oral every 6 hours PRN Mild Pain (1 - 3), Moderate Pain (4 - 6), Severe Pain (7 - 10)  albuterol/ipratropium for Nebulization 3 milliLiter(s) Nebulizer every 6 hours PRN Shortness of Breath and/or Wheezing  guaiFENesin  milliGRAM(s) Oral every 12 hours PRN Cough                            11.0   6.18  )-----------( 311      ( 11 Dec 2019 07:43 )             35.0       12-11    140  |  107  |  12  ----------------------------<  93  3.8   |  28  |  0.93    Ca    8.6      11 Dec 2019 07:43

## 2019-12-12 NOTE — PROGRESS NOTE ADULT - CARDIOVASCULAR DETAILS
positive S1/positive S2
positive S2/positive S1
positive S1/positive S2
positive S2/positive S1
positive S2/positive S1

## 2019-12-12 NOTE — PROGRESS NOTE ADULT - [facility] PE MLT D E PC
no rub/regular rate and rhythm
regular rate and rhythm/no rub

## 2019-12-13 PROBLEM — I10 ESSENTIAL (PRIMARY) HYPERTENSION: Chronic | Status: ACTIVE | Noted: 2019-12-07

## 2019-12-13 PROBLEM — K81.9 CHOLECYSTITIS, UNSPECIFIED: Chronic | Status: ACTIVE | Noted: 2019-12-07

## 2019-12-26 ENCOUNTER — APPOINTMENT (OUTPATIENT)
Dept: SURGERY | Facility: CLINIC | Age: 84
End: 2019-12-26

## 2019-12-26 DIAGNOSIS — Z86.79 PERSONAL HISTORY OF OTHER DISEASES OF THE CIRCULATORY SYSTEM: ICD-10-CM

## 2019-12-26 DIAGNOSIS — Z78.9 OTHER SPECIFIED HEALTH STATUS: ICD-10-CM

## 2019-12-26 DIAGNOSIS — Z87.2 PERSONAL HISTORY OF DISEASES OF THE SKIN AND SUBCUTANEOUS TISSUE: ICD-10-CM

## 2019-12-26 DIAGNOSIS — Z86.39 PERSONAL HISTORY OF OTHER ENDOCRINE, NUTRITIONAL AND METABOLIC DISEASE: ICD-10-CM

## 2020-01-06 ENCOUNTER — APPOINTMENT (OUTPATIENT)
Dept: SURGERY | Facility: CLINIC | Age: 85
End: 2020-01-06
Payer: MEDICARE

## 2020-01-06 VITALS
OXYGEN SATURATION: 92 % | SYSTOLIC BLOOD PRESSURE: 169 MMHG | WEIGHT: 198 LBS | TEMPERATURE: 98.5 F | HEART RATE: 91 BPM | BODY MASS INDEX: 26.82 KG/M2 | DIASTOLIC BLOOD PRESSURE: 95 MMHG | HEIGHT: 72 IN

## 2020-01-06 DIAGNOSIS — N42.9 DISORDER OF PROSTATE, UNSPECIFIED: ICD-10-CM

## 2020-01-06 DIAGNOSIS — K81.9 CHOLECYSTITIS, UNSPECIFIED: ICD-10-CM

## 2020-01-06 DIAGNOSIS — E07.9 DISORDER OF THYROID, UNSPECIFIED: ICD-10-CM

## 2020-01-06 PROCEDURE — 99213 OFFICE O/P EST LOW 20 MIN: CPT

## 2020-01-06 RX ORDER — ACETAMINOPHEN 500 MG/1
TABLET ORAL
Refills: 0 | Status: ACTIVE | COMMUNITY

## 2020-01-06 RX ORDER — LISINOPRIL 30 MG/1
TABLET ORAL
Refills: 0 | Status: ACTIVE | COMMUNITY

## 2020-01-06 RX ORDER — TAMSULOSIN HYDROCHLORIDE 0.4 MG/1
CAPSULE ORAL
Refills: 0 | Status: ACTIVE | COMMUNITY

## 2020-01-06 RX ORDER — RISEDRONATE SODIUM 150 MG/1
TABLET, FILM COATED ORAL
Refills: 0 | Status: ACTIVE | COMMUNITY

## 2020-01-06 RX ORDER — TUBERCULIN PURIFIED PROTEIN DERIVATIVE 5 [IU]/.1ML
INJECTION, SOLUTION INTRADERMAL
Refills: 0 | Status: ACTIVE | COMMUNITY

## 2020-01-06 RX ORDER — CLOPIDOGREL 75 MG/1
TABLET, FILM COATED ORAL
Refills: 0 | Status: ACTIVE | COMMUNITY

## 2020-01-06 RX ORDER — CEFPODOXIME PROXETIL 200 MG/1
TABLET, FILM COATED ORAL
Refills: 0 | Status: ACTIVE | COMMUNITY

## 2020-01-06 RX ORDER — FERRIC OXIDE RED 80; 80 MG/ML; MG/ML
LOTION TOPICAL
Refills: 0 | Status: ACTIVE | COMMUNITY

## 2020-01-06 RX ORDER — HYDROCORTISONE 1 %
12 CREAM (GRAM) TOPICAL
Refills: 0 | Status: ACTIVE | COMMUNITY

## 2020-01-06 RX ORDER — LEVOTHYROXINE SODIUM 0.17 MG/1
TABLET ORAL
Refills: 0 | Status: ACTIVE | COMMUNITY

## 2020-01-06 RX ORDER — SENNOSIDES 8.6 MG TABLETS 8.6 MG/1
TABLET ORAL
Refills: 0 | Status: ACTIVE | COMMUNITY

## 2020-01-14 ENCOUNTER — INPATIENT (INPATIENT)
Facility: HOSPITAL | Age: 85
LOS: 2 days | Discharge: NURSING, REHAB OR LONG-TERM CARE | DRG: 300 | End: 2020-01-17
Attending: INTERNAL MEDICINE | Admitting: INTERNAL MEDICINE
Payer: MEDICARE

## 2020-01-14 VITALS
HEART RATE: 73 BPM | RESPIRATION RATE: 16 BRPM | SYSTOLIC BLOOD PRESSURE: 119 MMHG | DIASTOLIC BLOOD PRESSURE: 78 MMHG | OXYGEN SATURATION: 97 % | HEIGHT: 68 IN | TEMPERATURE: 99 F | WEIGHT: 164.91 LBS

## 2020-01-14 DIAGNOSIS — I82.411 ACUTE EMBOLISM AND THROMBOSIS OF RIGHT FEMORAL VEIN: ICD-10-CM

## 2020-01-14 DIAGNOSIS — T85.518A BREAKDOWN (MECHANICAL) OF OTHER GASTROINTESTINAL PROSTHETIC DEVICES, IMPLANTS AND GRAFTS, INITIAL ENCOUNTER: ICD-10-CM

## 2020-01-14 DIAGNOSIS — I10 ESSENTIAL (PRIMARY) HYPERTENSION: ICD-10-CM

## 2020-01-14 DIAGNOSIS — E03.9 HYPOTHYROIDISM, UNSPECIFIED: ICD-10-CM

## 2020-01-14 DIAGNOSIS — E87.0 HYPEROSMOLALITY AND HYPERNATREMIA: ICD-10-CM

## 2020-01-14 DIAGNOSIS — Z29.9 ENCOUNTER FOR PROPHYLACTIC MEASURES, UNSPECIFIED: ICD-10-CM

## 2020-01-14 DIAGNOSIS — I73.9 PERIPHERAL VASCULAR DISEASE, UNSPECIFIED: ICD-10-CM

## 2020-01-14 LAB
ALBUMIN SERPL ELPH-MCNC: 2.6 G/DL — LOW (ref 3.5–5)
ALP SERPL-CCNC: 168 U/L — HIGH (ref 40–120)
ALT FLD-CCNC: 13 U/L DA — SIGNIFICANT CHANGE UP (ref 10–60)
ANION GAP SERPL CALC-SCNC: 8 MMOL/L — SIGNIFICANT CHANGE UP (ref 5–17)
APTT BLD: 35.1 SEC — SIGNIFICANT CHANGE UP (ref 27.5–36.3)
AST SERPL-CCNC: 16 U/L — SIGNIFICANT CHANGE UP (ref 10–40)
BASOPHILS # BLD AUTO: 0.04 K/UL — SIGNIFICANT CHANGE UP (ref 0–0.2)
BASOPHILS NFR BLD AUTO: 0.4 % — SIGNIFICANT CHANGE UP (ref 0–2)
BILIRUB SERPL-MCNC: 0.4 MG/DL — SIGNIFICANT CHANGE UP (ref 0.2–1.2)
BUN SERPL-MCNC: 19 MG/DL — HIGH (ref 7–18)
CALCIUM SERPL-MCNC: 8.4 MG/DL — SIGNIFICANT CHANGE UP (ref 8.4–10.5)
CHLORIDE SERPL-SCNC: 115 MMOL/L — HIGH (ref 96–108)
CO2 SERPL-SCNC: 27 MMOL/L — SIGNIFICANT CHANGE UP (ref 22–31)
CREAT SERPL-MCNC: 1.19 MG/DL — SIGNIFICANT CHANGE UP (ref 0.5–1.3)
EOSINOPHIL # BLD AUTO: 0.43 K/UL — SIGNIFICANT CHANGE UP (ref 0–0.5)
EOSINOPHIL NFR BLD AUTO: 4.4 % — SIGNIFICANT CHANGE UP (ref 0–6)
GLUCOSE SERPL-MCNC: 92 MG/DL — SIGNIFICANT CHANGE UP (ref 70–99)
HCT VFR BLD CALC: 38.3 % — LOW (ref 39–50)
HGB BLD-MCNC: 11.7 G/DL — LOW (ref 13–17)
IMM GRANULOCYTES NFR BLD AUTO: 0.4 % — SIGNIFICANT CHANGE UP (ref 0–1.5)
INR BLD: 1.29 RATIO — HIGH (ref 0.88–1.16)
LIDOCAIN IGE QN: 77 U/L — SIGNIFICANT CHANGE UP (ref 73–393)
LYMPHOCYTES # BLD AUTO: 2.76 K/UL — SIGNIFICANT CHANGE UP (ref 1–3.3)
LYMPHOCYTES # BLD AUTO: 28.4 % — SIGNIFICANT CHANGE UP (ref 13–44)
MCHC RBC-ENTMCNC: 30.5 GM/DL — LOW (ref 32–36)
MCHC RBC-ENTMCNC: 30.9 PG — SIGNIFICANT CHANGE UP (ref 27–34)
MCV RBC AUTO: 101.1 FL — HIGH (ref 80–100)
MONOCYTES # BLD AUTO: 0.51 K/UL — SIGNIFICANT CHANGE UP (ref 0–0.9)
MONOCYTES NFR BLD AUTO: 5.3 % — SIGNIFICANT CHANGE UP (ref 2–14)
NEUTROPHILS # BLD AUTO: 5.93 K/UL — SIGNIFICANT CHANGE UP (ref 1.8–7.4)
NEUTROPHILS NFR BLD AUTO: 61.1 % — SIGNIFICANT CHANGE UP (ref 43–77)
NRBC # BLD: 0 /100 WBCS — SIGNIFICANT CHANGE UP (ref 0–0)
PLATELET # BLD AUTO: 357 K/UL — SIGNIFICANT CHANGE UP (ref 150–400)
POTASSIUM SERPL-MCNC: 4.2 MMOL/L — SIGNIFICANT CHANGE UP (ref 3.5–5.3)
POTASSIUM SERPL-SCNC: 4.2 MMOL/L — SIGNIFICANT CHANGE UP (ref 3.5–5.3)
PROT SERPL-MCNC: 6.8 G/DL — SIGNIFICANT CHANGE UP (ref 6–8.3)
PROTHROM AB SERPL-ACNC: 14.4 SEC — HIGH (ref 10–12.9)
RBC # BLD: 3.79 M/UL — LOW (ref 4.2–5.8)
RBC # FLD: 16.1 % — HIGH (ref 10.3–14.5)
SODIUM SERPL-SCNC: 150 MMOL/L — HIGH (ref 135–145)
WBC # BLD: 9.71 K/UL — SIGNIFICANT CHANGE UP (ref 3.8–10.5)
WBC # FLD AUTO: 9.71 K/UL — SIGNIFICANT CHANGE UP (ref 3.8–10.5)

## 2020-01-14 PROCEDURE — 93971 EXTREMITY STUDY: CPT | Mod: 26,RT

## 2020-01-14 PROCEDURE — 99285 EMERGENCY DEPT VISIT HI MDM: CPT

## 2020-01-14 PROCEDURE — 99222 1ST HOSP IP/OBS MODERATE 55: CPT

## 2020-01-14 PROCEDURE — 74177 CT ABD & PELVIS W/CONTRAST: CPT | Mod: 26

## 2020-01-14 RX ORDER — LISINOPRIL 2.5 MG/1
2.5 TABLET ORAL DAILY
Refills: 0 | Status: DISCONTINUED | OUTPATIENT
Start: 2020-01-14 | End: 2020-01-17

## 2020-01-14 RX ORDER — MIRTAZAPINE 45 MG/1
7.5 TABLET, ORALLY DISINTEGRATING ORAL AT BEDTIME
Refills: 0 | Status: DISCONTINUED | OUTPATIENT
Start: 2020-01-14 | End: 2020-01-17

## 2020-01-14 RX ORDER — LISINOPRIL 2.5 MG/1
1 TABLET ORAL
Qty: 0 | Refills: 0 | DISCHARGE

## 2020-01-14 RX ORDER — RISPERIDONE 4 MG/1
0.25 TABLET ORAL DAILY
Refills: 0 | Status: DISCONTINUED | OUTPATIENT
Start: 2020-01-14 | End: 2020-01-17

## 2020-01-14 RX ORDER — FERROUS SULFATE 325(65) MG
7.5 TABLET ORAL
Qty: 0 | Refills: 0 | DISCHARGE

## 2020-01-14 RX ORDER — ACETAMINOPHEN 500 MG
650 TABLET ORAL EVERY 6 HOURS
Refills: 0 | Status: DISCONTINUED | OUTPATIENT
Start: 2020-01-14 | End: 2020-01-17

## 2020-01-14 RX ORDER — CLOPIDOGREL BISULFATE 75 MG/1
1 TABLET, FILM COATED ORAL
Qty: 0 | Refills: 0 | DISCHARGE

## 2020-01-14 RX ORDER — ENOXAPARIN SODIUM 100 MG/ML
75 INJECTION SUBCUTANEOUS
Refills: 0 | Status: DISCONTINUED | OUTPATIENT
Start: 2020-01-14 | End: 2020-01-15

## 2020-01-14 RX ORDER — RISPERIDONE 4 MG/1
1 TABLET ORAL
Qty: 0 | Refills: 0 | DISCHARGE

## 2020-01-14 RX ORDER — LEVOTHYROXINE SODIUM 125 MCG
100 TABLET ORAL DAILY
Refills: 0 | Status: DISCONTINUED | OUTPATIENT
Start: 2020-01-14 | End: 2020-01-17

## 2020-01-14 RX ORDER — SOD,AMMONIUM,POTASSIUM LACTATE
1 CREAM (GRAM) TOPICAL
Refills: 0 | Status: DISCONTINUED | OUTPATIENT
Start: 2020-01-14 | End: 2020-01-17

## 2020-01-14 RX ORDER — MIRTAZAPINE 45 MG/1
0.5 TABLET, ORALLY DISINTEGRATING ORAL
Qty: 0 | Refills: 0 | DISCHARGE

## 2020-01-14 RX ORDER — SENNA PLUS 8.6 MG/1
2 TABLET ORAL AT BEDTIME
Refills: 0 | Status: DISCONTINUED | OUTPATIENT
Start: 2020-01-14 | End: 2020-01-17

## 2020-01-14 RX ORDER — SOD,AMMONIUM,POTASSIUM LACTATE
1 CREAM (GRAM) TOPICAL
Qty: 0 | Refills: 0 | DISCHARGE

## 2020-01-14 RX ORDER — SENNA PLUS 8.6 MG/1
3 TABLET ORAL
Qty: 0 | Refills: 0 | DISCHARGE

## 2020-01-14 RX ORDER — TAMSULOSIN HYDROCHLORIDE 0.4 MG/1
0.4 CAPSULE ORAL AT BEDTIME
Refills: 0 | Status: DISCONTINUED | OUTPATIENT
Start: 2020-01-14 | End: 2020-01-17

## 2020-01-14 RX ORDER — LIDOCAINE HCL 20 MG/ML
10 VIAL (ML) INJECTION ONCE
Refills: 0 | Status: DISCONTINUED | OUTPATIENT
Start: 2020-01-14 | End: 2020-01-14

## 2020-01-14 RX ORDER — CLOPIDOGREL BISULFATE 75 MG/1
75 TABLET, FILM COATED ORAL DAILY
Refills: 0 | Status: DISCONTINUED | OUTPATIENT
Start: 2020-01-15 | End: 2020-01-17

## 2020-01-14 RX ORDER — FERROUS SULFATE 325(65) MG
300 TABLET ORAL DAILY
Refills: 0 | Status: DISCONTINUED | OUTPATIENT
Start: 2020-01-14 | End: 2020-01-17

## 2020-01-14 RX ORDER — SODIUM CHLORIDE 9 MG/ML
1000 INJECTION, SOLUTION INTRAVENOUS
Refills: 0 | Status: DISCONTINUED | OUTPATIENT
Start: 2020-01-14 | End: 2020-01-17

## 2020-01-14 RX ORDER — SODIUM CHLORIDE 9 MG/ML
1000 INJECTION INTRAMUSCULAR; INTRAVENOUS; SUBCUTANEOUS ONCE
Refills: 0 | Status: COMPLETED | OUTPATIENT
Start: 2020-01-14 | End: 2020-01-14

## 2020-01-14 RX ADMIN — TAMSULOSIN HYDROCHLORIDE 0.4 MILLIGRAM(S): 0.4 CAPSULE ORAL at 23:05

## 2020-01-14 RX ADMIN — SODIUM CHLORIDE 1000 MILLILITER(S): 9 INJECTION INTRAMUSCULAR; INTRAVENOUS; SUBCUTANEOUS at 19:29

## 2020-01-14 RX ADMIN — SENNA PLUS 2 TABLET(S): 8.6 TABLET ORAL at 23:06

## 2020-01-14 RX ADMIN — MIRTAZAPINE 7.5 MILLIGRAM(S): 45 TABLET, ORALLY DISINTEGRATING ORAL at 23:06

## 2020-01-14 RX ADMIN — ENOXAPARIN SODIUM 75 MILLIGRAM(S): 100 INJECTION SUBCUTANEOUS at 22:04

## 2020-01-14 NOTE — CONSULT NOTE ADULT - ADDITIONAL PE
Under sterile conditions Percutaneous cholecystostomy tube was secured w/ 3.0 silk suture, dressing applied, pt tolerated procedure well.

## 2020-01-14 NOTE — H&P ADULT - HISTORY OF PRESENT ILLNESS
89 y/o male from [facility], bedbound with PMHx of HTN, hypothyroidism, HTN chronic venous insufficiency, PVD ,acute cholecystitis s/p IR Percutaneous cholecystostomy tube placement on 11/26/2019 was sent in from NH for dislodged Percutaneous cholecystostomy tube and broken suture. Pt currently denies any abd pain, N/V/D, CP , SOB, Cough , leg pain or any other symptoms. Pt was found to have Right common femoral vein on the CT scan in the ED     ED course:   Vitals : stable   Labs : unremarkable except for Na 150 , .   CT a/p: Cholecystostomy tube appears in good position. Deep vein thrombosis in the right common femoral vein.  US R leg : Deep vein thrombosis in the right common femoral vein and profunda femoral vein    SH : Denies Smoking, alcohol or drug use     GOC : full code

## 2020-01-14 NOTE — ED PROVIDER NOTE - LOCATION
abdomen Pt admitted for pleural effusion.  Tolerating regular diet, needs assistance. Per aide, Pt has not eaten anything for the past 3 meals, Pt needs encouragement. Unknown wt history.

## 2020-01-14 NOTE — ED PROVIDER NOTE - SECONDARY DIAGNOSIS.
Cholecystostomy tube dysfunction, initial encounter Acute deep vein thrombosis (DVT) of femoral vein of right lower extremity

## 2020-01-14 NOTE — ED ADULT NURSE NOTE - NSIMPLEMENTINTERV_GEN_ALL_ED
Implemented All Universal Safety Interventions:  [place] to call system. Call bell, personal items and telephone within reach. Instruct patient to call for assistance. Room bathroom lighting operational. Non-slip footwear when patient is off stretcher. Physically safe environment: no spills, clutter or unnecessary equipment. Stretcher in lowest position, wheels locked, appropriate side rails in place.

## 2020-01-14 NOTE — ED ADULT NURSE NOTE - OBJECTIVE STATEMENT
From [facility] as per transfer sheet malfunctioning nephrostomy tube pt does not have nephrostomy tube in place. A cholecystomy tube noted from RUQ. B/L lower extremity with redness and edema

## 2020-01-14 NOTE — H&P ADULT - PROBLEM SELECTOR PLAN 1
Pt doesn't have any symptoms, no SOB, CP ,.   - pt has been bedbound at NH   - CT a/p : R common femoral V DVT   - US R Leg : DVT of R common femoral and Profunda vein   - started on full dose Lovenox 75 mg IV BID  - Heme consult Dr Torres

## 2020-01-14 NOTE — H&P ADULT - NSICDXPASTMEDICALHX_GEN_ALL_CORE_FT
PAST MEDICAL HISTORY:  Acalculous cholecystitis s/p percutaneous cholecystostomy    HTN (hypertension)     Hypothyroid     PVD (peripheral vascular disease)     Ulcers of both lower extremities

## 2020-01-14 NOTE — ED PROVIDER NOTE - PMH
Acalculous cholecystitis  s/p percutaneous cholecystostomy  HTN (hypertension)    Hypothyroid    Ulcers of both lower extremities

## 2020-01-14 NOTE — H&P ADULT - NSHPPHYSICALEXAM_GEN_ALL_CORE
GENERAL: NAD  EYES: EOMI, PERRLA,   NECK: Supple, No JVD  CHEST/LUNG: Clear to auscultation b/l  No rales, rhonchi, wheezing   HEART: Regular rate and rhythm; No murmurs, +ve S1 S2   ABDOMEN: Soft, Nontender, Nondistended; Bowel sounds present  NERVOUS SYSTEM:  Alert & Oriented X2, normal sensations and normal strength     EXTREMITIES:   No clubbing, cyanosis, or edema, no calf tenderness . chronic skin changes on the lower legs  LYMPH NODES : non palpable   PSYCH: normal mood and affect

## 2020-01-14 NOTE — ED PROVIDER NOTE - CARE PLAN
EKG Principal Discharge DX:	Hypernatremia  Secondary Diagnosis:	Cholecystostomy tube dysfunction, initial encounter  Secondary Diagnosis:	Acute deep vein thrombosis (DVT) of femoral vein of right lower extremity

## 2020-01-14 NOTE — H&P ADULT - PROBLEM SELECTOR PLAN 2
Na 150 , Free water deficit 1.5 L   - s/p 1 L NS in ED   - started on 0.45% @ 75 cc   - f/u BMP in am

## 2020-01-14 NOTE — H&P ADULT - ASSESSMENT
89 y/o male from [facility], bedbound with PMHx of HTN, hypothyroidism, HTN chronic venous insufficiency, PVD ,acute cholecystitis s/p IR Percutaneous cholecystostomy tube placement on 11/26/2019 was sent in from NH for dislodged Percutaneous cholecystostomy tube and broken suture . Found to have R common femoral and profonda vein DVT     Pt will be admitted to [facility] for DVT and hypernatremia

## 2020-01-14 NOTE — ED PROVIDER NOTE - OBJECTIVE STATEMENT
Pt is a 89y M with significant PMHx of cholecystitis, htn, hypothyroid and no significant PSHx presenting to the ED with a dislodged draining tube and a broken suture. Pt has NKDA and currently denies any additional complaints at this time.

## 2020-01-14 NOTE — H&P ADULT - PROBLEM SELECTOR PLAN 7
IMPROVE VTE Individual Risk Assessment  RISK                                                                Points  [  ] Previous VTE                                                  3  [  ] Thrombophilia                                               2  [  ] Lower limb paralysis                                      2  [  ] Current Cancer                                              2         (within 6 months)  [  ] Immobilization > 24 hrs                                1  [  ] ICU/CCU stay > 24 hours                              1  [  ] Age > 60                                                      1  IMPROVE VTE Score ___2______  DVT ppx : full dose lovenox  GI ppx : no need

## 2020-01-14 NOTE — ED PROVIDER NOTE - PROGRESS NOTE DETAILS
Pt with abnormal electrolytes, moved cholecystostomy tube and DVT, Surgery consulted and recommended medical admission.

## 2020-01-14 NOTE — CONSULT NOTE ADULT - SUBJECTIVE AND OBJECTIVE BOX
Attending:  Dr Elizabeth     HPI:  89 y/o male with PMHx of HTN, hypothyroidism, HTN chronic venous insufficiency, hx of acute cholecystitis s/p IR Percutaneous cholecystostomy tube placement on 11/26/2019 presented w/ dislodged Percutaneous cholecystostomy tube and broken suture.     PAST MEDICAL & SURGICAL HISTORY:  Acalculous cholecystitis: s/p percutaneous cholecystostomy  HTN (hypertension)  Hypothyroid  Ulcers of both lower extremities  No significant past surgical history      MEDICATIONS  (STANDING):  lidocaine 1% (Preservative-free) Injectable 10 milliLiter(s) Local Injection Once  sodium chloride 0.9% Bolus 1000 milliLiter(s) IV Bolus once    MEDICATIONS  (PRN):      Allergies    No Known Allergies    Intolerances        SOCIAL HISTORY:    FAMILY HISTORY:  No pertinent family history in first degree relatives      Vital Signs Last 24 Hrs  T(C): 37.2 (14 Jan 2020 15:35), Max: 37.2 (14 Jan 2020 15:35)  T(F): 99 (14 Jan 2020 15:35), Max: 99 (14 Jan 2020 15:35)  HR: 76 (14 Jan 2020 15:35) (73 - 76)  BP: 124/74 (14 Jan 2020 15:35) (119/78 - 124/74)  BP(mean): --  RR: 16 (14 Jan 2020 15:35) (16 - 16)  SpO2: 97% (14 Jan 2020 15:35) (97% - 97%)    I&O's Summary      LABS:                        11.7   9.71  )-----------( 357      ( 14 Jan 2020 16:26 )             38.3     01-14    150<H>  |  115<H>  |  19<H>  ----------------------------<  92  4.2   |  27  |  1.19    Ca    8.4      14 Jan 2020 16:26    TPro  6.8  /  Alb  2.6<L>  /  TBili  0.4  /  DBili  x   /  AST  16  /  ALT  13  /  AlkPhos  168<H>  01-14    PT/INR - ( 14 Jan 2020 16:26 )   PT: 14.4 sec;   INR: 1.29 ratio         PTT - ( 14 Jan 2020 16:26 )  PTT:35.1 sec    CAPILLARY BLOOD GLUCOSE        LIVER FUNCTIONS - ( 14 Jan 2020 16:26 )  Alb: 2.6 g/dL / Pro: 6.8 g/dL / ALK PHOS: 168 U/L / ALT: 13 U/L DA / AST: 16 U/L / GGT: x             Cultures:      RADIOLOGY & ADDITIONAL STUDIES: Attending:  Dr Elizabeth     HPI:  89 y/o male with PMHx of HTN, hypothyroidism, HTN chronic venous insufficiency, hx of acute cholecystitis s/p IR Percutaneous cholecystostomy tube placement on 11/26/2019 presented w/ dislodged Percutaneous cholecystostomy tube and broken suture. Pt seen and examined at bedside, denies abd pain, no nausea or vomiting, pt not sure how and when Percutaneous cholecystostomy tube got dislodged.   Offers no other complaints.      PAST MEDICAL & SURGICAL HISTORY:  Acalculous cholecystitis: s/p percutaneous cholecystostomy  HTN (hypertension)  Hypothyroid  Ulcers of both lower extremities  No significant past surgical history      MEDICATIONS  (STANDING):  lidocaine 1% (Preservative-free) Injectable 10 milliLiter(s) Local Injection Once  sodium chloride 0.9% Bolus 1000 milliLiter(s) IV Bolus once    MEDICATIONS  (PRN):      Allergies    No Known Allergies    Intolerances        SOCIAL HISTORY:  Unknown   FAMILY HISTORY:  No pertinent family history in first degree relatives      Vital Signs Last 24 Hrs  T(C): 37.2 (14 Jan 2020 15:35), Max: 37.2 (14 Jan 2020 15:35)  T(F): 99 (14 Jan 2020 15:35), Max: 99 (14 Jan 2020 15:35)  HR: 76 (14 Jan 2020 15:35) (73 - 76)  BP: 124/74 (14 Jan 2020 15:35) (119/78 - 124/74)  BP(mean): --  RR: 16 (14 Jan 2020 15:35) (16 - 16)  SpO2: 97% (14 Jan 2020 15:35) (97% - 97%)    I&O's Summary      LABS:                        11.7   9.71  )-----------( 357      ( 14 Jan 2020 16:26 )             38.3     01-14    150<H>  |  115<H>  |  19<H>  ----------------------------<  92  4.2   |  27  |  1.19    Ca    8.4      14 Jan 2020 16:26    TPro  6.8  /  Alb  2.6<L>  /  TBili  0.4  /  DBili  x   /  AST  16  /  ALT  13  /  AlkPhos  168<H>  01-14    PT/INR - ( 14 Jan 2020 16:26 )   PT: 14.4 sec;   INR: 1.29 ratio         PTT - ( 14 Jan 2020 16:26 )  PTT:35.1 sec    CAPILLARY BLOOD GLUCOSE        LIVER FUNCTIONS - ( 14 Jan 2020 16:26 )  Alb: 2.6 g/dL / Pro: 6.8 g/dL / ALK PHOS: 168 U/L / ALT: 13 U/L DA / AST: 16 U/L / GGT: x               RADIOLOGY & ADDITIONAL STUDIES:  < from: CT Abdomen and Pelvis w/ IV Cont (01.14.20 @ 17:29) >  FINDINGS:    LOWER CHEST: Within normal limits.    LIVER: Within normal limits.  BILE DUCTS: Normal caliber.  GALLBLADDER: Contains a cholecystostomy tube, which appears in good position.  SPLEEN: Within normal limits.  PANCREAS: Within normal limits.  ADRENALS: Within normal limits.  KIDNEYS/URETERS: No hydronephrosis. Left renal cyst.    BLADDER: Within normal limits.  REPRODUCTIVE ORGANS: Prostate gland is enlarged and heterogeneous.    BOWEL: Moderate-sized hiatal hernia. No bowel obstruction. Colonic diverticulosis.  PERITONEUM: No ascites.  VESSELS: Deep vein thrombosis in the right common femoral vein.  RETROPERITONEUM/LYMPH NODES: No lymphadenopathy.    ABDOMINAL WALL: Within normal limits.  BONES: Degenerative changes. Small enhancing fluid surrounding both ischial tuberosities. Hemangioma in the T9 vertebral body.    IMPRESSION:     Cholecystostomy tube appears in good position.    Deep vein thrombosis in the right common femoral vein.    Small enhancing fluid surrounding both ischial tuberosities.    Dr. Heart discussed the findings with Dr. Jacinto on January 14, 2020 at 5:55 PM.  Readback was obtained.      < end of copied text >

## 2020-01-14 NOTE — H&P ADULT - PROBLEM SELECTOR PLAN 4
Pt takes lisinopril 2.5 mg    at home   - will continue with home medications with parameters   - Monitor BP and titrate meds accordingly

## 2020-01-15 DIAGNOSIS — L97.919 NON-PRESSURE CHRONIC ULCER OF UNSPECIFIED PART OF RIGHT LOWER LEG WITH UNSPECIFIED SEVERITY: ICD-10-CM

## 2020-01-15 DIAGNOSIS — F32.9 MAJOR DEPRESSIVE DISORDER, SINGLE EPISODE, UNSPECIFIED: ICD-10-CM

## 2020-01-15 DIAGNOSIS — Z02.9 ENCOUNTER FOR ADMINISTRATIVE EXAMINATIONS, UNSPECIFIED: ICD-10-CM

## 2020-01-15 DIAGNOSIS — E03.9 HYPOTHYROIDISM, UNSPECIFIED: ICD-10-CM

## 2020-01-15 LAB
24R-OH-CALCIDIOL SERPL-MCNC: 29.6 NG/ML — LOW (ref 30–80)
ALBUMIN SERPL ELPH-MCNC: 2.2 G/DL — LOW (ref 3.5–5)
ALP SERPL-CCNC: 144 U/L — HIGH (ref 40–120)
ALT FLD-CCNC: 11 U/L DA — SIGNIFICANT CHANGE UP (ref 10–60)
ANION GAP SERPL CALC-SCNC: 5 MMOL/L — SIGNIFICANT CHANGE UP (ref 5–17)
AST SERPL-CCNC: 10 U/L — SIGNIFICANT CHANGE UP (ref 10–40)
BASOPHILS # BLD AUTO: 0.02 K/UL — SIGNIFICANT CHANGE UP (ref 0–0.2)
BASOPHILS NFR BLD AUTO: 0.3 % — SIGNIFICANT CHANGE UP (ref 0–2)
BILIRUB SERPL-MCNC: 0.4 MG/DL — SIGNIFICANT CHANGE UP (ref 0.2–1.2)
BUN SERPL-MCNC: 16 MG/DL — SIGNIFICANT CHANGE UP (ref 7–18)
CALCIUM SERPL-MCNC: 8.1 MG/DL — LOW (ref 8.4–10.5)
CHLORIDE SERPL-SCNC: 112 MMOL/L — HIGH (ref 96–108)
CHOLEST SERPL-MCNC: 110 MG/DL — SIGNIFICANT CHANGE UP (ref 10–199)
CO2 SERPL-SCNC: 26 MMOL/L — SIGNIFICANT CHANGE UP (ref 22–31)
CREAT SERPL-MCNC: 0.99 MG/DL — SIGNIFICANT CHANGE UP (ref 0.5–1.3)
EOSINOPHIL # BLD AUTO: 0.31 K/UL — SIGNIFICANT CHANGE UP (ref 0–0.5)
EOSINOPHIL NFR BLD AUTO: 4.8 % — SIGNIFICANT CHANGE UP (ref 0–6)
GLUCOSE SERPL-MCNC: 81 MG/DL — SIGNIFICANT CHANGE UP (ref 70–99)
HBA1C BLD-MCNC: 5 % — SIGNIFICANT CHANGE UP (ref 4–5.6)
HCT VFR BLD CALC: 34.5 % — LOW (ref 39–50)
HDLC SERPL-MCNC: 34 MG/DL — LOW
HGB BLD-MCNC: 10.6 G/DL — LOW (ref 13–17)
IMM GRANULOCYTES NFR BLD AUTO: 0.2 % — SIGNIFICANT CHANGE UP (ref 0–1.5)
LIPID PNL WITH DIRECT LDL SERPL: 60 MG/DL — SIGNIFICANT CHANGE UP
LYMPHOCYTES # BLD AUTO: 2.05 K/UL — SIGNIFICANT CHANGE UP (ref 1–3.3)
LYMPHOCYTES # BLD AUTO: 31.7 % — SIGNIFICANT CHANGE UP (ref 13–44)
MAGNESIUM SERPL-MCNC: 1.8 MG/DL — SIGNIFICANT CHANGE UP (ref 1.6–2.6)
MCHC RBC-ENTMCNC: 30.7 GM/DL — LOW (ref 32–36)
MCHC RBC-ENTMCNC: 30.7 PG — SIGNIFICANT CHANGE UP (ref 27–34)
MCV RBC AUTO: 100 FL — SIGNIFICANT CHANGE UP (ref 80–100)
MONOCYTES # BLD AUTO: 0.38 K/UL — SIGNIFICANT CHANGE UP (ref 0–0.9)
MONOCYTES NFR BLD AUTO: 5.9 % — SIGNIFICANT CHANGE UP (ref 2–14)
NEUTROPHILS # BLD AUTO: 3.69 K/UL — SIGNIFICANT CHANGE UP (ref 1.8–7.4)
NEUTROPHILS NFR BLD AUTO: 57.1 % — SIGNIFICANT CHANGE UP (ref 43–77)
NRBC # BLD: 0 /100 WBCS — SIGNIFICANT CHANGE UP (ref 0–0)
PHOSPHATE SERPL-MCNC: 2.5 MG/DL — SIGNIFICANT CHANGE UP (ref 2.5–4.5)
PLATELET # BLD AUTO: 322 K/UL — SIGNIFICANT CHANGE UP (ref 150–400)
POTASSIUM SERPL-MCNC: 3.8 MMOL/L — SIGNIFICANT CHANGE UP (ref 3.5–5.3)
POTASSIUM SERPL-SCNC: 3.8 MMOL/L — SIGNIFICANT CHANGE UP (ref 3.5–5.3)
PROT SERPL-MCNC: 5.9 G/DL — LOW (ref 6–8.3)
RBC # BLD: 3.45 M/UL — LOW (ref 4.2–5.8)
RBC # FLD: 16 % — HIGH (ref 10.3–14.5)
SODIUM SERPL-SCNC: 143 MMOL/L — SIGNIFICANT CHANGE UP (ref 135–145)
TOTAL CHOLESTEROL/HDL RATIO MEASUREMENT: 3.2 RATIO — LOW (ref 3.4–9.6)
TRIGL SERPL-MCNC: 80 MG/DL — SIGNIFICANT CHANGE UP (ref 10–149)
TSH SERPL-MCNC: 0.85 UU/ML — SIGNIFICANT CHANGE UP (ref 0.34–4.82)
VIT B12 SERPL-MCNC: 317 PG/ML — SIGNIFICANT CHANGE UP (ref 232–1245)
WBC # BLD: 6.46 K/UL — SIGNIFICANT CHANGE UP (ref 3.8–10.5)
WBC # FLD AUTO: 6.46 K/UL — SIGNIFICANT CHANGE UP (ref 3.8–10.5)

## 2020-01-15 PROCEDURE — 99232 SBSQ HOSP IP/OBS MODERATE 35: CPT

## 2020-01-15 RX ORDER — APIXABAN 2.5 MG/1
10 TABLET, FILM COATED ORAL EVERY 12 HOURS
Refills: 0 | Status: DISCONTINUED | OUTPATIENT
Start: 2020-01-15 | End: 2020-01-17

## 2020-01-15 RX ADMIN — ENOXAPARIN SODIUM 75 MILLIGRAM(S): 100 INJECTION SUBCUTANEOUS at 05:54

## 2020-01-15 RX ADMIN — SENNA PLUS 2 TABLET(S): 8.6 TABLET ORAL at 23:01

## 2020-01-15 RX ADMIN — Medication 1 APPLICATION(S): at 17:29

## 2020-01-15 RX ADMIN — Medication 1 APPLICATION(S): at 05:54

## 2020-01-15 RX ADMIN — Medication 100 MICROGRAM(S): at 05:54

## 2020-01-15 RX ADMIN — APIXABAN 10 MILLIGRAM(S): 2.5 TABLET, FILM COATED ORAL at 17:34

## 2020-01-15 RX ADMIN — TAMSULOSIN HYDROCHLORIDE 0.4 MILLIGRAM(S): 0.4 CAPSULE ORAL at 23:01

## 2020-01-15 RX ADMIN — SODIUM CHLORIDE 75 MILLILITER(S): 9 INJECTION, SOLUTION INTRAVENOUS at 14:40

## 2020-01-15 RX ADMIN — LISINOPRIL 2.5 MILLIGRAM(S): 2.5 TABLET ORAL at 05:54

## 2020-01-15 RX ADMIN — MIRTAZAPINE 7.5 MILLIGRAM(S): 45 TABLET, ORALLY DISINTEGRATING ORAL at 23:00

## 2020-01-15 RX ADMIN — SODIUM CHLORIDE 75 MILLILITER(S): 9 INJECTION, SOLUTION INTRAVENOUS at 00:14

## 2020-01-15 NOTE — CHART NOTE - NSCHARTNOTEFT_GEN_A_CORE
pt with no abdominal pain  confused  ICU Vital Signs Last 24 Hrs  T(C): 36.6 (15 Charlie 2020 08:13), Max: 37.2 (14 Jan 2020 15:35)  T(F): 97.8 (15 Charlie 2020 08:13), Max: 99 (14 Jan 2020 15:35)  HR: 71 (15 Charlie 2020 08:13) (71 - 81)  BP: 116/62 (15 Charlie 2020 08:13) (116/62 - 156/87)  BP(mean): --  ABP: --  ABP(mean): --  RR: 16 (15 Charlie 2020 08:13) (16 - 18)  SpO2: 98% (15 Charlie 2020 08:13) (97% - 100%)                          10.6   6.46  )-----------( 322      ( 15 Charlie 2020 06:48 )             34.5       01-15    143  |  112<H>  |  16  ----------------------------<  81  3.8   |  26  |  0.99    Ca    8.1<L>      15 Charlie 2020 06:48  Phos  2.5     01-15  Mg     1.8     01-15    TPro  5.9<L>  /  Alb  2.2<L>  /  TBili  0.4  /  DBili  x   /  AST  10  /  ALT  11  /  AlkPhos  144<H>  01-15      abdomen soft, non tender  Cholecystostomy tube is draining

## 2020-01-15 NOTE — CONSULT NOTE ADULT - ASSESSMENT
89 year old male with acute cholecystitis s/p cholestomy tube insertion was found to have right femoral vein DVT
89 y/o Male w/ hx of acute cholecystitis s/p IR Percutaneous cholecystostomy tube placement on 11/26/2019, admitted to medical services w/ LE DVT     -CT abd/ pelvis confirms Percutaneous cholecystostomy tube placement in good position; Under sterile conditions Percutaneous cholecystostomy tube was secured w/ 3.0 silk suture, dressing applied, pt tolerated procedure well  -Trend LFTs   -Monitor Percutaneous cholecystostomy tube output   -Care as per medical team  -Reconsult as needed  -D/w Dr Elizabeth and agrees

## 2020-01-15 NOTE — PROGRESS NOTE ADULT - SUBJECTIVE AND OBJECTIVE BOX
89 y/o male from [facility], bedbound with PMHx of HTN, hypothyroidism, HTN chronic venous insufficiency, PVD ,acute cholecystitis s/p IR Percutaneous cholecystostomy tube placement on 11/26/2019 was sent in from NH for dislodged Percutaneous cholecystostomy tube and broken suture. Pt currently denies any abd pain, N/V/D, CP , SOB, Cough , leg pain or any other symptoms. Pt was found to have Right common femoral vein on the CT scan in the ED. Resolved hypernatremia with IVF.   Venous doppler shows deep vein thrombosis in the right common femoral vein and profunda femoral vein.   Started full dose of Lovenox and evaluated by Heme. Dr. Torres. Switched to Eliquis BID.    CT abd/pelvis confirms Percutaneous cholecystostomy tube placement in good position. Surgery dr. Elizabeth following for Cholecystostomy tube and suture replaced. Draining well this time.    continue monitoring LFT and output.  Pt reports that he wants to die. During assessment, he was talking with eyes closed, stating " I don't want to be here.  Everyone surrounding me make me upset here. I don't want anything from this place. Leave me alone." He also states feeling depressed and wants to sleep. no appetite. He denies suicidal ideation. no plan to kill himself. Psych consulted.     INTERVAL HPI/OVERNIGHT EVENTS: no new complaints    MEDICATIONS  (STANDING):  ammonium lactate 12% Lotion 1 Application(s) Topical two times a day  clopidogrel Tablet 75 milliGRAM(s) Oral daily  enoxaparin Injectable 75 milliGRAM(s) SubCutaneous two times a day  ferrous    sulfate Liquid 300 milliGRAM(s) Oral daily  levothyroxine 100 MICROGram(s) Oral daily  lisinopril 2.5 milliGRAM(s) Oral daily  mirtazapine 7.5 milliGRAM(s) Oral at bedtime  risperiDONE   Tablet 0.25 milliGRAM(s) Oral daily  senna 2 Tablet(s) Oral at bedtime  sodium chloride 0.45%. 1000 milliLiter(s) (75 mL/Hr) IV Continuous <Continuous>  tamsulosin 0.4 milliGRAM(s) Oral at bedtime    MEDICATIONS  (PRN):  acetaminophen   Tablet .. 650 milliGRAM(s) Oral every 6 hours PRN Temp greater or equal to 38C (100.4F), Mild Pain (1 - 3)      __________________________________________________  REVIEW OF SYSTEMS:    CONSTITUTIONAL: No fever,   EYES: no acute visual disturbances  NECK: No pain or stiffness  RESPIRATORY: No cough; No shortness of breath  CARDIOVASCULAR: No chest pain, no palpitations  GASTROINTESTINAL: No pain. No nausea or vomiting; No diarrhea   NEUROLOGICAL: No headache or numbness, no tremors  MUSCULOSKELETAL: No joint pain, no muscle pain  GENITOURINARY: no dysuria, no frequency, no hesitancy  PSYCHIATRY: depressed  ALL OTHER  ROS negative        Vital Signs Last 24 Hrs  T(C): 36.6 (15 Charlie 2020 08:13), Max: 37 (14 Jan 2020 19:48)  T(F): 97.8 (15 Charlie 2020 08:13), Max: 98.6 (14 Jan 2020 19:48)  HR: 71 (15 Charlie 2020 08:13) (71 - 81)  BP: 116/62 (15 Charlie 2020 08:13) (116/62 - 156/87)  BP(mean): --  RR: 16 (15 Charlie 2020 08:13) (16 - 18)  SpO2: 98% (15 Charlie 2020 08:13) (98% - 100%)    ________________________________________________  PHYSICAL EXAM:  GENERAL: NAD,    HEENT: Normocephalic;  conjunctivae and sclerae clear; moist mucous membranes;   NECK : supple  CHEST/LUNG: Clear to auscultation bilaterally with good air entry   HEART: S1 S2  regular; no murmurs, gallops or rubs  ABDOMEN: Right cholecystostomy tube in place, suture site intact. draining well. abdomen Soft, Nontender, Nondistended; Bowel sounds present  EXTREMITIES: no cyanosis; no edema; no calf tenderness  SKIN: warm and dry; no rash  NERVOUS SYSTEM:  Awake and alert; Oriented  to place and person.  no new deficits  Psych: indifference. depressed. aggressive on exam    _________________________________________________  LABS:                        10.6   6.46  )-----------( 322      ( 15 Charlie 2020 06:48 )             34.5     01-15    143  |  112<H>  |  16  ----------------------------<  81  3.8   |  26  |  0.99    Ca    8.1<L>      15 Charlie 2020 06:48  Phos  2.5     01-15  Mg     1.8     01-15    TPro  5.9<L>  /  Alb  2.2<L>  /  TBili  0.4  /  DBili  x   /  AST  10  /  ALT  11  /  AlkPhos  144<H>  01-15    PT/INR - ( 14 Jan 2020 16:26 )   PT: 14.4 sec;   INR: 1.29 ratio         PTT - ( 14 Jan 2020 16:26 )  PTT:35.1 sec    CAPILLARY BLOOD GLUCOSE            RADIOLOGY & ADDITIONAL TESTS:    Imaging Personally Reviewed:  YES    < from: US Duplex Venous Lower Ext Ltd, Right (01.14.20 @ 19:12) >  EXAM:  US DPLX LWR EXT VEINS LTD RT                            PROCEDURE DATE:  01/14/2020          INTERPRETATION:  CLINICAL INFORMATION: Clinical suspicion for deep vein thrombosis    TECHNIQUE: Duplex sonography of the RIGHT LOWER extremity veins with color and spectral Doppler, with and without compression.      FINDINGS:    Noncompressible thrombi are noted in the right common femoral vein and profunda femoral vein, compatible with deep vein thrombosis. There is normal compressibility of the right femoral and popliteal veins.     No calf vein thrombosis is detected.    IMPRESSION:     Deep vein thrombosis in the right common femoral vein and profunda femoral vein. Dr. Jacinto was informed on 1/14/2020 at 7:10 PM.        < end of copied text >    Consultant(s) Notes Reviewed:   YES/ No    Care Discussed with Consultants :     Plan of care was discussed with patient and /or primary care giver; all questions and concerns were addressed and care was aligned with patient's wishes. 89 y/o male from [facility], bedbound with PMHx of HTN, hypothyroidism, HTN chronic venous insufficiency, PVD ,acute cholecystitis s/p IR Percutaneous cholecystostomy tube placement on 11/26/2019 was sent in from NH for dislodged Percutaneous cholecystostomy tube and broken suture. Pt was found to have Right common femoral vein on the CT scan in the ED.  Venous doppler shows deep vein thrombosis in the right common femoral vein and profunda femoral vein. Started full dose of Lovenox and evaluated by Heme. Dr. Torres. Switched to Eliquis BID.  Resolved hypernatremia with IVF.   CT abd/pelvis confirms Percutaneous cholecystostomy tube placement in good position. Surgery dr. Elizabeth following for Cholecystostomy tube and suture replaced. Draining well this time.    Pt reports that he wants to die to nurse this morning. During assessment, he was talking with eyes closed, stating " I don't want to be here.  Everyone surrounding me makes me upset here. I don't want anything from this place. Leave me alone."   He also states feeling depressed and wants to sleep. no appetite. He denies suicidal ideation. No verbalization with actual plan to kill himself. Psych consulted.   PT consulted for assess functioning.     INTERVAL HPI/OVERNIGHT EVENTS: no new complaints    MEDICATIONS  (STANDING):  ammonium lactate 12% Lotion 1 Application(s) Topical two times a day  clopidogrel Tablet 75 milliGRAM(s) Oral daily  enoxaparin Injectable 75 milliGRAM(s) SubCutaneous two times a day  ferrous    sulfate Liquid 300 milliGRAM(s) Oral daily  levothyroxine 100 MICROGram(s) Oral daily  lisinopril 2.5 milliGRAM(s) Oral daily  mirtazapine 7.5 milliGRAM(s) Oral at bedtime  risperiDONE   Tablet 0.25 milliGRAM(s) Oral daily  senna 2 Tablet(s) Oral at bedtime  sodium chloride 0.45%. 1000 milliLiter(s) (75 mL/Hr) IV Continuous <Continuous>  tamsulosin 0.4 milliGRAM(s) Oral at bedtime    MEDICATIONS  (PRN):  acetaminophen   Tablet .. 650 milliGRAM(s) Oral every 6 hours PRN Temp greater or equal to 38C (100.4F), Mild Pain (1 - 3)      __________________________________________________  REVIEW OF SYSTEMS:    CONSTITUTIONAL: No fever,   EYES: no acute visual disturbances  NECK: No pain or stiffness  RESPIRATORY: No cough; No shortness of breath  CARDIOVASCULAR: No chest pain, no palpitations  GASTROINTESTINAL: No pain. No nausea or vomiting; No diarrhea   NEUROLOGICAL: No headache or numbness, no tremors  MUSCULOSKELETAL: No joint pain, no muscle pain  GENITOURINARY: no dysuria, no frequency, no hesitancy  PSYCHIATRY: depressed  ALL OTHER  ROS negative        Vital Signs Last 24 Hrs  T(C): 36.6 (15 Charlie 2020 08:13), Max: 37 (14 Jan 2020 19:48)  T(F): 97.8 (15 Charlie 2020 08:13), Max: 98.6 (14 Jan 2020 19:48)  HR: 71 (15 Charlie 2020 08:13) (71 - 81)  BP: 116/62 (15 Charlie 2020 08:13) (116/62 - 156/87)  BP(mean): --  RR: 16 (15 Charlie 2020 08:13) (16 - 18)  SpO2: 98% (15 Charlie 2020 08:13) (98% - 100%)    ________________________________________________  PHYSICAL EXAM:  GENERAL: NAD,    HEENT: Normocephalic;  conjunctivae and sclerae clear; moist mucous membranes;   NECK : supple  CHEST/LUNG: Clear to auscultation bilaterally with good air entry   HEART: S1 S2  regular; no murmurs, gallops or rubs  ABDOMEN: Right cholecystostomy tube in place, suture site intact. draining well. abdomen Soft, Nontender, Nondistended; Bowel sounds present  EXTREMITIES: no cyanosis; no edema; no calf tenderness  SKIN: warm and dry; no rash  NERVOUS SYSTEM:  Awake and alert; Oriented  to place and person.  no new deficits  Psych: indifference. depressed. aggressive on exam    _________________________________________________  LABS:                        10.6   6.46  )-----------( 322      ( 15 Charlie 2020 06:48 )             34.5     01-15    143  |  112<H>  |  16  ----------------------------<  81  3.8   |  26  |  0.99    Ca    8.1<L>      15 Charlie 2020 06:48  Phos  2.5     01-15  Mg     1.8     01-15    TPro  5.9<L>  /  Alb  2.2<L>  /  TBili  0.4  /  DBili  x   /  AST  10  /  ALT  11  /  AlkPhos  144<H>  01-15    PT/INR - ( 14 Jan 2020 16:26 )   PT: 14.4 sec;   INR: 1.29 ratio         PTT - ( 14 Jan 2020 16:26 )  PTT:35.1 sec    CAPILLARY BLOOD GLUCOSE            RADIOLOGY & ADDITIONAL TESTS:    Imaging Personally Reviewed:  YES    < from: US Duplex Venous Lower Ext Ltd, Right (01.14.20 @ 19:12) >  EXAM:  US DPLX LWR EXT VEINS LTD RT                            PROCEDURE DATE:  01/14/2020          INTERPRETATION:  CLINICAL INFORMATION: Clinical suspicion for deep vein thrombosis    TECHNIQUE: Duplex sonography of the RIGHT LOWER extremity veins with color and spectral Doppler, with and without compression.      FINDINGS:    Noncompressible thrombi are noted in the right common femoral vein and profunda femoral vein, compatible with deep vein thrombosis. There is normal compressibility of the right femoral and popliteal veins.     No calf vein thrombosis is detected.    IMPRESSION:     Deep vein thrombosis in the right common femoral vein and profunda femoral vein. Dr. Jacinto was informed on 1/14/2020 at 7:10 PM.        < end of copied text >    Consultant(s) Notes Reviewed:   YES/ No    Care Discussed with Consultants :     Plan of care was discussed with patient and /or primary care giver; all questions and concerns were addressed and care was aligned with patient's wishes. A 89 y/o male from [facility], bedbound with PMHx of HTN, hypothyroidism, HTN chronic venous insufficiency, PVD ,acute cholecystitis s/p IR Percutaneous cholecystostomy tube placement on 11/26/2019 was sent in from NH for dislodged Percutaneous cholecystostomy tube and broken suture. Pt was found to have Right common femoral vein on the CT scan in the ED.  Venous doppler shows deep vein thrombosis in the right common femoral vein and profunda femoral vein. Started full dose of Lovenox and evaluated by Heme. Dr. Torres. Switched to Eliquis BID.  Resolved hypernatremia with IVF.   CT abd/pelvis confirms Percutaneous cholecystostomy tube placement in good position. Surgery dr. Elizabeth following for Cholecystostomy tube and suture replaced. Draining well this time.    Pt reports that he wants to die to nurse this morning. During assessment, he was talking with eyes closed, stating " I don't want to be here.  Everyone surrounding me makes me upset here. I don't want anything from this place. Leave me alone."   He also states feeling depressed and wants to sleep. no appetite. He denies suicidal ideation. No verbalization with actual plan to kill himself. Psych consulted.   PT consulted for assess functioning.     INTERVAL HPI/OVERNIGHT EVENTS: no new complaints    MEDICATIONS  (STANDING):  ammonium lactate 12% Lotion 1 Application(s) Topical two times a day  clopidogrel Tablet 75 milliGRAM(s) Oral daily  enoxaparin Injectable 75 milliGRAM(s) SubCutaneous two times a day  ferrous    sulfate Liquid 300 milliGRAM(s) Oral daily  levothyroxine 100 MICROGram(s) Oral daily  lisinopril 2.5 milliGRAM(s) Oral daily  mirtazapine 7.5 milliGRAM(s) Oral at bedtime  risperiDONE   Tablet 0.25 milliGRAM(s) Oral daily  senna 2 Tablet(s) Oral at bedtime  sodium chloride 0.45%. 1000 milliLiter(s) (75 mL/Hr) IV Continuous <Continuous>  tamsulosin 0.4 milliGRAM(s) Oral at bedtime    MEDICATIONS  (PRN):  acetaminophen   Tablet .. 650 milliGRAM(s) Oral every 6 hours PRN Temp greater or equal to 38C (100.4F), Mild Pain (1 - 3)      __________________________________________________  REVIEW OF SYSTEMS:    CONSTITUTIONAL: No fever,   EYES: no acute visual disturbances  NECK: No pain or stiffness  RESPIRATORY: No cough; No shortness of breath  CARDIOVASCULAR: No chest pain, no palpitations  GASTROINTESTINAL: No pain. No nausea or vomiting; No diarrhea   NEUROLOGICAL: No headache or numbness, no tremors  MUSCULOSKELETAL: No joint pain, no muscle pain  GENITOURINARY: no dysuria, no frequency, no hesitancy  PSYCHIATRY: depressed  ALL OTHER  ROS negative        Vital Signs Last 24 Hrs  T(C): 36.6 (15 Charlie 2020 08:13), Max: 37 (14 Jan 2020 19:48)  T(F): 97.8 (15 Charlie 2020 08:13), Max: 98.6 (14 Jan 2020 19:48)  HR: 71 (15 Charlie 2020 08:13) (71 - 81)  BP: 116/62 (15 Charlie 2020 08:13) (116/62 - 156/87)  BP(mean): --  RR: 16 (15 Charlie 2020 08:13) (16 - 18)  SpO2: 98% (15 Charlie 2020 08:13) (98% - 100%)    ________________________________________________  PHYSICAL EXAM:  GENERAL: NAD,    HEENT: Normocephalic;  conjunctivae and sclerae clear; moist mucous membranes;   NECK : supple  CHEST/LUNG: Clear to auscultation bilaterally with good air entry   HEART: S1 S2  regular; no murmurs, gallops or rubs  ABDOMEN: Right cholecystostomy tube in place, suture site intact. draining well. abdomen Soft, Nontender, Nondistended; Bowel sounds present  EXTREMITIES: no cyanosis; no edema; no calf tenderness  SKIN: warm and dry; no rash  NERVOUS SYSTEM:  Awake and alert; Oriented  to place and person.  no new deficits  Psych: indifference. depressed. aggressive on exam    _________________________________________________  LABS:                        10.6   6.46  )-----------( 322      ( 15 Charlie 2020 06:48 )             34.5     01-15    143  |  112<H>  |  16  ----------------------------<  81  3.8   |  26  |  0.99    Ca    8.1<L>      15 Charlie 2020 06:48  Phos  2.5     01-15  Mg     1.8     01-15    TPro  5.9<L>  /  Alb  2.2<L>  /  TBili  0.4  /  DBili  x   /  AST  10  /  ALT  11  /  AlkPhos  144<H>  01-15    PT/INR - ( 14 Jan 2020 16:26 )   PT: 14.4 sec;   INR: 1.29 ratio         PTT - ( 14 Jan 2020 16:26 )  PTT:35.1 sec    CAPILLARY BLOOD GLUCOSE            RADIOLOGY & ADDITIONAL TESTS:    Imaging Personally Reviewed:  YES    < from: US Duplex Venous Lower Ext Ltd, Right (01.14.20 @ 19:12) >  EXAM:  US DPLX LWR EXT VEINS LTD RT                            PROCEDURE DATE:  01/14/2020          INTERPRETATION:  CLINICAL INFORMATION: Clinical suspicion for deep vein thrombosis    TECHNIQUE: Duplex sonography of the RIGHT LOWER extremity veins with color and spectral Doppler, with and without compression.      FINDINGS:    Noncompressible thrombi are noted in the right common femoral vein and profunda femoral vein, compatible with deep vein thrombosis. There is normal compressibility of the right femoral and popliteal veins.     No calf vein thrombosis is detected.    IMPRESSION:     Deep vein thrombosis in the right common femoral vein and profunda femoral vein. Dr. Jacinto was informed on 1/14/2020 at 7:10 PM.        < end of copied text >      < from: CT Abdomen and Pelvis w/ IV Cont (01.14.20 @ 17:29) >    EXAM:  CT ABDOMEN AND PELVIS IC                            PROCEDURE DATE:  01/14/2020          INTERPRETATION:  CLINICAL INFORMATION: Cholecystostomy tube movement.     COMPARISON: December 7, 2019    PROCEDURE:   CT of the Abdomen and Pelvis was performed with intravenous contrast.   Intravenous contrast: 90 ml Omnipaque 350. 10 ml discarded.  Oral contrast: None.  Sagittal and coronal reformats were performed.    FINDINGS:    LOWER CHEST: Within normal limits.    LIVER: Within normal limits.  BILE DUCTS: Normal caliber.  GALLBLADDER: Contains a cholecystostomy tube, which appears in good position.  SPLEEN: Within normal limits.  PANCREAS: Within normal limits.  ADRENALS: Within normal limits.  KIDNEYS/URETERS: No hydronephrosis. Left renal cyst.    BLADDER: Within normal limits.  REPRODUCTIVE ORGANS: Prostate gland is enlarged and heterogeneous.    BOWEL: Moderate-sized hiatal hernia. No bowel obstruction. Colonic diverticulosis.  PERITONEUM: No ascites.  VESSELS: Deep vein thrombosis in the right common femoral vein.  RETROPERITONEUM/LYMPH NODES: No lymphadenopathy.    ABDOMINAL WALL: Within normal limits.  BONES: Degenerative changes. Small enhancing fluid surrounding both ischial tuberosities. Hemangioma in the T9 vertebral body.    IMPRESSION:     Cholecystostomy tube appears in good position.    Deep vein thrombosis in the right common femoral vein.    Small enhancing fluid surrounding both ischial tuberosities.    Dr. Heart discussed the findings with Dr. Jacinto on January 14, 2020 at 5:55 PM.  Readback was obtained.                  CORA HEART M.D., ATTENDING RADIOLOGIST  This document has been electronically signed. Jan 14 2020  6:16PM                < end of copied text >    Consultant(s) Notes Reviewed:   YES/ No    Care Discussed with Consultants :     Plan of care was discussed with patient and /or primary care giver; all questions and concerns were addressed and care was aligned with patient's wishes.

## 2020-01-15 NOTE — CONSULT NOTE ADULT - SUBJECTIVE AND OBJECTIVE BOX
Patient is a 89y old  Male who presents with a chief complaint of DVT (14 Jan 2020 19:10)      HPI:  89 y/o male from [facility], bedbound with PMHx of HTN, hypothyroidism, HTN chronic venous insufficiency, PVD ,acute cholecystitis s/p IR Percutaneous cholecystostomy tube placement on 11/26/2019 was sent in from NH for dislodged Percutaneous cholecystostomy tube and broken suture. Pt currently denies any abd pain, N/V/D, CP , SOB, Cough , leg pain or any other symptoms. Pt was found to have Right common femoral vein on the CT scan in the ED.  Pt is combative and confused.     ED course:   Vitals : stable   Labs : unremarkable except for Na 150 , .   CT a/p: Cholecystostomy tube appears in good position. Deep vein thrombosis in the right common femoral vein.  US R leg : Deep vein thrombosis in the right common femoral vein and profunda femoral vein    SH : Denies Smoking, alcohol or drug use     GOC : full code (14 Jan 2020 19:10)       ROS:  Negative except for:    PAST MEDICAL & SURGICAL HISTORY:  PVD (peripheral vascular disease)  Acalculous cholecystitis: s/p percutaneous cholecystostomy  HTN (hypertension)  Hypothyroid  Ulcers of both lower extremities  No significant past surgical history      SOCIAL HISTORY:    FAMILY HISTORY:  No pertinent family history in first degree relatives      MEDICATIONS  (STANDING):  ammonium lactate 12% Lotion 1 Application(s) Topical two times a day  clopidogrel Tablet 75 milliGRAM(s) Oral daily  enoxaparin Injectable 75 milliGRAM(s) SubCutaneous two times a day  ferrous    sulfate Liquid 300 milliGRAM(s) Oral daily  levothyroxine 100 MICROGram(s) Oral daily  lisinopril 2.5 milliGRAM(s) Oral daily  mirtazapine 7.5 milliGRAM(s) Oral at bedtime  risperiDONE   Tablet 0.25 milliGRAM(s) Oral daily  senna 2 Tablet(s) Oral at bedtime  sodium chloride 0.45%. 1000 milliLiter(s) (75 mL/Hr) IV Continuous <Continuous>  tamsulosin 0.4 milliGRAM(s) Oral at bedtime    MEDICATIONS  (PRN):  acetaminophen   Tablet .. 650 milliGRAM(s) Oral every 6 hours PRN Temp greater or equal to 38C (100.4F), Mild Pain (1 - 3)      Allergies    No Known Allergies    Intolerances        Vital Signs Last 24 Hrs  T(C): 36.6 (15 Charlie 2020 08:13), Max: 37.2 (14 Jan 2020 15:35)  T(F): 97.8 (15 Charlie 2020 08:13), Max: 99 (14 Jan 2020 15:35)  HR: 71 (15 Charlie 2020 08:13) (71 - 81)  BP: 116/62 (15 Charlie 2020 08:13) (116/62 - 156/87)  BP(mean): --  RR: 16 (15 Charlie 2020 08:13) (16 - 18)  SpO2: 98% (15 Charlie 2020 08:13) (97% - 100%)    PHYSICAL EXAM  General: adult in NAD  HEENT: clear oropharynx, anicteric sclera, pink conjunctiva  Neck: supple  CV: normal S1/S2 with no murmur rubs or gallops  Lungs: positive air movement b/l ant lungs,clear to auscultation, no wheezes, no rales  Abdomen: soft non-tender non-distended, no hepatosplenomegaly  Ext: no clubbing cyanosis or edema  Skin: no rashes and no petechiae  Neuro: alert and oriented X 4, no focal deficits      LABS:                          10.6   6.46  )-----------( 322      ( 15 Charlie 2020 06:48 )             34.5         Mean Cell Volume : 100.0 fl  Mean Cell Hemoglobin : 30.7 pg  Mean Cell Hemoglobin Concentration : 30.7 gm/dL  Auto Neutrophil # : 3.69 K/uL  Auto Lymphocyte # : 2.05 K/uL  Auto Monocyte # : 0.38 K/uL  Auto Eosinophil # : 0.31 K/uL  Auto Basophil # : 0.02 K/uL  Auto Neutrophil % : 57.1 %  Auto Lymphocyte % : 31.7 %  Auto Monocyte % : 5.9 %  Auto Eosinophil % : 4.8 %  Auto Basophil % : 0.3 %      Serial CBC's  01-15 @ 06:48  Hct-34.5 / Hgb-10.6 / Plat-322 / RBC-3.45 / WBC-6.46  Serial CBC's  01-14 @ 16:26  Hct-38.3 / Hgb-11.7 / Plat-357 / RBC-3.79 / WBC-9.71      01-15    143  |  112<H>  |  16  ----------------------------<  81  3.8   |  26  |  0.99    Ca    8.1<L>      15 Charlie 2020 06:48  Phos  2.5     01-15  Mg     1.8     01-15    TPro  5.9<L>  /  Alb  2.2<L>  /  TBili  0.4  /  DBili  x   /  AST  10  /  ALT  11  /  AlkPhos  144<H>  01-15      PT/INR - ( 14 Jan 2020 16:26 )   PT: 14.4 sec;   INR: 1.29 ratio         PTT - ( 14 Jan 2020 16:26 )  PTT:35.1 sec                BLOOD SMEAR INTERPRETATION:       RADIOLOGY & ADDITIONAL STUDIES:  < from: CT Abdomen and Pelvis w/ IV Cont (01.14.20 @ 17:29) >  INTERPRETATION:  CLINICAL INFORMATION: Cholecystostomy tube movement.     COMPARISON: December 7, 2019    PROCEDURE:   CT of the Abdomen and Pelvis was performed with intravenous contrast.   Intravenous contrast: 90 ml Omnipaque 350. 10 ml discarded.  Oral contrast: None.  Sagittal and coronal reformats were performed.    FINDINGS:    LOWER CHEST: Within normal limits.    LIVER: Within normal limits.  BILE DUCTS: Normal caliber.  GALLBLADDER: Contains a cholecystostomy tube, which appears in good position.  SPLEEN: Within normal limits.  PANCREAS: Within normal limits.  ADRENALS: Within normal limits.  KIDNEYS/URETERS: No hydronephrosis. Left renal cyst.    BLADDER: Within normal limits.  REPRODUCTIVE ORGANS: Prostate gland is enlarged and heterogeneous.    BOWEL: Moderate-sized hiatal hernia. No bowel obstruction. Colonic diverticulosis.  PERITONEUM: No ascites.  VESSELS: Deep vein thrombosis in the right common femoral vein.  RETROPERITONEUM/LYMPH NODES: No lymphadenopathy.    ABDOMINAL WALL: Within normal limits.  BONES: Degenerative changes. Small enhancing fluid surrounding both ischial tuberosities. Hemangioma in the T9 vertebral body.    IMPRESSION:     Cholecystostomy tube appears in good position.    Deep vein thrombosis in the right common femoral vein.    Small enhancing fluid surrounding both ischial tuberosities.    < end of copied text >

## 2020-01-15 NOTE — ADVANCED PRACTICE NURSE CONSULT - ASSESSMENT
This is a 89yr old male patient admitted for Hypernatremia, presenting with the following:  -There is a R. Heel Stage 1 Pressure Injury, as evident by non-blanchable erythema  -There is an intact DTI to the L. Heel (2cm x 1cm) without drainage

## 2020-01-15 NOTE — PROGRESS NOTE ADULT - SUBJECTIVE AND OBJECTIVE BOX
ADRY DE SOUZA  [id number]    Patient is a 89y old  Male who presents with a chief complaint of DVT (15 Charlie 2020 11:22)      patient was seen and examined   s refusing meds and treatment , says wants to die , shanon suicidal ideation     MEDICATIONS  (STANDING):  ammonium lactate 12% Lotion 1 Application(s) Topical two times a day  clopidogrel Tablet 75 milliGRAM(s) Oral daily  enoxaparin Injectable 75 milliGRAM(s) SubCutaneous two times a day  ferrous    sulfate Liquid 300 milliGRAM(s) Oral daily  levothyroxine 100 MICROGram(s) Oral daily  lisinopril 2.5 milliGRAM(s) Oral daily  mirtazapine 7.5 milliGRAM(s) Oral at bedtime  risperiDONE   Tablet 0.25 milliGRAM(s) Oral daily  senna 2 Tablet(s) Oral at bedtime  sodium chloride 0.45%. 1000 milliLiter(s) (75 mL/Hr) IV Continuous <Continuous>  tamsulosin 0.4 milliGRAM(s) Oral at bedtime      MEDICATIONS  (PRN):  acetaminophen   Tablet .. 650 milliGRAM(s) Oral every 6 hours PRN Temp greater or equal to 38C (100.4F), Mild Pain (1 - 3)      Allergies    No Known Allergies    Intolerances        PAST MEDICAL & SURGICAL HISTORY:  PVD (peripheral vascular disease)  Acalculous cholecystitis: s/p percutaneous cholecystostomy  HTN (hypertension)  Hypothyroid  Ulcers of both lower extremities  No significant past surgical history      FAMILY HISTORY:  No pertinent family history in first degree relatives      SOCIAL HISTORY  Smoking History:     REVIEW OF SYSTEMS:    CONSTITUTIONAL:  Fevers [  ]  Yes  [  ]x  No                                   chills [  ]  Yes  [ x ]  No                               sweats  [  ]  Yes  [  ]x  No                                fatigue [  x]  Yes  [  ]  No    HEENT:  Eyes:  Diplopia or blurred vision [  ]  Yes  [x  ]  No    ENT:                        earache  [  ]  Yes  [  x]  No                             sore throat  [  ]  Yes  [  x]  No                            runny nose.  [  ]  Yes  [  x]  No    CARDIOVASCULAR:       chest pain  [  ]  Yes  [ x ]  No                        squeezing, tightness,  [  ]  Yes  [ x ]  No                                      palpitations.  [  ]  Yes  [x  ]  No    RESPIRATORY:             Cough [  ]  Yes  [  x]  No                                  Wheezing [  ]  Yes  [x  ]  No                                Hemoptysis [  ]  Yes  [ x ]  No                                      Sputum [  ]  Yes  [  x]  No                   Shortness of Breathe [  ]  Yes  [ x ]  No    GASTROINTESTINAL:      Abdominal pain  [  ]  Yes  [  x]  No                                                    Nausea  [  ]  Yes  [x  ]  No                                                   Vomiting [  ]  Yes  [  x]  No                                              Constipation [ x ]  Yes  [  ]  No                                                    Diarrhea [  ]  Yes  [ x ]  No  had malfunction of rt cholecystectomy tube  GENITOURINARY:           Incontinence [ x ]  Yes  [  ]  No                                                Dysuria [  ]  Yes  [ x ]  No                                      Blood in Urine  [  ]  Yes  [ x ]  No                          Frequency or urgency.  [  ]  Yes  [ x ]  No    NEUROLOGIC:                     Paresthesias  [  ]  Yes  [ x ]  No                                             fasciculations  [  ]  Yes  [ x ]  No                                seizures or weakness.  [  ]  Yes  [  x]  No                                                   Headache [  ]  Yes  [ x ]  No                                  Loss of Conciousness [  ]  Yes  [ x ]  No                                                     Insomnia [  ]  Yes  [ x ]  No    PSYCHIATRIC:    Disorder of thought or mood.  [ x ]  Yes  [  ]  No                                                           Anxiety [  x]  Yes  [  ]  No                                                      Depression [  ]  Yes  [  ]  No                                                         Dementia [  ]  Yes  [  ]  No    Vital Signs Last 24 Hrs  T(C): 36.6 (15 Charlie 2020 08:13), Max: 37.2 (14 Jan 2020 15:35)  T(F): 97.8 (15 Charlie 2020 08:13), Max: 99 (14 Jan 2020 15:35)  HR: 71 (15 Charlie 2020 08:13) (71 - 81)  BP: 116/62 (15 Charlie 2020 08:13) (116/62 - 156/87)  BP(mean): --  RR: 16 (15 Charlie 2020 08:13) (16 - 18)  SpO2: 98% (15 Charlie 2020 08:13) (97% - 100%)  I&O's Detail      PHYSICAL EXAMINATION:    GENERAL: The patient is a well-developed, ____in no apparent distress.     HEENT: Head is normocephalic and atraumatic. Extraocular muscles are intact. Mucous membranes are moist.     NECK: Supple. jvd [  ]  Yes  [  x]  No    LUNGS: Clear to auscultation  [ x ]  Yes  [  ]  No                               wheezing, [  ]  Yes  [x  ]  No                                      rales  [  ]  Yes  [ x ]  No                                    rhonchi  [  ]  Yes  [x  ]  No                 Respirations labored  [  ]  Yes  [ x ]  No    HEART: Regular rate and rhythm  [ x ]  Yes  [  ]  No                                        Murmur [  ]  Yes  [x  ]  No                                              rub  [  ]  Yes  [  x]  No                                          gallop  [  ]  Yes  [  x]  No    ABDOMEN:                                 Soft, [x  ]  Yes  [  ]  No                                             nontender [x ]  Yes  [  ]  No                                             distended.[  ]  Yes  [x  ]  No                            hepatosplenomegaly ,[  ]  Yes  [x  ]  No                                                    BS   [ x ]  Yes  [  ]  No  rt sided c-tube in place , no bleeding  EXTREMITIES:                cyanosis, [  ]  Yes  [ x ]  No                                       clubbing,   [  ]  Yes  [x  ]  No                                               rash, [  ]  Yes  [  x]  No                                           edema.  [ x ]  Yes  [  ]  No    NEUROLOGIC: Alert and Oriented  [x  ] Person [  ] Time  [x ] Place                                    Cranial nerves intact    [ x ]  Yes  [  ]  No                                               sensory Intact  [  x]  Yes  [  ]  No                                                  motor WNL   [  ]  Yes  [  ]  No                                                Domingo Paresis  [  ]  Yes  [  x]  No  DTR  babinski+ or -      LABS:                        10.6   6.46  )-----------( 322      ( 15 Charlie 2020 06:48 )             34.5     01-15    143  |  112<H>  |  16  ----------------------------<  81  3.8   |  26  |  0.99    Ca    8.1<L>      15 Charlie 2020 06:48  Phos  2.5     01-15  Mg     1.8     01-15    TPro  5.9<L>  /  Alb  2.2<L>  /  TBili  0.4  /  DBili  x   /  AST  10  /  ALT  11  /  AlkPhos  144<H>  01-15    PT/INR - ( 14 Jan 2020 16:26 )   PT: 14.4 sec;   INR: 1.29 ratio         PTT - ( 14 Jan 2020 16:26 )  PTT:35.1 sec      < from: US Duplex Venous Lower Ext Ltd, Right (01.14.20 @ 19:12) >  IMPRESSION:     Deep vein thrombosis in the right common femoral vein and profunda femoral vein. Dr. Jacinto was informed on 1/14/2020 at 7:10 PM.      < end of copied text >      < from: CT Abdomen and Pelvis w/ IV Cont (01.14.20 @ 17:29) >  IMPRESSION:     Cholecystostomy tube appears in good position.    Deep vein thrombosis in the right common femoral vein.    Small enhancing fluid surrounding both ischial tuberosities.    Dr. Heart discussed the findings with Dr. Jacinto on January 14, 2020 at 5:55 PM.  Readback was obtained.        < end of copied text >            MICROBIOLOGY:    RADIOLOGY & ADDITIONAL STUDIES:    CXR:    Ct scan chest:    ekg;    echo:

## 2020-01-15 NOTE — ADVANCED PRACTICE NURSE CONSULT - RECOMMEDATIONS
-Clean all wounds with normal saline and apply skin prep to the surrounding skin  -Leave the L. Heel wound open to air  -Elevate/float the patients heels using heel protectors and reposition the patient Q 2hrs using wedges or pillows

## 2020-01-15 NOTE — CONSULT NOTE ADULT - PROBLEM SELECTOR RECOMMENDATION 2
CT did not show residual fluid.  no clinical sign of active cholecystitis  tube is in good position.

## 2020-01-16 DIAGNOSIS — F05 DELIRIUM DUE TO KNOWN PHYSIOLOGICAL CONDITION: ICD-10-CM

## 2020-01-16 DIAGNOSIS — F43.21 ADJUSTMENT DISORDER WITH DEPRESSED MOOD: ICD-10-CM

## 2020-01-16 LAB
ALBUMIN SERPL ELPH-MCNC: 2.2 G/DL — LOW (ref 3.5–5)
ALP SERPL-CCNC: 141 U/L — HIGH (ref 40–120)
ALT FLD-CCNC: 10 U/L DA — SIGNIFICANT CHANGE UP (ref 10–60)
ANION GAP SERPL CALC-SCNC: 5 MMOL/L — SIGNIFICANT CHANGE UP (ref 5–17)
AST SERPL-CCNC: 10 U/L — SIGNIFICANT CHANGE UP (ref 10–40)
BILIRUB SERPL-MCNC: 0.4 MG/DL — SIGNIFICANT CHANGE UP (ref 0.2–1.2)
BUN SERPL-MCNC: 15 MG/DL — SIGNIFICANT CHANGE UP (ref 7–18)
CALCIUM SERPL-MCNC: 8 MG/DL — LOW (ref 8.4–10.5)
CHLORIDE SERPL-SCNC: 110 MMOL/L — HIGH (ref 96–108)
CO2 SERPL-SCNC: 26 MMOL/L — SIGNIFICANT CHANGE UP (ref 22–31)
CREAT SERPL-MCNC: 0.87 MG/DL — SIGNIFICANT CHANGE UP (ref 0.5–1.3)
GLUCOSE SERPL-MCNC: 83 MG/DL — SIGNIFICANT CHANGE UP (ref 70–99)
HCT VFR BLD CALC: 34.7 % — LOW (ref 39–50)
HGB BLD-MCNC: 10.9 G/DL — LOW (ref 13–17)
MCHC RBC-ENTMCNC: 31.4 GM/DL — LOW (ref 32–36)
MCHC RBC-ENTMCNC: 31.5 PG — SIGNIFICANT CHANGE UP (ref 27–34)
MCV RBC AUTO: 100.3 FL — HIGH (ref 80–100)
NRBC # BLD: 0 /100 WBCS — SIGNIFICANT CHANGE UP (ref 0–0)
PLATELET # BLD AUTO: 310 K/UL — SIGNIFICANT CHANGE UP (ref 150–400)
POTASSIUM SERPL-MCNC: 3.6 MMOL/L — SIGNIFICANT CHANGE UP (ref 3.5–5.3)
POTASSIUM SERPL-SCNC: 3.6 MMOL/L — SIGNIFICANT CHANGE UP (ref 3.5–5.3)
PROT SERPL-MCNC: 6 G/DL — SIGNIFICANT CHANGE UP (ref 6–8.3)
RBC # BLD: 3.46 M/UL — LOW (ref 4.2–5.8)
RBC # FLD: 15.9 % — HIGH (ref 10.3–14.5)
SODIUM SERPL-SCNC: 141 MMOL/L — SIGNIFICANT CHANGE UP (ref 135–145)
WBC # BLD: 6.2 K/UL — SIGNIFICANT CHANGE UP (ref 3.8–10.5)
WBC # FLD AUTO: 6.2 K/UL — SIGNIFICANT CHANGE UP (ref 3.8–10.5)

## 2020-01-16 PROCEDURE — 99223 1ST HOSP IP/OBS HIGH 75: CPT

## 2020-01-16 RX ORDER — SODIUM CHLORIDE 9 MG/ML
1000 INJECTION INTRAMUSCULAR; INTRAVENOUS; SUBCUTANEOUS
Refills: 0 | Status: DISCONTINUED | OUTPATIENT
Start: 2020-01-16 | End: 2020-01-17

## 2020-01-16 RX ADMIN — TAMSULOSIN HYDROCHLORIDE 0.4 MILLIGRAM(S): 0.4 CAPSULE ORAL at 21:32

## 2020-01-16 RX ADMIN — LISINOPRIL 2.5 MILLIGRAM(S): 2.5 TABLET ORAL at 05:36

## 2020-01-16 RX ADMIN — SENNA PLUS 2 TABLET(S): 8.6 TABLET ORAL at 21:32

## 2020-01-16 RX ADMIN — Medication 300 MILLIGRAM(S): at 11:45

## 2020-01-16 RX ADMIN — MIRTAZAPINE 7.5 MILLIGRAM(S): 45 TABLET, ORALLY DISINTEGRATING ORAL at 21:33

## 2020-01-16 RX ADMIN — Medication 1 APPLICATION(S): at 05:36

## 2020-01-16 RX ADMIN — APIXABAN 10 MILLIGRAM(S): 2.5 TABLET, FILM COATED ORAL at 05:36

## 2020-01-16 RX ADMIN — CLOPIDOGREL BISULFATE 75 MILLIGRAM(S): 75 TABLET, FILM COATED ORAL at 11:45

## 2020-01-16 RX ADMIN — Medication 1 APPLICATION(S): at 17:07

## 2020-01-16 RX ADMIN — RISPERIDONE 0.25 MILLIGRAM(S): 4 TABLET ORAL at 11:45

## 2020-01-16 RX ADMIN — APIXABAN 10 MILLIGRAM(S): 2.5 TABLET, FILM COATED ORAL at 17:07

## 2020-01-16 RX ADMIN — Medication 100 MICROGRAM(S): at 05:36

## 2020-01-16 RX ADMIN — SODIUM CHLORIDE 75 MILLILITER(S): 9 INJECTION INTRAMUSCULAR; INTRAVENOUS; SUBCUTANEOUS at 02:13

## 2020-01-16 NOTE — BEHAVIORAL HEALTH ASSESSMENT NOTE - NS ED BHA SUICIDALITY PRESENT CURRENT INTENT DETAILS COLLATERAL FT
During prior consult on 12/9/19, pt denied SI/SA/SIB and stated he has never done anything to harm himself

## 2020-01-16 NOTE — PROGRESS NOTE ADULT - ASSESSMENT
Assessment and Recommendation:   · Assessment		  89 year old male with acute cholecystitis s/p cholestomy tube insertion was found to have right femoral vein DVT    Problem/Recommendation - 1:  Problem: Acute deep vein thrombosis (DVT) of femoral vein of right lower extremity. Recommendation: probably from acute infection and bed ridden.  on lovenox now  he can begin NOAC. on eliquis 10 mg bid now.  reduce to 5 mg bid after 1 week.    Problem/Recommendation - 2:  ·  Problem: Cholecystostomy tube dysfunction, initial encounter.  Recommendation: CT did not show residual fluid.  no clinical sign of active cholecystitis  tube is in good position.

## 2020-01-16 NOTE — PROGRESS NOTE ADULT - SUBJECTIVE AND OBJECTIVE BOX
ADRY DE SOUZA  [id number]    Patient is a 89y old  Male who presents with a chief complaint of DVT (15 Charlie 2020 11:22)      patient was seen and examined   s doing better   MEDICATIONS  (STANDING):  ammonium lactate 12% Lotion 1 Application(s) Topical two times a day  apixaban 10 milliGRAM(s) Oral every 12 hours  clopidogrel Tablet 75 milliGRAM(s) Oral daily  ferrous    sulfate Liquid 300 milliGRAM(s) Oral daily  levothyroxine 100 MICROGram(s) Oral daily  lisinopril 2.5 milliGRAM(s) Oral daily  mirtazapine 7.5 milliGRAM(s) Oral at bedtime  risperiDONE   Tablet 0.25 milliGRAM(s) Oral daily  senna 2 Tablet(s) Oral at bedtime  sodium chloride 0.45%. 1000 milliLiter(s) (75 mL/Hr) IV Continuous <Continuous>  sodium chloride 0.9%. 1000 milliLiter(s) (75 mL/Hr) IV Continuous <Continuous>  tamsulosin 0.4 milliGRAM(s) Oral at bedtime    MEDICATIONS  (PRN):  acetaminophen   Tablet .. 650 milliGRAM(s) Oral every 6 hours PRN Temp greater or equal to 38C (100.4F), Mild Pain (1 - 3)    Allergies    No Known Allergies    Intolerances        PAST MEDICAL & SURGICAL HISTORY:  PVD (peripheral vascular disease)  Acalculous cholecystitis: s/p percutaneous cholecystostomy  HTN (hypertension)  Hypothyroid  Ulcers of both lower extremities  No significant past surgical history      FAMILY HISTORY:  No pertinent family history in first degree relatives      SOCIAL HISTORY  Smoking History:     REVIEW OF SYSTEMS:    CONSTITUTIONAL:  Fevers [  ]  Yes  [  ]x  No                                   chills [  ]  Yes  [ x ]  No                               sweats  [  ]  Yes  [  ]x  No                                fatigue [  x]  Yes  [  ]  No    HEENT:  Eyes:  Diplopia or blurred vision [  ]  Yes  [x  ]  No    ENT:                        earache  [  ]  Yes  [  x]  No                             sore throat  [  ]  Yes  [  x]  No                            runny nose.  [  ]  Yes  [  x]  No    CARDIOVASCULAR:       chest pain  [  ]  Yes  [ x ]  No                        squeezing, tightness,  [  ]  Yes  [ x ]  No                                      palpitations.  [  ]  Yes  [x  ]  No    RESPIRATORY:             Cough [  ]  Yes  [  x]  No                                  Wheezing [  ]  Yes  [x  ]  No                                Hemoptysis [  ]  Yes  [ x ]  No                                      Sputum [  ]  Yes  [  x]  No                   Shortness of Breathe [  ]  Yes  [ x ]  No    GASTROINTESTINAL:      Abdominal pain  [  ]  Yes  [  x]  No                                                    Nausea  [  ]  Yes  [x  ]  No                                                   Vomiting [  ]  Yes  [  x]  No                                              Constipation [ x ]  Yes  [  ]  No                                                    Diarrhea [  ]  Yes  [ x ]  No  had malfunction of rt cholecystectomy tube  GENITOURINARY:           Incontinence [ x ]  Yes  [  ]  No                                                Dysuria [  ]  Yes  [ x ]  No                                      Blood in Urine  [  ]  Yes  [ x ]  No                          Frequency or urgency.  [  ]  Yes  [ x ]  No    NEUROLOGIC:                     Paresthesias  [  ]  Yes  [ x ]  No                                             fasciculations  [  ]  Yes  [ x ]  No                                seizures or weakness.  [  ]  Yes  [  x]  No                                                   Headache [  ]  Yes  [ x ]  No                                  Loss of Conciousness [  ]  Yes  [ x ]  No                                                     Insomnia [  ]  Yes  [ x ]  No    PSYCHIATRIC:    Disorder of thought or mood.  [ x ]  Yes  [  ]  No                                                           Anxiety [  x]  Yes  [  ]  No                                                      Depression [ x ]  Yes  [  ]  No                                                         Dementia [x  ]  Yes  [  ]  No  Vital Signs Last 24 Hrs  T(C): 36.6 (16 Jan 2020 07:52), Max: 36.8 (15 Charlie 2020 16:09)  T(F): 97.8 (16 Jan 2020 07:52), Max: 98.2 (15 Charlie 2020 16:09)  HR: 72 (16 Jan 2020 07:52) (63 - 80)  BP: 129/79 (16 Jan 2020 07:52) (92/77 - 144/62)  BP(mean): --  RR: 16 (16 Jan 2020 07:52) (16 - 17)  SpO2: 98% (16 Jan 2020 07:52) (98% - 100%)    PHYSICAL EXAMINATION:    GENERAL: The patient is a well-developed, ____in no apparent distress.     HEENT: Head is normocephalic and atraumatic. Extraocular muscles are intact. Mucous membranes are moist.     NECK: Supple. jvd [  ]  Yes  [  x]  No    LUNGS: Clear to auscultation  [ x ]  Yes  [  ]  No                               wheezing, [  ]  Yes  [x  ]  No                                      rales  [  ]  Yes  [ x ]  No                                    rhonchi  [  ]  Yes  [x  ]  No                 Respirations labored  [  ]  Yes  [ x ]  No    HEART: Regular rate and rhythm  [ x ]  Yes  [  ]  No                                        Murmur [  ]  Yes  [x  ]  No                                              rub  [  ]  Yes  [  x]  No                                          gallop  [  ]  Yes  [  x]  No    ABDOMEN:                                 Soft, [x  ]  Yes  [  ]  No                                             nontender [x ]  Yes  [  ]  No                                             distended.[  ]  Yes  [x  ]  No                            hepatosplenomegaly ,[  ]  Yes  [x  ]  No                                                    BS   [ x ]  Yes  [  ]  No  rt sided c-tube in place , no bleeding  EXTREMITIES:                cyanosis, [  ]  Yes  [ x ]  No                                       clubbing,   [  ]  Yes  [x  ]  No                                               rash, [  ]  Yes  [  x]  No                                           edema.  [ x ]  Yes  [  ]  No    NEUROLOGIC: Alert and Oriented  [x  ] Person [  ] Time  [x ] Place                                    Cranial nerves intact    [ x ]  Yes  [  ]  No                                               sensory Intact  [  x]  Yes  [  ]  No                                                  motor WNL   [  ]  Yes  [  ]  No                                                Domingo Paresis  [  ]  Yes  [  x]  No  DTR  babinski+ or -    LABS:                        10.9   6.20  )-----------( 310      ( 16 Jan 2020 07:01 )             34.7     01-16    141  |  110<H>  |  15  ----------------------------<  83  3.6   |  26  |  0.87    Ca    8.0<L>      16 Jan 2020 07:01  Phos  2.5     01-15  Mg     1.8     01-15    TPro  6.0  /  Alb  2.2<L>  /  TBili  0.4  /  DBili  x   /  AST  10  /  ALT  10  /  AlkPhos  141<H>  01-16    PT/INR - ( 14 Jan 2020 16:26 )   PT: 14.4 sec;   INR: 1.29 ratio         PTT - ( 14 Jan 2020 16:26 )  PTT:35.1 sec                      LABS:                        10.6   6.46  )-----------( 322      ( 15 Charlie 2020 06:48 )             34.5     01-15    143  |  112<H>  |  16  ----------------------------<  81  3.8   |  26  |  0.99    Ca    8.1<L>      15 Charlie 2020 06:48  Phos  2.5     01-15  Mg     1.8     01-15    TPro  5.9<L>  /  Alb  2.2<L>  /  TBili  0.4  /  DBili  x   /  AST  10  /  ALT  11  /  AlkPhos  144<H>  01-15    PT/INR - ( 14 Jan 2020 16:26 )   PT: 14.4 sec;   INR: 1.29 ratio         PTT - ( 14 Jan 2020 16:26 )  PTT:35.1 sec      < from: US Duplex Venous Lower Ext Ltd, Right (01.14.20 @ 19:12) >  IMPRESSION:     Deep vein thrombosis in the right common femoral vein and profunda femoral vein. Dr. Jacinto was informed on 1/14/2020 at 7:10 PM.      < end of copied text >      < from: CT Abdomen and Pelvis w/ IV Cont (01.14.20 @ 17:29) >  IMPRESSION:     Cholecystostomy tube appears in good position.    Deep vein thrombosis in the right common femoral vein.    Small enhancing fluid surrounding both ischial tuberosities.    Dr. Heart discussed the findings with Dr. Jacinto on January 14, 2020 at 5:55 PM.  Readback was obtained.        < end of copied text >            MICROBIOLOGY:    RADIOLOGY & ADDITIONAL STUDIES:    CXR:    Ct scan chest:    ekg;    echo:

## 2020-01-16 NOTE — BEHAVIORAL HEALTH ASSESSMENT NOTE - SUICIDE PROTECTIVE FACTORS
Responsibility to family and others/Supportive social network of family or friends/Fear of death or the actual act of killing self/Identifies reasons for living/Has future plans/Cultural, spiritual and/or moral attitudes against suicide/Other

## 2020-01-16 NOTE — PROGRESS NOTE ADULT - ATTENDING COMMENTS
continue other meds and care  had low bp earlier   waiting psychiatry eval  pt ot  possible d/c in am to [facility]

## 2020-01-16 NOTE — BEHAVIORAL HEALTH ASSESSMENT NOTE - SUMMARY
89M,  and previously living with wife but in nursing home for medical rehab since 12/2019, with no reported PHx but known to writer from depression consult requested by pt's wife during previous admission to this hospital in 12/2019, and MHx of HTN, hypothyroidism, chronic venous insufficiency, PVD ,acute cholecystitis s/p IR percutaneous cholecystostomy tube placement on 11/26/2019, sent in from NH on 1/14 for dislodged percutaneous cholecystostomy tube and broken suture. Psych consult was requested to r/o SI after pt reportedly told staff that he wanted to die. On interview, pt not only denies any memory of making a suicidal statement the previous day, but he demonstrates ability to sustain a detailed conversation about his interest in collecting  art with no obvious impairments in attention, concentration or hedonic capacity. Under the circumstances, we suspect pt's reported statement that he wanted to die to is more likely 2/2 delirium or adjustment disorder rather than true SI. Pt does not appear to present an acute risk of harm to self or others at the time of assessment, and does not appear to be in need of admission to  psych at the time of assessment.

## 2020-01-16 NOTE — PROGRESS NOTE ADULT - PROBLEM SELECTOR PLAN 6
Better mood today with no further depressed ideations  -Seen by psych  -Cont Remeron and Risperidone

## 2020-01-16 NOTE — PROGRESS NOTE ADULT - SUBJECTIVE AND OBJECTIVE BOX
condiition stable  no fever or pain  no sob.  MEDICATIONS  (STANDING):  ammonium lactate 12% Lotion 1 Application(s) Topical two times a day  apixaban 10 milliGRAM(s) Oral every 12 hours  clopidogrel Tablet 75 milliGRAM(s) Oral daily  ferrous    sulfate Liquid 300 milliGRAM(s) Oral daily  levothyroxine 100 MICROGram(s) Oral daily  lisinopril 2.5 milliGRAM(s) Oral daily  mirtazapine 7.5 milliGRAM(s) Oral at bedtime  risperiDONE   Tablet 0.25 milliGRAM(s) Oral daily  senna 2 Tablet(s) Oral at bedtime  sodium chloride 0.45%. 1000 milliLiter(s) (75 mL/Hr) IV Continuous <Continuous>  sodium chloride 0.9%. 1000 milliLiter(s) (75 mL/Hr) IV Continuous <Continuous>  tamsulosin 0.4 milliGRAM(s) Oral at bedtime    MEDICATIONS  (PRN):  acetaminophen   Tablet .. 650 milliGRAM(s) Oral every 6 hours PRN Temp greater or equal to 38C (100.4F), Mild Pain (1 - 3)      Allergies    No Known Allergies    Intolerances        Vital Signs Last 24 Hrs  T(C): 36.6 (16 Jan 2020 07:52), Max: 36.8 (15 Charlie 2020 16:09)  T(F): 97.8 (16 Jan 2020 07:52), Max: 98.2 (15 Charlie 2020 16:09)  HR: 72 (16 Jan 2020 07:52) (63 - 80)  BP: 129/79 (16 Jan 2020 07:52) (92/77 - 144/62)  BP(mean): --  RR: 16 (16 Jan 2020 07:52) (16 - 17)  SpO2: 98% (16 Jan 2020 07:52) (98% - 100%)    PHYSICAL EXAM  General: adult in NAD  HEENT: clear oropharynx, anicteric sclera, pink conjunctiva  Neck: supple  CV: normal S1/S2 with no murmur rubs or gallops  Lungs: positive air movement b/l ant lungs,clear to auscultation, no wheezes, no rales  Abdomen: soft non-tender non-distended, no hepatosplenomegaly  Ext: no clubbing cyanosis or edema  Skin: no rashes and no petechiae  Neuro: alert and oriented X 4, no focal deficits  LABS:                          10.9   6.20  )-----------( 310      ( 16 Jan 2020 07:01 )             34.7         Mean Cell Volume : 100.3 fl  Mean Cell Hemoglobin : 31.5 pg  Mean Cell Hemoglobin Concentration : 31.4 gm/dL  Auto Neutrophil # : x  Auto Lymphocyte # : x  Auto Monocyte # : x  Auto Eosinophil # : x  Auto Basophil # : x  Auto Neutrophil % : x  Auto Lymphocyte % : x  Auto Monocyte % : x  Auto Eosinophil % : x  Auto Basophil % : x    Serial CBC  Hematocrit 34.7  Hemoglobin 10.9  Plat 310  RBC 3.46  WBC 6.20  Serial CBC  Hematocrit 34.5  Hemoglobin 10.6  Plat 322  RBC 3.45  WBC 6.46  Serial CBC  Hematocrit 38.3  Hemoglobin 11.7  Plat 357  RBC 3.79  WBC 9.71    01-16    141  |  110<H>  |  15  ----------------------------<  83  3.6   |  26  |  0.87    Ca    8.0<L>      16 Jan 2020 07:01  Phos  2.5     01-15  Mg     1.8     01-15    TPro  6.0  /  Alb  2.2<L>  /  TBili  0.4  /  DBili  x   /  AST  10  /  ALT  10  /  AlkPhos  141<H>  01-16      PT/INR - ( 14 Jan 2020 16:26 )   PT: 14.4 sec;   INR: 1.29 ratio         PTT - ( 14 Jan 2020 16:26 )  PTT:35.1 sec    Vitamin B12, Serum: 317 pg/mL (01-15 @ 10:33)            BLOOD SMEAR INTERPRETATION:       RADIOLOGY & ADDITIONAL STUDIES:

## 2020-01-16 NOTE — BEHAVIORAL HEALTH ASSESSMENT NOTE - NSBHCONSULTRECOMMENDOTHER_PSY_A_CORE FT
1. Pt is psych cleared for DC back to [facility] as soon as he is medically optimized  2. No indication for standing or PRN psychotropic medications at this time  3. Psychiatry is signing off. Reconsult if additional issues arise as inpatient  4. Case d/w Blanca Hairston NP of primary team    Leah Stevens MD  Director, Consultation-Liaison Psychiatry Service  [id number]

## 2020-01-16 NOTE — PROGRESS NOTE ADULT - PROBLEM SELECTOR PROBLEM 1
Acute deep vein thrombosis (DVT) of femoral vein of right lower extremity

## 2020-01-16 NOTE — CHART NOTE - NSCHARTNOTEFT_GEN_A_CORE
EVENT:   Patient with hx. HTN, his BP dropped to 92/77, HR - 80.   OBJECTIVE:  Vital Signs Last 24 Hrs  T(C): 36.5 (16 Jan 2020 00:18), Max: 36.8 (15 Charlie 2020 16:09)  T(F): 97.7 (16 Jan 2020 00:18), Max: 98.2 (15 Charlie 2020 16:09)  HR: 80 (16 Jan 2020 00:18) (66 - 80)  BP: 92/77 (16 Jan 2020 00:18) (92/77 - 156/87)  BP(mean): --  RR: 17 (16 Jan 2020 00:18) (16 - 17)  SpO2: 100% (16 Jan 2020 00:18) (98% - 100%)    FOCUSED PHYSICAL EXAM:    LABS:                        10.6   6.46  )-----------( 322      ( 15 Charlie 2020 06:48 )             34.5     01-15    143  |  112<H>  |  16  ----------------------------<  81  3.8   |  26  |  0.99    Ca    8.1<L>      15 Charlie 2020 06:48  Phos  2.5     01-15  Mg     1.8     01-15    TPro  5.9<L>  /  Alb  2.2<L>  /  TBili  0.4  /  DBili  x   /  AST  10  /  ALT  11  /  AlkPhos  144<H>  01-15      EKG:   IMAGING:    ASSESSMENT:  PLAN: 1. IV Fluid - NS @ 75 ml/hrs (stop after 12hrs).   FOLLOW UP / RESULT: Monitor VS.

## 2020-01-16 NOTE — PROGRESS NOTE ADULT - SUBJECTIVE AND OBJECTIVE BOX
NP Note discussed with  Primary Attending    Patient is a 89y old  Male who presents with a chief complaint of DVT (16 Jan 2020 10:50)      INTERVAL HPI/OVERNIGHT EVENTS: no new complaints    MEDICATIONS  (STANDING):  ammonium lactate 12% Lotion 1 Application(s) Topical two times a day  apixaban 10 milliGRAM(s) Oral every 12 hours  clopidogrel Tablet 75 milliGRAM(s) Oral daily  ferrous    sulfate Liquid 300 milliGRAM(s) Oral daily  levothyroxine 100 MICROGram(s) Oral daily  lisinopril 2.5 milliGRAM(s) Oral daily  mirtazapine 7.5 milliGRAM(s) Oral at bedtime  risperiDONE   Tablet 0.25 milliGRAM(s) Oral daily  senna 2 Tablet(s) Oral at bedtime  sodium chloride 0.45%. 1000 milliLiter(s) (75 mL/Hr) IV Continuous <Continuous>  sodium chloride 0.9%. 1000 milliLiter(s) (75 mL/Hr) IV Continuous <Continuous>  tamsulosin 0.4 milliGRAM(s) Oral at bedtime    MEDICATIONS  (PRN):  acetaminophen   Tablet .. 650 milliGRAM(s) Oral every 6 hours PRN Temp greater or equal to 38C (100.4F), Mild Pain (1 - 3)      __________________________________________________  REVIEW OF SYSTEMS:    CONSTITUTIONAL: No fever,   EYES: no acute visual disturbances  NECK: No pain or stiffness  RESPIRATORY: No cough; No shortness of breath  CARDIOVASCULAR: No chest pain, no palpitations  GASTROINTESTINAL: No pain. No nausea or vomiting; No diarrhea   NEUROLOGICAL: No headache or numbness, no tremors  MUSCULOSKELETAL: No joint pain, no muscle pain  GENITOURINARY: no dysuria, no frequency, no hesitancy  PSYCHIATRY: no depression , no anxiety  ALL OTHER  ROS negative        Vital Signs Last 24 Hrs  T(C): 36.2 (16 Jan 2020 16:07), Max: 36.6 (16 Jan 2020 07:52)  T(F): 97.2 (16 Jan 2020 16:07), Max: 97.8 (16 Jan 2020 07:52)  HR: 69 (16 Jan 2020 16:07) (63 - 80)  BP: 127/53 (16 Jan 2020 16:07) (92/77 - 129/79)  BP(mean): --  RR: 16 (16 Jan 2020 16:07) (16 - 17)  SpO2: 99% (16 Jan 2020 16:07) (98% - 100%)    ________________________________________________  PHYSICAL EXAM:  GENERAL: NAD  HEENT: Normocephalic;  conjunctivae and sclerae clear; moist mucous membranes;   NECK : supple  CHEST/LUNG: Clear to auscultation bilaterally with good air entry   HEART: S1 S2  regular; no murmurs, gallops or rubs  ABDOMEN: Soft, Nontender, Nondistended; Bowel sounds present  EXTREMITIES: no cyanosis; no edema; no calf tenderness  SKIN: warm and dry; no rash  NERVOUS SYSTEM:  Awake and alert; Oriented  to place, person and time ; no new deficits    _________________________________________________  LABS:                        10.9   6.20  )-----------( 310      ( 16 Jan 2020 07:01 )             34.7     01-16    141  |  110<H>  |  15  ----------------------------<  83  3.6   |  26  |  0.87    Ca    8.0<L>      16 Jan 2020 07:01  Phos  2.5     01-15  Mg     1.8     01-15    TPro  6.0  /  Alb  2.2<L>  /  TBili  0.4  /  DBili  x   /  AST  10  /  ALT  10  /  AlkPhos  141<H>  01-16    CAPILLARY BLOOD GLUCOSE    RADIOLOGY & ADDITIONAL TESTS:      US Duplex Venous Lower Ext Ltd, Right (01.14.20 @ 19:12) >  EXAM:  US DPLX LWR EXT VEINS LTD RT                            PROCEDURE DATE:  01/14/2020          INTERPRETATION:  CLINICAL INFORMATION: Clinical suspicion for deep vein thrombosis    TECHNIQUE: Duplex sonography of the RIGHT LOWER extremity veins with color and spectral Doppler, with and without compression.      FINDINGS:    Noncompressible thrombi are noted in the right common femoral vein and profunda femoral vein, compatible with deep vein thrombosis. There is normal compressibility of the right femoral and popliteal veins.     No calf vein thrombosis is detected.    < end of copied text >    CT Abdomen and Pelvis w/ IV Cont (01.14.20 @ 17:29) >  EXAM:  CT ABDOMEN AND PELVIS IC                            PROCEDURE DATE:  01/14/2020          INTERPRETATION:  CLINICAL INFORMATION: Cholecystostomy tube movement.     COMPARISON: December 7, 2019    PROCEDURE:   CT of the Abdomen and Pelvis was performed with intravenous contrast.   Intravenous contrast: 90 ml Omnipaque 350. 10 ml discarded.  Oral contrast: None.  Sagittal and coronal reformats were performed.    FINDINGS:    LOWER CHEST: Within normal limits.    LIVER: Within normal limits.  BILE DUCTS: Normal caliber.  GALLBLADDER: Contains a cholecystostomy tube, which appears in good position.  SPLEEN: Within normal limits.  PANCREAS: Within normal limits.  ADRENALS: Within normal limits.  KIDNEYS/URETERS: No hydronephrosis. Left renal cyst.    BLADDER: Within normal limits.  REPRODUCTIVE ORGANS: Prostate gland is enlarged and heterogeneous.    BOWEL: Moderate-sized hiatal hernia. No bowel obstruction. Colonic diverticulosis.  PERITONEUM: No ascites.  VESSELS: Deep vein thrombosis in the right common femoral vein.  RETROPERITONEUM/LYMPH NODES: No lymphadenopathy.    ABDOMINAL WALL: Within normal limits.  BONES: Degenerative changes. Small enhancing fluid surrounding both ischial tuberosities. Hemangioma in the T9 vertebral body.    IMPRESSION:     Cholecystostomy tube appears in good position.    Deep vein thrombosis in the right common femoral vein.    Small enhancing fluid surrounding both ischial tuberosities.    Dr. Heart discussed the findings with Dr. Jacinto on January 14, 2020 at 5:55 PM.  Readback was obtained.      < end of copied text >      Imaging Personally Reviewed:  YES/NO    Consultant(s) Notes Reviewed:   YES/ No    Care Discussed with Consultants :     Plan of care was discussed with patient and /or primary care giver; all questions and concerns were addressed and care was aligned with patient's wishes.

## 2020-01-16 NOTE — PROGRESS NOTE ADULT - PROBLEM SELECTOR PLAN 1
eliquis  hematology eval noted
lovenox  hematology eval noted
Incidental finding with CT abd/pelvis in the ED.  R common femoral V DVT    Venous doppler confirms right common femoral vein and profunda femoral vein  asymtomatic of CP or SOB  -Heme dr. Torres following  -Transition from Lovenox to Eliquis  -Start Eliquis 10mg BId x 7 days and then 5mg BID for maintenance.   -monitor bleeding.  -monitor kidney fnx.
Incidental finding with CT abd/pelvis in the ED.  R common femoral V DVT    Venous doppler confirms right common femoral vein and profunda femoral vein  asymtomatic of CP or SOB  started full dose lovenox  Heme dr. Torres following  -Switch to Eliquis 10mg BId x 7 days and then 5mg BID for maintenance.   -monitor bleeding.  -monitor kidney fnx.

## 2020-01-16 NOTE — PROGRESS NOTE ADULT - PROBLEM SELECTOR PROBLEM 5
Ulcers of both lower extremities
HTN (hypertension)
HTN (hypertension)
Ulcers of both lower extremities

## 2020-01-16 NOTE — PROGRESS NOTE ADULT - PROBLEM SELECTOR PLAN 5
lisinoprill  monitor bp
lisinoprill  monitor bp
Pressure wound to both lower legs.  Wound care team following.  continue treatment as:  Clean all wounds with normal saline and apply skin prep to the surrounding skin  -Leave the L. Heel wound open to air  -Elevate/float the patients heels using heel protectors and reposition the patient Q 2hrs using wedges or pillows
Pressure wound to both lower legs.  Wound care team following.  continue treatment as:  Clean all wounds with normal saline and apply skin prep to the surrounding skin  -Leave the L. Heel wound open to air  -Elevate/float the patients heels using heel protectors and reposition the patient Q 2hrs using wedges or pillows

## 2020-01-16 NOTE — PROGRESS NOTE ADULT - PROBLEM SELECTOR PROBLEM 2
Cholecystostomy tube dysfunction, initial encounter

## 2020-01-16 NOTE — BEHAVIORAL HEALTH ASSESSMENT NOTE - NSBHCHARTREVIEWVS_PSY_A_CORE FT
Vital Signs Last 24 Hrs  T(C): 36.2 (16 Jan 2020 16:07), Max: 36.6 (16 Jan 2020 07:52)  T(F): 97.2 (16 Jan 2020 16:07), Max: 97.8 (16 Jan 2020 07:52)  HR: 69 (16 Jan 2020 16:07) (63 - 80)  BP: 127/53 (16 Jan 2020 16:07) (92/77 - 129/79)  BP(mean): --  RR: 16 (16 Jan 2020 16:07) (16 - 17)  SpO2: 99% (16 Jan 2020 16:07) (98% - 100%)

## 2020-01-16 NOTE — PROGRESS NOTE ADULT - PROBLEM/PLAN-7
DISPLAY PLAN FREE TEXT
Fall with Harm Risk

## 2020-01-16 NOTE — BEHAVIORAL HEALTH ASSESSMENT NOTE - NSBHSOCIALHXDETAILSFT_PSY_A_CORE
B/R in [place]. Formerly  but  30 yrs ago, remarried to woman originally from [place] who is 80 yrs old. No children from marriage but wife has adult daughter who is  and lives in  with  and children. Originally worked as  but now retired. Continues to have strong interest in collecting  art.

## 2020-01-16 NOTE — BEHAVIORAL HEALTH ASSESSMENT NOTE - RISK ASSESSMENT
Low Acute Suicide Risk Risk factors: Advanced age, multiple medical problems, domicile in nursing home. Protective factors: Absent h/o SA/SI/SIB, supportive spouse, intact hedonic capacity, strong interest in collecting  art. Risk level appears low at the time of assessment.

## 2020-01-16 NOTE — PROGRESS NOTE ADULT - PROBLEM SELECTOR PLAN 2
its functional now   surgery on case
its functional now   surgery on case
Admitted from [facility] for malfunctioning cholecystostomy tube and broken suture.  hx actue cholecystitis and s/p IR percutaneous cholecystostomy tub 11/26/2019.   CT shows PCT in place  Followed by surgery Dr. Elizabeth. suture replaced. draining well.  -monitor output  -monitor LFT
Admitted from [facility] for malfunctioning cholecystostomy tube and broken suture.  hx actue cholecystitis and s/p IR percutaneous cholecystostomy tub 11/26/2019.   CT shows PCT in place  Followed by surgery Dr. Elizabeth. suture replaced. draining well.  -monitor output  -monitor LFT

## 2020-01-16 NOTE — BEHAVIORAL HEALTH ASSESSMENT NOTE - NSBHCHARTREVIEWLAB_PSY_A_CORE FT
01-16    141  |  110<H>  |  15  ----------------------------<  83  3.6   |  26  |  0.87    Ca    8.0<L>      16 Jan 2020 07:01  Phos  2.5     01-15  Mg     1.8     01-15    TPro  6.0  /  Alb  2.2<L>  /  TBili  0.4  /  DBili  x   /  AST  10  /  ALT  10  /  AlkPhos  141<H>  01-16    Complete Blood Count in AM (01.16.20 @ 07:01)    Nucleated RBC: 0 /100 WBCs    WBC Count: 6.20 K/uL    RBC Count: 3.46 M/uL    Hemoglobin: 10.9 g/dL    Hematocrit: 34.7 %    Mean Cell Volume: 100.3 fl    Mean Cell Hemoglobin: 31.5 pg    Mean Cell Hemoglobin Conc: 31.4 gm/dL    Red Cell Distrib Width: 15.9 %    Platelet Count - Automated: 310 K/uL

## 2020-01-16 NOTE — PROGRESS NOTE ADULT - PROBLEM SELECTOR PLAN 9
adm. from [facility]  When pt is optimized, d/c EDIE.   Psych eval pending.   PT consulted.
adm. from [facility]  When pt is optimized, d/c EDIE.   Psych eval pending.   PT consulted.

## 2020-01-16 NOTE — BEHAVIORAL HEALTH ASSESSMENT NOTE - HPI (INCLUDE ILLNESS QUALITY, SEVERITY, DURATION, TIMING, CONTEXT, MODIFYING FACTORS, ASSOCIATED SIGNS AND SYMPTOMS)
89M,  and previously living with wife but in nursing home for medical rehab since 12/2019, with no reported PHx but known to writer from depression consult requested by pt's wife during previous admission to this hospital in 12/2019, and MHx of HTN, hypothyroidism, chronic venous insufficiency, PVD ,acute cholecystitis s/p IR percutaneous cholecystostomy tube placement on 11/26/2019, sent in from NH on 1/14 for dislodged percutaneous cholecystostomy tube and broken suture. Psych consult was requested to r/o SI after pt reportedly told staff that he wanted to die. On writer's approach, pt denies any memory of making suicidal statements, adding, "I am 89, you know. I could die of natural causes at any time." Pt denies any desire for his death to be hastened. When writer inquires about pt's personal hx, pt reports that he used to work as an , but his real passion is collecting art from [place], especially [place] paintings and sculptures. Pt talks for a long time in a highly animated fashion about his love for collecting. 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 You shouldn't worry about me."

## 2020-01-16 NOTE — PHYSICAL THERAPY INITIAL EVALUATION ADULT - CRITERIA FOR SKILLED THERAPEUTIC INTERVENTIONS
therapy frequency/rehab potential/predicted duration of therapy intervention/anticipated discharge recommendation/impairments found

## 2020-01-16 NOTE — PROGRESS NOTE ADULT - ASSESSMENT
89 y/o male from [facility], bedbound with PMHx of HTN, hypothyroidism, HTN chronic venous insufficiency, PVD ,acute cholecystitis s/p IR Percutaneous cholecystostomy tube placement on 11/26/2019 was sent in from NH for dislodged Percutaneous cholecystostomy tube and broken suture. Pt was found to have Right common femoral vein on the CT scan in the ED.  Venous doppler shows deep vein thrombosis in the right common femoral vein and profunda femoral vein. Started full dose of Lovenox and evaluated by Heme. Dr. Torres. Switched to Eliquis BID.  Resolved hypernatremia with IVF.   CT abd/pelvis confirms Percutaneous cholecystostomy tube placement in good position. Surgery dr. Elizabeth following for Cholecystostomy tube and suture replaced. Draining well this time.    Pt reports that he wants to die to nurse this morning. During assessment, he was talking with eyes closed, stating " I don't want to be here.  Everyone surrounding me makes me upset here. I don't want anything from this place. Leave me alone."   He also states feeling depressed and wants to sleep. no appetite. He denies suicidal ideation. No verbalization with actual plan to kill himself. Psych consulted.   PT consulted for assess functioning.   1/16-Pt. in better mood today with no unusual statements noted, HD stable in NAD.  Pt. seen and evaluated by psych.  Pt. is medically stable for discharge to [facility].

## 2020-01-16 NOTE — PROGRESS NOTE ADULT - PROBLEM SELECTOR PLAN 4
ok now   iv fluid
ok now   iv fluid
PVD with leg ulcers  continue plavix at home dose.
PVD with leg ulcers  continue plavix at home dose.

## 2020-01-17 ENCOUNTER — TRANSCRIPTION ENCOUNTER (OUTPATIENT)
Age: 85
End: 2020-01-17

## 2020-01-17 VITALS
HEART RATE: 73 BPM | RESPIRATION RATE: 16 BRPM | OXYGEN SATURATION: 98 % | SYSTOLIC BLOOD PRESSURE: 136 MMHG | DIASTOLIC BLOOD PRESSURE: 81 MMHG | TEMPERATURE: 98 F

## 2020-01-17 LAB
ANION GAP SERPL CALC-SCNC: 5 MMOL/L — SIGNIFICANT CHANGE UP (ref 5–17)
BUN SERPL-MCNC: 14 MG/DL — SIGNIFICANT CHANGE UP (ref 7–18)
CALCIUM SERPL-MCNC: 8.2 MG/DL — LOW (ref 8.4–10.5)
CHLORIDE SERPL-SCNC: 113 MMOL/L — HIGH (ref 96–108)
CO2 SERPL-SCNC: 27 MMOL/L — SIGNIFICANT CHANGE UP (ref 22–31)
CREAT SERPL-MCNC: 0.85 MG/DL — SIGNIFICANT CHANGE UP (ref 0.5–1.3)
GLUCOSE SERPL-MCNC: 90 MG/DL — SIGNIFICANT CHANGE UP (ref 70–99)
HCT VFR BLD CALC: 35.9 % — LOW (ref 39–50)
HGB BLD-MCNC: 11 G/DL — LOW (ref 13–17)
MCHC RBC-ENTMCNC: 30.6 GM/DL — LOW (ref 32–36)
MCHC RBC-ENTMCNC: 30.7 PG — SIGNIFICANT CHANGE UP (ref 27–34)
MCV RBC AUTO: 100.3 FL — HIGH (ref 80–100)
NRBC # BLD: 0 /100 WBCS — SIGNIFICANT CHANGE UP (ref 0–0)
PLATELET # BLD AUTO: 333 K/UL — SIGNIFICANT CHANGE UP (ref 150–400)
POTASSIUM SERPL-MCNC: 3.6 MMOL/L — SIGNIFICANT CHANGE UP (ref 3.5–5.3)
POTASSIUM SERPL-SCNC: 3.6 MMOL/L — SIGNIFICANT CHANGE UP (ref 3.5–5.3)
RBC # BLD: 3.58 M/UL — LOW (ref 4.2–5.8)
RBC # FLD: 15.9 % — HIGH (ref 10.3–14.5)
SODIUM SERPL-SCNC: 145 MMOL/L — SIGNIFICANT CHANGE UP (ref 135–145)
WBC # BLD: 6.3 K/UL — SIGNIFICANT CHANGE UP (ref 3.8–10.5)
WBC # FLD AUTO: 6.3 K/UL — SIGNIFICANT CHANGE UP (ref 3.8–10.5)

## 2020-01-17 PROCEDURE — 80061 LIPID PANEL: CPT

## 2020-01-17 PROCEDURE — 80048 BASIC METABOLIC PNL TOTAL CA: CPT

## 2020-01-17 PROCEDURE — 36415 COLL VENOUS BLD VENIPUNCTURE: CPT

## 2020-01-17 PROCEDURE — 84100 ASSAY OF PHOSPHORUS: CPT

## 2020-01-17 PROCEDURE — 86850 RBC ANTIBODY SCREEN: CPT

## 2020-01-17 PROCEDURE — 83690 ASSAY OF LIPASE: CPT

## 2020-01-17 PROCEDURE — 80053 COMPREHEN METABOLIC PANEL: CPT

## 2020-01-17 PROCEDURE — 83735 ASSAY OF MAGNESIUM: CPT

## 2020-01-17 PROCEDURE — 85730 THROMBOPLASTIN TIME PARTIAL: CPT

## 2020-01-17 PROCEDURE — 83036 HEMOGLOBIN GLYCOSYLATED A1C: CPT

## 2020-01-17 PROCEDURE — 74177 CT ABD & PELVIS W/CONTRAST: CPT

## 2020-01-17 PROCEDURE — 85027 COMPLETE CBC AUTOMATED: CPT

## 2020-01-17 PROCEDURE — 85610 PROTHROMBIN TIME: CPT

## 2020-01-17 PROCEDURE — 86900 BLOOD TYPING SEROLOGIC ABO: CPT

## 2020-01-17 PROCEDURE — 99285 EMERGENCY DEPT VISIT HI MDM: CPT | Mod: 25

## 2020-01-17 PROCEDURE — 93971 EXTREMITY STUDY: CPT

## 2020-01-17 PROCEDURE — 82607 VITAMIN B-12: CPT

## 2020-01-17 PROCEDURE — 86901 BLOOD TYPING SEROLOGIC RH(D): CPT

## 2020-01-17 PROCEDURE — 82306 VITAMIN D 25 HYDROXY: CPT

## 2020-01-17 PROCEDURE — 84443 ASSAY THYROID STIM HORMONE: CPT

## 2020-01-17 RX ORDER — CIPROFLOXACIN LACTATE 400MG/40ML
1 VIAL (ML) INTRAVENOUS
Qty: 0 | Refills: 0 | DISCHARGE
End: 2020-01-18

## 2020-01-17 RX ORDER — APIXABAN 2.5 MG/1
2 TABLET, FILM COATED ORAL
Qty: 120 | Refills: 0
Start: 2020-01-17 | End: 2020-02-15

## 2020-01-17 RX ADMIN — RISPERIDONE 0.25 MILLIGRAM(S): 4 TABLET ORAL at 11:14

## 2020-01-17 RX ADMIN — LISINOPRIL 2.5 MILLIGRAM(S): 2.5 TABLET ORAL at 06:15

## 2020-01-17 RX ADMIN — Medication 300 MILLIGRAM(S): at 11:14

## 2020-01-17 RX ADMIN — Medication 1 APPLICATION(S): at 06:15

## 2020-01-17 RX ADMIN — APIXABAN 10 MILLIGRAM(S): 2.5 TABLET, FILM COATED ORAL at 06:15

## 2020-01-17 RX ADMIN — CLOPIDOGREL BISULFATE 75 MILLIGRAM(S): 75 TABLET, FILM COATED ORAL at 11:14

## 2020-01-17 RX ADMIN — Medication 100 MICROGRAM(S): at 06:15

## 2020-01-17 NOTE — DISCHARGE NOTE PROVIDER - NSDCCPCAREPLAN_GEN_ALL_CORE_FT
PRINCIPAL DISCHARGE DIAGNOSIS  Diagnosis: Acute deep vein thrombosis (DVT) of femoral vein of right lower extremity  Assessment and Plan of Treatment: Pt is incidentally found to have DVT to R femoral vein thrombosis.   Received full dose of Lovenox and Evaluated by hematology.   Started Eliquis.  Take your Eliquis as as prescribed.  Increase activity as tolerated.  If you develop new leg pain, swelling, and/or redness contact your healthcare provider.  If you develop new chest pain with difficulty breathing, a rapid heart rate and/or a feeling of passing out call emergency medical services 911.        SECONDARY DISCHARGE DIAGNOSES  Diagnosis: Cholecystostomy tube dysfunction, initial encounter  Assessment and Plan of Treatment: Pt was evaluated by surgery and replaced suture.   Monitor for output, sxs of infection or pain.       Diagnosis: Adjustment disorder with depressed mood  Assessment and Plan of Treatment: During hospital stay, pt exhibits depressive mood, loss of appetite.   Evaluated by psych and cleared for discharge EDIE.   No acute interventions are recommended at this time.    Diagnosis: Ulcers of both lower extremities  Assessment and Plan of Treatment: Pt wiht PVD with lower leg wound.   Evaluated by wound care team.   Treatment as described:  -Clean all wounds with normal saline and apply skin prep to the surrounding skin  -Leave the L. Heel wound open to air  -Elevate/float the patients heels using heel protectors and reposition the patient Q 2hrs using wedges or pillows      Diagnosis: HTN (hypertension)  Assessment and Plan of Treatment: Pt BP well controlled.   Continue antihypertensive meds at home dose.   Monitor BP.  Follow up with pcp.    Diagnosis: Hypothyroidism, unspecified type  Assessment and Plan of Treatment: Continue synthroid.  Monitor TSH.    Diagnosis: Hypernatremia  Assessment and Plan of Treatment: Pt was noted hypernatremia and resolved after receiving IVF.

## 2020-01-17 NOTE — DISCHARGE NOTE PROVIDER - CARE PROVIDER_API CALL
Lois Broussard)  Internal Medicine  [id number] [address]  Phone: [phone]  Fax: [phone]  Established Patient  Follow Up Time: 1 week

## 2020-01-17 NOTE — DISCHARGE NOTE PROVIDER - HOSPITAL COURSE
89 y/o male from [facility], bedbound with PMHx of HTN, hypothyroidism, HTN chronic venous insufficiency, PVD ,acute cholecystitis s/p IR Percutaneous cholecystostomy tube placement on 11/26/2019 was sent in from NH for dislodged Percutaneous cholecystostomy tube and broken suture. Pt was found to have Right common femoral vein on the CT scan in the ED.  Venous doppler shows deep vein thrombosis in the right common femoral vein and profunda femoral vein. Started full dose of Lovenox and evaluated by Heme. Dr. Torres. Switched to Eliquis BID.  Resolved hypernatremia with IVF.     CT abd/pelvis confirms Percutaneous cholecystostomy tube placement in good position. Surgery dr. Elizabeth following for Cholecystostomy tube and suture replaced. Draining well.     Pt reports that he wants to die to staff previously. During assessment, he was talking with eyes closed, stating " I don't want to be here.  Everyone surrounding me makes me upset here. I don't want anything from this place. Leave me alone." He also states feeling depressed and wants to sleep. no appetite. He denies suicidal ideation. No verbalization with actual plan to kill himself. Evaluated by Psych. Pt. denies depressive mood today stating feeling better. Patient is medically optimized for discharge [facility].

## 2020-01-17 NOTE — DISCHARGE NOTE NURSING/CASE MANAGEMENT/SOCIAL WORK - PATIENT PORTAL LINK FT
You can access the FollowMyHealth Patient Portal offered by [facility] by registering at the following website: [email or web address]. By joining [facility]’s FollowMyHealth portal, you will also be able to view your health information using other applications (apps) compatible with our system.

## 2020-01-17 NOTE — DISCHARGE NOTE PROVIDER - NSDCMRMEDTOKEN_GEN_ALL_CORE_FT
acetaminophen 325 mg oral tablet: 2 tab(s) orally every 6 hours, As needed, Temp greater or equal to 38C (100.4F), Moderate Pain (4 - 6)  ammonium lactate 12% topical lotion: Apply topically to affected area 2 times a day  calamine topical lotion: 1 application topically once a day  clopidogrel 75 mg oral tablet: 1 tab(s) orally once a day  Eliquis Starter Pack for Treatment of DVT and PE 5 mg oral tablet: 10mg q 12hrs for 5 more days and then decrease to 5mg Q 12hrs   ferrous sulfate 220 mg/5 mL (44 mg elemental iron) oral elixir: 7.5 milliliter(s) orally once a day  levothyroxine 100 mcg (0.1 mg) oral tablet: 1 tab(s) orally once a day  lisinopril 2.5 mg oral tablet: 1 tab(s) orally once a day  Remeron 15 mg oral tablet: 0.5 tab(s) orally once a day (at bedtime)  risperiDONE 0.25 mg oral tablet: 1 tab(s) orally once a day  Senna 8.6 mg oral tablet: 3 tab(s) orally once a day (at bedtime)  tamsulosin 0.4 mg oral capsule: 1 cap(s) orally once a day (at bedtime)

## 2020-01-29 ENCOUNTER — RESULT CHARGE (OUTPATIENT)
Age: 85
End: 2020-01-29

## 2020-01-29 ENCOUNTER — INPATIENT (INPATIENT)
Facility: HOSPITAL | Age: 85
LOS: 21 days | Discharge: NURSING, REHAB OR LONG-TERM CARE | DRG: 415 | End: 2020-02-20
Attending: INTERNAL MEDICINE | Admitting: INTERNAL MEDICINE
Payer: MEDICARE

## 2020-01-29 VITALS
RESPIRATION RATE: 16 BRPM | HEART RATE: 71 BPM | SYSTOLIC BLOOD PRESSURE: 133 MMHG | WEIGHT: 179.9 LBS | DIASTOLIC BLOOD PRESSURE: 79 MMHG | TEMPERATURE: 97 F | OXYGEN SATURATION: 96 %

## 2020-01-29 DIAGNOSIS — K81.1 CHRONIC CHOLECYSTITIS: ICD-10-CM

## 2020-01-29 PROBLEM — I73.9 PERIPHERAL VASCULAR DISEASE, UNSPECIFIED: Chronic | Status: ACTIVE | Noted: 2020-01-14

## 2020-01-29 LAB
ALBUMIN SERPL ELPH-MCNC: 2.6 G/DL — LOW (ref 3.5–5)
ALP SERPL-CCNC: 117 U/L — SIGNIFICANT CHANGE UP (ref 40–120)
ALT FLD-CCNC: 10 U/L DA — SIGNIFICANT CHANGE UP (ref 10–60)
ANION GAP SERPL CALC-SCNC: 5 MMOL/L — SIGNIFICANT CHANGE UP (ref 5–17)
APTT BLD: 49.9 SEC — HIGH (ref 27.5–36.3)
AST SERPL-CCNC: 23 U/L — SIGNIFICANT CHANGE UP (ref 10–40)
BASOPHILS # BLD AUTO: 0.03 K/UL — SIGNIFICANT CHANGE UP (ref 0–0.2)
BASOPHILS NFR BLD AUTO: 0.3 % — SIGNIFICANT CHANGE UP (ref 0–2)
BILIRUB SERPL-MCNC: 0.4 MG/DL — SIGNIFICANT CHANGE UP (ref 0.2–1.2)
BUN SERPL-MCNC: 31 MG/DL — HIGH (ref 7–18)
CALCIUM SERPL-MCNC: 8.2 MG/DL — LOW (ref 8.4–10.5)
CHLORIDE SERPL-SCNC: 113 MMOL/L — HIGH (ref 96–108)
CO2 SERPL-SCNC: 25 MMOL/L — SIGNIFICANT CHANGE UP (ref 22–31)
CREAT SERPL-MCNC: 1.07 MG/DL — SIGNIFICANT CHANGE UP (ref 0.5–1.3)
EOSINOPHIL # BLD AUTO: 0.96 K/UL — HIGH (ref 0–0.5)
EOSINOPHIL NFR BLD AUTO: 9.4 % — HIGH (ref 0–6)
GLUCOSE SERPL-MCNC: 82 MG/DL — SIGNIFICANT CHANGE UP (ref 70–99)
HCT VFR BLD CALC: 38.7 % — LOW (ref 39–50)
HGB BLD-MCNC: 12 G/DL — LOW (ref 13–17)
IMM GRANULOCYTES NFR BLD AUTO: 0.3 % — SIGNIFICANT CHANGE UP (ref 0–1.5)
INR BLD: 1.48 RATIO — HIGH (ref 0.88–1.16)
LYMPHOCYTES # BLD AUTO: 2.69 K/UL — SIGNIFICANT CHANGE UP (ref 1–3.3)
LYMPHOCYTES # BLD AUTO: 26.4 % — SIGNIFICANT CHANGE UP (ref 13–44)
MCHC RBC-ENTMCNC: 31 GM/DL — LOW (ref 32–36)
MCHC RBC-ENTMCNC: 31.7 PG — SIGNIFICANT CHANGE UP (ref 27–34)
MCV RBC AUTO: 102.4 FL — HIGH (ref 80–100)
MONOCYTES # BLD AUTO: 0.41 K/UL — SIGNIFICANT CHANGE UP (ref 0–0.9)
MONOCYTES NFR BLD AUTO: 4 % — SIGNIFICANT CHANGE UP (ref 2–14)
NEUTROPHILS # BLD AUTO: 6.08 K/UL — SIGNIFICANT CHANGE UP (ref 1.8–7.4)
NEUTROPHILS NFR BLD AUTO: 59.6 % — SIGNIFICANT CHANGE UP (ref 43–77)
NRBC # BLD: 0 /100 WBCS — SIGNIFICANT CHANGE UP (ref 0–0)
PLATELET # BLD AUTO: 368 K/UL — SIGNIFICANT CHANGE UP (ref 150–400)
POTASSIUM SERPL-MCNC: 4.5 MMOL/L — SIGNIFICANT CHANGE UP (ref 3.5–5.3)
POTASSIUM SERPL-SCNC: 4.5 MMOL/L — SIGNIFICANT CHANGE UP (ref 3.5–5.3)
PROT SERPL-MCNC: 6.9 G/DL — SIGNIFICANT CHANGE UP (ref 6–8.3)
PROTHROM AB SERPL-ACNC: 16.6 SEC — HIGH (ref 10–12.9)
RBC # BLD: 3.78 M/UL — LOW (ref 4.2–5.8)
RBC # FLD: 16.3 % — HIGH (ref 10.3–14.5)
SODIUM SERPL-SCNC: 143 MMOL/L — SIGNIFICANT CHANGE UP (ref 135–145)
WBC # BLD: 10.2 K/UL — SIGNIFICANT CHANGE UP (ref 3.8–10.5)
WBC # FLD AUTO: 10.2 K/UL — SIGNIFICANT CHANGE UP (ref 3.8–10.5)

## 2020-01-29 PROCEDURE — 99223 1ST HOSP IP/OBS HIGH 75: CPT

## 2020-01-29 PROCEDURE — 99285 EMERGENCY DEPT VISIT HI MDM: CPT

## 2020-01-29 RX ORDER — APIXABAN 2.5 MG/1
5 TABLET, FILM COATED ORAL EVERY 12 HOURS
Refills: 0 | Status: DISCONTINUED | OUTPATIENT
Start: 2020-01-29 | End: 2020-02-01

## 2020-01-29 RX ORDER — LEVOTHYROXINE SODIUM 125 MCG
100 TABLET ORAL DAILY
Refills: 0 | Status: DISCONTINUED | OUTPATIENT
Start: 2020-01-29 | End: 2020-02-04

## 2020-01-29 RX ORDER — TAMSULOSIN HYDROCHLORIDE 0.4 MG/1
0.4 CAPSULE ORAL AT BEDTIME
Refills: 0 | Status: DISCONTINUED | OUTPATIENT
Start: 2020-01-29 | End: 2020-02-04

## 2020-01-29 RX ORDER — SENNA PLUS 8.6 MG/1
3 TABLET ORAL DAILY
Refills: 0 | Status: DISCONTINUED | OUTPATIENT
Start: 2020-01-29 | End: 2020-02-04

## 2020-01-29 RX ORDER — RISPERIDONE 4 MG/1
0.25 TABLET ORAL DAILY
Refills: 0 | Status: DISCONTINUED | OUTPATIENT
Start: 2020-01-29 | End: 2020-02-04

## 2020-01-29 RX ORDER — CALAMINE AND ZINC OXIDE AND PHENOL 160; 10 MG/ML; MG/ML
1 LOTION TOPICAL DAILY
Refills: 0 | Status: DISCONTINUED | OUTPATIENT
Start: 2020-01-29 | End: 2020-02-04

## 2020-01-29 RX ORDER — SOD,AMMONIUM,POTASSIUM LACTATE
1 CREAM (GRAM) TOPICAL
Refills: 0 | Status: DISCONTINUED | OUTPATIENT
Start: 2020-01-29 | End: 2020-02-04

## 2020-01-29 RX ORDER — LISINOPRIL 2.5 MG/1
2.5 TABLET ORAL DAILY
Refills: 0 | Status: DISCONTINUED | OUTPATIENT
Start: 2020-01-29 | End: 2020-02-04

## 2020-01-29 RX ORDER — ACETAMINOPHEN 500 MG
650 TABLET ORAL EVERY 6 HOURS
Refills: 0 | Status: DISCONTINUED | OUTPATIENT
Start: 2020-01-29 | End: 2020-02-04

## 2020-01-29 RX ORDER — MIRTAZAPINE 45 MG/1
7.5 TABLET, ORALLY DISINTEGRATING ORAL AT BEDTIME
Refills: 0 | Status: DISCONTINUED | OUTPATIENT
Start: 2020-01-29 | End: 2020-02-04

## 2020-01-29 RX ADMIN — TAMSULOSIN HYDROCHLORIDE 0.4 MILLIGRAM(S): 0.4 CAPSULE ORAL at 23:22

## 2020-01-29 RX ADMIN — MIRTAZAPINE 7.5 MILLIGRAM(S): 45 TABLET, ORALLY DISINTEGRATING ORAL at 23:23

## 2020-01-29 NOTE — ED PROVIDER NOTE - PROGRESS NOTE DETAILS
Spoke with MELISSA Negrete from surgery who states he will speak with Dr. Ramirez and get back to me. Called by MELISSA Negrete from surgery who requested admission to Dr. Ramirez and pre op labs. Will get medical clearance in-house so surgery is likely to take place on Friday. Patient is resting comfortably, NAD.

## 2020-01-29 NOTE — PATIENT PROFILE ADULT - OVER THE PAST TWO WEEKS, HAVE YOU FELT LITTLE INTEREST OR PLEASURE IN DOING THINGS?
Wilton Zelaya (Resident): fell backwards, L arm injury, likely L elbow fracture - snuffbox tender, will need splint, imaging, pain control, and dispo per the imaging - no other injuries, neurovasc intact, no neck pain
no

## 2020-01-29 NOTE — PATIENT PROFILE ADULT - BRADEN ACTIVITY
Additional Anesthesia Volume In Cc (Will Not Render If 0): 0 Render Post-Care Instructions In Note?: yes Depth Of Biopsy: dermis Anesthesia Type: 1% lidocaine with epinephrine Silver Nitrate Text: The wound bed was treated with silver nitrate after the biopsy was performed. Bill For Surgical Tray: no Anesthesia Volume In Cc (Will Not Render If 0): 0.5 Post-Care Instructions: I reviewed with the patient in detail post-care instructions. Patient is to keep the biopsy site dry overnight, and then apply Vaseline twice daily and keep covered with band-aid until healed. Notification Instructions: Patient will be notified of biopsy results. However, patient instructed to call the office if not contacted within 2-3 weeks. Biopsy Type: H and E Electrodesiccation Text: The wound bed was treated with electrodesiccation after the biopsy was performed. Billing Type: Third-Party Bill Biopsy Method: Personna blade Type Of Destruction Used: Curettage Hemostasis: Bola's Wound Care: Bacitracin Lab: 3 Curettage Text: The wound bed was treated with curettage after the biopsy was performed. Electrodesiccation And Curettage Text: The wound bed was treated with electrodesiccation and curettage after the biopsy was performed. Cryotherapy Text: The wound bed was treated with cryotherapy after the biopsy was performed. Detail Level: Detailed Lab Facility: 1 Dressing: bandage Consent: Written consent was obtained and risks were reviewed including but not limited to scarring, infection, bleeding, scabbing, incomplete removal, nerve damage and allergy to anesthesia. (2) chairfast

## 2020-01-29 NOTE — H&P ADULT - ASSESSMENT
89 y.o. M with acute blayne with PTC tube    -Admit to surgery under Dr. Elias  -Plan for lap cholecystectomy, removal of PTC tube 1/31/2020 if pt medically optimized  -Puree diet, then NPO after midnight 1/30/2020  -Continue therapeutic Lovenox   -Medical consult and clearance

## 2020-01-29 NOTE — ED PROVIDER NOTE - PMH
Acalculous cholecystitis  s/p percutaneous cholecystostomy  HTN (hypertension)    Hypothyroid    PVD (peripheral vascular disease)    Ulcers of both lower extremities

## 2020-01-29 NOTE — ED PROVIDER NOTE - OBJECTIVE STATEMENT
89 y/o M pt with a PMHx of HTN, PVD, sent to the ED by Dr. Ramirez to get admitted. Patient reports he spoke with Dr. Ramirez yesterday and was told to come today to get admitted for an abdominal cholecystectomy. Triage wrote the cholecystostomy tube Is leaking but the family denied that to me. Triage also wrote patient had a nephrectomy tube but it is a cholecystostomy tube. Patient denies fever, chest pain, shortness of breath, abdominal pain, nausea, vomiting, dysuria, urinary frequency, urinary urgency or any other symptoms. NKDA.

## 2020-01-29 NOTE — ED ADULT NURSE NOTE - NSIMPLEMENTINTERV_GEN_ALL_ED
Implemented All Fall with Harm Risk Interventions:  Heath to call system. Call bell, personal items and telephone within reach. Instruct patient to call for assistance. Room bathroom lighting operational. Non-slip footwear when patient is off stretcher. Physically safe environment: no spills, clutter or unnecessary equipment. Stretcher in lowest position, wheels locked, appropriate side rails in place. Provide visual cue, wrist band, yellow gown, etc. Monitor gait and stability. Monitor for mental status changes and reorient to person, place, and time. Review medications for side effects contributing to fall risk. Reinforce activity limits and safety measures with patient and family. Provide visual clues: red socks.

## 2020-01-29 NOTE — ED PROVIDER NOTE - CHPI ED SYMPTOMS NEG
no vomiting/no abdominal pain, no urinary frequency, no urinary urgency/no nausea/no fever/no dysuria

## 2020-01-29 NOTE — H&P ADULT - HISTORY OF PRESENT ILLNESS
89 y.o. M with PMH cholecystitis s/p IR PTC tube insertion, DVT on AC, hypothyroidism, afib, HTN presents to [facility] ER for management of PTC tube. PTC was placed by IR 11/29/2019 when pt was admitted for sepsis secondary to acute cholecystitis but refused surgical intervention and also had elevated coags. Pt was readmitted in December for dislodgement of PTC tube. Tube was secured by surgical team with suture. Pt currently with no acute complaints. States drain has been working well since last admission.

## 2020-01-29 NOTE — ED ADULT NURSE NOTE - OBJECTIVE STATEMENT
biba from nursing home for  surgery admission, as per wife patient was asked to come for Gall Bladder removal, denies any  pain or discomfort, drain + and draining to the collection bag

## 2020-01-30 DIAGNOSIS — E03.9 HYPOTHYROIDISM, UNSPECIFIED: ICD-10-CM

## 2020-01-30 DIAGNOSIS — Z29.9 ENCOUNTER FOR PROPHYLACTIC MEASURES, UNSPECIFIED: ICD-10-CM

## 2020-01-30 DIAGNOSIS — I82.419 ACUTE EMBOLISM AND THROMBOSIS OF UNSPECIFIED FEMORAL VEIN: ICD-10-CM

## 2020-01-30 DIAGNOSIS — K81.1 CHRONIC CHOLECYSTITIS: ICD-10-CM

## 2020-01-30 DIAGNOSIS — I48.91 UNSPECIFIED ATRIAL FIBRILLATION: ICD-10-CM

## 2020-01-30 DIAGNOSIS — I10 ESSENTIAL (PRIMARY) HYPERTENSION: ICD-10-CM

## 2020-01-30 PROCEDURE — 99232 SBSQ HOSP IP/OBS MODERATE 35: CPT

## 2020-01-30 RX ADMIN — LISINOPRIL 2.5 MILLIGRAM(S): 2.5 TABLET ORAL at 06:44

## 2020-01-30 RX ADMIN — TAMSULOSIN HYDROCHLORIDE 0.4 MILLIGRAM(S): 0.4 CAPSULE ORAL at 22:39

## 2020-01-30 RX ADMIN — Medication 100 MICROGRAM(S): at 06:44

## 2020-01-30 RX ADMIN — Medication 1 APPLICATION(S): at 17:32

## 2020-01-30 RX ADMIN — SENNA PLUS 3 TABLET(S): 8.6 TABLET ORAL at 12:16

## 2020-01-30 RX ADMIN — Medication 1 APPLICATION(S): at 08:09

## 2020-01-30 RX ADMIN — APIXABAN 5 MILLIGRAM(S): 2.5 TABLET, FILM COATED ORAL at 17:33

## 2020-01-30 RX ADMIN — APIXABAN 5 MILLIGRAM(S): 2.5 TABLET, FILM COATED ORAL at 06:44

## 2020-01-30 RX ADMIN — MIRTAZAPINE 7.5 MILLIGRAM(S): 45 TABLET, ORALLY DISINTEGRATING ORAL at 22:40

## 2020-01-30 RX ADMIN — RISPERIDONE 0.25 MILLIGRAM(S): 4 TABLET ORAL at 12:16

## 2020-01-30 NOTE — CONSULT NOTE ADULT - PROBLEM SELECTOR RECOMMENDATION 6
RISK                                                          Points  [] Previous VTE                                           3  [] Thrombophilia                                        2  [] Lower limb paralysis                              2   [] Current Cancer                                       2   [x] Immobilization > 24 hrs                        1  [] ICU/CCU stay > 24 hours                       1  [x] Age > 60                                                   1    DVT prophylaxis with eliquis but switch to heparin drip before surgery

## 2020-01-30 NOTE — CONSULT NOTE ADULT - PROBLEM SELECTOR RECOMMENDATION 5
not on rate control medication  AC for DVT not for Afib not on rate control medication  AC for DVT not for Afib  cardiology consult Dr Jaffe

## 2020-01-30 NOTE — DIETITIAN INITIAL EVALUATION ADULT. - ADD RECOMMEND
spoke with RN and MD spoke with RN and MD, add multivitamin/minerals 1 tab/d and vitamin C 500mg bid to assist with healing

## 2020-01-30 NOTE — DIETITIAN INITIAL EVALUATION ADULT. - PERTINENT LABORATORY DATA
01-29 Na143 mmol/L Glu 82 mg/dL K+ 4.5 mmol/L Cr  1.07 mg/dL BUN 31 mg/dL<H> 01-29 Alb 2.6 g/dL<L> 01-15 [id number] 5.0 % 01-15 Chol 110 mg/dL LDL 60 mg/dL HDL 34 mg/dL<L> Trig 80 mg/dL

## 2020-01-30 NOTE — ADVANCED PRACTICE NURSE CONSULT - RECOMMEDATIONS
-Clean all wounds with normal saline and apply skin prep to the surrounding skin  -Leave the Bilateral Heels open to air  -Elevate/float the patients heels using heel protectors and reposition the patient Q 2hrs using wedges or pillows

## 2020-01-30 NOTE — CONSULT NOTE ADULT - PROBLEM SELECTOR RECOMMENDATION 9
-No WBC count, hemodynamically stable. Hold off to Antibiotics. can start rocephin and flagyl if spikes a fever.  -Echo Nov 2019 show severe pulmonary HTN, EF 55%   -Plan for lap cholecystectomy, removal of PTC tube 1/31/2020   -Cardiology consult for further risk stratification. patient is probably moderate-risk for intermediate risk procedure but will defer to cardiology.   -switch to Heparin drip for AC if needs surgery as eliquis needs to be held 48 hours before the procedure. heparin drip needs to be help 6 hours before the procedure. -No WBC count, hemodynamically stable. Hold off to Antibiotics. can start rocephin and flagyl if spikes a fever.  -Echo Nov 2019 show severe pulmonary HTN, EF 55%   -Plan for lap cholecystectomy, removal of PTC tube 1/31/2020   -Cardiology consult for further risk stratification. patient is probably moderate-risk for intermediate risk procedure but will defer to cardiology.   -switch to Heparin drip for AC if needs surgery as eliquis needs to be held 48 hours before the procedure. heparin drip needs to be help 6 hours before the procedure.  -Speech and swallow eval as per nutritionist recommendation. on prior admission he was on a dysphagia diet. -No WBC count, hemodynamically stable. Hold off to Antibiotics. can start rocephin and flagyl if spikes a fever.  -Echo Nov 2019 show severe pulmonary HTN, EF 55%   -Plan for lap cholecystectomy, removal of PTC tube 1/31/2020   -Cardiology consult for further risk stratification. patient is probably moderate-risk for intermediate risk procedure but will defer to cardiology.   -switch to Heparin drip for AC if needs surgery as eliquis needs to be held 48 hours before the procedure. heparin drip needs to be held 6 hours before the procedure.  -Speech and swallow eval as per nutritionist recommendation. on prior admission he was on a dysphagia diet.

## 2020-01-30 NOTE — PROGRESS NOTE ADULT - ASSESSMENT
h/o acute cholecystitis s/p IR percutaneous cholecystostomy tube 11/2019.   1. Medical clearance for possible OR  2. Plan for surgery in this admission   3.  Currently on therapeutic lovenox for DVT

## 2020-01-30 NOTE — PROGRESS NOTE ADULT - SUBJECTIVE AND OBJECTIVE BOX
Pt seen at bedside  Patient is a 89y old  Male who presents with a chief complaint of Chronic cholecystitis (29 Jan 2020 18:40)      INTERVAL HPI/OVERNIGHT EVENTS:  Pt states feeling well this AM  No new complaints    Vital Signs Last 24 Hrs  T(C): 36.7 (30 Jan 2020 05:15), Max: 36.8 (29 Jan 2020 22:00)  T(F): 98 (30 Jan 2020 05:15), Max: 98.3 (29 Jan 2020 22:00)  HR: 70 (30 Jan 2020 05:15) (59 - 71)  BP: 142/63 (30 Jan 2020 05:15) (105/61 - 142/63)  BP(mean): --  RR: 16 (30 Jan 2020 05:15) (16 - 16)  SpO2: 99% (30 Jan 2020 05:15) (96% - 100%)    Physical Exam:    Gen: awake, alert oriented NAD  HEENT: anicteric  Abd: soft NT ND, IR drain with bilious output  Ext: warm to touch no c/c/e    MEDICATIONS  (STANDING):  ammonium lactate 12% Lotion 1 Application(s) Topical two times a day  apixaban 5 milliGRAM(s) Oral every 12 hours  calamine Lotion 1 Application(s) Topical daily  levothyroxine 100 MICROGram(s) Oral daily  lisinopril 2.5 milliGRAM(s) Oral daily  mirtazapine 7.5 milliGRAM(s) Oral at bedtime  risperiDONE   Tablet 0.25 milliGRAM(s) Oral daily  senna 3 Tablet(s) Oral daily  tamsulosin 0.4 milliGRAM(s) Oral at bedtime    MEDICATIONS  (PRN):  acetaminophen   Tablet .. 650 milliGRAM(s) Oral every 6 hours PRN Temp greater or equal to 38C (100.4F), Mild Pain (1 - 3)    Labs:                          12.0   10.20 )-----------( 368      ( 29 Jan 2020 16:49 )             38.7     01-29    143  |  113<H>  |  31<H>  ----------------------------<  82  4.5   |  25  |  1.07    Ca    8.2<L>      29 Jan 2020 16:49    TPro  6.9  /  Alb  2.6<L>  /  TBili  0.4  /  DBili  x   /  AST  23  /  ALT  10  /  AlkPhos  117  01-29    PT/INR - ( 29 Jan 2020 16:49 )   PT: 16.6 sec;   INR: 1.48 ratio         PTT - ( 29 Jan 2020 16:49 )  PTT:49.9 sec    I&O's Detail    29 Jan 2020 07:01  -  30 Jan 2020 07:00  --------------------------------------------------------  IN:  Total IN: 0 mL    OUT:    Drain: 200 mL  Total OUT: 200 mL    Total NET: -200 mL

## 2020-01-30 NOTE — ADVANCED PRACTICE NURSE CONSULT - ASSESSMENT
This is a 89yr old male patient admitted for Chronic Cholecystitis, presenting with the following:  -There is an Unstageable Pressure Injury to the L. Heel (2cm x 2cm) with stable eschar and no drainage  -There is a Stage 1 Pressure Injury to the R. Heel, as evident by non-blanchable erythema

## 2020-01-30 NOTE — CONSULT NOTE ADULT - SUBJECTIVE AND OBJECTIVE BOX
HPI:  89 y.o. M with PMH cholecystitis s/p IR PTC tube insertion, DVT on AC, hypothyroidism, afib, HTN presents to [facility] ER for management of PTC tube. PTC was placed by IR 11/29/2019 when pt was admitted for sepsis secondary to acute cholecystitis but refused surgical intervention and also had elevated coags. Pt was readmitted in December for dislodgement of PTC tube. Tube was secured by surgical team with suture. Pt currently with no acute complaints. States drain has been working well since last admission. (29 Jan 2020 18:40)    Medicine consult: Medicine team is consulted for harper-operative optimization for cholecystectomy.         REVIEW OF SYSTEMS:  CONSTITUTIONAL: No fever, weight loss, or fatigue  EYES: No eye pain, visual disturbances, or discharge  ENMT:  No difficulty hearing, tinnitus, vertigo; No sinus or throat pain  NECK: No pain or stiffness  BREASTS: No pain, masses, or nipple discharge  RESPIRATORY: No cough, wheezing, chills or hemoptysis; No shortness of breath  CARDIOVASCULAR: No chest pain, palpitations, dizziness, or leg swelling  GASTROINTESTINAL: No abdominal or epigastric pain. No nausea, vomiting, or hematemesis; No diarrhea or constipation. No melena or hematochezia.  GENITOURINARY: No dysuria, frequency, hematuria, or incontinence  NEUROLOGICAL: No headaches, memory loss, loss of strength, numbness, or tremors  SKIN: No itching, burning, rashes, or lesions   LYMPH NODES: No enlarged glands  ENDOCRINE: No heat or cold intolerance; No hair loss  MUSCULOSKELETAL: No joint pain or swelling; No muscle, back, or extremity pain  PSYCHIATRIC: No depression, anxiety, mood swings, or difficulty sleeping  HEME/LYMPH: No easy bruising, or bleeding gums  ALLERY AND IMMUNOLOGIC: No hives or eczema        T(C): 36.3 (01-30-20 @ 14:12), Max: 36.8 (01-29-20 @ 22:00)  HR: 60 (01-30-20 @ 14:12) (59 - 70)  BP: 134/57 (01-30-20 @ 14:12) (105/61 - 142/63)  RR: 18 (01-30-20 @ 14:12) (16 - 18)  SpO2: 98% (01-30-20 @ 14:12) (98% - 100%)  Wt(kg): --  I&O's Summary    29 Jan 2020 07:01  -  30 Jan 2020 07:00  --------------------------------------------------------  IN: 0 mL / OUT: 200 mL / NET: -200 mL        PHYSICAL EXAM:  GENERAL: NAD, well-groomed, well-developed  HEAD:  Atraumatic, Normocephalic  EYES: EOMI, PERRLA, conjunctiva and sclera clear  ENMT: No tonsillar erythema, exudates, or enlargement; Moist mucous membranes, Good dentition, No lesions  NECK: Supple, No JVD, Normal thyroid  NERVOUS SYSTEM:  Alert & Oriented X3, Good concentration; Motor Strength 5/5 B/L upper and lower extremities; DTRs 2+ intact and symmetric  CHEST/LUNG: Clear to percussion bilaterally; No rales, rhonchi, wheezing, or rubs  HEART: Regular rate and rhythm; No murmurs, rubs, or gallops  ABDOMEN: Soft, Nontender, Nondistended; Bowel sounds present, PTC tube in place   EXTREMITIES:  2+ Peripheral Pulses, No clubbing, cyanosis, or edema  LYMPH: No lymphadenopathy noted  SKIN: No rashes or lesions        LABS:                        12.0   10.20 )-----------( 368      ( 29 Jan 2020 16:49 )             38.7     01-29    143  |  113<H>  |  31<H>  ----------------------------<  82  4.5   |  25  |  1.07    Ca    8.2<L>      29 Jan 2020 16:49    TPro  6.9  /  Alb  2.6<L>  /  TBili  0.4  /  DBili  x   /  AST  23  /  ALT  10  /  AlkPhos  117  01-29    PT/INR - ( 29 Jan 2020 16:49 )   PT: 16.6 sec;   INR: 1.48 ratio         PTT - ( 29 Jan 2020 16:49 )  PTT:49.9 sec    CAPILLARY BLOOD GLUCOSE                  RADIOLOGY & ADDITIONAL TESTS:        < from: CT Abdomen and Pelvis w/ IV Cont (01.14.20 @ 17:29) >  MPRESSION:     Cholecystostomy tube appears in good position.    Deep vein thrombosis in the right common femoral vein.    Small enhancing fluid surrounding both ischial tuberosities.    Dr. Heart discussed the findings with Dr. Jacinto on January 14, 2020 at 5:55 PM.  Readback was obtained.        Imaging Personally Reviewed:  [x ] YES  [ ] NO    Consultant(s) Notes Reviewed:  [x ] YES  [ ] NO    Care Discussed with Consultants/Other Providers [x ] YES  [ ] NO HPI:  89 y.o. M with PMH cholecystitis s/p IR PTC tube insertion, DVT on AC, hypothyroidism, afib, HTN presents to [facility] ER for management of PTC tube. PTC was placed by IR 11/29/2019 when pt was admitted for sepsis secondary to acute cholecystitis but refused surgical intervention and also had elevated coags. Pt was readmitted in December for dislodgement of PTC tube. Tube was secured by surgical team with suture. Pt currently with no acute complaints. States drain has been working well since last admission. (29 Jan 2020 18:40)    Medicine consult: Medicine team is consulted for harper-operative optimization for cholecystectomy. Patient was seen and examined at bed side. He is AAO x 1 to self. He reports that he has not complaints. He is in the hospital to get his strength back. The only medical problem he is aware of is the small clot in his leg. Unreliable historian. Most of the history is obtained by patient's chart.         REVIEW OF SYSTEMS:   CONSTITUTIONAL: No fever, weight loss, or fatigue  EYES: No eye pain, visual disturbances, or discharge  ENMT:  No difficulty hearing, tinnitus, vertigo; No sinus or throat pain  NECK: No pain or stiffness  BREASTS: No pain, masses, or nipple discharge  RESPIRATORY: No cough, wheezing, chills or hemoptysis; No shortness of breath  CARDIOVASCULAR: No chest pain, palpitations, dizziness, or leg swelling  GASTROINTESTINAL: No abdominal or epigastric pain. No nausea, vomiting, or hematemesis; No diarrhea or constipation. No melena or hematochezia.  GENITOURINARY: No dysuria, frequency, hematuria, or incontinence  NEUROLOGICAL: No headaches, memory loss, loss of strength, numbness, or tremors  SKIN: No itching, burning, rashes, or lesions   LYMPH NODES: No enlarged glands  ENDOCRINE: No heat or cold intolerance; No hair loss  MUSCULOSKELETAL: No joint pain or swelling; No muscle, back, or extremity pain  PSYCHIATRIC: No depression, anxiety, mood swings, or difficulty sleeping  HEME/LYMPH: No easy bruising, or bleeding gums  ALLERY AND IMMUNOLOGIC: No hives or eczema        T(C): 36.3 (01-30-20 @ 14:12), Max: 36.8 (01-29-20 @ 22:00)  HR: 60 (01-30-20 @ 14:12) (59 - 70)  BP: 134/57 (01-30-20 @ 14:12) (105/61 - 142/63)  RR: 18 (01-30-20 @ 14:12) (16 - 18)  SpO2: 98% (01-30-20 @ 14:12) (98% - 100%)  Wt(kg): --  I&O's Summary    29 Jan 2020 07:01  -  30 Jan 2020 07:00  --------------------------------------------------------  IN: 0 mL / OUT: 200 mL / NET: -200 mL        PHYSICAL EXAM:  GENERAL: NAD, well-groomed, well-developed  HEAD:  Atraumatic, Normocephalic  EYES: EOMI, PERRLA, conjunctiva and sclera clear  ENMT: No tonsillar erythema, exudates, or enlargement; Moist mucous membranes, Good dentition, No lesions  NECK: Supple, No JVD, Normal thyroid  NERVOUS SYSTEM:  Alert & Oriented X 1 to self, Good concentration;   CHEST/LUNG: Clear to percussion bilaterally; No rales, rhonchi, wheezing, or rubs  HEART: Regular rate and rhythm; No murmurs, rubs, or gallops  ABDOMEN: Soft, Nontender, Nondistended; Bowel sounds present, PTC tube in place draining mustard colored discharge  EXTREMITIES:  2+ Peripheral Pulses, chronic venous stasis changes No clubbing, cyanosis, or edema  LYMPH: No lymphadenopathy noted  SKIN: No rashes or lesions        LABS:                        12.0   10.20 )-----------( 368      ( 29 Jan 2020 16:49 )             38.7     01-29    143  |  113<H>  |  31<H>  ----------------------------<  82  4.5   |  25  |  1.07    Ca    8.2<L>      29 Jan 2020 16:49    TPro  6.9  /  Alb  2.6<L>  /  TBili  0.4  /  DBili  x   /  AST  23  /  ALT  10  /  AlkPhos  117  01-29    PT/INR - ( 29 Jan 2020 16:49 )   PT: 16.6 sec;   INR: 1.48 ratio         PTT - ( 29 Jan 2020 16:49 )  PTT:49.9 sec    CAPILLARY BLOOD GLUCOSE                  RADIOLOGY & ADDITIONAL TESTS:        < from: CT Abdomen and Pelvis w/ IV Cont (01.14.20 @ 17:29) >  MPRESSION:     Cholecystostomy tube appears in good position.    Deep vein thrombosis in the right common femoral vein.    Small enhancing fluid surrounding both ischial tuberosities.    Dr. Heart discussed the findings with Dr. Jacinto on January 14, 2020 at 5:55 PM.  Readback was obtained.        Imaging Personally Reviewed:  [x ] YES  [ ] NO    Consultant(s) Notes Reviewed:  [x ] YES  [ ] NO    Care Discussed with Consultants/Other Providers [x ] YES  [ ] NO

## 2020-01-31 PROCEDURE — 99232 SBSQ HOSP IP/OBS MODERATE 35: CPT

## 2020-01-31 RX ADMIN — APIXABAN 5 MILLIGRAM(S): 2.5 TABLET, FILM COATED ORAL at 17:00

## 2020-01-31 RX ADMIN — TAMSULOSIN HYDROCHLORIDE 0.4 MILLIGRAM(S): 0.4 CAPSULE ORAL at 21:13

## 2020-01-31 RX ADMIN — RISPERIDONE 0.25 MILLIGRAM(S): 4 TABLET ORAL at 11:13

## 2020-01-31 RX ADMIN — SENNA PLUS 3 TABLET(S): 8.6 TABLET ORAL at 11:13

## 2020-01-31 RX ADMIN — Medication 1 APPLICATION(S): at 06:13

## 2020-01-31 RX ADMIN — Medication 100 MICROGRAM(S): at 06:13

## 2020-01-31 RX ADMIN — LISINOPRIL 2.5 MILLIGRAM(S): 2.5 TABLET ORAL at 06:13

## 2020-01-31 RX ADMIN — APIXABAN 5 MILLIGRAM(S): 2.5 TABLET, FILM COATED ORAL at 06:13

## 2020-01-31 RX ADMIN — MIRTAZAPINE 7.5 MILLIGRAM(S): 45 TABLET, ORALLY DISINTEGRATING ORAL at 21:13

## 2020-01-31 RX ADMIN — Medication 1 APPLICATION(S): at 17:00

## 2020-01-31 RX ADMIN — CALAMINE AND ZINC OXIDE AND PHENOL 1 APPLICATION(S): 160; 10 LOTION TOPICAL at 11:12

## 2020-01-31 NOTE — PROGRESS NOTE ADULT - SUBJECTIVE AND OBJECTIVE BOX
INTERVAL HPI/OVERNIGHT EVENTS:    No acute events overnight.   Pt resting comfortably. No acute complaints.   denies n/v/f/c, no pain    MEDICATIONS  (STANDING):  ammonium lactate 12% Lotion 1 Application(s) Topical two times a day  apixaban 5 milliGRAM(s) Oral every 12 hours  calamine Lotion 1 Application(s) Topical daily  levothyroxine 100 MICROGram(s) Oral daily  lisinopril 2.5 milliGRAM(s) Oral daily  mirtazapine 7.5 milliGRAM(s) Oral at bedtime  risperiDONE   Tablet 0.25 milliGRAM(s) Oral daily  senna 3 Tablet(s) Oral daily  tamsulosin 0.4 milliGRAM(s) Oral at bedtime    MEDICATIONS  (PRN):  acetaminophen   Tablet .. 650 milliGRAM(s) Oral every 6 hours PRN Temp greater or equal to 38C (100.4F), Mild Pain (1 - 3)      Vital Signs Last 24 Hrs  T(C): 36.5 (31 Jan 2020 05:00), Max: 36.9 (30 Jan 2020 20:57)  T(F): 97.7 (31 Jan 2020 05:00), Max: 98.5 (30 Jan 2020 20:57)  HR: 95 (31 Jan 2020 05:00) (60 - 95)  BP: 120/73 (31 Jan 2020 05:00) (120/73 - 134/57)  BP(mean): --  RR: 18 (31 Jan 2020 05:00) (17 - 18)  SpO2: 99% (31 Jan 2020 05:00) (98% - 99%)      PHYSICAL EXAM  General: Alert and oriented, not in acute distress  Resp: Breathing unlabored  Abdomen: Soft, nondistended, nontender, PTC in place with bilious output  : No cabrales catheter, no dysuria or hematuria  Extremities: No pedal edema    I&O's Detail    30 Jan 2020 07:01  -  31 Jan 2020 07:00  --------------------------------------------------------  IN:  Total IN: 0 mL    OUT:    Drain: 150 mL  Total OUT: 150 mL    Total NET: -150 mL          LABS:                        12.0   10.20 )-----------( 368      ( 29 Jan 2020 16:49 )             38.7             01-29    143  |  113<H>  |  31<H>  ----------------------------<  82  4.5   |  25  |  1.07    Ca    8.2<L>      29 Jan 2020 16:49    TPro  6.9  /  Alb  2.6<L>  /  TBili  0.4  /  DBili  x   /  AST  23  /  ALT  10  /  AlkPhos  117  01-29

## 2020-01-31 NOTE — PROGRESS NOTE ADULT - ASSESSMENT
89 yoM h/o acute cholecystitis s/p IR percutaneous cholecystostomy tube 11/2019.     - pending medical/cardiac clearance for possible OR  - plan for surgery in this admission   - currently on Eliquis, will need to be held 48 hours in prior to surgery  - PTC care per protocol  - d/w attending

## 2020-01-31 NOTE — SWALLOW BEDSIDE ASSESSMENT ADULT - COMMENTS
Pt is AA+Ox2, HOB elevated to 45°. Pt c/o disliking the pureed foods; states that prior to hospitalization and recent NH admission, Pt was consuming regular diet with thin liquids. Pt states medications/pills are taken whole.

## 2020-01-31 NOTE — PROGRESS NOTE ADULT - SUBJECTIVE AND OBJECTIVE BOX
patient was seen and examined   s no c/o offered   MEDICATIONS  (STANDING):  ammonium lactate 12% Lotion 1 Application(s) Topical two times a day  apixaban 5 milliGRAM(s) Oral every 12 hours  calamine Lotion 1 Application(s) Topical daily  levothyroxine 100 MICROGram(s) Oral daily  lisinopril 2.5 milliGRAM(s) Oral daily  mirtazapine 7.5 milliGRAM(s) Oral at bedtime  risperiDONE   Tablet 0.25 milliGRAM(s) Oral daily  senna 3 Tablet(s) Oral daily  tamsulosin 0.4 milliGRAM(s) Oral at bedtime    MEDICATIONS  (PRN):  acetaminophen   Tablet .. 650 milliGRAM(s) Oral every 6 hours PRN Temp greater or equal to 38C (100.4F), Mild Pain (1 - 3)      REVIEW OF SYSTEMS:   CONSTITUTIONAL: No fever, weight loss, or fatigue  EYES: No eye pain, visual disturbances, or discharge  ENMT:  No difficulty hearing, tinnitus, vertigo; No sinus or throat pain  NECK: No pain or stiffness  BREASTS: No pain, masses, or nipple discharge  RESPIRATORY: No cough, wheezing, chills or hemoptysis; No shortness of breath  CARDIOVASCULAR: No chest pain, palpitations, dizziness, or leg swelling  GASTROINTESTINAL: No abdominal or epigastric pain. No nausea, vomiting, or hematemesis; No diarrhea or constipation. No melena or hematochezia.  GENITOURINARY: No dysuria, frequency, hematuria, or incontinence  NEUROLOGICAL: No headaches, memory loss, loss of strength, numbness, or tremors  SKIN: No itching, burning, rashes, or lesions   LYMPH NODES: No enlarged glands  ENDOCRINE: No heat or cold intolerance; No hair loss  MUSCULOSKELETAL: No joint pain or swelling; No muscle, back, or extremity pain  PSYCHIATRIC: No depression, anxiety, mood swings, or difficulty sleeping  HEME/LYMPH: No easy bruising, or bleeding gums  ALLERY AND IMMUNOLOGIC: No hives or eczema    Vital Signs Last 24 Hrs  T(C): 36.4 (31 Jan 2020 14:27), Max: 36.9 (30 Jan 2020 20:57)  T(F): 97.6 (31 Jan 2020 14:27), Max: 98.5 (30 Jan 2020 20:57)  HR: 65 (31 Jan 2020 14:27) (65 - 95)  BP: 107/51 (31 Jan 2020 14:27) (107/51 - 126/77)  BP(mean): --  RR: 17 (31 Jan 2020 14:27) (17 - 18)  SpO2: 100% (31 Jan 2020 14:27) (99% - 100%)      PHYSICAL EXAM:  GENERAL: NAD, well-groomed, well-developed  HEAD:  Atraumatic, Normocephalic  EYES: EOMI, PERRLA, conjunctiva and sclera clear  ENMT: No tonsillar erythema, exudates, or enlargement; Moist mucous membranes, Good dentition, No lesions  NECK: Supple, No JVD, Normal thyroid  NERVOUS SYSTEM:  Alert & Oriented X 1 to self, Good concentration;   CHEST/LUNG: Clear to percussion bilaterally; No rales, rhonchi, wheezing, or rubs  HEART: Regular rate and rhythm; No murmurs, rubs, or gallops  ABDOMEN: Soft, Nontender, Nondistended; Bowel sounds present, PTC tube in place draining mustard colored discharge  EXTREMITIES:  2+ Peripheral Pulses, chronic venous stasis changes No clubbing, cyanosis, or edema  LYMPH: No lymphadenopathy noted  SKIN: No rashes or lesions        LABS:                        12.0   10.20 )-----------( 368      ( 29 Jan 2020 16:49 )             38.7     01-29    143  |  113<H>  |  31<H>  ----------------------------<  82  4.5   |  25  |  1.07    Ca    8.2<L>      29 Jan 2020 16:49    TPro  6.9  /  Alb  2.6<L>  /  TBili  0.4  /  DBili  x   /  AST  23  /  ALT  10  /  AlkPhos  117  01-29    PT/INR - ( 29 Jan 2020 16:49 )   PT: 16.6 sec;   INR: 1.48 ratio         PTT - ( 29 Jan 2020 16:49 )  PTT:49.9 sec    CAPILLARY BLOOD GLUCOSE    < from: Transthoracic Echocardiogram (11.22.19 @ 10:44) >  Ejection Fraction Visual Estimate: >55 %    ------------------------------------------------------------------------  OBSERVATIONS:  Mitral Valve: Mitral annular calcification. Mild mitral  regurgitation.  Aortic Root: Normal aortic root.  Aortic Valve: Calcified trileaflet aortic valve with normal  opening. Mild aortic regurgitation.  LeftAtrium: Moderate left atrial enlargement.  Left Ventricle: Endocardium not well visualized; grossly  normal left ventricular systolic function. Normal left  ventricular internal dimensions and wall thicknesses.  Right Heart: Normal right atrium. Normal right ventricular  size and function. There is moderate-severe tricuspid  regurgitation. Normal pulmonic valve.  Pericardium/PleuraNormal pericardium with no pericardial  effusion.  Hemodynamic: RA Pressure is 10 mm Hg. RV systolic pressure  is 84 mm Hg. Severe pulmonary hypertension.  ------------------------------------------------------------------------  CONCLUSIONS:  1. Mitral annular calcification. Mild mitral regurgitation.    2. Calcified trileaflet aortic valve with normal opening.  Mild aortic regurgitation.  3. Moderate left atrial enlargement.  4. Normal left ventricular internal dimensions and wall  thicknesses.  5. Endocardium not well visualized; grossly normal left  ventricular systolic function.  6. Normal right ventricularsize and function.  7. RV systolic pressure is 84 mm Hg. Severe pulmonary  hypertension.    ------------------------------------------------------------------------  Confirmed on  11/23/2019 - 08:27:04 by Ayush Yang MD  ------------------------------------------------------------------------    < end of copied text >                RADIOLOGY & ADDITIONAL TESTS:        < from: CT Abdomen and Pelvis w/ IV Cont (01.14.20 @ 17:29) >  MPRESSION:     Cholecystostomy tube appears in good position.    Deep vein thrombosis in the right common femoral vein.    Small enhancing fluid surrounding both ischial tuberosities.    Dr. Heart discussed the findings with Dr. Jacinto on January 14, 2020 at 5:55 PM.  Readback was obtained.

## 2020-01-31 NOTE — SWALLOW BEDSIDE ASSESSMENT ADULT - SLP PERTINENT HISTORY OF CURRENT PROBLEM
89 y.o. M with PMH cholecystitis s/p IR PTC tube insertion, DVT on AC, hypothyroidism, afib, HTN presents to [facility] ER for management of PTC tube. Medicine team is consulted for harper-operative optimization for cholecystectomy.

## 2020-01-31 NOTE — PROGRESS NOTE ADULT - ASSESSMENT
89 y.o. M with PMH cholecystitis s/p IR PTC tube insertion, DVT on AC, hypothyroidism, afib, HTN presents to [facility] ER for management of PTC tube.  Medicine team is consulted for harper-operative optimization.

## 2020-01-31 NOTE — SWALLOW BEDSIDE ASSESSMENT ADULT - SWALLOW EVAL: DIAGNOSIS
Pt p/w oral dysphagia c/b slightly prolonged mastication, and reduced A-P transport; However, oropharyngeal swallow was intact and timely with no overt s/s of laryngeal penetration or aspiration at this exam.

## 2020-01-31 NOTE — SWALLOW BEDSIDE ASSESSMENT ADULT - SWALLOW EVAL: RECOMMENDED FEEDING/EATING TECHNIQUES
oral hygiene/alternate food with liquid/allow for swallow between intakes/check mouth frequently for oral residue/pocketing/position upright (90 degrees)

## 2020-02-01 LAB
ANION GAP SERPL CALC-SCNC: 4 MMOL/L — LOW (ref 5–17)
BUN SERPL-MCNC: 23 MG/DL — HIGH (ref 7–18)
CALCIUM SERPL-MCNC: 8.4 MG/DL — SIGNIFICANT CHANGE UP (ref 8.4–10.5)
CHLORIDE SERPL-SCNC: 114 MMOL/L — HIGH (ref 96–108)
CO2 SERPL-SCNC: 27 MMOL/L — SIGNIFICANT CHANGE UP (ref 22–31)
CREAT SERPL-MCNC: 0.84 MG/DL — SIGNIFICANT CHANGE UP (ref 0.5–1.3)
GLUCOSE SERPL-MCNC: 86 MG/DL — SIGNIFICANT CHANGE UP (ref 70–99)
HCT VFR BLD CALC: 33.6 % — LOW (ref 39–50)
HGB BLD-MCNC: 10.3 G/DL — LOW (ref 13–17)
MCHC RBC-ENTMCNC: 30.7 GM/DL — LOW (ref 32–36)
MCHC RBC-ENTMCNC: 31 PG — SIGNIFICANT CHANGE UP (ref 27–34)
MCV RBC AUTO: 101.2 FL — HIGH (ref 80–100)
NRBC # BLD: 0 /100 WBCS — SIGNIFICANT CHANGE UP (ref 0–0)
PLATELET # BLD AUTO: 291 K/UL — SIGNIFICANT CHANGE UP (ref 150–400)
POTASSIUM SERPL-MCNC: 3.6 MMOL/L — SIGNIFICANT CHANGE UP (ref 3.5–5.3)
POTASSIUM SERPL-SCNC: 3.6 MMOL/L — SIGNIFICANT CHANGE UP (ref 3.5–5.3)
RBC # BLD: 3.32 M/UL — LOW (ref 4.2–5.8)
RBC # FLD: 15.9 % — HIGH (ref 10.3–14.5)
SODIUM SERPL-SCNC: 145 MMOL/L — SIGNIFICANT CHANGE UP (ref 135–145)
WBC # BLD: 8.03 K/UL — SIGNIFICANT CHANGE UP (ref 3.8–10.5)
WBC # FLD AUTO: 8.03 K/UL — SIGNIFICANT CHANGE UP (ref 3.8–10.5)

## 2020-02-01 PROCEDURE — 99232 SBSQ HOSP IP/OBS MODERATE 35: CPT

## 2020-02-01 PROCEDURE — 99231 SBSQ HOSP IP/OBS SF/LOW 25: CPT

## 2020-02-01 RX ORDER — HEPARIN SODIUM 5000 [USP'U]/ML
INJECTION INTRAVENOUS; SUBCUTANEOUS
Qty: 25000 | Refills: 0 | Status: DISCONTINUED | OUTPATIENT
Start: 2020-02-01 | End: 2020-02-01

## 2020-02-01 RX ORDER — APIXABAN 2.5 MG/1
5 TABLET, FILM COATED ORAL EVERY 12 HOURS
Refills: 0 | Status: DISCONTINUED | OUTPATIENT
Start: 2020-02-01 | End: 2020-02-02

## 2020-02-01 RX ADMIN — Medication 100 MICROGRAM(S): at 05:24

## 2020-02-01 RX ADMIN — RISPERIDONE 0.25 MILLIGRAM(S): 4 TABLET ORAL at 14:25

## 2020-02-01 RX ADMIN — SENNA PLUS 3 TABLET(S): 8.6 TABLET ORAL at 14:26

## 2020-02-01 RX ADMIN — MIRTAZAPINE 7.5 MILLIGRAM(S): 45 TABLET, ORALLY DISINTEGRATING ORAL at 21:23

## 2020-02-01 RX ADMIN — Medication 1 APPLICATION(S): at 05:24

## 2020-02-01 RX ADMIN — APIXABAN 5 MILLIGRAM(S): 2.5 TABLET, FILM COATED ORAL at 17:51

## 2020-02-01 RX ADMIN — TAMSULOSIN HYDROCHLORIDE 0.4 MILLIGRAM(S): 0.4 CAPSULE ORAL at 21:23

## 2020-02-01 RX ADMIN — APIXABAN 5 MILLIGRAM(S): 2.5 TABLET, FILM COATED ORAL at 05:24

## 2020-02-01 NOTE — CONSULT NOTE ADULT - SUBJECTIVE AND OBJECTIVE BOX
CHIEF COMPLAINT:    HPI:HPI:  89 y.o. M with PMH cholecystitis s/p IR PTC tube insertion, DVT on AC, hypothyroidism, afib, HTN presents to [facility] ER for management of PTC tube. PTC was placed by IR 11/29/2019 when pt was admitted for sepsis secondary to acute cholecystitis but refused surgical intervention and also had elevated coags. Pt was readmitted in December for dislodgement of PTC tube. Tube was secured by surgical team with suture. Pt currently with no acute complaints. States drain has been working well since last admission. (29 Jan 2020 18:40)      PAST MEDICAL & SURGICAL HISTORY:  PVD (peripheral vascular disease)  Acalculous cholecystitis: s/p percutaneous cholecystostomy  HTN (hypertension)  Hypothyroid  Ulcers of both lower extremities  No significant past surgical history      MEDICATIONS  (STANDING):  ammonium lactate 12% Lotion 1 Application(s) Topical two times a day  apixaban 5 milliGRAM(s) Oral every 12 hours  calamine Lotion 1 Application(s) Topical daily  levothyroxine 100 MICROGram(s) Oral daily  lisinopril 2.5 milliGRAM(s) Oral daily  mirtazapine 7.5 milliGRAM(s) Oral at bedtime  risperiDONE   Tablet 0.25 milliGRAM(s) Oral daily  senna 3 Tablet(s) Oral daily  tamsulosin 0.4 milliGRAM(s) Oral at bedtime    MEDICATIONS  (PRN):  acetaminophen   Tablet .. 650 milliGRAM(s) Oral every 6 hours PRN Temp greater or equal to 38C (100.4F), Mild Pain (1 - 3)      FAMILY HISTORY:  No pertinent family history in first degree relatives    No family history of premature coronary artery disease or sudden cardiac death    SOCIAL HISTORY:  Smoking-  Alcohol-  Ilicit Drug use-    REVIEW OF SYSTEMS:  Constitutional: [ ] fever, [ ]weight loss, [ ]fatigue Activity [ ] Bedbound,[ ] Ambulates [ ] Unassisted[ ] Cane/Walker [ ] Assistence.  Eyes: [ ] visual changes  Respiratory: [ ]shortness of breath;  [ ] cough, [ ]wheezing, [ ]chills, [ ]hemoptysis  Cardiovascular: [ ] chest pain, [ ]palpitations, [ ]dizziness,  [ ]leg swelling[ ]orthopnea [ ]PND  Gastrointestinal: [ ] abdominal pain, [ ]nausea, [ ]vomiting,  [ ]diarrhea,[ ]constipation  Genitourinary: [ ] dysuria, [ ] hematuria  Neurologic: [ ] headaches [ ] tremors[ ] weakness  Skin: [ ] itching, [ ]burning, [ ] rashes  Endocrine: [ ] heat or cold intolerance  Musculoskeletal: [ ] joint pain or swelling; [ ] muscle, back, or extremity pain  Psychiatric: [ ] depression, [ ]anxiety, [ ]mood swings, or [ ]difficulty sleeping  Hematologic: [ ] easy bruising, [ ] bleeding gums       [ x] All others negative	  [ ] Unable to obtain    Vital Signs Last 24 Hrs  T(C): 36.2 (01 Feb 2020 14:47), Max: 37.1 (31 Jan 2020 22:00)  T(F): 97.2 (01 Feb 2020 14:47), Max: 98.7 (31 Jan 2020 22:00)  HR: 75 (01 Feb 2020 14:47) (58 - 75)  BP: 111/53 (01 Feb 2020 14:47) (98/49 - 136/61)  BP(mean): --  RR: 16 (01 Feb 2020 14:47) (16 - 16)  SpO2: 100% (01 Feb 2020 14:47) (96% - 100%)  I&O's Summary    31 Jan 2020 07:01  -  01 Feb 2020 07:00  --------------------------------------------------------  IN: 0 mL / OUT: 125 mL / NET: -125 mL        PHYSICAL EXAM:  General: No acute distress BMI-  HEENT: EOMI, PERRL[ ] Icteric  Neck: Supple, No JVD  Lungs: Equal air entry bilaterally; [ ] Rales [ ] Rhonchi [ ] Wheezing  Heart: Regular rate and rhythm;[ ] Murmurs-   /6 [ ] Systolic [ ] Diastolic [ ] Radiation,No rubs, or gallops  Abdomen: Nontender, bowel sounds present  Extremities: No clubbing, cyanosis, or edema[ ] Calf tenderness  Nervous system:  Alert & Oriented X3, no focal deficits  Psychiatric: Normal affect  Skin: No rashes or lesions      LABS:  02-01    145  |  114<H>  |  23<H>  ----------------------------<  86  3.6   |  27  |  0.84    Ca    8.4      01 Feb 2020 07:28      Creatinine Trend: 0.84<--, 1.07<--, 0.85<--, 0.87<--, 0.99<--, 1.19<--                        10.3   8.03  )-----------( 291      ( 01 Feb 2020 07:28 )             33.6         Lipid Panel:   Cardiac Enzymes:       01-15 [id number] 5.0      RADIOLOGY:    ECG [my interpretation]:    TELEMETRY:    ECHO:    STRESS TEST:    CATHETERIZATION:

## 2020-02-01 NOTE — PROGRESS NOTE ADULT - ASSESSMENT
89 y.o. M with PMH cholecystitis s/p IR PTC tube insertion, DVT on AC, hypothyroidism, afib, HTN presents to Replaced by [facility] ER for management of PTC tube.  Medicine team is consulted for harper-operative optimization.

## 2020-02-01 NOTE — PROGRESS NOTE ADULT - SUBJECTIVE AND OBJECTIVE BOX
patient was seen and examined   s no specific  c/o offered     MEDICATIONS  (STANDING):  ammonium lactate 12% Lotion 1 Application(s) Topical two times a day  apixaban 5 milliGRAM(s) Oral every 12 hours  calamine Lotion 1 Application(s) Topical daily  levothyroxine 100 MICROGram(s) Oral daily  lisinopril 2.5 milliGRAM(s) Oral daily  mirtazapine 7.5 milliGRAM(s) Oral at bedtime  risperiDONE   Tablet 0.25 milliGRAM(s) Oral daily  senna 3 Tablet(s) Oral daily  tamsulosin 0.4 milliGRAM(s) Oral at bedtime    MEDICATIONS  (PRN):  acetaminophen   Tablet .. 650 milliGRAM(s) Oral every 6 hours PRN Temp greater or equal to 38C (100.4F), Mild Pain (1 - 3)      REVIEW OF SYSTEMS:   CONSTITUTIONAL: No fever, weight loss, or fatigue  EYES: No eye pain, visual disturbances, or discharge  ENMT:  No difficulty hearing, tinnitus, vertigo; No sinus or throat pain  NECK: No pain or stiffness  BREASTS: No pain, masses, or nipple discharge  RESPIRATORY: No cough, wheezing, chills or hemoptysis; No shortness of breath  CARDIOVASCULAR: No chest pain, palpitations, dizziness, or leg swelling  GASTROINTESTINAL: No abdominal or epigastric pain. No nausea, vomiting, or hematemesis; No diarrhea or constipation. No melena or hematochezia.  GENITOURINARY: No dysuria, frequency, hematuria, or incontinence  NEUROLOGICAL: No headaches, memory loss, loss of strength, numbness, or tremors  SKIN: No itching, burning, rashes, or lesions   LYMPH NODES: No enlarged glands  ENDOCRINE: No heat or cold intolerance; No hair loss  MUSCULOSKELETAL: No joint pain or swelling; No muscle, back, or extremity pain  PSYCHIATRIC: No depression, anxiety, mood swings, or difficulty sleeping  HEME/LYMPH: No easy bruising, or bleeding gums  ALLERY AND IMMUNOLOGIC: No hives or eczema    Vital Signs Last 24 Hrs  T(C): 36.2 (01 Feb 2020 14:47), Max: 37.1 (31 Jan 2020 22:00)  T(F): 97.2 (01 Feb 2020 14:47), Max: 98.7 (31 Jan 2020 22:00)  HR: 75 (01 Feb 2020 14:47) (58 - 75)  BP: 111/53 (01 Feb 2020 14:47) (98/49 - 136/61)  BP(mean): --  RR: 16 (01 Feb 2020 14:47) (16 - 16)  SpO2: 100% (01 Feb 2020 14:47) (96% - 100%)    PHYSICAL EXAM:  GENERAL: NAD, well-groomed, well-developed  HEAD:  Atraumatic, Normocephalic  EYES: EOMI, PERRLA, conjunctiva and sclera clear  ENMT: No tonsillar erythema, exudates, or enlargement; Moist mucous membranes, Good dentition, No lesions  NECK: Supple, No JVD, Normal thyroid  NERVOUS SYSTEM:  Alert & Oriented X 1 to self, Good concentration;   CHEST/LUNG: Clear to percussion bilaterally; No rales, rhonchi, wheezing, or rubs  HEART: Regular rate and rhythm; No murmurs, rubs, or gallops  ABDOMEN: Soft, Nontender, Nondistended; Bowel sounds present, PTC tube in place draining mustard colored discharge  EXTREMITIES:  2+ Peripheral Pulses, chronic venous stasis changes No clubbing, cyanosis, or edema  LYMPH: No lymphadenopathy noted  SKIN: No rashes or lesions    LABS:                        10.3   8.03  )-----------( 291      ( 01 Feb 2020 07:28 )             33.6     02-01    145  |  114<H>  |  23<H>  ----------------------------<  86  3.6   |  27  |  0.84    Ca    8.4      01 Feb 2020 07:28                              LABS:                        12.0   10.20 )-----------( 368      ( 29 Jan 2020 16:49 )             38.7     01-29    143  |  113<H>  |  31<H>  ----------------------------<  82  4.5   |  25  |  1.07    Ca    8.2<L>      29 Jan 2020 16:49    TPro  6.9  /  Alb  2.6<L>  /  TBili  0.4  /  DBili  x   /  AST  23  /  ALT  10  /  AlkPhos  117  01-29    PT/INR - ( 29 Jan 2020 16:49 )   PT: 16.6 sec;   INR: 1.48 ratio         PTT - ( 29 Jan 2020 16:49 )  PTT:49.9 sec    CAPILLARY BLOOD GLUCOSE    < from: Transthoracic Echocardiogram (11.22.19 @ 10:44) >  Ejection Fraction Visual Estimate: >55 %    ------------------------------------------------------------------------  OBSERVATIONS:  Mitral Valve: Mitral annular calcification. Mild mitral  regurgitation.  Aortic Root: Normal aortic root.  Aortic Valve: Calcified trileaflet aortic valve with normal  opening. Mild aortic regurgitation.  LeftAtrium: Moderate left atrial enlargement.  Left Ventricle: Endocardium not well visualized; grossly  normal left ventricular systolic function. Normal left  ventricular internal dimensions and wall thicknesses.  Right Heart: Normal right atrium. Normal right ventricular  size and function. There is moderate-severe tricuspid  regurgitation. Normal pulmonic valve.  Pericardium/PleuraNormal pericardium with no pericardial  effusion.  Hemodynamic: RA Pressure is 10 mm Hg. RV systolic pressure  is 84 mm Hg. Severe pulmonary hypertension.  ------------------------------------------------------------------------  CONCLUSIONS:  1. Mitral annular calcification. Mild mitral regurgitation.    2. Calcified trileaflet aortic valve with normal opening.  Mild aortic regurgitation.  3. Moderate left atrial enlargement.  4. Normal left ventricular internal dimensions and wall  thicknesses.  5. Endocardium not well visualized; grossly normal left  ventricular systolic function.  6. Normal right ventricularsize and function.  7. RV systolic pressure is 84 mm Hg. Severe pulmonary  hypertension.    ------------------------------------------------------------------------  Confirmed on  11/23/2019 - 08:27:04 by Ayush Yang MD  ------------------------------------------------------------------------    < end of copied text >                RADIOLOGY & ADDITIONAL TESTS:        < from: CT Abdomen and Pelvis w/ IV Cont (01.14.20 @ 17:29) >  MPRESSION:     Cholecystostomy tube appears in good position.    Deep vein thrombosis in the right common femoral vein.    Small enhancing fluid surrounding both ischial tuberosities.    Dr. Heart discussed the findings with Dr. Jacinto on January 14, 2020 at 5:55 PM.  Readback was obtained.

## 2020-02-01 NOTE — PROGRESS NOTE ADULT - SUBJECTIVE AND OBJECTIVE BOX
INTERVAL HPI/OVERNIGHT EVENTS:    Pt seen and examined at bedside. No acute events overnight, no acute complaints at this time    Vital Signs Last 24 Hrs  T(C): 36.3 (01 Feb 2020 05:10), Max: 37.1 (31 Jan 2020 22:00)  T(F): 97.3 (01 Feb 2020 05:10), Max: 98.7 (31 Jan 2020 22:00)  HR: 58 (01 Feb 2020 05:10) (58 - 72)  BP: 98/49 (01 Feb 2020 05:10) (98/49 - 136/61)  BP(mean): --  RR: 16 (01 Feb 2020 05:10) (16 - 17)  SpO2: 99% (01 Feb 2020 05:10) (96% - 100%)  I&O's Detail    31 Jan 2020 07:01  -  01 Feb 2020 07:00  --------------------------------------------------------  IN:  Total IN: 0 mL    OUT:    Drain: 125 mL  Total OUT: 125 mL    Total NET: -125 mL        senna 3 Tablet(s) Oral daily      Physical Exam  General: AAOx3, No acute distress  Skin: No jaundice, no icterus  Abdomen: soft, nondistended, nontender, no rebound tenderness, no guarding, no palpable masses, percutaneous cholecystostomy tube in place, output bilious   Extremities: non edematous, no calf pain bilaterally        Labs:                        10.3   8.03  )-----------( 291      ( 01 Feb 2020 07:28 )             33.6     02-01    145  |  114<H>  |  23<H>  ----------------------------<  86  3.6   |  27  |  0.84    Ca    8.4      01 Feb 2020 07:28

## 2020-02-01 NOTE — PROGRESS NOTE ADULT - ASSESSMENT
89 yoM h/o acute cholecystitis s/p IR percutaneous cholecystostomy tube 11/2019    -Reg diet   -C/w Home Medication   -Hold Eliquis 48hrs prior to surgery   -Start IV Heparin  -IV Heparin to be held 6hrs before surgery  -Plan for lap blayne 2/3  -F/u Medical/ cardiology clearance 89 yoM h/o acute cholecystitis s/p IR percutaneous cholecystostomy tube 11/2019    -Reg diet   -C/w Home Medication   -Hold Eliquis 48hrs prior to surgery   -Will start IV Heparin once Eliquis is discontinued, IV Heparin to be held 6hrs before surgery  -Plan for lap blayne 2/4  -F/u Medical/ cardiology clearance

## 2020-02-02 LAB
ANION GAP SERPL CALC-SCNC: 5 MMOL/L — SIGNIFICANT CHANGE UP (ref 5–17)
APTT BLD: 157.9 SEC — CRITICAL HIGH (ref 27.5–36.3)
APTT BLD: 41 SEC — HIGH (ref 27.5–36.3)
BUN SERPL-MCNC: 19 MG/DL — HIGH (ref 7–18)
CALCIUM SERPL-MCNC: 7.9 MG/DL — LOW (ref 8.4–10.5)
CHLORIDE SERPL-SCNC: 113 MMOL/L — HIGH (ref 96–108)
CO2 SERPL-SCNC: 26 MMOL/L — SIGNIFICANT CHANGE UP (ref 22–31)
CREAT SERPL-MCNC: 0.87 MG/DL — SIGNIFICANT CHANGE UP (ref 0.5–1.3)
GLUCOSE SERPL-MCNC: 86 MG/DL — SIGNIFICANT CHANGE UP (ref 70–99)
HCT VFR BLD CALC: 32.3 % — LOW (ref 39–50)
HCT VFR BLD CALC: 36.5 % — LOW (ref 39–50)
HGB BLD-MCNC: 11 G/DL — LOW (ref 13–17)
HGB BLD-MCNC: 9.9 G/DL — LOW (ref 13–17)
MCHC RBC-ENTMCNC: 30.1 GM/DL — LOW (ref 32–36)
MCHC RBC-ENTMCNC: 30.3 PG — SIGNIFICANT CHANGE UP (ref 27–34)
MCHC RBC-ENTMCNC: 30.7 GM/DL — LOW (ref 32–36)
MCHC RBC-ENTMCNC: 30.9 PG — SIGNIFICANT CHANGE UP (ref 27–34)
MCV RBC AUTO: 100.6 FL — HIGH (ref 80–100)
MCV RBC AUTO: 100.9 FL — HIGH (ref 80–100)
NRBC # BLD: 0 /100 WBCS — SIGNIFICANT CHANGE UP (ref 0–0)
NRBC # BLD: 0 /100 WBCS — SIGNIFICANT CHANGE UP (ref 0–0)
PLATELET # BLD AUTO: 254 K/UL — SIGNIFICANT CHANGE UP (ref 150–400)
PLATELET # BLD AUTO: 284 K/UL — SIGNIFICANT CHANGE UP (ref 150–400)
POTASSIUM SERPL-MCNC: 3.7 MMOL/L — SIGNIFICANT CHANGE UP (ref 3.5–5.3)
POTASSIUM SERPL-SCNC: 3.7 MMOL/L — SIGNIFICANT CHANGE UP (ref 3.5–5.3)
RBC # BLD: 3.2 M/UL — LOW (ref 4.2–5.8)
RBC # BLD: 3.63 M/UL — LOW (ref 4.2–5.8)
RBC # FLD: 15.9 % — HIGH (ref 10.3–14.5)
RBC # FLD: 15.9 % — HIGH (ref 10.3–14.5)
SODIUM SERPL-SCNC: 144 MMOL/L — SIGNIFICANT CHANGE UP (ref 135–145)
WBC # BLD: 6.56 K/UL — SIGNIFICANT CHANGE UP (ref 3.8–10.5)
WBC # BLD: 7.45 K/UL — SIGNIFICANT CHANGE UP (ref 3.8–10.5)
WBC # FLD AUTO: 6.56 K/UL — SIGNIFICANT CHANGE UP (ref 3.8–10.5)
WBC # FLD AUTO: 7.45 K/UL — SIGNIFICANT CHANGE UP (ref 3.8–10.5)

## 2020-02-02 PROCEDURE — 99232 SBSQ HOSP IP/OBS MODERATE 35: CPT

## 2020-02-02 RX ORDER — HEPARIN SODIUM 5000 [USP'U]/ML
INJECTION INTRAVENOUS; SUBCUTANEOUS
Qty: 25000 | Refills: 0 | Status: DISCONTINUED | OUTPATIENT
Start: 2020-02-02 | End: 2020-02-04

## 2020-02-02 RX ORDER — HEPARIN SODIUM 5000 [USP'U]/ML
6500 INJECTION INTRAVENOUS; SUBCUTANEOUS EVERY 6 HOURS
Refills: 0 | Status: DISCONTINUED | OUTPATIENT
Start: 2020-02-02 | End: 2020-02-04

## 2020-02-02 RX ORDER — HEPARIN SODIUM 5000 [USP'U]/ML
3000 INJECTION INTRAVENOUS; SUBCUTANEOUS EVERY 6 HOURS
Refills: 0 | Status: DISCONTINUED | OUTPATIENT
Start: 2020-02-02 | End: 2020-02-04

## 2020-02-02 RX ADMIN — LISINOPRIL 2.5 MILLIGRAM(S): 2.5 TABLET ORAL at 05:45

## 2020-02-02 RX ADMIN — Medication 100 MICROGRAM(S): at 05:45

## 2020-02-02 RX ADMIN — MIRTAZAPINE 7.5 MILLIGRAM(S): 45 TABLET, ORALLY DISINTEGRATING ORAL at 21:26

## 2020-02-02 RX ADMIN — RISPERIDONE 0.25 MILLIGRAM(S): 4 TABLET ORAL at 12:02

## 2020-02-02 RX ADMIN — Medication 1 APPLICATION(S): at 05:45

## 2020-02-02 RX ADMIN — Medication 1 APPLICATION(S): at 18:44

## 2020-02-02 RX ADMIN — HEPARIN SODIUM 1500 UNIT(S)/HR: 5000 INJECTION INTRAVENOUS; SUBCUTANEOUS at 11:52

## 2020-02-02 RX ADMIN — SENNA PLUS 3 TABLET(S): 8.6 TABLET ORAL at 12:02

## 2020-02-02 RX ADMIN — Medication 0.5 MILLIGRAM(S): at 20:48

## 2020-02-02 RX ADMIN — TAMSULOSIN HYDROCHLORIDE 0.4 MILLIGRAM(S): 0.4 CAPSULE ORAL at 21:26

## 2020-02-02 RX ADMIN — APIXABAN 5 MILLIGRAM(S): 2.5 TABLET, FILM COATED ORAL at 05:45

## 2020-02-02 RX ADMIN — HEPARIN SODIUM 0 UNIT(S)/HR: 5000 INJECTION INTRAVENOUS; SUBCUTANEOUS at 19:28

## 2020-02-02 NOTE — PROGRESS NOTE ADULT - ASSESSMENT
89 y.o. M with PMH cholecystitis s/p IR PTC tube insertion, DVT on AC, hypothyroidism, afib, HTN presents to [facility] ER for management of PTC tube.  Medicine team is consulted for harper-operative optimization.

## 2020-02-02 NOTE — PROGRESS NOTE ADULT - SUBJECTIVE AND OBJECTIVE BOX
patient was seen and examined   s no specific  c/o offered   MEDICATIONS  (STANDING):  ammonium lactate 12% Lotion 1 Application(s) Topical two times a day  calamine Lotion 1 Application(s) Topical daily  heparin  Infusion.  Unit(s)/Hr (15 mL/Hr) IV Continuous <Continuous>  levothyroxine 100 MICROGram(s) Oral daily  lisinopril 2.5 milliGRAM(s) Oral daily  mirtazapine 7.5 milliGRAM(s) Oral at bedtime  risperiDONE   Tablet 0.25 milliGRAM(s) Oral daily  senna 3 Tablet(s) Oral daily  tamsulosin 0.4 milliGRAM(s) Oral at bedtime    MEDICATIONS  (PRN):  acetaminophen   Tablet .. 650 milliGRAM(s) Oral every 6 hours PRN Temp greater or equal to 38C (100.4F), Mild Pain (1 - 3)  heparin  Injectable 6500 Unit(s) IV Push every 6 hours PRN For aPTT less than 40  heparin  Injectable 3000 Unit(s) IV Push every 6 hours PRN For aPTT between 40 - 57  PAST MEDICAL & SURGICAL HISTORY:  PVD (peripheral vascular disease)  Acalculous cholecystitis: s/p percutaneous cholecystostomy  HTN (hypertension)  Hypothyroid  Ulcers of both lower extremities  No significant past surgical history    REVIEW OF SYSTEMS:   CONSTITUTIONAL: No fever, weight loss, or fatigue  EYES: No eye pain, visual disturbances, or discharge  ENMT:  No difficulty hearing, tinnitus, vertigo; No sinus or throat pain  NECK: No pain or stiffness  BREASTS: No pain, masses, or nipple discharge  RESPIRATORY: No cough, wheezing, chills or hemoptysis; No shortness of breath  CARDIOVASCULAR: No chest pain, palpitations, dizziness, or leg swelling  GASTROINTESTINAL: No abdominal or epigastric pain. No nausea, vomiting, or hematemesis; No diarrhea or constipation. No melena or hematochezia.  GENITOURINARY: No dysuria, frequency, hematuria, or incontinence  NEUROLOGICAL: No headaches, memory loss, loss of strength, numbness, or tremors  SKIN: No itching, burning, rashes, or lesions   LYMPH NODES: No enlarged glands  ENDOCRINE: No heat or cold intolerance; No hair loss  MUSCULOSKELETAL: No joint pain or swelling; No muscle, back, or extremity pain  PSYCHIATRIC: No depression, anxiety, mood swings, or difficulty sleeping  HEME/LYMPH: No easy bruising, or bleeding gums  ALLERY AND IMMUNOLOGIC: No hives or eczema    Vital Signs Last 24 Hrs  T(C): 36.3 (02 Feb 2020 14:19), Max: 37.1 (01 Feb 2020 22:26)  T(F): 97.4 (02 Feb 2020 14:19), Max: 98.7 (01 Feb 2020 22:26)  HR: 64 (02 Feb 2020 14:19) (64 - 71)  BP: 120/58 (02 Feb 2020 14:19) (118/56 - 132/73)  BP(mean): --  RR: 17 (02 Feb 2020 14:19) (16 - 17)  SpO2: 98% (02 Feb 2020 14:19) (98% - 99%)      PHYSICAL EXAM:  GENERAL: NAD, well-groomed, well-developed  HEAD:  Atraumatic, Normocephalic  EYES: EOMI, PERRLA, conjunctiva and sclera clear  ENMT: No tonsillar erythema, exudates, or enlargement; Moist mucous membranes, Good dentition, No lesions  NECK: Supple, No JVD, Normal thyroid  NERVOUS SYSTEM:  Alert & Oriented X 1 to self, Good concentration;   CHEST/LUNG: Clear to percussion bilaterally; No rales, rhonchi, wheezing, or rubs  HEART: Regular rate and rhythm; No murmurs, rubs, or gallops  ABDOMEN: Soft, Nontender, Nondistended; Bowel sounds present, PTC tube in place draining mustard colored discharge  EXTREMITIES:  2+ Peripheral Pulses, chronic venous stasis changes No clubbing, cyanosis, or edema  LYMPH: No lymphadenopathy noted  SKIN: No rashes or lesions    LABS:                        9.9    6.56  )-----------( 254      ( 02 Feb 2020 08:00 )             32.3     02-02    144  |  113<H>  |  19<H>  ----------------------------<  86  3.7   |  26  |  0.87    Ca    7.9<L>      02 Feb 2020 08:00      PTT - ( 02 Feb 2020 08:00 )  PTT:41.0 sec                        LABS:                        10.3   8.03  )-----------( 291      ( 01 Feb 2020 07:28 )             33.6     02-01    145  |  114<H>  |  23<H>  ----------------------------<  86  3.6   |  27  |  0.84    Ca    8.4      01 Feb 2020 07:28                              LABS:                        12.0   10.20 )-----------( 368      ( 29 Jan 2020 16:49 )             38.7     01-29    143  |  113<H>  |  31<H>  ----------------------------<  82  4.5   |  25  |  1.07    Ca    8.2<L>      29 Jan 2020 16:49    TPro  6.9  /  Alb  2.6<L>  /  TBili  0.4  /  DBili  x   /  AST  23  /  ALT  10  /  AlkPhos  117  01-29    PT/INR - ( 29 Jan 2020 16:49 )   PT: 16.6 sec;   INR: 1.48 ratio         PTT - ( 29 Jan 2020 16:49 )  PTT:49.9 sec    CAPILLARY BLOOD GLUCOSE    < from: Transthoracic Echocardiogram (11.22.19 @ 10:44) >  Ejection Fraction Visual Estimate: >55 %    ------------------------------------------------------------------------  OBSERVATIONS:  Mitral Valve: Mitral annular calcification. Mild mitral  regurgitation.  Aortic Root: Normal aortic root.  Aortic Valve: Calcified trileaflet aortic valve with normal  opening. Mild aortic regurgitation.  LeftAtrium: Moderate left atrial enlargement.  Left Ventricle: Endocardium not well visualized; grossly  normal left ventricular systolic function. Normal left  ventricular internal dimensions and wall thicknesses.  Right Heart: Normal right atrium. Normal right ventricular  size and function. There is moderate-severe tricuspid  regurgitation. Normal pulmonic valve.  Pericardium/PleuraNormal pericardium with no pericardial  effusion.  Hemodynamic: RA Pressure is 10 mm Hg. RV systolic pressure  is 84 mm Hg. Severe pulmonary hypertension.  ------------------------------------------------------------------------  CONCLUSIONS:  1. Mitral annular calcification. Mild mitral regurgitation.    2. Calcified trileaflet aortic valve with normal opening.  Mild aortic regurgitation.  3. Moderate left atrial enlargement.  4. Normal left ventricular internal dimensions and wall  thicknesses.  5. Endocardium not well visualized; grossly normal left  ventricular systolic function.  6. Normal right ventricularsize and function.  7. RV systolic pressure is 84 mm Hg. Severe pulmonary  hypertension.    ------------------------------------------------------------------------  Confirmed on  11/23/2019 - 08:27:04 by Ayush Yang MD  ------------------------------------------------------------------------    < end of copied text >                RADIOLOGY & ADDITIONAL TESTS:        < from: CT Abdomen and Pelvis w/ IV Cont (01.14.20 @ 17:29) >  MPRESSION:     Cholecystostomy tube appears in good position.    Deep vein thrombosis in the right common femoral vein.    Small enhancing fluid surrounding both ischial tuberosities.    Dr. Heart discussed the findings with Dr. Jacinto on January 14, 2020 at 5:55 PM.  Readback was obtained.

## 2020-02-02 NOTE — PROGRESS NOTE ADULT - ASSESSMENT
89 yoM h/o acute cholecystitis s/p IR percutaneous cholecystostomy tube 11/2019    -C/w Reg diet   -C/w Home Medication   -Hold Eliquis 48hrs prior to surgery   -Will start IV Heparin once Eliquis is discontinued, IV Heparin to be held 6hrs before surgery  -Plan for lap blayne 2/4  -F/u Medical/ cardiology clearance

## 2020-02-02 NOTE — PROGRESS NOTE ADULT - SUBJECTIVE AND OBJECTIVE BOX
INTERVAL HPI/OVERNIGHT EVENTS:    Pt seen and examined at bedside. No acute complaints at this time, no acute events overnight     Vital Signs Last 24 Hrs  T(C): 36.7 (02 Feb 2020 06:32), Max: 37.1 (01 Feb 2020 22:26)  T(F): 98 (02 Feb 2020 06:32), Max: 98.7 (01 Feb 2020 22:26)  HR: 68 (02 Feb 2020 06:32) (68 - 75)  BP: 132/73 (02 Feb 2020 06:32) (111/53 - 132/73)  BP(mean): --  RR: 16 (02 Feb 2020 06:32) (16 - 17)  SpO2: 98% (02 Feb 2020 06:32) (98% - 100%)  I&O's Detail    01 Feb 2020 07:01  -  02 Feb 2020 07:00  --------------------------------------------------------  IN:  Total IN: 0 mL    OUT:    Drain: 150 mL  Total OUT: 150 mL    Total NET: -150 mL        senna 3 Tablet(s) Oral daily      Physical Exam  General: AAOx3, No acute distress  Skin: No jaundice, no icterus  Abdomen: soft, nondistended, nontender, no rebound tenderness, no guarding, no palpable masses, percutaneous cholecystostomy tube in place, output bilious   Extremities: non edematous, no calf pain bilaterally    Labs:                        9.9    6.56  )-----------( 254      ( 02 Feb 2020 08:00 )             32.3     02-02    144  |  113<H>  |  19<H>  ----------------------------<  86  3.7   |  26  |  0.87    Ca    7.9<L>      02 Feb 2020 08:00      PTT - ( 02 Feb 2020 08:00 )  PTT:41.0 sec

## 2020-02-03 ENCOUNTER — TRANSCRIPTION ENCOUNTER (OUTPATIENT)
Age: 85
End: 2020-02-03

## 2020-02-03 LAB
APTT BLD: 135.2 SEC — CRITICAL HIGH (ref 27.5–36.3)
APTT BLD: 77.4 SEC — HIGH (ref 27.5–36.3)
APTT BLD: 90.7 SEC — HIGH (ref 27.5–36.3)
HCT VFR BLD CALC: 36.3 % — LOW (ref 39–50)
HGB BLD-MCNC: 11.2 G/DL — LOW (ref 13–17)
MCHC RBC-ENTMCNC: 30.9 GM/DL — LOW (ref 32–36)
MCHC RBC-ENTMCNC: 31.2 PG — SIGNIFICANT CHANGE UP (ref 27–34)
MCV RBC AUTO: 101.1 FL — HIGH (ref 80–100)
NRBC # BLD: 0 /100 WBCS — SIGNIFICANT CHANGE UP (ref 0–0)
PLATELET # BLD AUTO: 265 K/UL — SIGNIFICANT CHANGE UP (ref 150–400)
RBC # BLD: 3.59 M/UL — LOW (ref 4.2–5.8)
RBC # FLD: 15.9 % — HIGH (ref 10.3–14.5)
WBC # BLD: 7.63 K/UL — SIGNIFICANT CHANGE UP (ref 3.8–10.5)
WBC # FLD AUTO: 7.63 K/UL — SIGNIFICANT CHANGE UP (ref 3.8–10.5)

## 2020-02-03 RX ORDER — CHLORHEXIDINE GLUCONATE 213 G/1000ML
1 SOLUTION TOPICAL ONCE
Refills: 0 | Status: COMPLETED | OUTPATIENT
Start: 2020-02-03 | End: 2020-02-04

## 2020-02-03 RX ADMIN — Medication 1 APPLICATION(S): at 06:03

## 2020-02-03 RX ADMIN — CALAMINE AND ZINC OXIDE AND PHENOL 1 APPLICATION(S): 160; 10 LOTION TOPICAL at 11:07

## 2020-02-03 RX ADMIN — Medication 1 APPLICATION(S): at 17:19

## 2020-02-03 RX ADMIN — HEPARIN SODIUM 1200 UNIT(S)/HR: 5000 INJECTION INTRAVENOUS; SUBCUTANEOUS at 04:01

## 2020-02-03 RX ADMIN — TAMSULOSIN HYDROCHLORIDE 0.4 MILLIGRAM(S): 0.4 CAPSULE ORAL at 21:00

## 2020-02-03 RX ADMIN — Medication 100 MICROGRAM(S): at 06:02

## 2020-02-03 RX ADMIN — LISINOPRIL 2.5 MILLIGRAM(S): 2.5 TABLET ORAL at 06:03

## 2020-02-03 RX ADMIN — RISPERIDONE 0.25 MILLIGRAM(S): 4 TABLET ORAL at 11:07

## 2020-02-03 RX ADMIN — MIRTAZAPINE 7.5 MILLIGRAM(S): 45 TABLET, ORALLY DISINTEGRATING ORAL at 21:00

## 2020-02-03 RX ADMIN — HEPARIN SODIUM 1200 UNIT(S)/HR: 5000 INJECTION INTRAVENOUS; SUBCUTANEOUS at 11:05

## 2020-02-03 RX ADMIN — SENNA PLUS 3 TABLET(S): 8.6 TABLET ORAL at 11:07

## 2020-02-03 NOTE — PROGRESS NOTE ADULT - ASSESSMENT
89 y.o. M with PMH cholecystitis s/p IR PTC tube insertion, DVT on AC, hypothyroidism, afib, HTN presents to [facility] ER for management of PTC tube.  Medicine team is consulted for harper-operative optimization.

## 2020-02-03 NOTE — PROVIDER CONTACT NOTE (CRITICAL VALUE NOTIFICATION) - ACTION/TREATMENT ORDERED:
Followed [facility] Heparin nomogram stop infusion for 60 minutes and endorsed to Amber to reduce by 3ml , rate 12ml/hr.
Scan the meds and follow the nomogram, stop drip for 1 hr, and restart @ 12 ml/hr

## 2020-02-03 NOTE — PROGRESS NOTE ADULT - SUBJECTIVE AND OBJECTIVE BOX
PRESENTING CC:Abdominal Pain    SUBJ: 89 y.o. MALE  with PMH cholecystitis s/p IR PTC tube insertion, DVT on AC, hypothyroidism, Atrial Fib, HTN scheduled for Cholecystectomy-01/4,is off Eliquis no chest pain dyspnea.      PMH -reviewed admission note, no change since admission  Heart failure: acute [ ] chronic [ ] acute or chronic [ ] diastolic [ ] systolic [ ] combined systolic and diastolic[ ]  ARMIN: ATN[ ] renal medullary necrosis [ ] CKD I [ ]CKDII [ ]CKD III [ ]CKD IV [ ]CKD V [ ]Other pathological lesions [ ]    MEDICATIONS  (STANDING):  ammonium lactate 12% Lotion 1 Application(s) Topical two times a day  calamine Lotion 1 Application(s) Topical daily  heparin  Infusion.  Unit(s)/Hr (15 mL/Hr) IV Continuous <Continuous>  levothyroxine 100 MICROGram(s) Oral daily  lisinopril 2.5 milliGRAM(s) Oral daily  mirtazapine 7.5 milliGRAM(s) Oral at bedtime  risperiDONE   Tablet 0.25 milliGRAM(s) Oral daily  senna 3 Tablet(s) Oral daily  tamsulosin 0.4 milliGRAM(s) Oral at bedtime    MEDICATIONS  (PRN):  acetaminophen   Tablet .. 650 milliGRAM(s) Oral every 6 hours PRN Temp greater or equal to 38C (100.4F), Mild Pain (1 - 3)  heparin  Injectable 6500 Unit(s) IV Push every 6 hours PRN For aPTT less than 40  heparin  Injectable 3000 Unit(s) IV Push every 6 hours PRN For aPTT between 40 - 57              REVIEW OF SYSTEMS:  Constitutional: [ ] fever, [ ]weight loss,  [ ]fatigue  Eyes: [ ] visual changes  Respiratory: [ ]shortness of breath;  [ ] cough, [ ]wheezing, [ ]chills, [ ]hemoptysis  Cardiovascular: [ ] chest pain, [ ]palpitations, [ ]dizziness,  [ ]leg swelling[ ]orthopnea[ ]PND  Gastrointestinal: [ ] abdominal pain, [ ]nausea, [ ]vomiting,  [ ]diarrhea   Genitourinary: [ ] dysuria, [ ] hematuria  Neurologic: [ ] headaches [ ] tremors[ ]weakness  Skin: [ ] itching, [ ]burning, [ ] rashes  Endocrine: [ ] heat or cold intolerance  Musculoskeletal: [ ] joint pain or swelling; [ ] muscle, back, or extremity pain  Psychiatric: [ ] depression, [ ]anxiety, [ ]mood swings, or [ ]difficulty sleeping  Hematologic: [ ] easy bruising, [ ] bleeding gums    [x] All remaining systems negative except as per above.   [ ]Unable to obtain.    Vital Signs Last 24 Hrs  T(C): 36.7 (02 Feb 2020 21:35), Max: 36.7 (02 Feb 2020 06:32)  T(F): 98.1 (02 Feb 2020 21:35), Max: 98.1 (02 Feb 2020 21:35)  HR: 80 (02 Feb 2020 21:35) (64 - 80)  BP: 134/87 (02 Feb 2020 21:35) (120/58 - 134/87)  BP(mean): --  RR: 18 (02 Feb 2020 21:35) (16 - 18)  SpO2: 98% (02 Feb 2020 21:35) (98% - 98%)  I&O's Summary    01 Feb 2020 07:01  -  02 Feb 2020 07:00  --------------------------------------------------------  IN: 0 mL / OUT: 150 mL / NET: -150 mL    02 Feb 2020 07:01  -  03 Feb 2020 06:19  --------------------------------------------------------  IN: 0 mL / OUT: 30 mL / NET: -30 mL        PHYSICAL EXAM:  General: No acute distress BMI-  HEENT: EOMI, PERRL  Neck: Supple, [ ] JVD  Lungs: Equal air entry bilaterally; [ ] rales [ ] wheezing [ ] rhonchi  Heart: Regular rate and rhythm; [ ] murmur   /6 [ ] systolic [ ] diastolic [ ] radiation[ ] rubs [ ]  gallops  Abdomen: Nontender, bowel sounds present  Extremities: No clubbing, cyanosis, [ ] edema  Nervous system:  Alert & Oriented X3, no focal deficits  Psychiatric: Normal affect  Skin: No rashes or lesions    LABS:  02-02    144  |  113<H>  |  19<H>  ----------------------------<  86  3.7   |  26  |  0.87    Ca    7.9<L>      02 Feb 2020 08:00      Creatinine Trend: 0.87<--, 0.84<--, 1.07<--, 0.85<--, 0.87<--, 0.99<--                        11.0   7.45  )-----------( 284      ( 02 Feb 2020 18:20 )             36.5     PTT - ( 03 Feb 2020 03:23 )  PTT:135.2 sec  Lipid Panel:   Cardiac Enzymes:           RADIOLOGY:    ECG [my interpretation]:    TELEMETRY:    ECHO:    STRESS TEST:    CATHETERIZATION:      IMPRESSION AND PLAN: PRESENTING CC:Abdominal Pain    SUBJ: 89 y.o. MALE  with PMH cholecystitis s/p IR PTC tube insertion, DVT on AC, hypothyroidism, Atrial Fib, HTN scheduled for Cholecystectomy-01/4,is off Eliquis no chest pain dyspnea.Off Eliquis at present      PMH -reviewed admission note, no change since admission  Heart failure: acute [ ] chronic [ ] acute or chronic [ ] diastolic [ ] systolic [ ] combined systolic and diastolic[ ]  ARMIN: ATN[ ] renal medullary necrosis [ ] CKD I [ ]CKDII [ ]CKD III [ ]CKD IV [ ]CKD V [ ]Other pathological lesions [ ]    MEDICATIONS  (STANDING):  ammonium lactate 12% Lotion 1 Application(s) Topical two times a day  calamine Lotion 1 Application(s) Topical daily  heparin  Infusion.  Unit(s)/Hr (15 mL/Hr) IV Continuous <Continuous>  levothyroxine 100 MICROGram(s) Oral daily  lisinopril 2.5 milliGRAM(s) Oral daily  mirtazapine 7.5 milliGRAM(s) Oral at bedtime  risperiDONE   Tablet 0.25 milliGRAM(s) Oral daily  senna 3 Tablet(s) Oral daily  tamsulosin 0.4 milliGRAM(s) Oral at bedtime    MEDICATIONS  (PRN):  acetaminophen   Tablet .. 650 milliGRAM(s) Oral every 6 hours PRN Temp greater or equal to 38C (100.4F), Mild Pain (1 - 3)  heparin  Injectable 6500 Unit(s) IV Push every 6 hours PRN For aPTT less than 40  heparin  Injectable 3000 Unit(s) IV Push every 6 hours PRN For aPTT between 40 - 57              REVIEW OF SYSTEMS:  Constitutional: [ ] fever, [ ]weight loss,  [x ]fatigue  Eyes: [ ] visual changes  Respiratory: [ ]shortness of breath;  [ ] cough, [ ]wheezing, [ ]chills, [ ]hemoptysis  Cardiovascular: [ ] chest pain, [ ]palpitations, [ ]dizziness,  [ ]leg swelling[ ]orthopnea[ ]PND  Gastrointestinal: [ ] abdominal pain, [ ]nausea, [ ]vomiting,  [ ]diarrhea   Genitourinary: [ ] dysuria, [ ] hematuria  Neurologic: [ ] headaches [ ] tremors[ ]weakness  Skin: [ ] itching, [ ]burning, [ ] rashes  Endocrine: [ ] heat or cold intolerance  Musculoskeletal: [ ] joint pain or swelling; [ ] muscle, back, or extremity pain  Psychiatric: [ ] depression, [ ]anxiety, [ ]mood swings, or [ ]difficulty sleeping  Hematologic: [ ] easy bruising, [ ] bleeding gums    [x] All remaining systems negative except as per above.   [ ]Unable to obtain.    Vital Signs Last 24 Hrs  T(C): 36.7 (02 Feb 2020 21:35), Max: 36.7 (02 Feb 2020 06:32)  T(F): 98.1 (02 Feb 2020 21:35), Max: 98.1 (02 Feb 2020 21:35)  HR: 80 (02 Feb 2020 21:35) (64 - 80)  BP: 134/87 (02 Feb 2020 21:35) (120/58 - 134/87)  RR: 18 (02 Feb 2020 21:35) (16 - 18)  SpO2: 98% (02 Feb 2020 21:35) (98% - 98%)  I&O's Summary    01 Feb 2020 07:01  -  02 Feb 2020 07:00  --------------------------------------------------------  IN: 0 mL / OUT: 150 mL / NET: -150 mL    02 Feb 2020 07:01  -  03 Feb 2020 06:19  --------------------------------------------------------  IN: 0 mL / OUT: 30 mL / NET: -30 mL        PHYSICAL EXAM:  General: No acute distress BMI-25.1  HEENT: EOMI, PERRL  Neck: Supple, [ ] JVD  Lungs: Equal air entry bilaterally; [ ] rales [ ] wheezing [ ] rhonchi  Heart: Irregular rate and rhythm; [x ] murmur   2/6 [x ] systolic [ ] diastolic [ ] radiation[ ] rubs [ ]  gallops  Abdomen: Nontender, bowel sounds present  Extremities: No clubbing, cyanosis, [ ] edema  Nervous system:  Alert & Oriented X3, no focal deficits  Psychiatric: Normal affect  Skin: No rashes or lesions    LABS:  02-02    144  |  113<H>  |  19<H>  ----------------------------<  86  3.7   |  26  |  0.87    Ca    7.9<L>      02 Feb 2020 08:00      Creatinine Trend: 0.87<--, 0.84<--, 1.07<--, 0.85<--, 0.87<--, 0.99<--                        11.0   7.45  )-----------( 284      ( 02 Feb 2020 18:20 )             36.5     PTT - ( 03 Feb 2020 03:23 )  PTT:135.2 sec    ECG [my interpretation]:Atrial Fibrillation no acute ST T wave abnormalities    ECHO:Study Date: 11/22/2019  Grossly normal left ventricular systolic function. EF >55 %  Mild mitral regurgitation.  Moderate left atrial enlargement.   Severe pulmonary hypertension.          IMPRESSION AND PLAN:    Problem/Plan - 1:  ·  Problem: Cholecystitis, chronic.  Plan: Scheduled for cholecystectomy  He is at intermediate cardiac risk with no cardiac contraindications for surgery.  Eliquis has been held  .     Problem/Plan - 2:  ·  Problem: HTN (hypertension).  Plan: continue 'lisinopril.   BP Stable    Problem/Plan - 3:  ·  Problem: DVT of deep femoral vein.  Plan: iv heparin   monitor ptt.   Restart Eliquis post op    Problem/Plan - 4:  ·  Problem: Hypothyroid.  Plan: levothyroxine.   Thyroid Stimulating Hormone, Serum (01.15.20 @ 06:48)    Thyroid Stimulating Hormone, Serum: 0.85 uU/mL        Problem/Plan - 5:  ·  Problem: Atrial fibrillation-permanent.  Plan: on iv heparin    heparine to be held 6 hours before surgery.   Rate cointrol  Restart Eliquis post op

## 2020-02-03 NOTE — PROGRESS NOTE ADULT - ASSESSMENT
89 yo male with acute cholecystitis, s/p cholecystostomy tube 11/2019, DVT, undergoing lap blayne tomorrow    1) npo after midnight  2) pre-op labs  3) hold hep drip 6hrs prior to surgery  4) f/u cardio clearance  5) consent to be obtained

## 2020-02-03 NOTE — PROGRESS NOTE ADULT - SUBJECTIVE AND OBJECTIVE BOX
patient was seen and examined   s no specific  c/o offered  no cp or sob , or abdominal pain   MEDICATIONS  (STANDING):  ammonium lactate 12% Lotion 1 Application(s) Topical two times a day  calamine Lotion 1 Application(s) Topical daily  chlorhexidine 4% Liquid 1 Application(s) Topical once  heparin  Infusion.  Unit(s)/Hr (15 mL/Hr) IV Continuous <Continuous>  levothyroxine 100 MICROGram(s) Oral daily  lisinopril 2.5 milliGRAM(s) Oral daily  mirtazapine 7.5 milliGRAM(s) Oral at bedtime  risperiDONE   Tablet 0.25 milliGRAM(s) Oral daily  senna 3 Tablet(s) Oral daily  tamsulosin 0.4 milliGRAM(s) Oral at bedtime    MEDICATIONS  (PRN):  acetaminophen   Tablet .. 650 milliGRAM(s) Oral every 6 hours PRN Temp greater or equal to 38C (100.4F), Mild Pain (1 - 3)  heparin  Injectable 6500 Unit(s) IV Push every 6 hours PRN For aPTT less than 40  heparin  Injectable 3000 Unit(s) IV Push every 6 hours PRN For aPTT between 40 - 57    PAST MEDICAL & SURGICAL HISTORY:  PVD (peripheral vascular disease)  Acalculous cholecystitis: s/p percutaneous cholecystostomy  HTN (hypertension)  Hypothyroid  Ulcers of both lower extremities  No significant past surgical history    REVIEW OF SYSTEMS:   CONSTITUTIONAL: No fever, weight loss, or fatigue  EYES: No eye pain, visual disturbances, or discharge  ENMT:  No difficulty hearing, tinnitus, vertigo; No sinus or throat pain  NECK: No pain or stiffness  BREASTS: No pain, masses, or nipple discharge  RESPIRATORY: No cough, wheezing, chills or hemoptysis; No shortness of breath  CARDIOVASCULAR: No chest pain, palpitations, dizziness, or leg swelling  GASTROINTESTINAL: No abdominal or epigastric pain. No nausea, vomiting, or hematemesis; No diarrhea or constipation. No melena or hematochezia.  GENITOURINARY: No dysuria, frequency, hematuria, or incontinence  NEUROLOGICAL: No headaches, memory loss, loss of strength, numbness, or tremors  SKIN: No itching, burning, rashes, or lesions   LYMPH NODES: No enlarged glands  ENDOCRINE: No heat or cold intolerance; No hair loss  MUSCULOSKELETAL: No joint pain or swelling; No muscle, back, or extremity pain  PSYCHIATRIC: No depression, anxiety, mood swings, or difficulty sleeping  HEME/LYMPH: No easy bruising, or bleeding gums  ALLERY AND IMMUNOLOGIC: No hives or eczema    Vital Signs Last 24 Hrs  T(C): 36.2 (03 Feb 2020 06:00), Max: 36.7 (02 Feb 2020 21:35)  T(F): 97.1 (03 Feb 2020 06:00), Max: 98.1 (02 Feb 2020 21:35)  HR: 66 (03 Feb 2020 06:00) (64 - 80)  BP: 143/68 (03 Feb 2020 06:00) (120/58 - 143/68)  BP(mean): --  RR: 18 (03 Feb 2020 06:00) (17 - 18)  SpO2: 96% (03 Feb 2020 06:00) (96% - 98%)      PHYSICAL EXAM:  GENERAL: NAD, well-groomed, well-developed  HEAD:  Atraumatic, Normocephalic  EYES: EOMI, PERRLA, conjunctiva and sclera clear  ENMT: No tonsillar erythema, exudates, or enlargement; Moist mucous membranes, Good dentition, No lesions  NECK: Supple, No JVD, Normal thyroid  NERVOUS SYSTEM:  Alert & Oriented X 1 to self, Good concentration;   CHEST/LUNG: Clear to percussion bilaterally; No rales, rhonchi, wheezing, or rubs  HEART: Regular rate and rhythm; No murmurs, rubs, or gallops  ABDOMEN: Soft, Nontender, Nondistended; Bowel sounds present, PTC tube in place draining mustard colored discharge  EXTREMITIES:  2+ Peripheral Pulses, chronic venous stasis changes No clubbing, cyanosis, or edema  LYMPH: No lymphadenopathy noted  SKIN: No rashes or lesions    LABS:                        11.2   7.63  )-----------( 265      ( 03 Feb 2020 07:30 )             36.3     02-02    144  |  113<H>  |  19<H>  ----------------------------<  86  3.7   |  26  |  0.87    Ca    7.9<L>      02 Feb 2020 08:00      PTT - ( 03 Feb 2020 10:05 )  PTT:90.7 sec                        LABS:                        9.9    6.56  )-----------( 254      ( 02 Feb 2020 08:00 )             32.3     02-02    144  |  113<H>  |  19<H>  ----------------------------<  86  3.7   |  26  |  0.87    Ca    7.9<L>      02 Feb 2020 08:00      PTT - ( 02 Feb 2020 08:00 )  PTT:41.0 sec                        LABS:                        10.3   8.03  )-----------( 291      ( 01 Feb 2020 07:28 )             33.6     02-01    145  |  114<H>  |  23<H>  ----------------------------<  86  3.6   |  27  |  0.84    Ca    8.4      01 Feb 2020 07:28                              LABS:                        12.0   10.20 )-----------( 368      ( 29 Jan 2020 16:49 )             38.7     01-29    143  |  113<H>  |  31<H>  ----------------------------<  82  4.5   |  25  |  1.07    Ca    8.2<L>      29 Jan 2020 16:49    TPro  6.9  /  Alb  2.6<L>  /  TBili  0.4  /  DBili  x   /  AST  23  /  ALT  10  /  AlkPhos  117  01-29    PT/INR - ( 29 Jan 2020 16:49 )   PT: 16.6 sec;   INR: 1.48 ratio         PTT - ( 29 Jan 2020 16:49 )  PTT:49.9 sec    CAPILLARY BLOOD GLUCOSE    < from: Transthoracic Echocardiogram (11.22.19 @ 10:44) >  Ejection Fraction Visual Estimate: >55 %    ------------------------------------------------------------------------  OBSERVATIONS:  Mitral Valve: Mitral annular calcification. Mild mitral  regurgitation.  Aortic Root: Normal aortic root.  Aortic Valve: Calcified trileaflet aortic valve with normal  opening. Mild aortic regurgitation.  LeftAtrium: Moderate left atrial enlargement.  Left Ventricle: Endocardium not well visualized; grossly  normal left ventricular systolic function. Normal left  ventricular internal dimensions and wall thicknesses.  Right Heart: Normal right atrium. Normal right ventricular  size and function. There is moderate-severe tricuspid  regurgitation. Normal pulmonic valve.  Pericardium/PleuraNormal pericardium with no pericardial  effusion.  Hemodynamic: RA Pressure is 10 mm Hg. RV systolic pressure  is 84 mm Hg. Severe pulmonary hypertension.  ------------------------------------------------------------------------  CONCLUSIONS:  1. Mitral annular calcification. Mild mitral regurgitation.    2. Calcified trileaflet aortic valve with normal opening.  Mild aortic regurgitation.  3. Moderate left atrial enlargement.  4. Normal left ventricular internal dimensions and wall  thicknesses.  5. Endocardium not well visualized; grossly normal left  ventricular systolic function.  6. Normal right ventricularsize and function.  7. RV systolic pressure is 84 mm Hg. Severe pulmonary  hypertension.    ------------------------------------------------------------------------  Confirmed on  11/23/2019 - 08:27:04 by Ayush Yang MD  ------------------------------------------------------------------------    < end of copied text >                RADIOLOGY & ADDITIONAL TESTS:        < from: CT Abdomen and Pelvis w/ IV Cont (01.14.20 @ 17:29) >  MPRESSION:     Cholecystostomy tube appears in good position.    Deep vein thrombosis in the right common femoral vein.    Small enhancing fluid surrounding both ischial tuberosities.    Dr. Heart discussed the findings with Dr. Jacinto on January 14, 2020 at 5:55 PM.  Readback was obtained.

## 2020-02-03 NOTE — PROGRESS NOTE ADULT - SUBJECTIVE AND OBJECTIVE BOX
Surgery Pre-op    Diagnosis: acute cholecystitis  Procedure: laparoscopic cholecystectomy    Vital Signs Last 24 Hrs  T(C): 36.2 (03 Feb 2020 06:00), Max: 36.7 (02 Feb 2020 21:35)  T(F): 97.1 (03 Feb 2020 06:00), Max: 98.1 (02 Feb 2020 21:35)  HR: 66 (03 Feb 2020 06:00) (64 - 80)  BP: 143/68 (03 Feb 2020 06:00) (120/58 - 143/68)  BP(mean): --  RR: 18 (03 Feb 2020 06:00) (17 - 18)  SpO2: 96% (03 Feb 2020 06:00) (96% - 98%)                          11.2   7.63  )-----------( 265      ( 03 Feb 2020 07:30 )             36.3   02-02    144  |  113<H>  |  19<H>  ----------------------------<  86  3.7   |  26  |  0.87    Ca    7.9<L>      02 Feb 2020 08:00

## 2020-02-04 ENCOUNTER — RESULT REVIEW (OUTPATIENT)
Age: 85
End: 2020-02-04

## 2020-02-04 LAB
ALBUMIN SERPL ELPH-MCNC: 2.4 G/DL — LOW (ref 3.5–5)
ALP SERPL-CCNC: 92 U/L — SIGNIFICANT CHANGE UP (ref 40–120)
ALT FLD-CCNC: 10 U/L DA — SIGNIFICANT CHANGE UP (ref 10–60)
ANION GAP SERPL CALC-SCNC: 8 MMOL/L — SIGNIFICANT CHANGE UP (ref 5–17)
APTT BLD: 37.4 SEC — HIGH (ref 27.5–36.3)
AST SERPL-CCNC: 15 U/L — SIGNIFICANT CHANGE UP (ref 10–40)
BILIRUB SERPL-MCNC: 0.4 MG/DL — SIGNIFICANT CHANGE UP (ref 0.2–1.2)
BUN SERPL-MCNC: 19 MG/DL — HIGH (ref 7–18)
CALCIUM SERPL-MCNC: 8.7 MG/DL — SIGNIFICANT CHANGE UP (ref 8.4–10.5)
CHLORIDE SERPL-SCNC: 111 MMOL/L — HIGH (ref 96–108)
CO2 SERPL-SCNC: 28 MMOL/L — SIGNIFICANT CHANGE UP (ref 22–31)
CREAT SERPL-MCNC: 0.93 MG/DL — SIGNIFICANT CHANGE UP (ref 0.5–1.3)
GLUCOSE SERPL-MCNC: 88 MG/DL — SIGNIFICANT CHANGE UP (ref 70–99)
HCT VFR BLD CALC: 35.4 % — LOW (ref 39–50)
HGB BLD-MCNC: 10.8 G/DL — LOW (ref 13–17)
INR BLD: 1.18 RATIO — HIGH (ref 0.88–1.16)
MCHC RBC-ENTMCNC: 30.5 GM/DL — LOW (ref 32–36)
MCHC RBC-ENTMCNC: 30.8 PG — SIGNIFICANT CHANGE UP (ref 27–34)
MCV RBC AUTO: 100.9 FL — HIGH (ref 80–100)
NRBC # BLD: 0 /100 WBCS — SIGNIFICANT CHANGE UP (ref 0–0)
PLATELET # BLD AUTO: 279 K/UL — SIGNIFICANT CHANGE UP (ref 150–400)
POTASSIUM SERPL-MCNC: 4.3 MMOL/L — SIGNIFICANT CHANGE UP (ref 3.5–5.3)
POTASSIUM SERPL-SCNC: 4.3 MMOL/L — SIGNIFICANT CHANGE UP (ref 3.5–5.3)
PROT SERPL-MCNC: 6 G/DL — SIGNIFICANT CHANGE UP (ref 6–8.3)
PROTHROM AB SERPL-ACNC: 13.2 SEC — HIGH (ref 10–12.9)
RBC # BLD: 3.51 M/UL — LOW (ref 4.2–5.8)
RBC # FLD: 16 % — HIGH (ref 10.3–14.5)
SODIUM SERPL-SCNC: 147 MMOL/L — HIGH (ref 135–145)
WBC # BLD: 7.02 K/UL — SIGNIFICANT CHANGE UP (ref 3.8–10.5)
WBC # FLD AUTO: 7.02 K/UL — SIGNIFICANT CHANGE UP (ref 3.8–10.5)

## 2020-02-04 PROCEDURE — 47605 CHOLECYSTECTOMY W/CHOLANG: CPT | Mod: AS

## 2020-02-04 PROCEDURE — 47605 CHOLECYSTECTOMY W/CHOLANG: CPT | Mod: 22

## 2020-02-04 PROCEDURE — 88304 TISSUE EXAM BY PATHOLOGIST: CPT | Mod: 26

## 2020-02-04 RX ORDER — ACETAMINOPHEN 500 MG
650 TABLET ORAL EVERY 6 HOURS
Refills: 0 | Status: DISCONTINUED | OUTPATIENT
Start: 2020-02-04 | End: 2020-02-06

## 2020-02-04 RX ORDER — SODIUM CHLORIDE 9 MG/ML
1000 INJECTION, SOLUTION INTRAVENOUS
Refills: 0 | Status: DISCONTINUED | OUTPATIENT
Start: 2020-02-04 | End: 2020-02-04

## 2020-02-04 RX ORDER — OXYCODONE AND ACETAMINOPHEN 5; 325 MG/1; MG/1
1 TABLET ORAL EVERY 4 HOURS
Refills: 0 | Status: DISCONTINUED | OUTPATIENT
Start: 2020-02-04 | End: 2020-02-06

## 2020-02-04 RX ORDER — LISINOPRIL 2.5 MG/1
2.5 TABLET ORAL DAILY
Refills: 0 | Status: DISCONTINUED | OUTPATIENT
Start: 2020-02-04 | End: 2020-02-06

## 2020-02-04 RX ORDER — HYDROMORPHONE HYDROCHLORIDE 2 MG/ML
0.5 INJECTION INTRAMUSCULAR; INTRAVENOUS; SUBCUTANEOUS
Refills: 0 | Status: DISCONTINUED | OUTPATIENT
Start: 2020-02-04 | End: 2020-02-04

## 2020-02-04 RX ORDER — MORPHINE SULFATE 50 MG/1
2 CAPSULE, EXTENDED RELEASE ORAL EVERY 4 HOURS
Refills: 0 | Status: DISCONTINUED | OUTPATIENT
Start: 2020-02-04 | End: 2020-02-06

## 2020-02-04 RX ORDER — RISPERIDONE 4 MG/1
0.25 TABLET ORAL DAILY
Refills: 0 | Status: DISCONTINUED | OUTPATIENT
Start: 2020-02-04 | End: 2020-02-06

## 2020-02-04 RX ORDER — PHENYLEPHRINE HYDROCHLORIDE 10 MG/ML
0.5 INJECTION INTRAVENOUS
Qty: 40 | Refills: 0 | Status: DISCONTINUED | OUTPATIENT
Start: 2020-02-04 | End: 2020-02-04

## 2020-02-04 RX ORDER — SOD,AMMONIUM,POTASSIUM LACTATE
1 CREAM (GRAM) TOPICAL
Refills: 0 | Status: DISCONTINUED | OUTPATIENT
Start: 2020-02-04 | End: 2020-02-06

## 2020-02-04 RX ORDER — TAMSULOSIN HYDROCHLORIDE 0.4 MG/1
0.4 CAPSULE ORAL AT BEDTIME
Refills: 0 | Status: DISCONTINUED | OUTPATIENT
Start: 2020-02-04 | End: 2020-02-06

## 2020-02-04 RX ORDER — SODIUM CHLORIDE 9 MG/ML
1000 INJECTION INTRAMUSCULAR; INTRAVENOUS; SUBCUTANEOUS
Refills: 0 | Status: DISCONTINUED | OUTPATIENT
Start: 2020-02-04 | End: 2020-02-05

## 2020-02-04 RX ORDER — MIRTAZAPINE 45 MG/1
7.5 TABLET, ORALLY DISINTEGRATING ORAL AT BEDTIME
Refills: 0 | Status: DISCONTINUED | OUTPATIENT
Start: 2020-02-04 | End: 2020-02-06

## 2020-02-04 RX ORDER — CALAMINE AND ZINC OXIDE AND PHENOL 160; 10 MG/ML; MG/ML
1 LOTION TOPICAL DAILY
Refills: 0 | Status: DISCONTINUED | OUTPATIENT
Start: 2020-02-04 | End: 2020-02-06

## 2020-02-04 RX ORDER — LEVOTHYROXINE SODIUM 125 MCG
100 TABLET ORAL DAILY
Refills: 0 | Status: DISCONTINUED | OUTPATIENT
Start: 2020-02-04 | End: 2020-02-06

## 2020-02-04 RX ORDER — ONDANSETRON 8 MG/1
4 TABLET, FILM COATED ORAL EVERY 6 HOURS
Refills: 0 | Status: DISCONTINUED | OUTPATIENT
Start: 2020-02-04 | End: 2020-02-06

## 2020-02-04 RX ORDER — SENNA PLUS 8.6 MG/1
3 TABLET ORAL DAILY
Refills: 0 | Status: DISCONTINUED | OUTPATIENT
Start: 2020-02-04 | End: 2020-02-06

## 2020-02-04 RX ADMIN — OXYCODONE AND ACETAMINOPHEN 1 TABLET(S): 5; 325 TABLET ORAL at 22:59

## 2020-02-04 RX ADMIN — CHLORHEXIDINE GLUCONATE 1 APPLICATION(S): 213 SOLUTION TOPICAL at 05:29

## 2020-02-04 RX ADMIN — OXYCODONE AND ACETAMINOPHEN 1 TABLET(S): 5; 325 TABLET ORAL at 23:30

## 2020-02-04 RX ADMIN — PHENYLEPHRINE HYDROCHLORIDE 15.3 MICROGRAM(S)/KG/MIN: 10 INJECTION INTRAVENOUS at 12:54

## 2020-02-04 RX ADMIN — TAMSULOSIN HYDROCHLORIDE 0.4 MILLIGRAM(S): 0.4 CAPSULE ORAL at 22:56

## 2020-02-04 RX ADMIN — OXYCODONE AND ACETAMINOPHEN 1 TABLET(S): 5; 325 TABLET ORAL at 19:20

## 2020-02-04 RX ADMIN — MIRTAZAPINE 7.5 MILLIGRAM(S): 45 TABLET, ORALLY DISINTEGRATING ORAL at 22:56

## 2020-02-04 RX ADMIN — OXYCODONE AND ACETAMINOPHEN 1 TABLET(S): 5; 325 TABLET ORAL at 18:32

## 2020-02-04 RX ADMIN — Medication 1 APPLICATION(S): at 18:33

## 2020-02-04 NOTE — CONSULT NOTE ADULT - SUBJECTIVE AND OBJECTIVE BOX
Patient is a 89y old  Male who presents with a chief complaint of Chronic cholecystitis (04 Feb 2020 10:59)      HPI:  89 y.o. M with PMH cholecystitis s/p IR PTC tube insertion, DVT on AC, hypothyroidism, afib, HTN presents to [facility] ER for management of PTC tube. PTC was placed by IR 11/29/2019 when pt was admitted for sepsis secondary to acute cholecystitis but refused surgical intervention and also had elevated coags. Pt was readmitted in December for dislodgement of PTC tube. Tube was secured by surgical team with suture. Pt currently with no acute complaints. States drain has been working well since last admission. (29 Jan 2020 18:40) Pt underwent lap cholecystectomy and removal of drain, however procedure was converted to open cholecystectomy given complicated nature of surgery and adhesions which were present.     ICU was consulted for hypotension. After the OR, pt required some phenylephrine for blood pressure support. Pt was seen and examined at bedside, offered no active complaints except for abdominal pain after surgery. Pt noted to have normal blood pressure and phenylephrine was turned off at bedside. BP re-evaluated and noted to be systolic 95 with MAP of 66.       No Known Allergies      MEDICATIONS  (STANDING):  ammonium lactate 12% Lotion 1 Application(s) Topical two times a day  calamine Lotion 1 Application(s) Topical daily  dextrose 5% + sodium chloride 0.9%. 1000 milliLiter(s) (120 mL/Hr) IV Continuous <Continuous>  lactated ringers. 1000 milliLiter(s) (75 mL/Hr) IV Continuous <Continuous>  levothyroxine 100 MICROGram(s) Oral daily  lisinopril 2.5 milliGRAM(s) Oral daily  mirtazapine 7.5 milliGRAM(s) Oral at bedtime  phenylephrine    Infusion 0.5 MICROgram(s)/kG/Min (15.3 mL/Hr) IV Continuous <Continuous>  risperiDONE   Tablet 0.25 milliGRAM(s) Oral daily  senna 3 Tablet(s) Oral daily  tamsulosin 0.4 milliGRAM(s) Oral at bedtime    MEDICATIONS  (PRN):  acetaminophen   Tablet .. 650 milliGRAM(s) Oral every 6 hours PRN Temp greater or equal to 38C (100.4F)  HYDROmorphone  Injectable 0.5 milliGRAM(s) IV Push every 10 minutes PRN Moderate Pain (4 - 6)  morphine  - Injectable 2 milliGRAM(s) IV Push every 4 hours PRN Severe Pain (7 - 10)  ondansetron Injectable 4 milliGRAM(s) IV Push every 6 hours PRN Nausea  oxycodone    5 mG/acetaminophen 325 mG 1 Tablet(s) Oral every 4 hours PRN Moderate Pain (4 - 6)      Daily     Drug Dosing Weight  Height (cm): 180.3 (02 Feb 2020 15:05)  Weight (kg): 81.6 (29 Jan 2020 15:29)  BMI (kg/m2): 25.1 (02 Feb 2020 15:05)  BSA (m2): 2.02 (02 Feb 2020 15:05)    PAST MEDICAL & SURGICAL HISTORY:  PVD (peripheral vascular disease)  Acalculous cholecystitis: s/p percutaneous cholecystostomy  HTN (hypertension)  Hypothyroid  Ulcers of both lower extremities  No significant past surgical history      FAMILY HISTORY:  No pertinent family history in first degree relatives    ADVANCE DIRECTIVES: FULL CODE    REVIEW OF SYSTEMS:    CONSTITUTIONAL: No fever, weight loss  EYES: No eye pain, visual disturbances, or discharge  NECK: No pain or stiffness  RESPIRATORY: No cough, wheezing, chills or hemoptysis; No shortness of breath  CARDIOVASCULAR: No chest pain, palpitations, dizziness, or leg swelling  GASTROINTESTINAL: No nausea, vomiting, or hematemesis; No diarrhea or constipation. No melena or hematochezia.  GENITOURINARY: No dysuria, frequency, hematuria, or incontinence  NEUROLOGICAL: No headaches, memory loss, loss of strength, numbness, or tremors  SKIN: No itching, burning, rashes, or lesions   MUSCULOSKELETAL: No joint pain or swelling; No muscle, back, or extremity pain          ICU Vital Signs Last 24 Hrs  T(C): 36.3 (04 Feb 2020 12:04), Max: 36.9 (04 Feb 2020 05:23)  T(F): 97.4 (04 Feb 2020 12:04), Max: 98.4 (04 Feb 2020 05:23)  HR: 93 (04 Feb 2020 13:26) (66 - 93)  BP: 96/55 (04 Feb 2020 13:26) (87/58 - 138/86)  BP(mean): 66 (04 Feb 2020 13:26) (66 - 84)  RR: 18 (04 Feb 2020 13:26) (18 - 22)  SpO2: 95% (04 Feb 2020 13:26) (95% - 100%)          I&O's Detail    03 Feb 2020 07:01  -  04 Feb 2020 07:00  --------------------------------------------------------  IN:  Total IN: 0 mL    OUT:    Drain: 130 mL  Total OUT: 130 mL    Total NET: -130 mL      04 Feb 2020 07:01  -  04 Feb 2020 14:02  --------------------------------------------------------  IN:    Lactated Ringers IV Bolus: 1700 mL    lactated ringers.: 115 mL  Total IN: 1815 mL    OUT:    Bulb: 80 mL    Estimated Blood Loss: 300 mL  Total OUT: 380 mL    Total NET: 1435 mL          PHYSICAL EXAM:    GENERAL: NAD, well-developed  HEAD:  Atraumatic, Normocephalic  EYES: EOMI, PERRL, conjunctiva and sclera clear  ENMT: No tonsillar erythema, exudates, or enlargement; dry mucous membranes, Good dentition, No lesions  NECK: Supple, No JVD  NERVOUS SYSTEM:  Alert & Oriented X3, Good concentration; Motor Strength 5/5 B/L upper and lower extremities; DTRs 2+ intact and symmetric  CHEST/LUNG: Clear to auscultation bilaterally; No rales, rhonchi, wheezing, or rubs  HEART: Regular rate and rhythm; No murmurs, rubs, or gallops  ABDOMEN: Soft, Nontender, Nondistended, RUQ drain removed, MARYJO drain with approx 200cc of serosanguineous fluid, Bowel sounds present  EXTREMITIES:  2+ Peripheral Pulses, No clubbing, cyanosis, or edema  SKIN: No rashes or lesions    LABS:  CBC Full  -  ( 04 Feb 2020 07:40 )  WBC Count : 7.02 K/uL  RBC Count : 3.51 M/uL  Hemoglobin : 10.8 g/dL  Hematocrit : 35.4 %  Platelet Count - Automated : 279 K/uL  Mean Cell Volume : 100.9 fl  Mean Cell Hemoglobin : 30.8 pg  Mean Cell Hemoglobin Concentration : 30.5 gm/dL  Auto Neutrophil # : x  Auto Lymphocyte # : x  Auto Monocyte # : x  Auto Eosinophil # : x  Auto Basophil # : x  Auto Neutrophil % : x  Auto Lymphocyte % : x  Auto Monocyte % : x  Auto Eosinophil % : x  Auto Basophil % : x    02-04    147<H>  |  111<H>  |  19<H>  ----------------------------<  88  4.3   |  28  |  0.93    Ca    8.7      04 Feb 2020 07:40    TPro  6.0  /  Alb  2.4<L>  /  TBili  0.4  /  DBili  x   /  AST  15  /  ALT  10  /  AlkPhos  92  02-04    CAPILLARY BLOOD GLUCOSE        PT/INR - ( 04 Feb 2020 07:40 )   PT: 13.2 sec;   INR: 1.18 ratio         PTT - ( 04 Feb 2020 07:40 )  PTT:37.4 sec          CRITICAL CARE TIME SPENT: 45 minutes

## 2020-02-04 NOTE — CONSULT NOTE ADULT - ASSESSMENT
89 y.o. M with PMH cholecystitis s/p IR PTC tube insertion, DVT on AC, hypothyroidism, afib, HTN presents to [facility] ER for management of PTC tube.  Medicine team is consulted for harper-operative optimization.
89 y.o. M with PMH cholecystitis s/p IR PTC tube insertion, DVT on AC, hypothyroidism, Afib, HTN, chronic cholecystitis with PTC tube placed by IR 11/29/2019 s/p multiple dislodgements of PTC tube, s/p lap converted to open cholecystectomy with removal of PTC tube, evaluated by ICU team due to hypotension requiring pressors. Pt seen and evaluated at bedside. Phenylephrine turned off and NS fluids run at 100cc/hr with improved blood pressure to systolic 110s    Patient does not require ICU level of care at this time.      Recommendations:  - continue with NS @100cc/hr for the next 10 hrs  - can continue to be monitored on surgical floor as Afib currently rate controlled  - NPO for now as per surgery    Discussed above plan with Felicita TORRES.           CARDIOVASCULAR  #Atrial fibrillation  -currently rate controlled  -restart AC per surgery  PULMONARY  #no issues  GASTROINTESTINAL  #s/p open cholecystectomy  NPO for now  rest of management as per surgery  RENAL  #no issues  continue to monitor urine output and BUN/creatinine  Neurology  #no issues  INFECTIOUS DISEASE  #no issues  ENDOCRINE  #no issues  HEME  #no current issues  -f/u repeat cbc  FLUIDS/ELECTROLYTES/NUTRITION  -IVF - NS@100cc/hr for 10 hours  -Monitor, Replete to K>4 and Mg>2  -Diet - NPO for now    PROPHYLAXIS  -DVT - recommend heparin subQ prophylaxis dose or scd boots  -GI - recommend IV PPI daily for now    DISPO - surgical floor under Dr. Elias service  CODE STATUS
1. CBD stone  2. S/p Cholecystectomy  3. No evidence of biliary obstruction  4. Anemia  5. No evidence of acute GI bleeding    Suggestions:    1. Monitor H/H  2. Follow up LFT's  3. Protonix daily  4. ERCP when more stable  5. Avoid NSAID  6. DVT prophylaxis  7. Surgical follow up  8. DVT prophylaxis

## 2020-02-04 NOTE — BRIEF OPERATIVE NOTE - NSICDXBRIEFPREOP_GEN_ALL_CORE_FT
PRE-OP DIAGNOSIS:  Chronic cholecystitis 04-Feb-2020 11:34:40 hx percutaneous biliary drain Felicita Loyola

## 2020-02-04 NOTE — PROGRESS NOTE ADULT - SUBJECTIVE AND OBJECTIVE BOX
late entry    patient was seen and examined   s no specific  c/o offered  no cp or sob , or abdominal pain   MEDICATIONS  (STANDING):    MEDICATIONS  (PRN):    PAST MEDICAL & SURGICAL HISTORY:  PVD (peripheral vascular disease)  Acalculous cholecystitis: s/p percutaneous cholecystostomy  HTN (hypertension)  Hypothyroid  Ulcers of both lower extremities  No significant past surgical history    REVIEW OF SYSTEMS:   CONSTITUTIONAL: No fever, weight loss, or fatigue  EYES: No eye pain, visual disturbances, or discharge  ENMT:  No difficulty hearing, tinnitus, vertigo; No sinus or throat pain  NECK: No pain or stiffness  BREASTS: No pain, masses, or nipple discharge  RESPIRATORY: No cough, wheezing, chills or hemoptysis; No shortness of breath  CARDIOVASCULAR: No chest pain, palpitations, dizziness, or leg swelling  GASTROINTESTINAL: No abdominal or epigastric pain. No nausea, vomiting, or hematemesis; No diarrhea or constipation. No melena or hematochezia.  GENITOURINARY: No dysuria, frequency, hematuria, or incontinence  NEUROLOGICAL: No headaches, memory loss, loss of strength, numbness, or tremors  SKIN: No itching, burning, rashes, or lesions   LYMPH NODES: No enlarged glands  ENDOCRINE: No heat or cold intolerance; No hair loss  MUSCULOSKELETAL: No joint pain or swelling; No muscle, back, or extremity pain  PSYCHIATRIC: No depression, anxiety, mood swings, or difficulty sleeping  HEME/LYMPH: No easy bruising, or bleeding gums  ALLERY AND IMMUNOLOGIC: No hives or eczema      Vital Signs Last 24 Hrs  T(C): 36.9 (04 Feb 2020 05:23), Max: 36.9 (04 Feb 2020 05:23)  T(F): 98.4 (04 Feb 2020 05:23), Max: 98.4 (04 Feb 2020 05:23)  HR: 66 (04 Feb 2020 05:23) (66 - 76)  BP: 134/61 (04 Feb 2020 05:23) (116/58 - 138/86)  BP(mean): --  RR: 18 (04 Feb 2020 05:23) (18 - 18)  SpO2: 97% (04 Feb 2020 05:23) (96% - 98%)      PHYSICAL EXAM:  GENERAL: NAD, well-groomed, well-developed  HEAD:  Atraumatic, Normocephalic  EYES: EOMI, PERRLA, conjunctiva and sclera clear  ENMT: No tonsillar erythema, exudates, or enlargement; Moist mucous membranes, Good dentition, No lesions  NECK: Supple, No JVD, Normal thyroid  NERVOUS SYSTEM:  Alert & Oriented X 1 to self, Good concentration;   CHEST/LUNG: Clear to percussion bilaterally; No rales, rhonchi, wheezing, or rubs  HEART: Regular rate and rhythm; No murmurs, rubs, or gallops  ABDOMEN: Soft, Nontender, Nondistended; Bowel sounds present, PTC tube in place draining mustard colored discharge  EXTREMITIES:  2+ Peripheral Pulses, chronic venous stasis changes No clubbing, cyanosis, or edema  LYMPH: No lymphadenopathy noted  SKIN: No rashes or lesions      LABS:                        10.8   7.02  )-----------( 279      ( 04 Feb 2020 07:40 )             35.4     02-04    147<H>  |  111<H>  |  19<H>  ----------------------------<  88  4.3   |  28  |  0.93    Ca    8.7      04 Feb 2020 07:40    TPro  6.0  /  Alb  2.4<L>  /  TBili  0.4  /  DBili  x   /  AST  15  /  ALT  10  /  AlkPhos  92  02-04    PT/INR - ( 04 Feb 2020 07:40 )   PT: 13.2 sec;   INR: 1.18 ratio         PTT - ( 04 Feb 2020 07:40 )  PTT:37.4 sec                      LABS:                        11.2   7.63  )-----------( 265      ( 03 Feb 2020 07:30 )             36.3     02-02    144  |  113<H>  |  19<H>  ----------------------------<  86  3.7   |  26  |  0.87    Ca    7.9<L>      02 Feb 2020 08:00      PTT - ( 03 Feb 2020 10:05 )  PTT:90.7 sec                        LABS:                        9.9    6.56  )-----------( 254      ( 02 Feb 2020 08:00 )             32.3     02-02    144  |  113<H>  |  19<H>  ----------------------------<  86  3.7   |  26  |  0.87    Ca    7.9<L>      02 Feb 2020 08:00      PTT - ( 02 Feb 2020 08:00 )  PTT:41.0 sec                        LABS:                        10.3   8.03  )-----------( 291      ( 01 Feb 2020 07:28 )             33.6     02-01    145  |  114<H>  |  23<H>  ----------------------------<  86  3.6   |  27  |  0.84    Ca    8.4      01 Feb 2020 07:28                              LABS:                        12.0   10.20 )-----------( 368      ( 29 Jan 2020 16:49 )             38.7     01-29    143  |  113<H>  |  31<H>  ----------------------------<  82  4.5   |  25  |  1.07    Ca    8.2<L>      29 Jan 2020 16:49    TPro  6.9  /  Alb  2.6<L>  /  TBili  0.4  /  DBili  x   /  AST  23  /  ALT  10  /  AlkPhos  117  01-29    PT/INR - ( 29 Jan 2020 16:49 )   PT: 16.6 sec;   INR: 1.48 ratio         PTT - ( 29 Jan 2020 16:49 )  PTT:49.9 sec    CAPILLARY BLOOD GLUCOSE    < from: Transthoracic Echocardiogram (11.22.19 @ 10:44) >  Ejection Fraction Visual Estimate: >55 %    ------------------------------------------------------------------------  OBSERVATIONS:  Mitral Valve: Mitral annular calcification. Mild mitral  regurgitation.  Aortic Root: Normal aortic root.  Aortic Valve: Calcified trileaflet aortic valve with normal  opening. Mild aortic regurgitation.  LeftAtrium: Moderate left atrial enlargement.  Left Ventricle: Endocardium not well visualized; grossly  normal left ventricular systolic function. Normal left  ventricular internal dimensions and wall thicknesses.  Right Heart: Normal right atrium. Normal right ventricular  size and function. There is moderate-severe tricuspid  regurgitation. Normal pulmonic valve.  Pericardium/PleuraNormal pericardium with no pericardial  effusion.  Hemodynamic: RA Pressure is 10 mm Hg. RV systolic pressure  is 84 mm Hg. Severe pulmonary hypertension.  ------------------------------------------------------------------------  CONCLUSIONS:  1. Mitral annular calcification. Mild mitral regurgitation.    2. Calcified trileaflet aortic valve with normal opening.  Mild aortic regurgitation.  3. Moderate left atrial enlargement.  4. Normal left ventricular internal dimensions and wall  thicknesses.  5. Endocardium not well visualized; grossly normal left  ventricular systolic function.  6. Normal right ventricularsize and function.  7. RV systolic pressure is 84 mm Hg. Severe pulmonary  hypertension.    ------------------------------------------------------------------------  Confirmed on  11/23/2019 - 08:27:04 by Ayush Yang MD  ------------------------------------------------------------------------    < end of copied text >                RADIOLOGY & ADDITIONAL TESTS:        < from: CT Abdomen and Pelvis w/ IV Cont (01.14.20 @ 17:29) >  MPRESSION:     Cholecystostomy tube appears in good position.    Deep vein thrombosis in the right common femoral vein.    Small enhancing fluid surrounding both ischial tuberosities.    Dr. Heart discussed the findings with Dr. Jacinto on January 14, 2020 at 5:55 PM.  Readback was obtained.

## 2020-02-04 NOTE — PROGRESS NOTE ADULT - ASSESSMENT
89yo male s/p lap converted to open cholecystectomy    1) npo  2) ivf  3) monitor eloisa output  4) bladder scan, may need cabrales  5) pain management  6) incentive spirometry

## 2020-02-04 NOTE — PROGRESS NOTE ADULT - SUBJECTIVE AND OBJECTIVE BOX
surgery post-op    s/p lap converted to open cholecystectomy  Pt seen at bedside, awake, NAD, reports 4/10 incisional pain, no nausea or vomiting, has not voided  pt is npo    Vital Signs Last 24 Hrs  T(C): 36.7 (04 Feb 2020 15:54), Max: 36.9 (04 Feb 2020 05:23)  T(F): 98 (04 Feb 2020 15:54), Max: 98.4 (04 Feb 2020 05:23)  HR: 85 (04 Feb 2020 15:54) (66 - 98)  BP: 140/75 (04 Feb 2020 15:54) (87/58 - 140/75)  BP(mean): 71 (04 Feb 2020 14:30) (66 - 84)  RR: 17 (04 Feb 2020 15:54) (17 - 23)  SpO2: 100% (04 Feb 2020 15:54) (95% - 100%)    Exam:  Gen: A&Ox3, NAD  Abdomen: soft, ND, nichole dressing in place not saturated, MARYJO in place with serobilious output 130mL  Extremities: no edema, no calf tenderness erma

## 2020-02-04 NOTE — PROGRESS NOTE ADULT - ASSESSMENT
89 y.o. M with PMH cholecystitis s/p IR PTC tube insertion, DVT on AC, hypothyroidism, afib, HTN presents to [facility] ER for management of PTC tube.  Medicine team is consulted for harper-operative optimization.

## 2020-02-04 NOTE — CONSULT NOTE ADULT - ATTENDING COMMENTS
Assessment:  1. Chronic cholecystitis s/p open cholecystectomy   2. Hypotension  3. Afib    Plan  - At time of evaluation, off vasopressors for an hour  - IV fluid resuscitation, but careful with aggressive fluid resuscitation   - Monitor drainage output  - Consider post operative labs   - Avoid BP medications for now  - Anticoagulation once cleared by surgery for afib  - Cardiology follow up   - Monitor heart rate on cardiac monitor   - At this time, continue care per medical/surgical teams  - No need for ICU level of care at this time
patient was seen and examined along with resident   above discussed , reviewed and agreed

## 2020-02-04 NOTE — BRIEF OPERATIVE NOTE - NSICDXBRIEFPROCEDURE_GEN_ALL_CORE_FT
PROCEDURES:  Lysis of peritoneal adhesions 04-Feb-2020 11:34:17  Felicita Loyola  Intraoperative cholangiography 04-Feb-2020 11:33:48  Felicita Loyola  Conversion, cholecystectomy, laparoscopic to open 04-Feb-2020 11:33:27  Felicita Loyola

## 2020-02-04 NOTE — CONSULT NOTE ADULT - SUBJECTIVE AND OBJECTIVE BOX
[  ] STAT REQUEST              [  ]ROUTINE REQUEST    Patient is a 89 year old male with common bile duct stone. GI consulted to evaluate.         HPI:  89 year old male with multiple medical problems including cholecystitis s/p IR PTC tube insertion, DVT on AC, hypothyroidism, afib, HTN presents to [facility] ER for management of PTC tube. PTC was placed by IR 11/29/2019 when pt was admitted for sepsis secondary to acute cholecystitis but refused surgical intervention and also had elevated coags. Pt was readmitted in December for dislodgement of PTC tube. Tube was secured by surgical team with suture. Pt currently with no acute complaints. States drain has been working well since last admission. Patient S/p open cholecystectomy found to have CBD stone on intraoperative cholangiogram.      PAIN MANAGEMENT:  Pain Scale:                 4-5/10  Pain Location:  Incisional pain    Prior Colonoscopy:  Unknown    PAST MEDICAL HISTORY  PVD   Cholecystitis  HTN    Hypothyroid  Ulcers of both lower extremities  Afib  DVT      PAST SURGICAL HISTORY  Open cholecystectomy      Allergies    No Known Allergies    Intolerances  None         MEDICATIONS  (STANDING):  ammonium lactate 12% Lotion 1 Application(s) Topical two times a day  calamine Lotion 1 Application(s) Topical daily  levothyroxine 100 MICROGram(s) Oral daily  lisinopril 2.5 milliGRAM(s) Oral daily  mirtazapine 7.5 milliGRAM(s) Oral at bedtime  risperiDONE   Tablet 0.25 milliGRAM(s) Oral daily  senna 3 Tablet(s) Oral daily  sodium chloride 0.9%. 1000 milliLiter(s) (100 mL/Hr) IV Continuous <Continuous>  tamsulosin 0.4 milliGRAM(s) Oral at bedtime    MEDICATIONS  (PRN):  acetaminophen   Tablet .. 650 milliGRAM(s) Oral every 6 hours PRN Temp greater or equal to 38C (100.4F)  morphine  - Injectable 2 milliGRAM(s) IV Push every 4 hours PRN Severe Pain (7 - 10)  ondansetron Injectable 4 milliGRAM(s) IV Push every 6 hours PRN Nausea  oxycodone    5 mG/acetaminophen 325 mG 1 Tablet(s) Oral every 4 hours PRN Moderate Pain (4 - 6)      SOCIAL HISTORY  Advanced Directives:       [ X ] Full Code       [  ] DNR  Marital Status:         [  ] M      [X  ] S      [  ] D       [  ] W  Children:       [ X ] Yes      [  ] No  Occupation:        [  ] Employed       [ X ] Unemployed       [  ] Retired  Diet:       [ X ] Regular       [  ] PEG feeding          [  ] NG tube feeding  Drug Use:           [ X ] Patient denied          [  ] Yes  Alcohol:           [ X ] No             [  ] Yes (socially)         [  ] Yes (chronic)  Tobacco:           [  ] Yes           [ X ] No    FAMILY HISTORY  [ X ] Heart Disease            [  ] Diabetes             [  ] HTN             [  ] Colon Cancer             [  ] Stomach Cancer              [  ] Pancreatic Cancer        VITAL SIGNS   Vital Signs Last 24 Hrs  T(C): 36.7 (04 Feb 2020 15:54), Max: 36.9 (04 Feb 2020 05:23)  T(F): 98 (04 Feb 2020 15:54), Max: 98.4 (04 Feb 2020 05:23)  HR: 85 (04 Feb 2020 15:54) (66 - 98)  BP: 140/75 (04 Feb 2020 15:54) (87/58 - 140/75)  BP(mean): 71 (04 Feb 2020 14:30) (66 - 84)  RR: 17 (04 Feb 2020 15:54) (17 - 23)  SpO2: 100% (04 Feb 2020 15:54) (95% - 100%)           CBC Full  -  ( 04 Feb 2020 07:40 )  WBC Count : 7.02 K/uL  RBC Count : 3.51 M/uL  Hemoglobin : 10.8 g/dL  Hematocrit : 35.4 %  Platelet Count - Automated : 279 K/uL  Mean Cell Volume : 100.9 fl  Mean Cell Hemoglobin : 30.8 pg  Mean Cell Hemoglobin Concentration : 30.5 gm/dL  Auto Neutrophil # : x  Auto Lymphocyte # : x  Auto Monocyte # : x  Auto Eosinophil # : x  Auto Basophil # : x  Auto Neutrophil % : x  Auto Lymphocyte % : x  Auto Monocyte % : x  Auto Eosinophil % : x  Auto Basophil % : x      02-04    147<H>  |  111<H>  |  19<H>  ----------------------------<  88  4.3   |  28  |  0.93    Ca    8.7      04 Feb 2020 07:40    TPro  6.0  /  Alb  2.4<L>  /  TBili  0.4  /  DBili  x   /  AST  15  /  ALT  10  /  AlkPhos  92  02-04       PT/INR - ( 04 Feb 2020 07:40 )   PT: 13.2 sec;   INR: 1.18 ratio       PTT - ( 04 Feb 2020 07:40 )  PTT:37.4 sec    Urinalysis (12.07.19 @ 01:09)    Glucose Qualitative, Urine: Negative    Blood, Urine: Negative    pH Urine: 6.0    Color: Yellow    Urine Appearance: Clear    Bilirubin: Negative    Ketone - Urine: Negative    Specific Gravity: 1.015    Protein, Urine: Negative    Urobilinogen: Negative    Nitrite: Negative    Leukocyte Esterase Concentration: Trace        ECG  Ventricular Rate 81 BPM    Atrial Rate 88 BPM    QRS Duration 84 ms    Q-T Interval 410 ms    QTC Calculation(Bezet) 476 ms    R Axis -1 degrees    T Axis 27 degrees    Diagnosis Line Atrial fibrillation  Abnormal ECG        RADIOLOGY/IMAGING                EXAM:  CT ABDOMEN AND PELVIS IC                            PROCEDURE DATE:  01/14/2020          INTERPRETATION:  CLINICAL INFORMATION: Cholecystostomy tube movement.     COMPARISON: December 7, 2019    PROCEDURE:   CT of the Abdomen and Pelvis was performed with intravenous contrast.   Intravenous contrast: 90 ml Omnipaque 350. 10 ml discarded.  Oral contrast: None.  Sagittal and coronal reformats were performed.    FINDINGS:    LOWER CHEST: Within normal limits.    LIVER: Within normal limits.  BILE DUCTS: Normal caliber.  GALLBLADDER: Contains a cholecystostomy tube, which appears in good position.  SPLEEN: Within normal limits.  PANCREAS: Within normal limits.  ADRENALS: Within normal limits.  KIDNEYS/URETERS: No hydronephrosis. Left renal cyst.    BLADDER: Within normal limits.  REPRODUCTIVE ORGANS: Prostate gland is enlarged and heterogeneous.    BOWEL: Moderate-sized hiatal hernia. No bowel obstruction. Colonic diverticulosis.  PERITONEUM: No ascites.  VESSELS: Deep vein thrombosis in the right common femoral vein.  RETROPERITONEUM/LYMPH NODES: No lymphadenopathy.    ABDOMINAL WALL: Within normal limits.  BONES: Degenerative changes. Small enhancing fluid surrounding both ischial tuberosities. Hemangioma in the T9 vertebral body.    IMPRESSION:     Cholecystostomy tube appears in good position.    Deep vein thrombosis in the right common femoral vein.    Small enhancing fluid surrounding both ischial tuberosities.          EXAM:  US LIVER AND PANCREAS                            PROCEDURE DATE:  11/19/2019          INTERPRETATION:  History: Distended gallbladder on recent CT.    Right upper quadrant ultrasound.    Gallbladder is distended with dependent sludge mild diffuse wall   thickening. No gallstones or pericholecystic fluid. If there is clinical   suspicion for cystic duct obstruction/cholecystitis consider HIDA scan.   No biliary dilatation. Common duct 0.6 cm, normal for age.  Liver not remarkable. Pancreas not well visualized. Right kidney 11.6 cm,   unremarkable.  No ascites.    Impression: Distended gallbladder with sludge wall thickening. Correlate   with HIDA scan as clinically indicated. No biliary dilatation.

## 2020-02-04 NOTE — CONSULT NOTE ADULT - NEGATIVE ENMT SYMPTOMS
no nose bleeds/no vertigo/no gum bleeding/no dry mouth/no throat pain/no dysphagia/no hearing difficulty/no ear pain

## 2020-02-05 LAB
ANION GAP SERPL CALC-SCNC: 5 MMOL/L — SIGNIFICANT CHANGE UP (ref 5–17)
BASOPHILS # BLD AUTO: 0.01 K/UL — SIGNIFICANT CHANGE UP (ref 0–0.2)
BASOPHILS NFR BLD AUTO: 0.1 % — SIGNIFICANT CHANGE UP (ref 0–2)
BUN SERPL-MCNC: 16 MG/DL — SIGNIFICANT CHANGE UP (ref 7–18)
CALCIUM SERPL-MCNC: 8 MG/DL — LOW (ref 8.4–10.5)
CHLORIDE SERPL-SCNC: 112 MMOL/L — HIGH (ref 96–108)
CO2 SERPL-SCNC: 27 MMOL/L — SIGNIFICANT CHANGE UP (ref 22–31)
CREAT SERPL-MCNC: 0.99 MG/DL — SIGNIFICANT CHANGE UP (ref 0.5–1.3)
EOSINOPHIL # BLD AUTO: 0.07 K/UL — SIGNIFICANT CHANGE UP (ref 0–0.5)
EOSINOPHIL NFR BLD AUTO: 0.7 % — SIGNIFICANT CHANGE UP (ref 0–6)
GLUCOSE SERPL-MCNC: 103 MG/DL — HIGH (ref 70–99)
HCT VFR BLD CALC: 32.5 % — LOW (ref 39–50)
HGB BLD-MCNC: 9.9 G/DL — LOW (ref 13–17)
IMM GRANULOCYTES NFR BLD AUTO: 0.4 % — SIGNIFICANT CHANGE UP (ref 0–1.5)
LYMPHOCYTES # BLD AUTO: 1.76 K/UL — SIGNIFICANT CHANGE UP (ref 1–3.3)
LYMPHOCYTES # BLD AUTO: 18.6 % — SIGNIFICANT CHANGE UP (ref 13–44)
MCHC RBC-ENTMCNC: 30.5 GM/DL — LOW (ref 32–36)
MCHC RBC-ENTMCNC: 31.4 PG — SIGNIFICANT CHANGE UP (ref 27–34)
MCV RBC AUTO: 103.2 FL — HIGH (ref 80–100)
MONOCYTES # BLD AUTO: 0.46 K/UL — SIGNIFICANT CHANGE UP (ref 0–0.9)
MONOCYTES NFR BLD AUTO: 4.9 % — SIGNIFICANT CHANGE UP (ref 2–14)
NEUTROPHILS # BLD AUTO: 7.13 K/UL — SIGNIFICANT CHANGE UP (ref 1.8–7.4)
NEUTROPHILS NFR BLD AUTO: 75.3 % — SIGNIFICANT CHANGE UP (ref 43–77)
NRBC # BLD: 0 /100 WBCS — SIGNIFICANT CHANGE UP (ref 0–0)
PLATELET # BLD AUTO: 234 K/UL — SIGNIFICANT CHANGE UP (ref 150–400)
POTASSIUM SERPL-MCNC: 4.4 MMOL/L — SIGNIFICANT CHANGE UP (ref 3.5–5.3)
POTASSIUM SERPL-SCNC: 4.4 MMOL/L — SIGNIFICANT CHANGE UP (ref 3.5–5.3)
RBC # BLD: 3.15 M/UL — LOW (ref 4.2–5.8)
RBC # FLD: 16.2 % — HIGH (ref 10.3–14.5)
SODIUM SERPL-SCNC: 144 MMOL/L — SIGNIFICANT CHANGE UP (ref 135–145)
WBC # BLD: 9.47 K/UL — SIGNIFICANT CHANGE UP (ref 3.8–10.5)
WBC # FLD AUTO: 9.47 K/UL — SIGNIFICANT CHANGE UP (ref 3.8–10.5)

## 2020-02-05 RX ORDER — ALBUTEROL 90 UG/1
2.5 AEROSOL, METERED ORAL ONCE
Refills: 0 | Status: COMPLETED | OUTPATIENT
Start: 2020-02-05 | End: 2020-02-05

## 2020-02-05 RX ORDER — SODIUM CHLORIDE 9 MG/ML
1000 INJECTION, SOLUTION INTRAVENOUS
Refills: 0 | Status: DISCONTINUED | OUTPATIENT
Start: 2020-02-05 | End: 2020-02-06

## 2020-02-05 RX ADMIN — SENNA PLUS 3 TABLET(S): 8.6 TABLET ORAL at 14:59

## 2020-02-05 RX ADMIN — Medication 1 APPLICATION(S): at 18:52

## 2020-02-05 RX ADMIN — MIRTAZAPINE 7.5 MILLIGRAM(S): 45 TABLET, ORALLY DISINTEGRATING ORAL at 21:24

## 2020-02-05 RX ADMIN — CALAMINE AND ZINC OXIDE AND PHENOL 1 APPLICATION(S): 160; 10 LOTION TOPICAL at 18:53

## 2020-02-05 RX ADMIN — LISINOPRIL 2.5 MILLIGRAM(S): 2.5 TABLET ORAL at 06:18

## 2020-02-05 RX ADMIN — TAMSULOSIN HYDROCHLORIDE 0.4 MILLIGRAM(S): 0.4 CAPSULE ORAL at 21:24

## 2020-02-05 RX ADMIN — RISPERIDONE 0.25 MILLIGRAM(S): 4 TABLET ORAL at 15:00

## 2020-02-05 RX ADMIN — ALBUTEROL 2.5 MILLIGRAM(S): 90 AEROSOL, METERED ORAL at 18:52

## 2020-02-05 RX ADMIN — OXYCODONE AND ACETAMINOPHEN 1 TABLET(S): 5; 325 TABLET ORAL at 06:59

## 2020-02-05 RX ADMIN — Medication 100 MICROGRAM(S): at 06:18

## 2020-02-05 RX ADMIN — Medication 1 APPLICATION(S): at 06:18

## 2020-02-05 RX ADMIN — OXYCODONE AND ACETAMINOPHEN 1 TABLET(S): 5; 325 TABLET ORAL at 06:17

## 2020-02-05 NOTE — PROGRESS NOTE ADULT - NEGATIVE ENMT SYMPTOMS
no throat pain/no dysphagia/no dry mouth/no vertigo/no nose bleeds/no gum bleeding/no ear pain/no hearing difficulty

## 2020-02-05 NOTE — PHYSICAL THERAPY INITIAL EVALUATION ADULT - ADDITIONAL COMMENTS
Pt. was in Sub- Acute Rehab. prior to hospitalization and was non-ambulatory requiring a mechanical lift for transfers. Pt. was in Sub- Acute Rehab. prior to hospitalization and was non-ambulatory requiring a mechanical lift for transfers.  In Nov 2019, pt was ambulatory w/ rolling walker.

## 2020-02-05 NOTE — PROGRESS NOTE ADULT - SUBJECTIVE AND OBJECTIVE BOX
patient was seen and examined   s no specific  c/o offered  no cp or sob , or abdominal pain  s/p surgery   MEDICATIONS  (STANDING):  ammonium lactate 12% Lotion 1 Application(s) Topical two times a day  calamine Lotion 1 Application(s) Topical daily  dextrose 5% + sodium chloride 0.45% 1000 milliLiter(s) (75 mL/Hr) IV Continuous <Continuous>  levothyroxine 100 MICROGram(s) Oral daily  lisinopril 2.5 milliGRAM(s) Oral daily  mirtazapine 7.5 milliGRAM(s) Oral at bedtime  risperiDONE   Tablet 0.25 milliGRAM(s) Oral daily  senna 3 Tablet(s) Oral daily  tamsulosin 0.4 milliGRAM(s) Oral at bedtime    MEDICATIONS  (PRN):  acetaminophen   Tablet .. 650 milliGRAM(s) Oral every 6 hours PRN Temp greater or equal to 38C (100.4F)  morphine  - Injectable 2 milliGRAM(s) IV Push every 4 hours PRN Severe Pain (7 - 10)  ondansetron Injectable 4 milliGRAM(s) IV Push every 6 hours PRN Nausea  oxycodone    5 mG/acetaminophen 325 mG 1 Tablet(s) Oral every 4 hours PRN Moderate Pain (4 - 6)    MEDICATIONS  (STANDING):    MEDICATIONS  (PRN):    PAST MEDICAL & SURGICAL HISTORY:  PVD (peripheral vascular disease)  Acalculous cholecystitis: s/p percutaneous cholecystostomy  HTN (hypertension)  Hypothyroid  Ulcers of both lower extremities  No significant past surgical history    REVIEW OF SYSTEMS:   CONSTITUTIONAL: No fever, weight loss, or fatigue  EYES: No eye pain, visual disturbances, or discharge  ENMT:  No difficulty hearing, tinnitus, vertigo; No sinus or throat pain  NECK: No pain or stiffness  BREASTS: No pain, masses, or nipple discharge  RESPIRATORY: No cough, wheezing, chills or hemoptysis; No shortness of breath  CARDIOVASCULAR: No chest pain, palpitations, dizziness, or leg swelling  GASTROINTESTINAL: No abdominal or epigastric pain. No nausea, vomiting, or hematemesis; No diarrhea or constipation. No melena or hematochezia.  GENITOURINARY: No dysuria, frequency, hematuria, or incontinence  NEUROLOGICAL: No headaches, memory loss, loss of strength, numbness, or tremors  SKIN: No itching, burning, rashes, or lesions   LYMPH NODES: No enlarged glands  ENDOCRINE: No heat or cold intolerance; No hair loss  MUSCULOSKELETAL: No joint pain or swelling; No muscle, back, or extremity pain  PSYCHIATRIC: No depression, anxiety, mood swings, or difficulty sleeping  HEME/LYMPH: No easy bruising, or bleeding gums  ALLERY AND IMMUNOLOGIC: No hives or eczema    Vital Signs Last 24 Hrs  T(C): 36.7 (05 Feb 2020 14:22), Max: 37.1 (05 Feb 2020 02:00)  T(F): 98 (05 Feb 2020 14:22), Max: 98.8 (05 Feb 2020 02:00)  HR: 65 (05 Feb 2020 14:22) (65 - 105)  BP: 143/77 (05 Feb 2020 14:22) (116/74 - 151/74)  BP(mean): --  RR: 18 (05 Feb 2020 14:22) (17 - 18)  SpO2: 95% (05 Feb 2020 14:22) (95% - 100%)      PHYSICAL EXAM:  GENERAL: NAD, well-groomed, well-developed  HEAD:  Atraumatic, Normocephalic  EYES: EOMI, PERRLA, conjunctiva and sclera clear  ENMT: No tonsillar erythema, exudates, or enlargement; Moist mucous membranes, Good dentition, No lesions  NECK: Supple, No JVD, Normal thyroid  NERVOUS SYSTEM:  Alert & Oriented X 1 to self, Good concentration;   CHEST/LUNG: Clear to percussion bilaterally; No rales, rhonchi, wheezing, or rubs  HEART: Regular rate and rhythm; No murmurs, rubs, or gallops  ABDOMEN: Soft, Nontender, Nondistended; Bowel sounds present, surgical site , no bleeding  EXTREMITIES:  2+ Peripheral Pulses, chronic venous stasis changes No clubbing, cyanosis, or edema  LYMPH: No lymphadenopathy noted  SKIN: No rashes or lesions    LABS:                        9.9    9.47  )-----------( 234      ( 05 Feb 2020 07:40 )             32.5     02-05    144  |  112<H>  |  16  ----------------------------<  103<H>  4.4   |  27  |  0.99    Ca    8.0<L>      05 Feb 2020 07:40    TPro  6.0  /  Alb  2.4<L>  /  TBili  0.4  /  DBili  x   /  AST  15  /  ALT  10  /  AlkPhos  92  02-04    PT/INR - ( 04 Feb 2020 07:40 )   PT: 13.2 sec;   INR: 1.18 ratio         PTT - ( 04 Feb 2020 07:40 )  PTT:37.4 sec                      LABS:                        10.8   7.02  )-----------( 279      ( 04 Feb 2020 07:40 )             35.4     02-04    147<H>  |  111<H>  |  19<H>  ----------------------------<  88  4.3   |  28  |  0.93    Ca    8.7      04 Feb 2020 07:40    TPro  6.0  /  Alb  2.4<L>  /  TBili  0.4  /  DBili  x   /  AST  15  /  ALT  10  /  AlkPhos  92  02-04    PT/INR - ( 04 Feb 2020 07:40 )   PT: 13.2 sec;   INR: 1.18 ratio         PTT - ( 04 Feb 2020 07:40 )  PTT:37.4 sec                      LABS:                        11.2   7.63  )-----------( 265      ( 03 Feb 2020 07:30 )             36.3     02-02    144  |  113<H>  |  19<H>  ----------------------------<  86  3.7   |  26  |  0.87    Ca    7.9<L>      02 Feb 2020 08:00      PTT - ( 03 Feb 2020 10:05 )  PTT:90.7 sec                        LABS:                        9.9    6.56  )-----------( 254      ( 02 Feb 2020 08:00 )             32.3     02-02    144  |  113<H>  |  19<H>  ----------------------------<  86  3.7   |  26  |  0.87    Ca    7.9<L>      02 Feb 2020 08:00      PTT - ( 02 Feb 2020 08:00 )  PTT:41.0 sec                        LABS:                        10.3   8.03  )-----------( 291      ( 01 Feb 2020 07:28 )             33.6     02-01    145  |  114<H>  |  23<H>  ----------------------------<  86  3.6   |  27  |  0.84    Ca    8.4      01 Feb 2020 07:28                              LABS:                        12.0   10.20 )-----------( 368      ( 29 Jan 2020 16:49 )             38.7     01-29    143  |  113<H>  |  31<H>  ----------------------------<  82  4.5   |  25  |  1.07    Ca    8.2<L>      29 Jan 2020 16:49    TPro  6.9  /  Alb  2.6<L>  /  TBili  0.4  /  DBili  x   /  AST  23  /  ALT  10  /  AlkPhos  117  01-29    PT/INR - ( 29 Jan 2020 16:49 )   PT: 16.6 sec;   INR: 1.48 ratio         PTT - ( 29 Jan 2020 16:49 )  PTT:49.9 sec    CAPILLARY BLOOD GLUCOSE    < from: Transthoracic Echocardiogram (11.22.19 @ 10:44) >  Ejection Fraction Visual Estimate: >55 %    ------------------------------------------------------------------------  OBSERVATIONS:  Mitral Valve: Mitral annular calcification. Mild mitral  regurgitation.  Aortic Root: Normal aortic root.  Aortic Valve: Calcified trileaflet aortic valve with normal  opening. Mild aortic regurgitation.  LeftAtrium: Moderate left atrial enlargement.  Left Ventricle: Endocardium not well visualized; grossly  normal left ventricular systolic function. Normal left  ventricular internal dimensions and wall thicknesses.  Right Heart: Normal right atrium. Normal right ventricular  size and function. There is moderate-severe tricuspid  regurgitation. Normal pulmonic valve.  Pericardium/PleuraNormal pericardium with no pericardial  effusion.  Hemodynamic: RA Pressure is 10 mm Hg. RV systolic pressure  is 84 mm Hg. Severe pulmonary hypertension.  ------------------------------------------------------------------------  CONCLUSIONS:  1. Mitral annular calcification. Mild mitral regurgitation.    2. Calcified trileaflet aortic valve with normal opening.  Mild aortic regurgitation.  3. Moderate left atrial enlargement.  4. Normal left ventricular internal dimensions and wall  thicknesses.  5. Endocardium not well visualized; grossly normal left  ventricular systolic function.  6. Normal right ventricularsize and function.  7. RV systolic pressure is 84 mm Hg. Severe pulmonary  hypertension.    ------------------------------------------------------------------------  Confirmed on  11/23/2019 - 08:27:04 by Ayush Yang MD  ------------------------------------------------------------------------    < end of copied text >                RADIOLOGY & ADDITIONAL TESTS:        < from: CT Abdomen and Pelvis w/ IV Cont (01.14.20 @ 17:29) >  MPRESSION:     Cholecystostomy tube appears in good position.    Deep vein thrombosis in the right common femoral vein.    Small enhancing fluid surrounding both ischial tuberosities.    Dr. Heart discussed the findings with Dr. Jacinto on January 14, 2020 at 5:55 PM.  Readback was obtained.

## 2020-02-05 NOTE — PROGRESS NOTE ADULT - ASSESSMENT
89 y/o male s/p laparoscopic converted to open cholecystectomy with positive intraop cholangiogram for cbd stone pod # 1    1. NPO   2. PRN pain control   3. PT eval   4. GI for ercp

## 2020-02-05 NOTE — PROGRESS NOTE ADULT - ASSESSMENT
89 y.o. M with PMH cholecystitis s/p IR PTC tube insertion, DVT on AC, hypothyroidism, afib, HTN presents to [facility] ER for management of PTC tube.  Medicine team is consulted for harper-operative optimization.

## 2020-02-05 NOTE — PROGRESS NOTE ADULT - SUBJECTIVE AND OBJECTIVE BOX
89 y/o male s/p laparoscopic converted to open cholecystectomy with positive intraop cholangiogram for cbd stone pod # 1. Patient examined at bedside, complains of abd pain   No nausea, no vomiting  currently npo     T(F): 97.9 (02-05-20 @ 05:38), Max: 98.8 (02-05-20 @ 02:00)  HR: 81 (02-05-20 @ 05:38) (79 - 98)  BP: 133/71 (02-05-20 @ 05:38) (87/58 - 151/74)  RR: 18 (02-05-20 @ 05:38) (17 - 23)  SpO2: 100% (02-05-20 @ 05:38) (95% - 100%)  Wt(kg): --      02-04 @ 07:01  -  02-05 @ 07:00  --------------------------------------------------------  IN:    Lactated Ringers IV Bolus: 1700 mL    lactated ringers.: 115 mL  Total IN: 1815 mL    OUT:    Bulb: 425 mL    Estimated Blood Loss: 300 mL    Indwelling Catheter - Urethral: 600 mL  Total OUT: 1325 mL    Total NET: 490 mL                          9.9    9.47  )-----------( 234      ( 05 Feb 2020 07:40 )             32.5   02-05    144  |  112<H>  |  16  ----------------------------<  103<H>  4.4   |  27  |  0.99    Ca    8.0<L>      05 Feb 2020 07:40    TPro  6.0  /  Alb  2.4<L>  /  TBili  0.4  /  DBili  x   /  AST  15  /  ALT  10  /  AlkPhos  92  02-04          Physical Exam  General: AAOx3, No acute distress  Skin: No jaundice, no icterus  Abdomen: soft, mildly distended, tender to palpation, , PATITO dressing in place, MARYJO drain in place with bilious output   : Normal external genitalia, cabrales in place   Extremities: non edematous, no calf pain bilaterally

## 2020-02-05 NOTE — PROGRESS NOTE ADULT - ASSESSMENT
1. CBD stone  2. S/p Cholecystectomy  3. No evidence of biliary obstruction  4. Anemia  5. No evidence of acute GI bleeding    Suggestions:    1. Monitor H/H  2. Follow up LFT's  3. Protonix daily  4. ERCP   5. Avoid NSAID  6. DVT prophylaxis  7. Surgical follow up  8. DVT prophylaxis

## 2020-02-05 NOTE — PROGRESS NOTE ADULT - SUBJECTIVE AND OBJECTIVE BOX
[   ] ICU                                          [   ] CCU                                      [  x ] Medical Floor      Patient is comfortable. No new complaints reported, No abdominal pain, N/V, hematemesis, hematochezia, melena, fever, chills, chest pain, SOB, cough or diarrhea reported.    VITALS  Vital Signs Last 24 Hrs  T(C): 36.7 (05 Feb 2020 14:22), Max: 37.1 (05 Feb 2020 02:00)  T(F): 98 (05 Feb 2020 14:22), Max: 98.8 (05 Feb 2020 02:00)  HR: 65 (05 Feb 2020 14:22) (65 - 105)  BP: 143/77 (05 Feb 2020 14:22) (116/74 - 151/74)     RR: 18 (05 Feb 2020 14:22) (17 - 18)  SpO2: 95% (05 Feb 2020 14:22) (95% - 100%)       MEDICATIONS  (STANDING):  ammonium lactate 12% Lotion 1 Application(s) Topical two times a day  calamine Lotion 1 Application(s) Topical daily  dextrose 5% + sodium chloride 0.45% 1000 milliLiter(s) (75 mL/Hr) IV Continuous <Continuous>  levothyroxine 100 MICROGram(s) Oral daily  lisinopril 2.5 milliGRAM(s) Oral daily  mirtazapine 7.5 milliGRAM(s) Oral at bedtime  risperiDONE   Tablet 0.25 milliGRAM(s) Oral daily  senna 3 Tablet(s) Oral daily  tamsulosin 0.4 milliGRAM(s) Oral at bedtime    MEDICATIONS  (PRN):  acetaminophen   Tablet .. 650 milliGRAM(s) Oral every 6 hours PRN Temp greater or equal to 38C (100.4F)  morphine  - Injectable 2 milliGRAM(s) IV Push every 4 hours PRN Severe Pain (7 - 10)  ondansetron Injectable 4 milliGRAM(s) IV Push every 6 hours PRN Nausea  oxycodone    5 mG/acetaminophen 325 mG 1 Tablet(s) Oral every 4 hours PRN Moderate Pain (4 - 6)                            9.9    9.47  )-----------( 234      ( 05 Feb 2020 07:40 )             32.5       02-05    144  |  112<H>  |  16  ----------------------------<  103<H>  4.4   |  27  |  0.99    Ca    8.0<L>      05 Feb 2020 07:40    TPro  6.0  /  Alb  2.4<L>  /  TBili  0.4  /  DBili  x   /  AST  15  /  ALT  10  /  AlkPhos  92  02-04      PT/INR - ( 04 Feb 2020 07:40 )   PT: 13.2 sec;   INR: 1.18 ratio         PTT - ( 04 Feb 2020 07:40 )  PTT:37.4 sec

## 2020-02-05 NOTE — PROGRESS NOTE ADULT - SUBJECTIVE AND OBJECTIVE BOX
INTERVAL HPI/OVERNIGHT EVENTS:  Pt stable.   NPO  flatus, no BM    Vital Signs Last 24 Hrs  T(C): 36.7 (05 Feb 2020 14:22), Max: 37.1 (05 Feb 2020 02:00)  T(F): 98 (05 Feb 2020 14:22), Max: 98.8 (05 Feb 2020 02:00)  HR: 65 (05 Feb 2020 14:22) (65 - 105)  BP: 143/77 (05 Feb 2020 14:22) (116/74 - 151/74)  BP(mean): --  RR: 18 (05 Feb 2020 14:22) (17 - 18)  SpO2: 95% (05 Feb 2020 14:22) (95% - 100%)    Physical:  Abdomen: Soft nondistended, nontender.  PATITO dressing in place  Sujit bilious drainage    I&O's Summary    04 Feb 2020 07:01  -  05 Feb 2020 07:00  --------------------------------------------------------  IN: 1815 mL / OUT: 1325 mL / NET: 490 mL    05 Feb 2020 07:01  -  05 Feb 2020 18:45  --------------------------------------------------------  IN: 0 mL / OUT: 250 mL / NET: -250 mL        LABS:                        9.9    9.47  )-----------( 234      ( 05 Feb 2020 07:40 )             32.5             02-05    144  |  112<H>  |  16  ----------------------------<  103<H>  4.4   |  27  |  0.99    Ca    8.0<L>      05 Feb 2020 07:40    TPro  6.0  /  Alb  2.4<L>  /  TBili  0.4  /  DBili  x   /  AST  15  /  ALT  10  /  AlkPhos  92  02-04

## 2020-02-06 LAB
ALBUMIN SERPL ELPH-MCNC: 2.2 G/DL — LOW (ref 3.5–5)
ALP SERPL-CCNC: 93 U/L — SIGNIFICANT CHANGE UP (ref 40–120)
ALT FLD-CCNC: 9 U/L DA — LOW (ref 10–60)
ANION GAP SERPL CALC-SCNC: 7 MMOL/L — SIGNIFICANT CHANGE UP (ref 5–17)
APPEARANCE UR: CLEAR — SIGNIFICANT CHANGE UP
AST SERPL-CCNC: 31 U/L — SIGNIFICANT CHANGE UP (ref 10–40)
BACTERIA # UR AUTO: ABNORMAL /HPF
BILIRUB DIRECT SERPL-MCNC: 0.2 MG/DL — SIGNIFICANT CHANGE UP (ref 0–0.2)
BILIRUB INDIRECT FLD-MCNC: 0.6 MG/DL — SIGNIFICANT CHANGE UP (ref 0.2–1)
BILIRUB SERPL-MCNC: 0.8 MG/DL — SIGNIFICANT CHANGE UP (ref 0.2–1.2)
BILIRUB UR-MCNC: ABNORMAL
BUN SERPL-MCNC: 21 MG/DL — HIGH (ref 7–18)
CALCIUM SERPL-MCNC: 8.3 MG/DL — LOW (ref 8.4–10.5)
CHLORIDE SERPL-SCNC: 113 MMOL/L — HIGH (ref 96–108)
CO2 SERPL-SCNC: 25 MMOL/L — SIGNIFICANT CHANGE UP (ref 22–31)
COLOR SPEC: YELLOW — SIGNIFICANT CHANGE UP
COMMENT - URINE: SIGNIFICANT CHANGE UP
CREAT SERPL-MCNC: 1.28 MG/DL — SIGNIFICANT CHANGE UP (ref 0.5–1.3)
DIFF PNL FLD: ABNORMAL
EPI CELLS # UR: ABNORMAL /HPF
GLUCOSE SERPL-MCNC: 128 MG/DL — HIGH (ref 70–99)
GLUCOSE UR QL: NEGATIVE — SIGNIFICANT CHANGE UP
HCT VFR BLD CALC: 33.6 % — LOW (ref 39–50)
HGB BLD-MCNC: 10.2 G/DL — LOW (ref 13–17)
KETONES UR-MCNC: ABNORMAL
LEUKOCYTE ESTERASE UR-ACNC: ABNORMAL
MCHC RBC-ENTMCNC: 30.4 GM/DL — LOW (ref 32–36)
MCHC RBC-ENTMCNC: 31.3 PG — SIGNIFICANT CHANGE UP (ref 27–34)
MCV RBC AUTO: 103.1 FL — HIGH (ref 80–100)
NITRITE UR-MCNC: POSITIVE
NRBC # BLD: 0 /100 WBCS — SIGNIFICANT CHANGE UP (ref 0–0)
PH UR: 5 — SIGNIFICANT CHANGE UP (ref 5–8)
PLATELET # BLD AUTO: 254 K/UL — SIGNIFICANT CHANGE UP (ref 150–400)
POTASSIUM SERPL-MCNC: 4.9 MMOL/L — SIGNIFICANT CHANGE UP (ref 3.5–5.3)
POTASSIUM SERPL-SCNC: 4.9 MMOL/L — SIGNIFICANT CHANGE UP (ref 3.5–5.3)
PROT SERPL-MCNC: 6.3 G/DL — SIGNIFICANT CHANGE UP (ref 6–8.3)
PROT UR-MCNC: 100
RBC # BLD: 3.26 M/UL — LOW (ref 4.2–5.8)
RBC # FLD: 16.3 % — HIGH (ref 10.3–14.5)
RBC CASTS # UR COMP ASSIST: ABNORMAL /HPF (ref 0–2)
SODIUM SERPL-SCNC: 145 MMOL/L — SIGNIFICANT CHANGE UP (ref 135–145)
SP GR SPEC: 1.02 — SIGNIFICANT CHANGE UP (ref 1.01–1.02)
UROBILINOGEN FLD QL: 1
WBC # BLD: 17.12 K/UL — HIGH (ref 3.8–10.5)
WBC # FLD AUTO: 17.12 K/UL — HIGH (ref 3.8–10.5)
WBC UR QL: ABNORMAL /HPF (ref 0–5)

## 2020-02-06 PROCEDURE — 71045 X-RAY EXAM CHEST 1 VIEW: CPT | Mod: 26

## 2020-02-06 RX ORDER — SODIUM CHLORIDE 9 MG/ML
1000 INJECTION, SOLUTION INTRAVENOUS
Refills: 0 | Status: DISCONTINUED | OUTPATIENT
Start: 2020-02-06 | End: 2020-02-10

## 2020-02-06 RX ORDER — ONDANSETRON 8 MG/1
4 TABLET, FILM COATED ORAL EVERY 6 HOURS
Refills: 0 | Status: DISCONTINUED | OUTPATIENT
Start: 2020-02-06 | End: 2020-02-10

## 2020-02-06 RX ORDER — CEFTRIAXONE 500 MG/1
1000 INJECTION, POWDER, FOR SOLUTION INTRAMUSCULAR; INTRAVENOUS EVERY 24 HOURS
Refills: 0 | Status: DISCONTINUED | OUTPATIENT
Start: 2020-02-06 | End: 2020-02-10

## 2020-02-06 RX ORDER — RISPERIDONE 4 MG/1
0.25 TABLET ORAL DAILY
Refills: 0 | Status: DISCONTINUED | OUTPATIENT
Start: 2020-02-06 | End: 2020-02-10

## 2020-02-06 RX ORDER — HYDROMORPHONE HYDROCHLORIDE 2 MG/ML
0.5 INJECTION INTRAMUSCULAR; INTRAVENOUS; SUBCUTANEOUS
Refills: 0 | Status: DISCONTINUED | OUTPATIENT
Start: 2020-02-06 | End: 2020-02-06

## 2020-02-06 RX ORDER — SODIUM CHLORIDE 9 MG/ML
250 INJECTION, SOLUTION INTRAVENOUS ONCE
Refills: 0 | Status: COMPLETED | OUTPATIENT
Start: 2020-02-06 | End: 2020-02-06

## 2020-02-06 RX ORDER — CALAMINE AND ZINC OXIDE AND PHENOL 160; 10 MG/ML; MG/ML
1 LOTION TOPICAL DAILY
Refills: 0 | Status: DISCONTINUED | OUTPATIENT
Start: 2020-02-06 | End: 2020-02-10

## 2020-02-06 RX ORDER — ACETAMINOPHEN 500 MG
650 TABLET ORAL EVERY 6 HOURS
Refills: 0 | Status: DISCONTINUED | OUTPATIENT
Start: 2020-02-06 | End: 2020-02-10

## 2020-02-06 RX ORDER — INDOMETHACIN 50 MG
100 CAPSULE ORAL ONCE
Refills: 0 | Status: DISCONTINUED | OUTPATIENT
Start: 2020-02-06 | End: 2020-02-06

## 2020-02-06 RX ORDER — LEVOTHYROXINE SODIUM 125 MCG
100 TABLET ORAL DAILY
Refills: 0 | Status: DISCONTINUED | OUTPATIENT
Start: 2020-02-06 | End: 2020-02-10

## 2020-02-06 RX ORDER — ACETAMINOPHEN 500 MG
1000 TABLET ORAL ONCE
Refills: 0 | Status: COMPLETED | OUTPATIENT
Start: 2020-02-06 | End: 2020-02-07

## 2020-02-06 RX ORDER — ACETAMINOPHEN 500 MG
1000 TABLET ORAL ONCE
Refills: 0 | Status: DISCONTINUED | OUTPATIENT
Start: 2020-02-06 | End: 2020-02-06

## 2020-02-06 RX ORDER — LISINOPRIL 2.5 MG/1
2.5 TABLET ORAL DAILY
Refills: 0 | Status: DISCONTINUED | OUTPATIENT
Start: 2020-02-06 | End: 2020-02-10

## 2020-02-06 RX ORDER — SENNA PLUS 8.6 MG/1
3 TABLET ORAL DAILY
Refills: 0 | Status: DISCONTINUED | OUTPATIENT
Start: 2020-02-06 | End: 2020-02-10

## 2020-02-06 RX ORDER — TAMSULOSIN HYDROCHLORIDE 0.4 MG/1
0.4 CAPSULE ORAL AT BEDTIME
Refills: 0 | Status: DISCONTINUED | OUTPATIENT
Start: 2020-02-06 | End: 2020-02-10

## 2020-02-06 RX ORDER — MIRTAZAPINE 45 MG/1
7.5 TABLET, ORALLY DISINTEGRATING ORAL AT BEDTIME
Refills: 0 | Status: DISCONTINUED | OUTPATIENT
Start: 2020-02-06 | End: 2020-02-10

## 2020-02-06 RX ORDER — SODIUM CHLORIDE 9 MG/ML
1000 INJECTION, SOLUTION INTRAVENOUS
Refills: 0 | Status: DISCONTINUED | OUTPATIENT
Start: 2020-02-06 | End: 2020-02-06

## 2020-02-06 RX ORDER — SOD,AMMONIUM,POTASSIUM LACTATE
1 CREAM (GRAM) TOPICAL
Refills: 0 | Status: DISCONTINUED | OUTPATIENT
Start: 2020-02-06 | End: 2020-02-10

## 2020-02-06 RX ADMIN — MIRTAZAPINE 7.5 MILLIGRAM(S): 45 TABLET, ORALLY DISINTEGRATING ORAL at 22:42

## 2020-02-06 RX ADMIN — SODIUM CHLORIDE 500 MILLILITER(S): 9 INJECTION, SOLUTION INTRAVENOUS at 10:56

## 2020-02-06 RX ADMIN — CEFTRIAXONE 100 MILLIGRAM(S): 500 INJECTION, POWDER, FOR SOLUTION INTRAMUSCULAR; INTRAVENOUS at 18:59

## 2020-02-06 RX ADMIN — SODIUM CHLORIDE 75 MILLILITER(S): 9 INJECTION, SOLUTION INTRAVENOUS at 19:00

## 2020-02-06 RX ADMIN — Medication 1 APPLICATION(S): at 18:58

## 2020-02-06 RX ADMIN — TAMSULOSIN HYDROCHLORIDE 0.4 MILLIGRAM(S): 0.4 CAPSULE ORAL at 22:42

## 2020-02-06 RX ADMIN — Medication 1 APPLICATION(S): at 05:56

## 2020-02-06 NOTE — PROGRESS NOTE ADULT - SUBJECTIVE AND OBJECTIVE BOX
89 y/o male s/p laparoscopic converted to open cholecystectomy with positive intraop cholangiogram for cbd stone pod # 2. Patient examined at bedside, more lethargic today. per nurse has decreased urinary output   No nausea, no vomiting  currently npo     Vital Signs Last 24 Hrs  T(C): 36.7 (06 Feb 2020 06:12), Max: 36.7 (05 Feb 2020 14:22)  T(F): 98.1 (06 Feb 2020 06:12), Max: 98.1 (06 Feb 2020 06:12)  HR: 96 (06 Feb 2020 06:12) (65 - 105)  BP: 112/57 (06 Feb 2020 06:12) (112/57 - 143/77)  BP(mean): --  RR: 16 (06 Feb 2020 06:12) (16 - 18)  SpO2: 94% (06 Feb 2020 06:12) (91% - 95%)                                         10.2   17.12 )-----------( 254      ( 06 Feb 2020 07:47 )             33.6   02-06    145  |  113<H>  |  21<H>  ----------------------------<  128<H>  4.9   |  25  |  1.28    Ca    8.3<L>      06 Feb 2020 07:47    I&O's Detail    05 Feb 2020 07:01  -  06 Feb 2020 07:00  --------------------------------------------------------  IN:  Total IN: 0 mL    OUT:    Bulb: 550 mL    Indwelling Catheter - Urethral: 500 mL  Total OUT: 1050 mL    Total NET: -1050 mL      06 Feb 2020 07:01  -  06 Feb 2020 09:48  --------------------------------------------------------  IN:  Total IN: 0 mL    OUT:    Bulb: 85 mL  Total OUT: 85 mL    Total NET: -85 mL              Physical Exam  General: AAOx2, lethargic   Skin: No jaundice, no icterus  Abdomen: soft, nondistended, tender to palpation, , PATITO dressing in place, MARYJO drain in place with bilious output   : Normal external genitalia, cabrales in place   Extremities: non edematous, no calf pain bilaterally

## 2020-02-06 NOTE — PROGRESS NOTE ADULT - ASSESSMENT
89 y/o male s/p laparoscopic converted to open cholecystectomy with positive intraop cholangiogram for cbd stone pod # 2, with leukocytosis, decreased urinary output, lethargic today     1. NPO   2. d/c all narcotics    3. gentle fluid hydration   4. ercp   5. chest xray   6. pulse oximetry   7. urinalysis

## 2020-02-06 NOTE — PROGRESS NOTE ADULT - ASSESSMENT
89 y.o. M with PMH cholecystitis s/p IR PTC tube insertion, DVT on AC, hypothyroidism, afib, HTN presents to [facility] ER for management of PTC tube.  Medicine team is consulted for harper-operative optimization.

## 2020-02-06 NOTE — PROGRESS NOTE ADULT - SUBJECTIVE AND OBJECTIVE BOX
patient was seen and examined   s no specific  c/o offered  no cp or sob , or abdominal pain  s/p surgery  waiting for ERCP  MEDICATIONS  (STANDING):  ammonium lactate 12% Lotion 1 Application(s) Topical two times a day  calamine Lotion 1 Application(s) Topical daily  dextrose 5% + sodium chloride 0.45% 1000 milliLiter(s) (75 mL/Hr) IV Continuous <Continuous>  levothyroxine 100 MICROGram(s) Oral daily  lisinopril 2.5 milliGRAM(s) Oral daily  mirtazapine 7.5 milliGRAM(s) Oral at bedtime  risperiDONE   Tablet 0.25 milliGRAM(s) Oral daily  senna 3 Tablet(s) Oral daily  tamsulosin 0.4 milliGRAM(s) Oral at bedtime    MEDICATIONS  (PRN):  acetaminophen   Tablet .. 650 milliGRAM(s) Oral every 6 hours PRN Temp greater or equal to 38C (100.4F)  acetaminophen  IVPB .. 1000 milliGRAM(s) IV Intermittent once PRN Moderate Pain (4 - 6)  ondansetron Injectable 4 milliGRAM(s) IV Push every 6 hours PRN Nausea    PAST MEDICAL & SURGICAL HISTORY:  PVD (peripheral vascular disease)  Acalculous cholecystitis: s/p percutaneous cholecystostomy  HTN (hypertension)  Hypothyroid  Ulcers of both lower extremities  No significant past surgical history    REVIEW OF SYSTEMS:   CONSTITUTIONAL: No fever, weight loss, or fatigue  EYES: No eye pain, visual disturbances, or discharge  ENMT:  No difficulty hearing, tinnitus, vertigo; No sinus or throat pain  NECK: No pain or stiffness  BREASTS: No pain, masses, or nipple discharge  RESPIRATORY: No cough, wheezing, chills or hemoptysis; No shortness of breath  CARDIOVASCULAR: No chest pain, palpitations, dizziness, or leg swelling  GASTROINTESTINAL: No abdominal or epigastric pain. No nausea, vomiting, or hematemesis; No diarrhea or constipation. No melena or hematochezia.  GENITOURINARY: No dysuria, frequency, hematuria, or incontinence  NEUROLOGICAL: No headaches, memory loss, loss of strength, numbness, or tremors  SKIN: No itching, burning, rashes, or lesions   LYMPH NODES: No enlarged glands  ENDOCRINE: No heat or cold intolerance; No hair loss  MUSCULOSKELETAL: No joint pain or swelling; No muscle, back, or extremity pain  PSYCHIATRIC: No depression, anxiety, mood swings, or difficulty sleeping  HEME/LYMPH: No easy bruising, or bleeding gums  ALLERY AND IMMUNOLOGIC: No hives or eczema    Vital Signs Last 24 Hrs  T(C): 36.7 (2020 06:12), Max: 36.7 (2020 14:22)  T(F): 98.1 (2020 06:12), Max: 98.1 (2020 06:12)  HR: 96 (2020 06:12) (65 - 105)  BP: 112/57 (2020 06:12) (112/57 - 143/77)  BP(mean): --  RR: 16 (2020 06:12) (16 - 18)  SpO2: 94% (2020 06:12) (91% - 95%)        PHYSICAL EXAM:  GENERAL: NAD, well-groomed, well-developed  HEAD:  Atraumatic, Normocephalic  EYES: EOMI, PERRLA, conjunctiva and sclera clear  ENMT: No tonsillar erythema, exudates, or enlargement; Moist mucous membranes, Good dentition, No lesions  NECK: Supple, No JVD, Normal thyroid  NERVOUS SYSTEM:  Alert & Oriented X 1 to self, Good concentration;   CHEST/LUNG: Clear to percussion bilaterally; No rales, rhonchi, wheezing, or rubs  HEART: Regular rate and rhythm; No murmurs, rubs, or gallops  ABDOMEN: Soft, Nontender, Nondistended; Bowel sounds present, surgical site , no bleeding  EXTREMITIES:  2+ Peripheral Pulses, chronic venous stasis changes No clubbing, cyanosis, or edema  LYMPH: No lymphadenopathy noted  SKIN: No rashes or lesions  LABS:                        10.2   17.12 )-----------( 254      ( 2020 07:47 )             33.6     02-06    145  |  113<H>  |  21<H>  ----------------------------<  128<H>  4.9   |  25  |  1.28    Ca    8.3<L>      2020 07:47    TPro  6.3  /  Alb  2.2<L>  /  TBili  0.8  /  DBili  0.2  /  AST  31  /  ALT  9<L>  /  AlkPhos  93  02-06      Urinalysis Basic - ( 2020 10:10 )    Color: Yellow / Appearance: Clear / S.020 / pH: x  Gluc: x / Ketone: Trace  / Bili: Small / Urobili: 1   Blood: x / Protein: 100 / Nitrite: Positive   Leuk Esterase: Moderate / RBC: 2-5 /HPF / WBC 11-25 /HPF   Sq Epi: x / Non Sq Epi: Occasional /HPF / Bacteria: Trace /HPF                          LABS:                        9.9    9.47  )-----------( 234      ( 2020 07:40 )             32.5     02-05    144  |  112<H>  |  16  ----------------------------<  103<H>  4.4   |  27  |  0.99    Ca    8.0<L>      2020 07:40    TPro  6.0  /  Alb  2.4<L>  /  TBili  0.4  /  DBili  x   /  AST  15  /  ALT  10  /  AlkPhos  92  02-04    PT/INR - ( 2020 07:40 )   PT: 13.2 sec;   INR: 1.18 ratio         PTT - ( 2020 07:40 )  PTT:37.4 sec                      LABS:                        10.8   7.02  )-----------( 279      ( 2020 07:40 )             35.4     02-04    147<H>  |  111<H>  |  19<H>  ----------------------------<  88  4.3   |  28  |  0.93    Ca    8.7      2020 07:40    TPro  6.0  /  Alb  2.4<L>  /  TBili  0.4  /  DBili  x   /  AST  15  /  ALT  10  /  AlkPhos  92  02-04    PT/INR - ( 2020 07:40 )   PT: 13.2 sec;   INR: 1.18 ratio         PTT - ( 2020 07:40 )  PTT:37.4 sec                      LABS:                        11.2   7.63  )-----------( 265      ( 2020 07:30 )             36.3     02-02    144  |  113<H>  |  19<H>  ----------------------------<  86  3.7   |  26  |  0.87    Ca    7.9<L>      2020 08:00      PTT - ( 2020 10:05 )  PTT:90.7 sec                        LABS:                        9.9    6.56  )-----------( 254      ( 2020 08:00 )             32.3     02-    144  |  113<H>  |  19<H>  ----------------------------<  86  3.7   |  26  |  0.87    Ca    7.9<L>      2020 08:00      PTT - ( 2020 08:00 )  PTT:41.0 sec                        LABS:                        10.3   8.03  )-----------( 291      ( 2020 07:28 )             33.6     02-01    145  |  114<H>  |  23<H>  ----------------------------<  86  3.6   |  27  |  0.84    Ca    8.4      2020 07:28                              LABS:                        12.0   10.20 )-----------( 368      ( 2020 16:49 )             38.7     01-    143  |  113<H>  |  31<H>  ----------------------------<  82  4.5   |  25  |  1.07    Ca    8.2<L>      2020 16:49    TPro  6.9  /  Alb  2.6<L>  /  TBili  0.4  /  DBili  x   /  AST  23  /  ALT  10  /  AlkPhos  117  -    PT/INR - ( 2020 16:49 )   PT: 16.6 sec;   INR: 1.48 ratio         PTT - ( 2020 16:49 )  PTT:49.9 sec    CAPILLARY BLOOD GLUCOSE    < from: Transthoracic Echocardiogram (.19 @ 10:44) >  Ejection Fraction Visual Estimate: >55 %    ------------------------------------------------------------------------  OBSERVATIONS:  Mitral Valve: Mitral annular calcification. Mild mitral  regurgitation.  Aortic Root: Normal aortic root.  Aortic Valve: Calcified trileaflet aortic valve with normal  opening. Mild aortic regurgitation.  LeftAtrium: Moderate left atrial enlargement.  Left Ventricle: Endocardium not well visualized; grossly  normal left ventricular systolic function. Normal left  ventricular internal dimensions and wall thicknesses.  Right Heart: Normal right atrium. Normal right ventricular  size and function. There is moderate-severe tricuspid  regurgitation. Normal pulmonic valve.  Pericardium/PleuraNormal pericardium with no pericardial  effusion.  Hemodynamic: RA Pressure is 10 mm Hg. RV systolic pressure  is 84 mm Hg. Severe pulmonary hypertension.  ------------------------------------------------------------------------  CONCLUSIONS:  1. Mitral annular calcification. Mild mitral regurgitation.    2. Calcified trileaflet aortic valve with normal opening.  Mild aortic regurgitation.  3. Moderate left atrial enlargement.  4. Normal left ventricular internal dimensions and wall  thicknesses.  5. Endocardium not well visualized; grossly normal left  ventricular systolic function.  6. Normal right ventricularsize and function.  7. RV systolic pressure is 84 mm Hg. Severe pulmonary  hypertension.    ------------------------------------------------------------------------  Confirmed on  2019 - 08:27:04 by Ayush Yang MD  ------------------------------------------------------------------------    < end of copied text >                RADIOLOGY & ADDITIONAL TESTS:        < from: CT Abdomen and Pelvis w/ IV Cont (20 @ 17:29) >  MPRESSION:     Cholecystostomy tube appears in good position.    Deep vein thrombosis in the right common femoral vein.    Small enhancing fluid surrounding both ischial tuberosities.    Dr. Heart discussed the findings with Dr. Jacinto on 2020 at 5:55 PM.  Readback was obtained.

## 2020-02-06 NOTE — CHART NOTE - NSCHARTNOTEFT_GEN_A_CORE
Assessment:   Patient is a 89y old  Male who presents with a chief complaint of Chronic cholecystitis (2020 12:22). Pt Clears/ NPO day #3. Now s/p attempted ERCP., EGD. Pt seen, asleep. Discussed with RN      Factors impacting intake: [ ] none [ ] nausea  [ ] vomiting [ ] diarrhea [ ] constipation  [ ]chewing problems [ ] swallowing issues  [ ] other:     Diet Prescription:   Intake:     Daily Height in cm: 180.34 (2020 15:05)      Daily Weight in k.6 (2020 16:57)    % Weight Change    Pertinent Medications: MEDICATIONS  (STANDING):  cefTRIAXone   IVPB 1000 milliGRAM(s) IV Intermittent every 24 hours  lactated ringers. 1000 milliLiter(s) (75 mL/Hr) IV Continuous <Continuous>    MEDICATIONS  (PRN):  HYDROmorphone  Injectable 0.5 milliGRAM(s) IV Push every 10 minutes PRN Moderate Pain (4 - 6)    Pertinent Labs:  Na145 mmol/L Glu 128 mg/dL<H> K+ 4.9 mmol/L Cr  1.28 mg/dL BUN 21 mg/dL<H>  Alb 2.2 g/dL<L> -15 [id number] 5.0 % 01-15 Chol 110 mg/dL LDL 60 mg/dL HDL 34 mg/dL<L> Trig 80 mg/dL     CAPILLARY BLOOD GLUCOSE        Skin:     Estimated Needs:   [ ] no change since previous assessment  [x ] recalculated: 81.6 kg 6404-5546 kcals/ day (20-22 kcals/kg)      Previous Nutrition Diagnosis:   [ ] Altered GI function  [ x]Inadequate Oral Intake [ ] Swallowing Difficulty   [ ] Altered nutrition related labs [ ] Increased Nutrient Needs [ ] Overweight/Obesity   [ ] Unintended Weight Loss [ ] Food & Nutrition Related Knowledge Deficit [ ] Malnutrition   [ ] Other:     Nutrition Diagnosis is [x ] ongoing  [ ] resolved [ ] not applicable     New Nutrition Diagnosis: [ ] not applicable       Interventions:   Recommend  [x ] order diet: (even if NPO), advance diet as medically feasible. Consider adding Ensure clears TID when clears. MD to monitor. RD available.   [ ] Nutrition Supplement  [ ] Nutrition Support  [x ] Other: weekly weight     Monitoring and Evaluation:   [ ] PO intake [ x ] Tolerance to diet prescription [ x ] weights [ x ] labs[ x ] follow up per protocol  [ ] other:

## 2020-02-07 LAB
ALBUMIN SERPL ELPH-MCNC: 1.9 G/DL — LOW (ref 3.5–5)
ALP SERPL-CCNC: 91 U/L — SIGNIFICANT CHANGE UP (ref 40–120)
ALT FLD-CCNC: <6 U/L DA — LOW (ref 10–60)
ANION GAP SERPL CALC-SCNC: 3 MMOL/L — LOW (ref 5–17)
APTT BLD: 44.6 SEC — HIGH (ref 27.5–36.3)
APTT BLD: 81 SEC — HIGH (ref 27.5–36.3)
AST SERPL-CCNC: 14 U/L — SIGNIFICANT CHANGE UP (ref 10–40)
BILIRUB SERPL-MCNC: 0.5 MG/DL — SIGNIFICANT CHANGE UP (ref 0.2–1.2)
BUN SERPL-MCNC: 22 MG/DL — HIGH (ref 7–18)
CALCIUM SERPL-MCNC: 7.9 MG/DL — LOW (ref 8.4–10.5)
CHLORIDE SERPL-SCNC: 114 MMOL/L — HIGH (ref 96–108)
CO2 SERPL-SCNC: 26 MMOL/L — SIGNIFICANT CHANGE UP (ref 22–31)
CREAT SERPL-MCNC: 1.07 MG/DL — SIGNIFICANT CHANGE UP (ref 0.5–1.3)
GLUCOSE SERPL-MCNC: 105 MG/DL — HIGH (ref 70–99)
HCT VFR BLD CALC: 31.1 % — LOW (ref 39–50)
HCT VFR BLD CALC: 33.9 % — LOW (ref 39–50)
HGB BLD-MCNC: 10.2 G/DL — LOW (ref 13–17)
HGB BLD-MCNC: 9.4 G/DL — LOW (ref 13–17)
MCHC RBC-ENTMCNC: 30.1 GM/DL — LOW (ref 32–36)
MCHC RBC-ENTMCNC: 30.2 GM/DL — LOW (ref 32–36)
MCHC RBC-ENTMCNC: 31.2 PG — SIGNIFICANT CHANGE UP (ref 27–34)
MCHC RBC-ENTMCNC: 31.3 PG — SIGNIFICANT CHANGE UP (ref 27–34)
MCV RBC AUTO: 103.3 FL — HIGH (ref 80–100)
MCV RBC AUTO: 104 FL — HIGH (ref 80–100)
NRBC # BLD: 0 /100 WBCS — SIGNIFICANT CHANGE UP (ref 0–0)
NRBC # BLD: 0 /100 WBCS — SIGNIFICANT CHANGE UP (ref 0–0)
PLATELET # BLD AUTO: 207 K/UL — SIGNIFICANT CHANGE UP (ref 150–400)
PLATELET # BLD AUTO: 226 K/UL — SIGNIFICANT CHANGE UP (ref 150–400)
POTASSIUM SERPL-MCNC: 4.4 MMOL/L — SIGNIFICANT CHANGE UP (ref 3.5–5.3)
POTASSIUM SERPL-SCNC: 4.4 MMOL/L — SIGNIFICANT CHANGE UP (ref 3.5–5.3)
PROT SERPL-MCNC: 5.5 G/DL — LOW (ref 6–8.3)
RBC # BLD: 3.01 M/UL — LOW (ref 4.2–5.8)
RBC # BLD: 3.26 M/UL — LOW (ref 4.2–5.8)
RBC # FLD: 16 % — HIGH (ref 10.3–14.5)
RBC # FLD: 16.1 % — HIGH (ref 10.3–14.5)
SODIUM SERPL-SCNC: 143 MMOL/L — SIGNIFICANT CHANGE UP (ref 135–145)
SURGICAL PATHOLOGY STUDY: SIGNIFICANT CHANGE UP
WBC # BLD: 11.02 K/UL — HIGH (ref 3.8–10.5)
WBC # BLD: 12.46 K/UL — HIGH (ref 3.8–10.5)
WBC # FLD AUTO: 11.02 K/UL — HIGH (ref 3.8–10.5)
WBC # FLD AUTO: 12.46 K/UL — HIGH (ref 3.8–10.5)

## 2020-02-07 RX ORDER — PANTOPRAZOLE SODIUM 20 MG/1
40 TABLET, DELAYED RELEASE ORAL EVERY 12 HOURS
Refills: 0 | Status: DISCONTINUED | OUTPATIENT
Start: 2020-02-07 | End: 2020-02-10

## 2020-02-07 RX ORDER — HEPARIN SODIUM 5000 [USP'U]/ML
3000 INJECTION INTRAVENOUS; SUBCUTANEOUS EVERY 6 HOURS
Refills: 0 | Status: DISCONTINUED | OUTPATIENT
Start: 2020-02-07 | End: 2020-02-10

## 2020-02-07 RX ORDER — HEPARIN SODIUM 5000 [USP'U]/ML
1000 INJECTION INTRAVENOUS; SUBCUTANEOUS
Qty: 25000 | Refills: 0 | Status: DISCONTINUED | OUTPATIENT
Start: 2020-02-07 | End: 2020-02-10

## 2020-02-07 RX ORDER — HEPARIN SODIUM 5000 [USP'U]/ML
6500 INJECTION INTRAVENOUS; SUBCUTANEOUS EVERY 6 HOURS
Refills: 0 | Status: DISCONTINUED | OUTPATIENT
Start: 2020-02-07 | End: 2020-02-10

## 2020-02-07 RX ADMIN — Medication 100 MICROGRAM(S): at 06:25

## 2020-02-07 RX ADMIN — Medication 1 APPLICATION(S): at 17:35

## 2020-02-07 RX ADMIN — HEPARIN SODIUM 3000 UNIT(S): 5000 INJECTION INTRAVENOUS; SUBCUTANEOUS at 18:03

## 2020-02-07 RX ADMIN — LISINOPRIL 2.5 MILLIGRAM(S): 2.5 TABLET ORAL at 06:25

## 2020-02-07 RX ADMIN — CALAMINE AND ZINC OXIDE AND PHENOL 1 APPLICATION(S): 160; 10 LOTION TOPICAL at 11:26

## 2020-02-07 RX ADMIN — Medication 400 MILLIGRAM(S): at 06:34

## 2020-02-07 RX ADMIN — SENNA PLUS 3 TABLET(S): 8.6 TABLET ORAL at 11:27

## 2020-02-07 RX ADMIN — HEPARIN SODIUM 1200 UNIT(S)/HR: 5000 INJECTION INTRAVENOUS; SUBCUTANEOUS at 17:59

## 2020-02-07 RX ADMIN — PANTOPRAZOLE SODIUM 40 MILLIGRAM(S): 20 TABLET, DELAYED RELEASE ORAL at 17:34

## 2020-02-07 RX ADMIN — MIRTAZAPINE 7.5 MILLIGRAM(S): 45 TABLET, ORALLY DISINTEGRATING ORAL at 22:21

## 2020-02-07 RX ADMIN — SODIUM CHLORIDE 75 MILLILITER(S): 9 INJECTION, SOLUTION INTRAVENOUS at 17:34

## 2020-02-07 RX ADMIN — SODIUM CHLORIDE 75 MILLILITER(S): 9 INJECTION, SOLUTION INTRAVENOUS at 06:27

## 2020-02-07 RX ADMIN — HEPARIN SODIUM 1000 UNIT(S)/HR: 5000 INJECTION INTRAVENOUS; SUBCUTANEOUS at 11:22

## 2020-02-07 RX ADMIN — CEFTRIAXONE 100 MILLIGRAM(S): 500 INJECTION, POWDER, FOR SOLUTION INTRAMUSCULAR; INTRAVENOUS at 18:05

## 2020-02-07 RX ADMIN — Medication 1 APPLICATION(S): at 06:25

## 2020-02-07 RX ADMIN — TAMSULOSIN HYDROCHLORIDE 0.4 MILLIGRAM(S): 0.4 CAPSULE ORAL at 22:21

## 2020-02-07 RX ADMIN — RISPERIDONE 0.25 MILLIGRAM(S): 4 TABLET ORAL at 11:27

## 2020-02-07 RX ADMIN — Medication 1000 MILLIGRAM(S): at 07:04

## 2020-02-07 NOTE — PROGRESS NOTE ADULT - SUBJECTIVE AND OBJECTIVE BOX
patient was seen and examined   s no specific  c/o offered  no cp or sob , or abdominal pain  s/p surgery   s/p  ERCP but unsuccessful   MEDICATIONS  (STANDING):  ammonium lactate 12% Lotion 1 Application(s) Topical two times a day  calamine Lotion 1 Application(s) Topical daily  cefTRIAXone   IVPB 1000 milliGRAM(s) IV Intermittent every 24 hours  dextrose 5% + sodium chloride 0.45% 1000 milliLiter(s) (75 mL/Hr) IV Continuous <Continuous>  heparin  Infusion. 1000 Unit(s)/Hr (10 mL/Hr) IV Continuous <Continuous>  lactated ringers. 1000 milliLiter(s) (75 mL/Hr) IV Continuous <Continuous>  levothyroxine 100 MICROGram(s) Oral daily  lisinopril 2.5 milliGRAM(s) Oral daily  mirtazapine 7.5 milliGRAM(s) Oral at bedtime  pantoprazole  Injectable 40 milliGRAM(s) IV Push every 12 hours  risperiDONE   Tablet 0.25 milliGRAM(s) Oral daily  senna 3 Tablet(s) Oral daily  tamsulosin 0.4 milliGRAM(s) Oral at bedtime    MEDICATIONS  (PRN):  acetaminophen   Tablet .. 650 milliGRAM(s) Oral every 6 hours PRN Temp greater or equal to 38C (100.4F)  heparin  Injectable 6500 Unit(s) IV Push every 6 hours PRN For aPTT less than 40  heparin  Injectable 3000 Unit(s) IV Push every 6 hours PRN For aPTT between 40 - 57  ondansetron Injectable 4 milliGRAM(s) IV Push every 6 hours PRN Nausea      PAST MEDICAL & SURGICAL HISTORY:  PVD (peripheral vascular disease)  Acalculous cholecystitis: s/p percutaneous cholecystostomy  HTN (hypertension)  Hypothyroid  Ulcers of both lower extremities  No significant past surgical history    REVIEW OF SYSTEMS:   CONSTITUTIONAL: No fever, weight loss, or fatigue  EYES: No eye pain, visual disturbances, or discharge  ENMT:  No difficulty hearing, tinnitus, vertigo; No sinus or throat pain  NECK: No pain or stiffness  BREASTS: No pain, masses, or nipple discharge  RESPIRATORY: No cough, wheezing, chills or hemoptysis; No shortness of breath  CARDIOVASCULAR: No chest pain, palpitations, dizziness, or leg swelling  GASTROINTESTINAL: No abdominal or epigastric pain. No nausea, vomiting, or hematemesis; No diarrhea or constipation. No melena or hematochezia.  GENITOURINARY: No dysuria, frequency, hematuria, or incontinence  NEUROLOGICAL: No headaches, memory loss, loss of strength, numbness, or tremors  SKIN: No itching, burning, rashes, or lesions   LYMPH NODES: No enlarged glands  ENDOCRINE: No heat or cold intolerance; No hair loss  MUSCULOSKELETAL: No joint pain or swelling; No muscle, back, or extremity pain  PSYCHIATRIC: No depression, anxiety, mood swings, or difficulty sleeping  HEME/LYMPH: No easy bruising, or bleeding gums  ALLERY AND IMMUNOLOGIC: No hives or eczema    Vital Signs Last 24 Hrs  T(C): 36.7 (2020 14:29), Max: 37.3 (2020 20:20)  T(F): 98 (2020 14:29), Max: 99.2 (2020 20:20)  HR: 75 (2020 14:29) (75 - 84)  BP: 128/91 (2020 14:29) (128/91 - 140/64)  BP(mean): --  RR: 16 (2020 14:29) (16 - 17)  SpO2: 100% (2020 14:29) (100% - 100%)        PHYSICAL EXAM:  GENERAL: NAD, well-groomed, well-developed  HEAD:  Atraumatic, Normocephalic  EYES: EOMI, PERRLA, conjunctiva and sclera clear  ENMT: No tonsillar erythema, exudates, or enlargement; Moist mucous membranes, Good dentition, No lesions  NECK: Supple, No JVD, Normal thyroid  NERVOUS SYSTEM:  Alert & Oriented X 1 to self, Good concentration;   CHEST/LUNG: Clear to percussion bilaterally; No rales, rhonchi, wheezing, or rubs  HEART: Regular rate and rhythm; No murmurs, rubs, or gallops  ABDOMEN: Soft, Nontender, Nondistended; Bowel sounds present, surgical site , no bleeding  EXTREMITIES:  2+ Peripheral Pulses, chronic venous stasis changes No clubbing, cyanosis, or edema  LYMPH: No lymphadenopathy noted  SKIN: No rashes or lesions    LABS:                        9.4    12.46 )-----------( 207      ( 2020 07:40 )             31.1     02-07    143  |  114<H>  |  22<H>  ----------------------------<  105<H>  4.4   |  26  |  1.07    Ca    7.9<L>      2020 07:40    TPro  5.5<L>  /  Alb  1.9<L>  /  TBili  0.5  /  DBili  x   /  AST  14  /  ALT  <6<L>  /  AlkPhos  91  02-07      Urinalysis Basic - ( 2020 10:10 )    Color: Yellow / Appearance: Clear / S.020 / pH: x  Gluc: x / Ketone: Trace  / Bili: Small / Urobili: 1   Blood: x / Protein: 100 / Nitrite: Positive   Leuk Esterase: Moderate / RBC: 2-5 /HPF / WBC 11-25 /HPF   Sq Epi: x / Non Sq Epi: Occasional /HPF / Bacteria: Trace /HPF                        LABS:                        10.2   17.12 )-----------( 254      ( 2020 07:47 )             33.6     02-06    145  |  113<H>  |  21<H>  ----------------------------<  128<H>  4.9   |  25  |  1.28    Ca    8.3<L>      2020 07:47    TPro  6.3  /  Alb  2.2<L>  /  TBili  0.8  /  DBili  0.2  /  AST  31  /  ALT  9<L>  /  AlkPhos  93  02-06      Urinalysis Basic - ( 2020 10:10 )    Color: Yellow / Appearance: Clear / S.020 / pH: x  Gluc: x / Ketone: Trace  / Bili: Small / Urobili: 1   Blood: x / Protein: 100 / Nitrite: Positive   Leuk Esterase: Moderate / RBC: 2-5 /HPF / WBC 11-25 /HPF   Sq Epi: x / Non Sq Epi: Occasional /HPF / Bacteria: Trace /HPF                          LABS:                        9.9    9.47  )-----------( 234      ( 2020 07:40 )             32.5     02-05    144  |  112<H>  |  16  ----------------------------<  103<H>  4.4   |  27  |  0.99    Ca    8.0<L>      2020 07:40    TPro  6.0  /  Alb  2.4<L>  /  TBili  0.4  /  DBili  x   /  AST  15  /  ALT  10  /  AlkPhos  92  02-04    PT/INR - ( 2020 07:40 )   PT: 13.2 sec;   INR: 1.18 ratio         PTT - ( 2020 07:40 )  PTT:37.4 sec                      LABS:                        10.8   7.02  )-----------( 279      ( 2020 07:40 )             35.4     02-04    147<H>  |  111<H>  |  19<H>  ----------------------------<  88  4.3   |  28  |  0.93    Ca    8.7      2020 07:40    TPro  6.0  /  Alb  2.4<L>  /  TBili  0.4  /  DBili  x   /  AST  15  /  ALT  10  /  AlkPhos  92  02-04    PT/INR - ( 2020 07:40 )   PT: 13.2 sec;   INR: 1.18 ratio         PTT - ( 2020 07:40 )  PTT:37.4 sec                      LABS:                        11.2   7.63  )-----------( 265      ( 2020 07:30 )             36.3     02-02    144  |  113<H>  |  19<H>  ----------------------------<  86  3.7   |  26  |  0.87    Ca    7.9<L>      2020 08:00      PTT - ( 2020 10:05 )  PTT:90.7 sec                        LABS:                        9.9    6.56  )-----------( 254      ( 2020 08:00 )             32.3     02-02    144  |  113<H>  |  19<H>  ----------------------------<  86  3.7   |  26  |  0.87    Ca    7.9<L>      2020 08:00      PTT - ( 2020 08:00 )  PTT:41.0 sec                        LABS:                        10.3   8.03  )-----------( 291      ( 2020 07:28 )             33.6     02-    145  |  114<H>  |  23<H>  ----------------------------<  86  3.6   |  27  |  0.84    Ca    8.4      2020 07:28                              LABS:                        12.0   10.20 )-----------( 368      ( 2020 16:49 )             38.7         143  |  113<H>  |  31<H>  ----------------------------<  82  4.5   |  25  |  1.07    Ca    8.2<L>      2020 16:49    TPro  6.9  /  Alb  2.6<L>  /  TBili  0.4  /  DBili  x   /  AST  23  /  ALT  10  /  AlkPhos  117      PT/INR - ( 2020 16:49 )   PT: 16.6 sec;   INR: 1.48 ratio         PTT - ( 2020 16:49 )  PTT:49.9 sec    CAPILLARY BLOOD GLUCOSE    < from: Transthoracic Echocardiogram (11.22.19 @ 10:44) >  Ejection Fraction Visual Estimate: >55 %    ------------------------------------------------------------------------  OBSERVATIONS:  Mitral Valve: Mitral annular calcification. Mild mitral  regurgitation.  Aortic Root: Normal aortic root.  Aortic Valve: Calcified trileaflet aortic valve with normal  opening. Mild aortic regurgitation.  LeftAtrium: Moderate left atrial enlargement.  Left Ventricle: Endocardium not well visualized; grossly  normal left ventricular systolic function. Normal left  ventricular internal dimensions and wall thicknesses.  Right Heart: Normal right atrium. Normal right ventricular  size and function. There is moderate-severe tricuspid  regurgitation. Normal pulmonic valve.  Pericardium/PleuraNormal pericardium with no pericardial  effusion.  Hemodynamic: RA Pressure is 10 mm Hg. RV systolic pressure  is 84 mm Hg. Severe pulmonary hypertension.  ------------------------------------------------------------------------  CONCLUSIONS:  1. Mitral annular calcification. Mild mitral regurgitation.    2. Calcified trileaflet aortic valve with normal opening.  Mild aortic regurgitation.  3. Moderate left atrial enlargement.  4. Normal left ventricular internal dimensions and wall  thicknesses.  5. Endocardium not well visualized; grossly normal left  ventricular systolic function.  6. Normal right ventricularsize and function.  7. RV systolic pressure is 84 mm Hg. Severe pulmonary  hypertension.    ------------------------------------------------------------------------  Confirmed on  2019 - 08:27:04 by Ayush Yang MD  ------------------------------------------------------------------------    < end of copied text >                RADIOLOGY & ADDITIONAL TESTS:        < from: CT Abdomen and Pelvis w/ IV Cont (20 @ 17:29) >  MPRESSION:     Cholecystostomy tube appears in good position.    Deep vein thrombosis in the right common femoral vein.    Small enhancing fluid surrounding both ischial tuberosities.    Dr. Heart discussed the findings with Dr. Jacinto on 2020 at 5:55 PM.  Readback was obtained.

## 2020-02-07 NOTE — PROGRESS NOTE ADULT - NEGATIVE ENMT SYMPTOMS
no vertigo/no gum bleeding/no nose bleeds/no dry mouth/no dysphagia/no hearing difficulty/no ear pain/no throat pain

## 2020-02-07 NOTE — PROGRESS NOTE ADULT - ASSESSMENT
89 y.o. M with PMH cholecystitis s/p IR PTC tube insertion, DVT on AC, hypothyroidism, afib, HTN presents to [facility] ER for management of PTC tube.  Medicine team is consulted for harper-operative optimization.

## 2020-02-07 NOTE — PROGRESS NOTE ADULT - SUBJECTIVE AND OBJECTIVE BOX
[   ] ICU                                          [   ] CCU                                      [  X ] Medical Floor      Patient is comfortable. No new complaints reported, No abdominal pain, N/V, hematemesis, hematochezia, melena, fever, chills, chest pain, SOB, cough or diarrhea reported.    VITALS  Vital Signs Last 24 Hrs  T(C): 36.7 (07 Feb 2020 14:29), Max: 37.3 (06 Feb 2020 20:20)  T(F): 98 (07 Feb 2020 14:29), Max: 99.2 (06 Feb 2020 20:20)  HR: 75 (07 Feb 2020 14:29) (75 - 92)  BP: 128/91 (07 Feb 2020 14:29) (97/47 - 140/64)  BP(mean): 68 (06 Feb 2020 16:02) (63 - 68)  RR: 16 (07 Feb 2020 14:29) (16 - 23)  SpO2: 100% (07 Feb 2020 14:29) (95% - 100%)         MEDICATIONS  (STANDING):  ammonium lactate 12% Lotion 1 Application(s) Topical two times a day  calamine Lotion 1 Application(s) Topical daily  cefTRIAXone   IVPB 1000 milliGRAM(s) IV Intermittent every 24 hours  dextrose 5% + sodium chloride 0.45% 1000 milliLiter(s) (75 mL/Hr) IV Continuous <Continuous>  heparin  Infusion. 1000 Unit(s)/Hr (10 mL/Hr) IV Continuous <Continuous>  lactated ringers. 1000 milliLiter(s) (75 mL/Hr) IV Continuous <Continuous>  levothyroxine 100 MICROGram(s) Oral daily  lisinopril 2.5 milliGRAM(s) Oral daily  mirtazapine 7.5 milliGRAM(s) Oral at bedtime  pantoprazole  Injectable 40 milliGRAM(s) IV Push every 12 hours  risperiDONE   Tablet 0.25 milliGRAM(s) Oral daily  senna 3 Tablet(s) Oral daily  tamsulosin 0.4 milliGRAM(s) Oral at bedtime    MEDICATIONS  (PRN):  acetaminophen   Tablet .. 650 milliGRAM(s) Oral every 6 hours PRN Temp greater or equal to 38C (100.4F)  heparin  Injectable 6500 Unit(s) IV Push every 6 hours PRN For aPTT less than 40  heparin  Injectable 3000 Unit(s) IV Push every 6 hours PRN For aPTT between 40 - 57  ondansetron Injectable 4 milliGRAM(s) IV Push every 6 hours PRN Nausea                            9.4    12.46 )-----------( 207      ( 07 Feb 2020 07:40 )             31.1       02-07    143  |  114<H>  |  22<H>  ----------------------------<  105<H>  4.4   |  26  |  1.07    Ca    7.9<L>      07 Feb 2020 07:40    TPro  5.5<L>  /  Alb  1.9<L>  /  TBili  0.5  /  DBili  x   /  AST  14  /  ALT  <6<L>  /  AlkPhos  91  02-07

## 2020-02-07 NOTE — PROGRESS NOTE ADULT - SUBJECTIVE AND OBJECTIVE BOX
INTERVAL HPI/OVERNIGHT EVENTS:    Pt seen and examined at bedside. Lethargic; No acute events overnight      Vital Signs Last 24 Hrs  T(C): 36.9 (07 Feb 2020 06:20), Max: 37.3 (06 Feb 2020 20:20)  T(F): 98.4 (07 Feb 2020 06:20), Max: 99.2 (06 Feb 2020 20:20)  HR: 84 (07 Feb 2020 06:20) (78 - 96)  BP: 140/64 (07 Feb 2020 06:20) (97/47 - 140/64)  BP(mean): 68 (06 Feb 2020 16:02) (63 - 87)  RR: 16 (07 Feb 2020 06:20) (16 - 23)  SpO2: 100% (07 Feb 2020 06:20) (95% - 100%)  I&O's Detail    06 Feb 2020 07:01  -  07 Feb 2020 07:00  --------------------------------------------------------  IN:    dextrose 5% + sodium chloride 0.45%: 300 mL    Lactated Ringers IV Bolus: 1100 mL    lactated ringers.: 450 mL  Total IN: 1850 mL    OUT:    Bulb: 310 mL    Indwelling Catheter - Urethral: 445 mL  Total OUT: 755 mL    Total NET: 1095 mL        cefTRIAXone   IVPB 1000 milliGRAM(s) IV Intermittent every 24 hours  senna 3 Tablet(s) Oral daily      Physical Exam  General: AAOx2, lethargic   Skin: No jaundice, no icterus  Abdomen: soft, nondistended, min incisional TTP, PATITO dressing in place, mildly saturated, MARYJO in place, bilious output; no guarding, no palpable masses  : Normal external genitalia, cabrales catheter in place, UO yellow and clear       Labs:                        9.4    12.46 )-----------( 207      ( 07 Feb 2020 07:40 )             31.1     02-07    143  |  114<H>  |  22<H>  ----------------------------<  105<H>  4.4   |  26  |  1.07    Ca    7.9<L>      07 Feb 2020 07:40    TPro  5.5<L>  /  Alb  1.9<L>  /  TBili  0.5  /  DBili  x   /  AST  14  /  ALT  <6<L>  /  AlkPhos  91  02-07

## 2020-02-07 NOTE — PROGRESS NOTE ADULT - SUBJECTIVE AND OBJECTIVE BOX
INTERVAL HPI/OVERNIGHT EVENTS:  Pt stable.   Tolerating diet.   flatus/BM.    Vital Signs Last 24 Hrs  T(C): 36.7 (07 Feb 2020 14:29), Max: 37.3 (06 Feb 2020 20:20)  T(F): 98 (07 Feb 2020 14:29), Max: 99.2 (06 Feb 2020 20:20)  HR: 75 (07 Feb 2020 14:29) (75 - 84)  BP: 128/91 (07 Feb 2020 14:29) (128/91 - 140/64)  BP(mean): --  RR: 16 (07 Feb 2020 14:29) (16 - 17)  SpO2: 100% (07 Feb 2020 14:29) (100% - 100%)    Physical:  Abdomen: Soft nondistended, nontender.  PATITO dressing in place  Drainage still bilious, volume down      I&O's Summary    06 Feb 2020 07:01  -  07 Feb 2020 07:00  --------------------------------------------------------  IN: 1850 mL / OUT: 755 mL / NET: 1095 mL    07 Feb 2020 07:01  -  07 Feb 2020 19:20  --------------------------------------------------------  IN: 0 mL / OUT: 40 mL / NET: -40 mL        LABS:                        10.2   11.02 )-----------( 226      ( 07 Feb 2020 17:37 )             33.9             02-07    143  |  114<H>  |  22<H>  ----------------------------<  105<H>  4.4   |  26  |  1.07    Ca    7.9<L>      07 Feb 2020 07:40    TPro  5.5<L>  /  Alb  1.9<L>  /  TBili  0.5  /  DBili  x   /  AST  14  /  ALT  <6<L>  /  AlkPhos  91  02-07

## 2020-02-07 NOTE — PROGRESS NOTE ADULT - ASSESSMENT
1. CBD stone  2. S/p Cholecystectomy  3. S/p unsuccessful ERCP  4. Duodenal ulcer with narrowing  5. Anemia  5. No evidence of acute GI bleeding    Suggestions:    1. Monitor H/H  2. Follow up LFT's  3. Protonix daily  4. Surgical follow up   5. Avoid NSAID  6. DVT prophylaxis

## 2020-02-07 NOTE — CHART NOTE - NSCHARTNOTEFT_GEN_A_CORE
I spoke with Dr. Denton regarding the failed ERCP. It appears the patient had severe duodenal ulceration dn peptic disease. It was not an issue of cannulation.  I would doubt a second endoscopic will be successful. For now PPi daily , monitor KJP drainage and consider repeat later in the week , perhaps inflammation will improve

## 2020-02-07 NOTE — PROGRESS NOTE ADULT - ASSESSMENT
89 y/o male s/p laparoscopic converted to open cholecystectomy with positive intraop cholangiogram for cbd stone 2/4; Leukocytosis trending down, lethargic today     1. Advance diet to clears  2. Restart hep gtt  3. IV fluid hydration   4. GI f/u   5. D/c cabrales catheter, TOV

## 2020-02-08 LAB
APTT BLD: 65 SEC — HIGH (ref 27.5–36.3)
HCT VFR BLD CALC: 32.8 % — LOW (ref 39–50)
HGB BLD-MCNC: 9.9 G/DL — LOW (ref 13–17)
MCHC RBC-ENTMCNC: 30.2 GM/DL — LOW (ref 32–36)
MCHC RBC-ENTMCNC: 31.2 PG — SIGNIFICANT CHANGE UP (ref 27–34)
MCV RBC AUTO: 103.5 FL — HIGH (ref 80–100)
NRBC # BLD: 0 /100 WBCS — SIGNIFICANT CHANGE UP (ref 0–0)
PLATELET # BLD AUTO: 251 K/UL — SIGNIFICANT CHANGE UP (ref 150–400)
RBC # BLD: 3.17 M/UL — LOW (ref 4.2–5.8)
RBC # FLD: 15.9 % — HIGH (ref 10.3–14.5)
WBC # BLD: 10.07 K/UL — SIGNIFICANT CHANGE UP (ref 3.8–10.5)
WBC # FLD AUTO: 10.07 K/UL — SIGNIFICANT CHANGE UP (ref 3.8–10.5)

## 2020-02-08 RX ADMIN — TAMSULOSIN HYDROCHLORIDE 0.4 MILLIGRAM(S): 0.4 CAPSULE ORAL at 22:18

## 2020-02-08 RX ADMIN — MIRTAZAPINE 7.5 MILLIGRAM(S): 45 TABLET, ORALLY DISINTEGRATING ORAL at 22:18

## 2020-02-08 RX ADMIN — Medication 100 MICROGRAM(S): at 06:38

## 2020-02-08 RX ADMIN — Medication 1 APPLICATION(S): at 06:38

## 2020-02-08 RX ADMIN — CEFTRIAXONE 100 MILLIGRAM(S): 500 INJECTION, POWDER, FOR SOLUTION INTRAMUSCULAR; INTRAVENOUS at 18:22

## 2020-02-08 RX ADMIN — SENNA PLUS 3 TABLET(S): 8.6 TABLET ORAL at 12:04

## 2020-02-08 RX ADMIN — CALAMINE AND ZINC OXIDE AND PHENOL 1 APPLICATION(S): 160; 10 LOTION TOPICAL at 12:04

## 2020-02-08 RX ADMIN — PANTOPRAZOLE SODIUM 40 MILLIGRAM(S): 20 TABLET, DELAYED RELEASE ORAL at 06:39

## 2020-02-08 RX ADMIN — HEPARIN SODIUM 1200 UNIT(S)/HR: 5000 INJECTION INTRAVENOUS; SUBCUTANEOUS at 06:37

## 2020-02-08 RX ADMIN — Medication 1 APPLICATION(S): at 18:22

## 2020-02-08 RX ADMIN — RISPERIDONE 0.25 MILLIGRAM(S): 4 TABLET ORAL at 12:04

## 2020-02-08 RX ADMIN — PANTOPRAZOLE SODIUM 40 MILLIGRAM(S): 20 TABLET, DELAYED RELEASE ORAL at 18:23

## 2020-02-08 RX ADMIN — LISINOPRIL 2.5 MILLIGRAM(S): 2.5 TABLET ORAL at 06:38

## 2020-02-08 RX ADMIN — HEPARIN SODIUM 1200 UNIT(S)/HR: 5000 INJECTION INTRAVENOUS; SUBCUTANEOUS at 00:07

## 2020-02-08 NOTE — PROGRESS NOTE ADULT - NEGATIVE ENMT SYMPTOMS
no ear pain/no nose bleeds/no dry mouth/no hearing difficulty/no gum bleeding/no throat pain/no dysphagia/no vertigo

## 2020-02-08 NOTE — PROGRESS NOTE ADULT - ASSESSMENT
89 y.o. M with PMH cholecystitis s/p IR PTC tube insertion, DVT on AC, hypothyroidism, afib, HTN presents to [facility] ER for management of PTC tube.  Medicine team is consulted for harper-operative optimization.

## 2020-02-08 NOTE — PROGRESS NOTE ADULT - ASSESSMENT
89 y/o male s/p laparoscopic converted to open cholecystectomy with positive IOC for CBD stone 2/4;  pt looks better today, answering questions, white count downtrending    - soft diet  - cont hep gtt  - GI f/u for ERCP 2/10  - cont Ceftriaxone for UTI

## 2020-02-08 NOTE — PROGRESS NOTE ADULT - SUBJECTIVE AND OBJECTIVE BOX
INTERVAL HPI/OVERNIGHT EVENTS:    No acute events overnight. Pt resting comfortably writing a letter in bed.  Tolerating diet.   Denies N/V.    MEDICATIONS  (STANDING):  ammonium lactate 12% Lotion 1 Application(s) Topical two times a day  calamine Lotion 1 Application(s) Topical daily  cefTRIAXone   IVPB 1000 milliGRAM(s) IV Intermittent every 24 hours  dextrose 5% + sodium chloride 0.45% 1000 milliLiter(s) (75 mL/Hr) IV Continuous <Continuous>  heparin  Infusion. 1000 Unit(s)/Hr (10 mL/Hr) IV Continuous <Continuous>  lactated ringers. 1000 milliLiter(s) (75 mL/Hr) IV Continuous <Continuous>  levothyroxine 100 MICROGram(s) Oral daily  lisinopril 2.5 milliGRAM(s) Oral daily  mirtazapine 7.5 milliGRAM(s) Oral at bedtime  pantoprazole  Injectable 40 milliGRAM(s) IV Push every 12 hours  risperiDONE   Tablet 0.25 milliGRAM(s) Oral daily  senna 3 Tablet(s) Oral daily  tamsulosin 0.4 milliGRAM(s) Oral at bedtime    MEDICATIONS  (PRN):  acetaminophen   Tablet .. 650 milliGRAM(s) Oral every 6 hours PRN Temp greater or equal to 38C (100.4F)  heparin  Injectable 6500 Unit(s) IV Push every 6 hours PRN For aPTT less than 40  heparin  Injectable 3000 Unit(s) IV Push every 6 hours PRN For aPTT between 40 - 57  ondansetron Injectable 4 milliGRAM(s) IV Push every 6 hours PRN Nausea      Vital Signs Last 24 Hrs  T(C): 37.4 (08 Feb 2020 14:49), Max: 37.4 (08 Feb 2020 14:49)  T(F): 99.3 (08 Feb 2020 14:49), Max: 99.3 (08 Feb 2020 14:49)  HR: 88 (08 Feb 2020 14:49) (79 - 95)  BP: 136/73 (08 Feb 2020 14:49) (120/49 - 136/73)  RR: 16 (08 Feb 2020 14:49) (16 - 16)  SpO2: 96% (08 Feb 2020 14:49) (96% - 98%)    Physical:  General: Alert, not in acute distress  Resp: Breathing unlabored  Abdomen: Soft, nondistended, nontender, MARYJO bilious, PATITO in place with small amount of dried blood   : No cabrales catheter, no dysuria or hematuria  Extremities: No pedal edema    I&O's Detail    07 Feb 2020 07:01  -  08 Feb 2020 07:00  --------------------------------------------------------  IN:    heparin  Infusion.: 144 mL    lactated ringers.: 900 mL  Total IN: 1044 mL    OUT:    Bulb: 70 mL    Indwelling Catheter - Urethral: 600 mL  Total OUT: 670 mL    Total NET: 374 mL      08 Feb 2020 07:01  -  08 Feb 2020 17:20  --------------------------------------------------------  IN:    heparin  Infusion.: 108 mL    lactated ringers.: 450 mL  Total IN: 558 mL    OUT:    Bulb: 5 mL    Indwelling Catheter - Urethral: 100 mL  Total OUT: 105 mL  Total NET: 453 mL      LABS:                        9.9    10.07 )-----------( 251      ( 08 Feb 2020 05:54 )             32.8             02-07    143  |  114<H>  |  22<H>  ----------------------------<  105<H>  4.4   |  26  |  1.07    Ca    7.9<L>      07 Feb 2020 07:40    TPro  5.5<L>  /  Alb  1.9<L>  /  TBili  0.5  /  DBili  x   /  AST  14  /  ALT  <6<L>  /  AlkPhos  91  02-07

## 2020-02-08 NOTE — PROGRESS NOTE ADULT - SUBJECTIVE AND OBJECTIVE BOX
patient was seen and examined   s no specific  c/o offered  no cp or sob , or abdominal pain  s/p surgery   s/p  ERCP but unsuccessful   MEDICATIONS  (STANDING):  ammonium lactate 12% Lotion 1 Application(s) Topical two times a day  calamine Lotion 1 Application(s) Topical daily  cefTRIAXone   IVPB 1000 milliGRAM(s) IV Intermittent every 24 hours  dextrose 5% + sodium chloride 0.45% 1000 milliLiter(s) (75 mL/Hr) IV Continuous <Continuous>  heparin  Infusion. 1000 Unit(s)/Hr (10 mL/Hr) IV Continuous <Continuous>  lactated ringers. 1000 milliLiter(s) (75 mL/Hr) IV Continuous <Continuous>  levothyroxine 100 MICROGram(s) Oral daily  lisinopril 2.5 milliGRAM(s) Oral daily  mirtazapine 7.5 milliGRAM(s) Oral at bedtime  pantoprazole  Injectable 40 milliGRAM(s) IV Push every 12 hours  risperiDONE   Tablet 0.25 milliGRAM(s) Oral daily  senna 3 Tablet(s) Oral daily  tamsulosin 0.4 milliGRAM(s) Oral at bedtime    MEDICATIONS  (PRN):  acetaminophen   Tablet .. 650 milliGRAM(s) Oral every 6 hours PRN Temp greater or equal to 38C (100.4F)  heparin  Injectable 6500 Unit(s) IV Push every 6 hours PRN For aPTT less than 40  heparin  Injectable 3000 Unit(s) IV Push every 6 hours PRN For aPTT between 40 - 57  ondansetron Injectable 4 milliGRAM(s) IV Push every 6 hours PRN Nausea      PAST MEDICAL & SURGICAL HISTORY:  PVD (peripheral vascular disease)  Acalculous cholecystitis: s/p percutaneous cholecystostomy  HTN (hypertension)  Hypothyroid  Ulcers of both lower extremities  No significant past surgical history    REVIEW OF SYSTEMS:   CONSTITUTIONAL: No fever, weight loss, or fatigue  EYES: No eye pain, visual disturbances, or discharge  ENMT:  No difficulty hearing, tinnitus, vertigo; No sinus or throat pain  NECK: No pain or stiffness  BREASTS: No pain, masses, or nipple discharge  RESPIRATORY: No cough, wheezing, chills or hemoptysis; No shortness of breath  CARDIOVASCULAR: No chest pain, palpitations, dizziness, or leg swelling  GASTROINTESTINAL: No abdominal or epigastric pain. No nausea, vomiting, or hematemesis; No diarrhea or constipation. No melena or hematochezia.  GENITOURINARY: No dysuria, frequency, hematuria, or incontinence  NEUROLOGICAL: No headaches, memory loss, loss of strength, numbness, or tremors  SKIN: No itching, burning, rashes, or lesions   LYMPH NODES: No enlarged glands  ENDOCRINE: No heat or cold intolerance; No hair loss  MUSCULOSKELETAL: No joint pain or swelling; No muscle, back, or extremity pain  PSYCHIATRIC: No depression, anxiety, mood swings, or difficulty sleeping  HEME/LYMPH: No easy bruising, or bleeding gums  ALLERY AND IMMUNOLOGIC: No hives or eczema    Vital Signs Last 24 Hrs  T(C): 36.7 (2020 14:29), Max: 37.3 (2020 20:20)  T(F): 98 (2020 14:29), Max: 99.2 (2020 20:20)  HR: 75 (2020 14:29) (75 - 84)  BP: 128/91 (2020 14:29) (128/91 - 140/64)  BP(mean): --  RR: 16 (2020 14:29) (16 - 17)  SpO2: 100% (2020 14:29) (100% - 100%)        PHYSICAL EXAM:  GENERAL: NAD, well-groomed, well-developed  HEAD:  Atraumatic, Normocephalic  EYES: EOMI, PERRLA, conjunctiva and sclera clear  ENMT: No tonsillar erythema, exudates, or enlargement; Moist mucous membranes, Good dentition, No lesions  NECK: Supple, No JVD, Normal thyroid  NERVOUS SYSTEM:  Alert & Oriented X 1 to self, Good concentration;   CHEST/LUNG: Clear to percussion bilaterally; No rales, rhonchi, wheezing, or rubs  HEART: Regular rate and rhythm; No murmurs, rubs, or gallops  ABDOMEN: Soft, Nontender, Nondistended; Bowel sounds present, surgical site , no bleeding  EXTREMITIES:  2+ Peripheral Pulses, chronic venous stasis changes No clubbing, cyanosis, or edema  LYMPH: No lymphadenopathy noted  SKIN: No rashes or lesions    LABS:                        9.4    12.46 )-----------( 207      ( 2020 07:40 )             31.1     02-07    143  |  114<H>  |  22<H>  ----------------------------<  105<H>  4.4   |  26  |  1.07    Ca    7.9<L>      2020 07:40    TPro  5.5<L>  /  Alb  1.9<L>  /  TBili  0.5  /  DBili  x   /  AST  14  /  ALT  <6<L>  /  AlkPhos  91  02-07      Urinalysis Basic - ( 2020 10:10 )    Color: Yellow / Appearance: Clear / S.020 / pH: x  Gluc: x / Ketone: Trace  / Bili: Small / Urobili: 1   Blood: x / Protein: 100 / Nitrite: Positive   Leuk Esterase: Moderate / RBC: 2-5 /HPF / WBC 11-25 /HPF   Sq Epi: x / Non Sq Epi: Occasional /HPF / Bacteria: Trace /HPF    LABS:                        9.9    10.07 )-----------( 251      ( 2020 05:54 )             32.8     02-07    143  |  114<H>  |  22<H>  ----------------------------<  105<H>  4.4   |  26  |  1.07    Ca    7.9<L>      2020 07:40    TPro  5.5<L>  /  Alb  1.9<L>  /  TBili  0.5  /  DBili  x   /  AST  14  /  ALT  <6<L>  /  AlkPhos  91  02-07    PTT - ( 2020 05:54 )  PTT:65.0 sec                                        LABS:                        10.2   17.12 )-----------( 254      ( 2020 07:47 )             33.6     02-06    145  |  113<H>  |  21<H>  ----------------------------<  128<H>  4.9   |  25  |  1.28    Ca    8.3<L>      2020 07:47    TPro  6.3  /  Alb  2.2<L>  /  TBili  0.8  /  DBili  0.2  /  AST  31  /  ALT  9<L>  /  AlkPhos  93  02-06      Urinalysis Basic - ( 2020 10:10 )    Color: Yellow / Appearance: Clear / S.020 / pH: x  Gluc: x / Ketone: Trace  / Bili: Small / Urobili: 1   Blood: x / Protein: 100 / Nitrite: Positive   Leuk Esterase: Moderate / RBC: 2-5 /HPF / WBC 11-25 /HPF   Sq Epi: x / Non Sq Epi: Occasional /HPF / Bacteria: Trace /HPF                          LABS:                        9.9    9.47  )-----------( 234      ( 2020 07:40 )             32.5     02-05    144  |  112<H>  |  16  ----------------------------<  103<H>  4.4   |  27  |  0.99    Ca    8.0<L>      2020 07:40    TPro  6.0  /  Alb  2.4<L>  /  TBili  0.4  /  DBili  x   /  AST  15  /  ALT  10  /  AlkPhos  92  02-04    PT/INR - ( 2020 07:40 )   PT: 13.2 sec;   INR: 1.18 ratio         PTT - ( 2020 07:40 )  PTT:37.4 sec                      LABS:                        10.8   7.02  )-----------( 279      ( 2020 07:40 )             35.4     02-04    147<H>  |  111<H>  |  19<H>  ----------------------------<  88  4.3   |  28  |  0.93    Ca    8.7      2020 07:40    TPro  6.0  /  Alb  2.4<L>  /  TBili  0.4  /  DBili  x   /  AST  15  /  ALT  10  /  AlkPhos  92  02-04    PT/INR - ( 2020 07:40 )   PT: 13.2 sec;   INR: 1.18 ratio         PTT - ( 2020 07:40 )  PTT:37.4 sec                      LABS:                        11.2   7.63  )-----------( 265      ( 2020 07:30 )             36.3     02-02    144  |  113<H>  |  19<H>  ----------------------------<  86  3.7   |  26  |  0.87    Ca    7.9<L>      2020 08:00      PTT - ( 2020 10:05 )  PTT:90.7 sec                        LABS:                        9.9    6.56  )-----------( 254      ( 2020 08:00 )             32.3     02-    144  |  113<H>  |  19<H>  ----------------------------<  86  3.7   |  26  |  0.87    Ca    7.9<L>      2020 08:00      PTT - ( 2020 08:00 )  PTT:41.0 sec                        LABS:                        10.3   8.03  )-----------( 291      ( 2020 07:28 )             33.6     02-01    145  |  114<H>  |  23<H>  ----------------------------<  86  3.6   |  27  |  0.84    Ca    8.4      2020 07:28                              LABS:                        12.0   10.20 )-----------( 368      ( 2020 16:49 )             38.7     01-    143  |  113<H>  |  31<H>  ----------------------------<  82  4.5   |  25  |  1.07    Ca    8.2<L>      2020 16:49    TPro  6.9  /  Alb  2.6<L>  /  TBili  0.4  /  DBili  x   /  AST  23  /  ALT  10  /  AlkPhos  117  -29    PT/INR - ( 2020 16:49 )   PT: 16.6 sec;   INR: 1.48 ratio         PTT - ( 2020 16:49 )  PTT:49.9 sec    CAPILLARY BLOOD GLUCOSE    < from: Transthoracic Echocardiogram (.19 @ 10:44) >  Ejection Fraction Visual Estimate: >55 %    ------------------------------------------------------------------------  OBSERVATIONS:  Mitral Valve: Mitral annular calcification. Mild mitral  regurgitation.  Aortic Root: Normal aortic root.  Aortic Valve: Calcified trileaflet aortic valve with normal  opening. Mild aortic regurgitation.  LeftAtrium: Moderate left atrial enlargement.  Left Ventricle: Endocardium not well visualized; grossly  normal left ventricular systolic function. Normal left  ventricular internal dimensions and wall thicknesses.  Right Heart: Normal right atrium. Normal right ventricular  size and function. There is moderate-severe tricuspid  regurgitation. Normal pulmonic valve.  Pericardium/PleuraNormal pericardium with no pericardial  effusion.  Hemodynamic: RA Pressure is 10 mm Hg. RV systolic pressure  is 84 mm Hg. Severe pulmonary hypertension.  ------------------------------------------------------------------------  CONCLUSIONS:  1. Mitral annular calcification. Mild mitral regurgitation.    2. Calcified trileaflet aortic valve with normal opening.  Mild aortic regurgitation.  3. Moderate left atrial enlargement.  4. Normal left ventricular internal dimensions and wall  thicknesses.  5. Endocardium not well visualized; grossly normal left  ventricular systolic function.  6. Normal right ventricularsize and function.  7. RV systolic pressure is 84 mm Hg. Severe pulmonary  hypertension.    ------------------------------------------------------------------------  Confirmed on  2019 - 08:27:04 by Ayush Yang MD  ------------------------------------------------------------------------    < end of copied text >                RADIOLOGY & ADDITIONAL TESTS:        < from: CT Abdomen and Pelvis w/ IV Cont (20 @ 17:29) >  MPRESSION:     Cholecystostomy tube appears in good position.    Deep vein thrombosis in the right common femoral vein.    Small enhancing fluid surrounding both ischial tuberosities.    Dr. Heart discussed the findings with Dr. Jacinto on 2020 at 5:55 PM.  Readback was obtained.

## 2020-02-08 NOTE — PROGRESS NOTE ADULT - SUBJECTIVE AND OBJECTIVE BOX
[   ] ICU                                          [   ] CCU                                      [ X  ] Medical Floor      Patient is comfortable. No new complaints reported, No abdominal pain, N/V, hematemesis, hematochezia, melena, fever, chills, chest pain, SOB, cough or diarrhea reported.    VITALS  Vital Signs Last 24 Hrs  T(C): 37.4 (08 Feb 2020 14:49), Max: 37.4 (08 Feb 2020 14:49)  T(F): 99.3 (08 Feb 2020 14:49), Max: 99.3 (08 Feb 2020 14:49)  HR: 88 (08 Feb 2020 14:49) (79 - 95)  BP: 136/73 (08 Feb 2020 14:49) (120/49 - 136/73)   RR: 16 (08 Feb 2020 14:49) (16 - 16)  SpO2: 96% (08 Feb 2020 14:49) (96% - 98%)       MEDICATIONS  (STANDING):  ammonium lactate 12% Lotion 1 Application(s) Topical two times a day  calamine Lotion 1 Application(s) Topical daily  cefTRIAXone   IVPB 1000 milliGRAM(s) IV Intermittent every 24 hours  dextrose 5% + sodium chloride 0.45% 1000 milliLiter(s) (75 mL/Hr) IV Continuous <Continuous>  heparin  Infusion. 1000 Unit(s)/Hr (10 mL/Hr) IV Continuous <Continuous>  lactated ringers. 1000 milliLiter(s) (75 mL/Hr) IV Continuous <Continuous>  levothyroxine 100 MICROGram(s) Oral daily  lisinopril 2.5 milliGRAM(s) Oral daily  mirtazapine 7.5 milliGRAM(s) Oral at bedtime  pantoprazole  Injectable 40 milliGRAM(s) IV Push every 12 hours  risperiDONE   Tablet 0.25 milliGRAM(s) Oral daily  senna 3 Tablet(s) Oral daily  tamsulosin 0.4 milliGRAM(s) Oral at bedtime    MEDICATIONS  (PRN):  acetaminophen   Tablet .. 650 milliGRAM(s) Oral every 6 hours PRN Temp greater or equal to 38C (100.4F)  heparin  Injectable 6500 Unit(s) IV Push every 6 hours PRN For aPTT less than 40  heparin  Injectable 3000 Unit(s) IV Push every 6 hours PRN For aPTT between 40 - 57  ondansetron Injectable 4 milliGRAM(s) IV Push every 6 hours PRN Nausea                            9.9    10.07 )-----------( 251      ( 08 Feb 2020 05:54 )             32.8       02-07    143  |  114<H>  |  22<H>  ----------------------------<  105<H>  4.4   |  26  |  1.07    Ca    7.9<L>      07 Feb 2020 07:40    TPro  5.5<L>  /  Alb  1.9<L>  /  TBili  0.5  /  DBili  x   /  AST  14  /  ALT  <6<L>  /  AlkPhos  91  02-07      PTT - ( 08 Feb 2020 05:54 )  PTT:65.0 sec

## 2020-02-09 LAB
APTT BLD: 58 SEC — HIGH (ref 27.5–36.3)
HCT VFR BLD CALC: 33.3 % — LOW (ref 39–50)
HGB BLD-MCNC: 10.1 G/DL — LOW (ref 13–17)
MCHC RBC-ENTMCNC: 30.3 GM/DL — LOW (ref 32–36)
MCHC RBC-ENTMCNC: 31 PG — SIGNIFICANT CHANGE UP (ref 27–34)
MCV RBC AUTO: 102.1 FL — HIGH (ref 80–100)
NRBC # BLD: 0 /100 WBCS — SIGNIFICANT CHANGE UP (ref 0–0)
PLATELET # BLD AUTO: 279 K/UL — SIGNIFICANT CHANGE UP (ref 150–400)
RBC # BLD: 3.26 M/UL — LOW (ref 4.2–5.8)
RBC # FLD: 15.7 % — HIGH (ref 10.3–14.5)
WBC # BLD: 8.21 K/UL — SIGNIFICANT CHANGE UP (ref 3.8–10.5)
WBC # FLD AUTO: 8.21 K/UL — SIGNIFICANT CHANGE UP (ref 3.8–10.5)

## 2020-02-09 RX ADMIN — PANTOPRAZOLE SODIUM 40 MILLIGRAM(S): 20 TABLET, DELAYED RELEASE ORAL at 05:31

## 2020-02-09 RX ADMIN — RISPERIDONE 0.25 MILLIGRAM(S): 4 TABLET ORAL at 11:55

## 2020-02-09 RX ADMIN — LISINOPRIL 2.5 MILLIGRAM(S): 2.5 TABLET ORAL at 05:32

## 2020-02-09 RX ADMIN — CALAMINE AND ZINC OXIDE AND PHENOL 1 APPLICATION(S): 160; 10 LOTION TOPICAL at 11:55

## 2020-02-09 RX ADMIN — MIRTAZAPINE 7.5 MILLIGRAM(S): 45 TABLET, ORALLY DISINTEGRATING ORAL at 22:12

## 2020-02-09 RX ADMIN — Medication 1 APPLICATION(S): at 05:31

## 2020-02-09 RX ADMIN — PANTOPRAZOLE SODIUM 40 MILLIGRAM(S): 20 TABLET, DELAYED RELEASE ORAL at 17:52

## 2020-02-09 RX ADMIN — HEPARIN SODIUM 1200 UNIT(S)/HR: 5000 INJECTION INTRAVENOUS; SUBCUTANEOUS at 07:42

## 2020-02-09 RX ADMIN — TAMSULOSIN HYDROCHLORIDE 0.4 MILLIGRAM(S): 0.4 CAPSULE ORAL at 22:12

## 2020-02-09 RX ADMIN — CEFTRIAXONE 100 MILLIGRAM(S): 500 INJECTION, POWDER, FOR SOLUTION INTRAMUSCULAR; INTRAVENOUS at 18:40

## 2020-02-09 RX ADMIN — Medication 100 MICROGRAM(S): at 05:32

## 2020-02-09 RX ADMIN — SENNA PLUS 3 TABLET(S): 8.6 TABLET ORAL at 11:55

## 2020-02-09 RX ADMIN — Medication 1 APPLICATION(S): at 17:52

## 2020-02-09 NOTE — PROGRESS NOTE ADULT - SUBJECTIVE AND OBJECTIVE BOX
[   ] ICU                                          [   ] CCU                                      [ X  ] Medical Floor      Patient is comfortable. No new complaints reported, No abdominal pain, N/V, hematemesis, hematochezia, melena, fever, chills, chest pain, SOB, cough or diarrhea reported.    VITALS  Vital Signs Last 24 Hrs  T(C): 36.6 (09 Feb 2020 13:58), Max: 36.6 (09 Feb 2020 13:58)  T(F): 97.8 (09 Feb 2020 13:58), Max: 97.8 (09 Feb 2020 13:58)  HR: 83 (09 Feb 2020 13:58) (78 - 102)  BP: 126/61 (09 Feb 2020 13:58) (126/61 - 137/65)   RR: 17 (09 Feb 2020 13:58) (16 - 17)  SpO2: 98% (09 Feb 2020 13:58) (96% - 98%)       MEDICATIONS  (STANDING):  ammonium lactate 12% Lotion 1 Application(s) Topical two times a day  calamine Lotion 1 Application(s) Topical daily  cefTRIAXone   IVPB 1000 milliGRAM(s) IV Intermittent every 24 hours  dextrose 5% + sodium chloride 0.45% 1000 milliLiter(s) (75 mL/Hr) IV Continuous <Continuous>  heparin  Infusion. 1000 Unit(s)/Hr (10 mL/Hr) IV Continuous <Continuous>  lactated ringers. 1000 milliLiter(s) (75 mL/Hr) IV Continuous <Continuous>  levothyroxine 100 MICROGram(s) Oral daily  lisinopril 2.5 milliGRAM(s) Oral daily  mirtazapine 7.5 milliGRAM(s) Oral at bedtime  pantoprazole  Injectable 40 milliGRAM(s) IV Push every 12 hours  risperiDONE   Tablet 0.25 milliGRAM(s) Oral daily  senna 3 Tablet(s) Oral daily  tamsulosin 0.4 milliGRAM(s) Oral at bedtime    MEDICATIONS  (PRN):  acetaminophen   Tablet .. 650 milliGRAM(s) Oral every 6 hours PRN Temp greater or equal to 38C (100.4F)  heparin  Injectable 6500 Unit(s) IV Push every 6 hours PRN For aPTT less than 40  heparin  Injectable 3000 Unit(s) IV Push every 6 hours PRN For aPTT between 40 - 57  ondansetron Injectable 4 milliGRAM(s) IV Push every 6 hours PRN Nausea                            10.1   8.21  )-----------( 279      ( 09 Feb 2020 06:37 )             33.3               PTT - ( 09 Feb 2020 06:37 )  PTT:58.0 sec

## 2020-02-09 NOTE — PROGRESS NOTE ADULT - SUBJECTIVE AND OBJECTIVE BOX
patient was seen and examined   s no specific  c/o offered  no cp or sob , or abdominal pain  s/p surgery   s/p  ERCP but unsuccessful   MEDICATIONS  (STANDING):  ammonium lactate 12% Lotion 1 Application(s) Topical two times a day  calamine Lotion 1 Application(s) Topical daily  cefTRIAXone   IVPB 1000 milliGRAM(s) IV Intermittent every 24 hours  dextrose 5% + sodium chloride 0.45% 1000 milliLiter(s) (75 mL/Hr) IV Continuous <Continuous>  heparin  Infusion. 1000 Unit(s)/Hr (10 mL/Hr) IV Continuous <Continuous>  lactated ringers. 1000 milliLiter(s) (75 mL/Hr) IV Continuous <Continuous>  levothyroxine 100 MICROGram(s) Oral daily  lisinopril 2.5 milliGRAM(s) Oral daily  mirtazapine 7.5 milliGRAM(s) Oral at bedtime  pantoprazole  Injectable 40 milliGRAM(s) IV Push every 12 hours  risperiDONE   Tablet 0.25 milliGRAM(s) Oral daily  senna 3 Tablet(s) Oral daily  tamsulosin 0.4 milliGRAM(s) Oral at bedtime    MEDICATIONS  (PRN):  acetaminophen   Tablet .. 650 milliGRAM(s) Oral every 6 hours PRN Temp greater or equal to 38C (100.4F)  heparin  Injectable 6500 Unit(s) IV Push every 6 hours PRN For aPTT less than 40  heparin  Injectable 3000 Unit(s) IV Push every 6 hours PRN For aPTT between 40 - 57  ondansetron Injectable 4 milliGRAM(s) IV Push every 6 hours PRN Nausea      PAST MEDICAL & SURGICAL HISTORY:  PVD (peripheral vascular disease)  Acalculous cholecystitis: s/p percutaneous cholecystostomy  HTN (hypertension)  Hypothyroid  Ulcers of both lower extremities  No significant past surgical history    REVIEW OF SYSTEMS:   CONSTITUTIONAL: No fever, weight loss, or fatigue  EYES: No eye pain, visual disturbances, or discharge  ENMT:  No difficulty hearing, tinnitus, vertigo; No sinus or throat pain  NECK: No pain or stiffness  BREASTS: No pain, masses, or nipple discharge  RESPIRATORY: No cough, wheezing, chills or hemoptysis; No shortness of breath  CARDIOVASCULAR: No chest pain, palpitations, dizziness, or leg swelling  GASTROINTESTINAL: No abdominal or epigastric pain. No nausea, vomiting, or hematemesis; No diarrhea or constipation. No melena or hematochezia.  GENITOURINARY: No dysuria, frequency, hematuria, or incontinence  NEUROLOGICAL: No headaches, memory loss, loss of strength, numbness, or tremors  SKIN: No itching, burning, rashes, or lesions   LYMPH NODES: No enlarged glands  ENDOCRINE: No heat or cold intolerance; No hair loss  MUSCULOSKELETAL: No joint pain or swelling; No muscle, back, or extremity pain  PSYCHIATRIC: No depression, anxiety, mood swings, or difficulty sleeping  HEME/LYMPH: No easy bruising, or bleeding gums  ALLERY AND IMMUNOLOGIC: No hives or eczema  Vital Signs Last 24 Hrs  T(C): 36.6 (2020 13:58), Max: 36.6 (2020 13:58)  T(F): 97.8 (2020 13:58), Max: 97.8 (2020 13:58)  HR: 83 (2020 13:58) (78 - 102)  BP: 126/61 (2020 13:58) (126/61 - 137/65)  BP(mean): --  RR: 17 (2020 13:58) (16 - 17)  SpO2: 98% (2020 13:58) (96% - 98%)      PHYSICAL EXAM:  GENERAL: NAD, well-groomed, well-developed  HEAD:  Atraumatic, Normocephalic  EYES: EOMI, PERRLA, conjunctiva and sclera clear  ENMT: No tonsillar erythema, exudates, or enlargement; Moist mucous membranes, Good dentition, No lesions  NECK: Supple, No JVD, Normal thyroid  NERVOUS SYSTEM:  Alert & Oriented X 1 to self, Good concentration;   CHEST/LUNG: Clear to percussion bilaterally; No rales, rhonchi, wheezing, or rubs  HEART: Regular rate and rhythm; No murmurs, rubs, or gallops  ABDOMEN: Soft, Nontender, Nondistended; Bowel sounds present, surgical site , no bleeding  EXTREMITIES:  2+ Peripheral Pulses, chronic venous stasis changes No clubbing, cyanosis, or edema  LYMPH: No lymphadenopathy noted  SKIN: No rashes or lesions    LABS:                        10.1   8.21  )-----------( 279      ( 2020 06:37 )             33.3           PTT - ( 2020 06:37 )  PTT:58.0 sec                        LABS:                        9.4    12.46 )-----------( 207      ( 2020 07:40 )             31.1     02-07    143  |  114<H>  |  22<H>  ----------------------------<  105<H>  4.4   |  26  |  1.07    Ca    7.9<L>      2020 07:40    TPro  5.5<L>  /  Alb  1.9<L>  /  TBili  0.5  /  DBili  x   /  AST  14  /  ALT  <6<L>  /  AlkPhos  91  02-07      Urinalysis Basic - ( 2020 10:10 )    Color: Yellow / Appearance: Clear / S.020 / pH: x  Gluc: x / Ketone: Trace  / Bili: Small / Urobili: 1   Blood: x / Protein: 100 / Nitrite: Positive   Leuk Esterase: Moderate / RBC: 2-5 /HPF / WBC 11-25 /HPF   Sq Epi: x / Non Sq Epi: Occasional /HPF / Bacteria: Trace /HPF    LABS:                        9.9    10.07 )-----------( 251      ( 2020 05:54 )             32.8     02-07    143  |  114<H>  |  22<H>  ----------------------------<  105<H>  4.4   |  26  |  1.07    Ca    7.9<L>      2020 07:40    TPro  5.5<L>  /  Alb  1.9<L>  /  TBili  0.5  /  DBili  x   /  AST  14  /  ALT  <6<L>  /  AlkPhos  91  02-07    PTT - ( 2020 05:54 )  PTT:65.0 sec                                        LABS:                        10.2   17.12 )-----------( 254      ( 2020 07:47 )             33.6     02-06    145  |  113<H>  |  21<H>  ----------------------------<  128<H>  4.9   |  25  |  1.28    Ca    8.3<L>      2020 07:47    TPro  6.3  /  Alb  2.2<L>  /  TBili  0.8  /  DBili  0.2  /  AST  31  /  ALT  9<L>  /  AlkPhos  93  02-06      Urinalysis Basic - ( 2020 10:10 )    Color: Yellow / Appearance: Clear / S.020 / pH: x  Gluc: x / Ketone: Trace  / Bili: Small / Urobili: 1   Blood: x / Protein: 100 / Nitrite: Positive   Leuk Esterase: Moderate / RBC: 2-5 /HPF / WBC 11-25 /HPF   Sq Epi: x / Non Sq Epi: Occasional /HPF / Bacteria: Trace /HPF                          LABS:                        9.9    9.47  )-----------( 234      ( 2020 07:40 )             32.5     02-05    144  |  112<H>  |  16  ----------------------------<  103<H>  4.4   |  27  |  0.99    Ca    8.0<L>      2020 07:40    TPro  6.0  /  Alb  2.4<L>  /  TBili  0.4  /  DBili  x   /  AST  15  /  ALT  10  /  AlkPhos  92  02-04    PT/INR - ( 2020 07:40 )   PT: 13.2 sec;   INR: 1.18 ratio         PTT - ( 2020 07:40 )  PTT:37.4 sec                      LABS:                        10.8   7.02  )-----------( 279      ( 2020 07:40 )             35.4     02-04    147<H>  |  111<H>  |  19<H>  ----------------------------<  88  4.3   |  28  |  0.93    Ca    8.7      2020 07:40    TPro  6.0  /  Alb  2.4<L>  /  TBili  0.4  /  DBili  x   /  AST  15  /  ALT  10  /  AlkPhos  92  02-04    PT/INR - ( 2020 07:40 )   PT: 13.2 sec;   INR: 1.18 ratio         PTT - ( 2020 07:40 )  PTT:37.4 sec                      LABS:                        11.2   7.63  )-----------( 265      ( 2020 07:30 )             36.3     02-02    144  |  113<H>  |  19<H>  ----------------------------<  86  3.7   |  26  |  0.87    Ca    7.9<L>      2020 08:00      PTT - ( 2020 10:05 )  PTT:90.7 sec                        LABS:                        9.9    6.56  )-----------( 254      ( 2020 08:00 )             32.3     02-    144  |  113<H>  |  19<H>  ----------------------------<  86  3.7   |  26  |  0.87    Ca    7.9<L>      2020 08:00      PTT - ( 2020 08:00 )  PTT:41.0 sec                        LABS:                        10.3   8.03  )-----------( 291      ( 2020 07:28 )             33.6     02-01    145  |  114<H>  |  23<H>  ----------------------------<  86  3.6   |  27  |  0.84    Ca    8.4      2020 07:28                              LABS:                        12.0   10.20 )-----------( 368      ( 2020 16:49 )             38.7     -    143  |  113<H>  |  31<H>  ----------------------------<  82  4.5   |  25  |  1.07    Ca    8.2<L>      2020 16:49    TPro  6.9  /  Alb  2.6<L>  /  TBili  0.4  /  DBili  x   /  AST  23  /  ALT  10  /  AlkPhos  117      PT/INR - ( 2020 16:49 )   PT: 16.6 sec;   INR: 1.48 ratio         PTT - ( 2020 16:49 )  PTT:49.9 sec    CAPILLARY BLOOD GLUCOSE    < from: Transthoracic Echocardiogram (.19 @ 10:44) >  Ejection Fraction Visual Estimate: >55 %    ------------------------------------------------------------------------  OBSERVATIONS:  Mitral Valve: Mitral annular calcification. Mild mitral  regurgitation.  Aortic Root: Normal aortic root.  Aortic Valve: Calcified trileaflet aortic valve with normal  opening. Mild aortic regurgitation.  LeftAtrium: Moderate left atrial enlargement.  Left Ventricle: Endocardium not well visualized; grossly  normal left ventricular systolic function. Normal left  ventricular internal dimensions and wall thicknesses.  Right Heart: Normal right atrium. Normal right ventricular  size and function. There is moderate-severe tricuspid  regurgitation. Normal pulmonic valve.  Pericardium/PleuraNormal pericardium with no pericardial  effusion.  Hemodynamic: RA Pressure is 10 mm Hg. RV systolic pressure  is 84 mm Hg. Severe pulmonary hypertension.  ------------------------------------------------------------------------  CONCLUSIONS:  1. Mitral annular calcification. Mild mitral regurgitation.    2. Calcified trileaflet aortic valve with normal opening.  Mild aortic regurgitation.  3. Moderate left atrial enlargement.  4. Normal left ventricular internal dimensions and wall  thicknesses.  5. Endocardium not well visualized; grossly normal left  ventricular systolic function.  6. Normal right ventricularsize and function.  7. RV systolic pressure is 84 mm Hg. Severe pulmonary  hypertension.    ------------------------------------------------------------------------  Confirmed on  2019 - 08:27:04 by Ayush Yang MD  ------------------------------------------------------------------------    < end of copied text >                RADIOLOGY & ADDITIONAL TESTS:        < from: CT Abdomen and Pelvis w/ IV Cont (20 @ 17:29) >  MPRESSION:     Cholecystostomy tube appears in good position.    Deep vein thrombosis in the right common femoral vein.    Small enhancing fluid surrounding both ischial tuberosities.    Dr. Heart discussed the findings with Dr. Jacinto on 2020 at 5:55 PM.  Readback was obtained.

## 2020-02-09 NOTE — PROGRESS NOTE ADULT - SUBJECTIVE AND OBJECTIVE BOX
INTERVAL HPI/OVERNIGHT EVENTS:  Pt resting comfortably. No overnight events.  Tolerating diet.     MEDICATIONS  (STANDING):  ammonium lactate 12% Lotion 1 Application(s) Topical two times a day  calamine Lotion 1 Application(s) Topical daily  cefTRIAXone   IVPB 1000 milliGRAM(s) IV Intermittent every 24 hours  dextrose 5% + sodium chloride 0.45% 1000 milliLiter(s) (75 mL/Hr) IV Continuous <Continuous>  heparin  Infusion. 1000 Unit(s)/Hr (10 mL/Hr) IV Continuous <Continuous>  lactated ringers. 1000 milliLiter(s) (75 mL/Hr) IV Continuous <Continuous>  levothyroxine 100 MICROGram(s) Oral daily  lisinopril 2.5 milliGRAM(s) Oral daily  mirtazapine 7.5 milliGRAM(s) Oral at bedtime  pantoprazole  Injectable 40 milliGRAM(s) IV Push every 12 hours  risperiDONE   Tablet 0.25 milliGRAM(s) Oral daily  senna 3 Tablet(s) Oral daily  tamsulosin 0.4 milliGRAM(s) Oral at bedtime    MEDICATIONS  (PRN):  acetaminophen   Tablet .. 650 milliGRAM(s) Oral every 6 hours PRN Temp greater or equal to 38C (100.4F)  heparin  Injectable 6500 Unit(s) IV Push every 6 hours PRN For aPTT less than 40  heparin  Injectable 3000 Unit(s) IV Push every 6 hours PRN For aPTT between 40 - 57  ondansetron Injectable 4 milliGRAM(s) IV Push every 6 hours PRN Nausea      Vital Signs Last 24 Hrs  T(C): 36.4 (08 Feb 2020 21:57), Max: 37.4 (08 Feb 2020 14:49)  T(F): 97.5 (08 Feb 2020 21:57), Max: 99.3 (08 Feb 2020 14:49)  HR: 78 (09 Feb 2020 09:34) (78 - 102)  BP: 137/65 (09 Feb 2020 09:34) (133/77 - 137/65)  BP(mean): --  RR: 16 (08 Feb 2020 21:57) (16 - 16)  SpO2: 96% (09 Feb 2020 09:34) (96% - 98%)    Physical:  General: A&Ox3. NAD.  Abdomen: Soft nondistended, nontender.    I&O's Detail    08 Feb 2020 07:01  -  09 Feb 2020 07:00  --------------------------------------------------------  IN:    heparin  Infusion.: 108 mL    lactated ringers.: 450 mL  Total IN: 558 mL    OUT:    Bulb: 15 mL serosanguinous    Indwelling Catheter - Urethral: 400 mL  Total OUT: 415 mL    Total NET: 143 mL    LABS:                        10.1   8.21  )-----------( 279      ( 09 Feb 2020 06:37 )             33.3

## 2020-02-09 NOTE — PROGRESS NOTE ADULT - GASTROINTESTINAL DETAILS
bowel sounds normal/soft/no distention/no guarding/nontender/no rebound tenderness/no rigidity
bowel sounds normal/no distention/soft/no rebound tenderness/no rigidity/nontender/no guarding
no guarding/no distention/bowel sounds normal/no rigidity/nontender/soft/no rebound tenderness
no rebound tenderness/bowel sounds normal/no rigidity/nontender/no distention/soft/no guarding

## 2020-02-09 NOTE — PROGRESS NOTE ADULT - NOSE
no deviation/no discharge
no discharge/no deviation
no deviation/no discharge
no discharge/no deviation

## 2020-02-09 NOTE — PROGRESS NOTE ADULT - RS GEN PE MLT RESP DETAILS PC
no rales/airway patent/good air movement/no rhonchi/no wheezes/breath sounds equal
good air movement/airway patent/breath sounds equal/no rhonchi/no rales
airway patent/breath sounds equal/good air movement/no wheezes
airway patent/no rales/breath sounds equal/good air movement/no rhonchi

## 2020-02-09 NOTE — PROGRESS NOTE ADULT - MOUTH
normal mouth and gums/moist
normal mouth and gums/moist
moist/normal mouth and gums
normal mouth and gums/moist

## 2020-02-09 NOTE — PROGRESS NOTE ADULT - ASSESSMENT
89y.o. Male s/p converted open blayne with +IOC POD#2, failed ERCP 2/6/2020    -Repeat ERCP 2/10/2020  -Diet as tolerated  -NPO after midnight  -Pain control prn

## 2020-02-09 NOTE — PROGRESS NOTE ADULT - CONSTITUTIONAL DETAILS
no distress/well-developed/cachectic
well-developed/no distress/well-groomed
well-developed/well-groomed/no distress

## 2020-02-10 DIAGNOSIS — K80.50 CALCULUS OF BILE DUCT WITHOUT CHOLANGITIS OR CHOLECYSTITIS WITHOUT OBSTRUCTION: ICD-10-CM

## 2020-02-10 LAB
APTT BLD: 66.9 SEC — HIGH (ref 27.5–36.3)
HCT VFR BLD CALC: 29.8 % — LOW (ref 39–50)
HGB BLD-MCNC: 9.2 G/DL — LOW (ref 13–17)
MCHC RBC-ENTMCNC: 30.8 PG — SIGNIFICANT CHANGE UP (ref 27–34)
MCHC RBC-ENTMCNC: 30.9 GM/DL — LOW (ref 32–36)
MCV RBC AUTO: 99.7 FL — SIGNIFICANT CHANGE UP (ref 80–100)
NRBC # BLD: 0 /100 WBCS — SIGNIFICANT CHANGE UP (ref 0–0)
PLATELET # BLD AUTO: 295 K/UL — SIGNIFICANT CHANGE UP (ref 150–400)
RBC # BLD: 2.99 M/UL — LOW (ref 4.2–5.8)
RBC # FLD: 15.7 % — HIGH (ref 10.3–14.5)
WBC # BLD: 8.73 K/UL — SIGNIFICANT CHANGE UP (ref 3.8–10.5)
WBC # FLD AUTO: 8.73 K/UL — SIGNIFICANT CHANGE UP (ref 3.8–10.5)

## 2020-02-10 RX ORDER — SODIUM CHLORIDE 9 MG/ML
1000 INJECTION, SOLUTION INTRAVENOUS
Refills: 0 | Status: DISCONTINUED | OUTPATIENT
Start: 2020-02-10 | End: 2020-02-11

## 2020-02-10 RX ORDER — SENNA PLUS 8.6 MG/1
3 TABLET ORAL DAILY
Refills: 0 | Status: DISCONTINUED | OUTPATIENT
Start: 2020-02-10 | End: 2020-02-20

## 2020-02-10 RX ORDER — PANTOPRAZOLE SODIUM 20 MG/1
40 TABLET, DELAYED RELEASE ORAL EVERY 12 HOURS
Refills: 0 | Status: DISCONTINUED | OUTPATIENT
Start: 2020-02-10 | End: 2020-02-20

## 2020-02-10 RX ORDER — RISPERIDONE 4 MG/1
0.25 TABLET ORAL DAILY
Refills: 0 | Status: DISCONTINUED | OUTPATIENT
Start: 2020-02-10 | End: 2020-02-20

## 2020-02-10 RX ORDER — INDOMETHACIN 50 MG
100 CAPSULE ORAL ONCE
Refills: 0 | Status: COMPLETED | OUTPATIENT
Start: 2020-02-10 | End: 2020-02-10

## 2020-02-10 RX ORDER — ONDANSETRON 8 MG/1
4 TABLET, FILM COATED ORAL EVERY 6 HOURS
Refills: 0 | Status: DISCONTINUED | OUTPATIENT
Start: 2020-02-10 | End: 2020-02-20

## 2020-02-10 RX ORDER — MIRTAZAPINE 45 MG/1
7.5 TABLET, ORALLY DISINTEGRATING ORAL AT BEDTIME
Refills: 0 | Status: DISCONTINUED | OUTPATIENT
Start: 2020-02-10 | End: 2020-02-20

## 2020-02-10 RX ORDER — CEFTRIAXONE 500 MG/1
1000 INJECTION, POWDER, FOR SOLUTION INTRAMUSCULAR; INTRAVENOUS EVERY 24 HOURS
Refills: 0 | Status: DISCONTINUED | OUTPATIENT
Start: 2020-02-10 | End: 2020-02-12

## 2020-02-10 RX ORDER — CALAMINE AND ZINC OXIDE AND PHENOL 160; 10 MG/ML; MG/ML
1 LOTION TOPICAL DAILY
Refills: 0 | Status: DISCONTINUED | OUTPATIENT
Start: 2020-02-10 | End: 2020-02-20

## 2020-02-10 RX ORDER — FENTANYL CITRATE 50 UG/ML
25 INJECTION INTRAVENOUS
Refills: 0 | Status: DISCONTINUED | OUTPATIENT
Start: 2020-02-10 | End: 2020-02-10

## 2020-02-10 RX ORDER — LEVOTHYROXINE SODIUM 125 MCG
100 TABLET ORAL DAILY
Refills: 0 | Status: DISCONTINUED | OUTPATIENT
Start: 2020-02-10 | End: 2020-02-20

## 2020-02-10 RX ORDER — TAMSULOSIN HYDROCHLORIDE 0.4 MG/1
0.4 CAPSULE ORAL AT BEDTIME
Refills: 0 | Status: DISCONTINUED | OUTPATIENT
Start: 2020-02-10 | End: 2020-02-20

## 2020-02-10 RX ORDER — KETOROLAC TROMETHAMINE 30 MG/ML
15 SYRINGE (ML) INJECTION EVERY 6 HOURS
Refills: 0 | Status: DISCONTINUED | OUTPATIENT
Start: 2020-02-10 | End: 2020-02-10

## 2020-02-10 RX ORDER — SODIUM CHLORIDE 9 MG/ML
1000 INJECTION, SOLUTION INTRAVENOUS
Refills: 0 | Status: DISCONTINUED | OUTPATIENT
Start: 2020-02-10 | End: 2020-02-10

## 2020-02-10 RX ORDER — LISINOPRIL 2.5 MG/1
2.5 TABLET ORAL DAILY
Refills: 0 | Status: DISCONTINUED | OUTPATIENT
Start: 2020-02-10 | End: 2020-02-20

## 2020-02-10 RX ORDER — HYDROMORPHONE HYDROCHLORIDE 2 MG/ML
0.5 INJECTION INTRAMUSCULAR; INTRAVENOUS; SUBCUTANEOUS
Refills: 0 | Status: DISCONTINUED | OUTPATIENT
Start: 2020-02-10 | End: 2020-02-10

## 2020-02-10 RX ORDER — ONDANSETRON 8 MG/1
4 TABLET, FILM COATED ORAL ONCE
Refills: 0 | Status: DISCONTINUED | OUTPATIENT
Start: 2020-02-10 | End: 2020-02-10

## 2020-02-10 RX ORDER — HEPARIN SODIUM 5000 [USP'U]/ML
3000 INJECTION INTRAVENOUS; SUBCUTANEOUS EVERY 6 HOURS
Refills: 0 | Status: DISCONTINUED | OUTPATIENT
Start: 2020-02-10 | End: 2020-02-11

## 2020-02-10 RX ORDER — SOD,AMMONIUM,POTASSIUM LACTATE
1 CREAM (GRAM) TOPICAL
Refills: 0 | Status: DISCONTINUED | OUTPATIENT
Start: 2020-02-10 | End: 2020-02-20

## 2020-02-10 RX ORDER — SODIUM CHLORIDE 9 MG/ML
1000 INJECTION, SOLUTION INTRAVENOUS
Refills: 0 | Status: DISCONTINUED | OUTPATIENT
Start: 2020-02-10 | End: 2020-02-12

## 2020-02-10 RX ORDER — HEPARIN SODIUM 5000 [USP'U]/ML
6500 INJECTION INTRAVENOUS; SUBCUTANEOUS EVERY 6 HOURS
Refills: 0 | Status: DISCONTINUED | OUTPATIENT
Start: 2020-02-10 | End: 2020-02-11

## 2020-02-10 RX ORDER — ACETAMINOPHEN 500 MG
650 TABLET ORAL EVERY 6 HOURS
Refills: 0 | Status: DISCONTINUED | OUTPATIENT
Start: 2020-02-10 | End: 2020-02-20

## 2020-02-10 RX ORDER — SODIUM CHLORIDE 9 MG/ML
1000 INJECTION, SOLUTION INTRAVENOUS ONCE
Refills: 0 | Status: DISCONTINUED | OUTPATIENT
Start: 2020-02-10 | End: 2020-02-11

## 2020-02-10 RX ADMIN — PANTOPRAZOLE SODIUM 40 MILLIGRAM(S): 20 TABLET, DELAYED RELEASE ORAL at 22:20

## 2020-02-10 RX ADMIN — MIRTAZAPINE 7.5 MILLIGRAM(S): 45 TABLET, ORALLY DISINTEGRATING ORAL at 22:20

## 2020-02-10 RX ADMIN — Medication 1 APPLICATION(S): at 06:32

## 2020-02-10 RX ADMIN — HEPARIN SODIUM 1200 UNIT(S)/HR: 5000 INJECTION INTRAVENOUS; SUBCUTANEOUS at 07:58

## 2020-02-10 RX ADMIN — Medication 1 APPLICATION(S): at 22:20

## 2020-02-10 RX ADMIN — PANTOPRAZOLE SODIUM 40 MILLIGRAM(S): 20 TABLET, DELAYED RELEASE ORAL at 06:32

## 2020-02-10 RX ADMIN — Medication 100 MILLIGRAM(S): at 15:00

## 2020-02-10 RX ADMIN — TAMSULOSIN HYDROCHLORIDE 0.4 MILLIGRAM(S): 0.4 CAPSULE ORAL at 22:20

## 2020-02-10 RX ADMIN — CEFTRIAXONE 100 MILLIGRAM(S): 500 INJECTION, POWDER, FOR SOLUTION INTRAMUSCULAR; INTRAVENOUS at 22:20

## 2020-02-10 NOTE — PROGRESS NOTE ADULT - ASSESSMENT
89 y.o. M with PMH cholecystitis s/p IR PTC tube insertion, DVT on AC, hypothyroidism, afib, HTN presents to [facility] ER for management of PTC tube.  Medicine team is consulted for harper-operative optimization.

## 2020-02-10 NOTE — PROGRESS NOTE ADULT - ASSESSMENT
For ERCP today in OR   Will attempt to remove stone   Consent to be obtained form family   Risks benefits and alternatives to be explained

## 2020-02-10 NOTE — CHART NOTE - NSCHARTNOTEFT_GEN_A_CORE
ERCP  Report  Indication: CBD stones   Instrument:  #  Anesthesia: General in OR   Consent:  Informed consent was obtained from the patient after providing any opportunity for questions  Procedure: See below for full summary    Preparation: NPO, Ancef, Indomethacin. Patient done in supine position.   Findings:   EGD was performed:  The duodenal bulb with edematous and erythematous however the scope was easily able to pass.   ERCP was performed:  1)	The duodenoscope was gently passed down the esophagus, into the stomach and into the duodenum.   2)	The ampulla was identified. Copious amounts of bile was seen draining from the ampulla.   3)	The wire preferentially went into the PD   4)	A PD wire was left in place   5)	CBD was unable to be cannulated despite tome and position changes.   6)	The PD was not opacified   7)	Unsuccessful wire cannualtion of CBD   8)	Copious amount of bile seen draining from the ampulla.   ____________________________________________  Date and time:2/10/2020 3:21:04 PM  EBL:0  Impression: 1-Failed ERC      Plan: 1- NPO until tonight then clear liquids 2- IVF Bolus X 3 followed by 175cc/hour X 6 hours 3- Monitor MARYJO drainage output 4-                    Procedure Start Time:    Procedure End Time:                             Attending:         Fernando Avila M.D.  Date and Time: 2/10/2020 3:21:04 PM

## 2020-02-10 NOTE — PROGRESS NOTE ADULT - SUBJECTIVE AND OBJECTIVE BOX
INTERVAL HPI/OVERNIGHT EVENTS:  Pt stable.   Tolerating diet.   flatus/BM.    Vital Signs Last 24 Hrs  T(C): 36.8 (10 Feb 2020 05:35), Max: 36.8 (09 Feb 2020 21:24)  T(F): 98.2 (10 Feb 2020 05:35), Max: 98.3 (09 Feb 2020 21:24)  HR: 86 (10 Feb 2020 05:35) (83 - 86)  BP: 139/84 (10 Feb 2020 05:35) (126/61 - 139/84)  BP(mean): --  RR: 16 (10 Feb 2020 05:35) (16 - 17)  SpO2: 97% (10 Feb 2020 05:35) (97% - 98%)    Physical:  Abdomen: Soft nondistended, nontender.  Sujit drain minimal drainage    I&O's Summary    09 Feb 2020 07:01  -  10 Feb 2020 07:00  --------------------------------------------------------  IN: 0 mL / OUT: 1020 mL / NET: -1020 mL        LABS:                        9.2    8.73  )-----------( 295      ( 10 Feb 2020 07:26 )             29.8

## 2020-02-10 NOTE — PROGRESS NOTE ADULT - SUBJECTIVE AND OBJECTIVE BOX
Summary:   89y  Male      Subjective:       Objective:    MEDICATIONS  (STANDING):  ammonium lactate 12% Lotion 1 Application(s) Topical two times a day  calamine Lotion 1 Application(s) Topical daily  cefTRIAXone   IVPB 1000 milliGRAM(s) IV Intermittent every 24 hours  dextrose 5% + sodium chloride 0.45% 1000 milliLiter(s) (75 mL/Hr) IV Continuous <Continuous>  levothyroxine 100 MICROGram(s) Oral daily  lisinopril 2.5 milliGRAM(s) Oral daily  mirtazapine 7.5 milliGRAM(s) Oral at bedtime  pantoprazole  Injectable 40 milliGRAM(s) IV Push every 12 hours  risperiDONE   Tablet 0.25 milliGRAM(s) Oral daily  senna 3 Tablet(s) Oral daily  tamsulosin 0.4 milliGRAM(s) Oral at bedtime    MEDICATIONS  (PRN):  acetaminophen   Tablet .. 650 milliGRAM(s) Oral every 6 hours PRN Temp greater or equal to 38C (100.4F)  heparin  Injectable 6500 Unit(s) IV Push every 6 hours PRN For aPTT less than 40  heparin  Injectable 3000 Unit(s) IV Push every 6 hours PRN For aPTT between 40 - 57  ondansetron Injectable 4 milliGRAM(s) IV Push every 6 hours PRN Nausea              Vital Signs Last 24 Hrs  T(C): 36.8 (10 Feb 2020 05:35), Max: 36.8 (09 Feb 2020 21:24)  T(F): 98.2 (10 Feb 2020 05:35), Max: 98.3 (09 Feb 2020 21:24)  HR: 86 (10 Feb 2020 05:35) (83 - 86)  BP: 139/84 (10 Feb 2020 05:35) (126/61 - 139/84)  BP(mean): --  RR: 16 (10 Feb 2020 05:35) (16 - 17)  SpO2: 97% (10 Feb 2020 05:35) (97% - 98%)      General:  Well developed, well nourished, alert and active, no pallor, NAD.  HEENT:    Normal appearance of conjunctiva, ears, nose, lips, oropharynx, and oral mucosa, anicteric.  Neck:  No masses, no asymmetry.  Lymph Nodes:  No lymphadenopathy.   Cardiovascular:  RRR normal S1/S2, no murmur.  Respiratory:  CTA B/L, normal respiratory effort.   Abdominal:   soft, no masses or tenderness, normoactive BS, NT/ND, no HSM.  Extremities:   No clubbing or cyanosis, normal capillary refill, no edema.   Skin:   No rash, jaundice, lesions, eczema.   Musculoskeletal:  No joint swelling, erythema or tenderness.   Neuro: No focal deficits.   Other:       LABS:                        9.2    8.73  )-----------( 295      ( 10 Feb 2020 07:26 )             29.8           PTT - ( 10 Feb 2020 07:26 )  PTT:66.9 sec      RADIOLOGY & ADDITIONAL TESTS: Summary:   89y  Male      Subjective:       Objective:    MEDICATIONS  (STANDING):  ammonium lactate 12% Lotion 1 Application(s) Topical two times a day  calamine Lotion 1 Application(s) Topical daily   cefTRIAXone   IVPB 1000 milliGRAM(s) IV Intermittent every 24 hours  dextrose 5% + sodium chloride 0.45% 1000 milliLiter(s) (75 mL/Hr) IV Continuous <Continuous>  levothyroxine 100 MICROGram(s) Oral daily  lisinopril 2.5 milliGRAM(s) Oral daily  mirtazapine 7.5 milliGRAM(s) Oral at bedtime  pantoprazole  Injectable 40 milliGRAM(s) IV Push every 12 hours  risperiDONE   Tablet 0.25 milliGRAM(s) Oral daily  senna 3 Tablet(s) Oral daily  tamsulosin 0.4 milliGRAM(s) Oral at bedtime    MEDICATIONS  (PRN):  acetaminophen   Tablet .. 650 milliGRAM(s) Oral every 6 hours PRN Temp greater or equal to 38C (100.4F)  heparin  Injectable 6500 Unit(s) IV Push every 6 hours PRN For aPTT less than 40  heparin  Injectable 3000 Unit(s) IV Push every 6 hours PRN For aPTT between 40 - 57  ondansetron Injectable 4 milliGRAM(s) IV Push every 6 hours PRN Nausea              Vital Signs Last 24 Hrs  T(C): 36.8 (10 Feb 2020 05:35), Max: 36.8 (09 Feb 2020 21:24)  T(F): 98.2 (10 Feb 2020 05:35), Max: 98.3 (09 Feb 2020 21:24)  HR: 86 (10 Feb 2020 05:35) (83 - 86)  BP: 139/84 (10 Feb 2020 05:35) (126/61 - 139/84)  BP(mean): --  RR: 16 (10 Feb 2020 05:35) (16 - 17)  SpO2: 97% (10 Feb 2020 05:35) (97% - 98%)      General:  Well developed, well nourished, alert and active, no pallor, NAD.  HEENT:    Normal appearance of conjunctiva, ears, nose, lips, oropharynx, and oral mucosa, anicteric.  Neck:  No masses, no asymmetry.  Lymph Nodes:  No lymphadenopathy.   Cardiovascular:  RRR normal S1/S2, no murmur.  Respiratory:  CTA B/L, normal respiratory effort.   Abdominal:   soft, no masses or tenderness, normoactive BS, NT/ND, no HSM.  Extremities:   No clubbing or cyanosis, normal capillary refill, no edema.   Skin:   No rash, jaundice, lesions, eczema.   Musculoskeletal:  No joint swelling, erythema or tenderness.   Neuro: No focal deficits.   Other:       LABS:                        9.2    8.73  )-----------( 295      ( 10 Feb 2020 07:26 )             29.8           PTT - ( 10 Feb 2020 07:26 )  PTT:66.9 sec      RADIOLOGY & ADDITIONAL TESTS:

## 2020-02-10 NOTE — PROGRESS NOTE ADULT - SUBJECTIVE AND OBJECTIVE BOX
patient was seen and examined   s no specific  c/o offered  no cp or sob , or abdominal pain  s/p surgery   s/p  ERCP but unsuccessful   MEDICATIONS  (STANDING):  ammonium lactate 12% Lotion 1 Application(s) Topical two times a day  calamine Lotion 1 Application(s) Topical daily  cefTRIAXone   IVPB 1000 milliGRAM(s) IV Intermittent every 24 hours  dextrose 5% + sodium chloride 0.45% 1000 milliLiter(s) (75 mL/Hr) IV Continuous <Continuous>  levothyroxine 100 MICROGram(s) Oral daily  lisinopril 2.5 milliGRAM(s) Oral daily  mirtazapine 7.5 milliGRAM(s) Oral at bedtime  pantoprazole  Injectable 40 milliGRAM(s) IV Push every 12 hours  risperiDONE   Tablet 0.25 milliGRAM(s) Oral daily  senna 3 Tablet(s) Oral daily  tamsulosin 0.4 milliGRAM(s) Oral at bedtime    MEDICATIONS  (PRN):  acetaminophen   Tablet .. 650 milliGRAM(s) Oral every 6 hours PRN Temp greater or equal to 38C (100.4F)  heparin  Injectable 6500 Unit(s) IV Push every 6 hours PRN For aPTT less than 40  heparin  Injectable 3000 Unit(s) IV Push every 6 hours PRN For aPTT between 40 - 57  ondansetron Injectable 4 milliGRAM(s) IV Push every 6 hours PRN Nausea    PAST MEDICAL & SURGICAL HISTORY:  PVD (peripheral vascular disease)  Acalculous cholecystitis: s/p percutaneous cholecystostomy  HTN (hypertension)  Hypothyroid  Ulcers of both lower extremities  No significant past surgical history    REVIEW OF SYSTEMS:   CONSTITUTIONAL: No fever, weight loss, or fatigue  EYES: No eye pain, visual disturbances, or discharge  ENMT:  No difficulty hearing, tinnitus, vertigo; No sinus or throat pain  NECK: No pain or stiffness  BREASTS: No pain, masses, or nipple discharge  RESPIRATORY: No cough, wheezing, chills or hemoptysis; No shortness of breath  CARDIOVASCULAR: No chest pain, palpitations, dizziness, or leg swelling  GASTROINTESTINAL: No abdominal or epigastric pain. No nausea, vomiting, or hematemesis; No diarrhea or constipation. No melena or hematochezia.  GENITOURINARY: No dysuria, frequency, hematuria, or incontinence  NEUROLOGICAL: No headaches, memory loss, loss of strength, numbness, or tremors  SKIN: No itching, burning, rashes, or lesions   LYMPH NODES: No enlarged glands  ENDOCRINE: No heat or cold intolerance; No hair loss  MUSCULOSKELETAL: No joint pain or swelling; No muscle, back, or extremity pain  PSYCHIATRIC: No depression, anxiety, mood swings, or difficulty sleeping  HEME/LYMPH: No easy bruising, or bleeding gums  ALLERY AND IMMUNOLOGIC: No hives or eczema    Vital Signs Last 24 Hrs  T(C): 36.8 (10 Feb 2020 05:35), Max: 36.8 (2020 21:24)  T(F): 98.2 (10 Feb 2020 05:35), Max: 98.3 (2020 21:24)  HR: 86 (10 Feb 2020 05:35) (83 - 86)  BP: 139/84 (10 Feb 2020 05:35) (126/61 - 139/84)  BP(mean): --  RR: 16 (10 Feb 2020 05:35) (16 - 17)  SpO2: 97% (10 Feb 2020 05:35) (97% - 98%)    PHYSICAL EXAM:  GENERAL: NAD, well-groomed, well-developed  HEAD:  Atraumatic, Normocephalic  EYES: EOMI, PERRLA, conjunctiva and sclera clear  ENMT: No tonsillar erythema, exudates, or enlargement; Moist mucous membranes, Good dentition, No lesions  NECK: Supple, No JVD, Normal thyroid  NERVOUS SYSTEM:  Alert & Oriented X 1 to self, Good concentration;   CHEST/LUNG: Clear to percussion bilaterally; No rales, rhonchi, wheezing, or rubs  HEART: Regular rate and rhythm; No murmurs, rubs, or gallops  ABDOMEN: Soft, Nontender, Nondistended; Bowel sounds present, surgical site , no bleeding  EXTREMITIES:  2+ Peripheral Pulses, chronic venous stasis changes No clubbing, cyanosis, or edema  LYMPH: No lymphadenopathy noted  SKIN: No rashes or lesions    LABS:                        9.2    8.73  )-----------( 295      ( 10 Feb 2020 07:26 )             29.8           PTT - ( 10 Feb 2020 07:26 )  PTT:66.9 sec                                            10.1   8.21  )-----------( 279      ( 2020 06:37 )             33.3           PTT - ( 2020 06:37 )  PTT:58.0 sec                        LABS:                        9.4    12.46 )-----------( 207      ( 2020 07:40 )             31.1     02-07    143  |  114<H>  |  22<H>  ----------------------------<  105<H>  4.4   |  26  |  1.07    Ca    7.9<L>      2020 07:40    TPro  5.5<L>  /  Alb  1.9<L>  /  TBili  0.5  /  DBili  x   /  AST  14  /  ALT  <6<L>  /  AlkPhos  91  02-07      Urinalysis Basic - ( 2020 10:10 )    Color: Yellow / Appearance: Clear / S.020 / pH: x  Gluc: x / Ketone: Trace  / Bili: Small / Urobili: 1   Blood: x / Protein: 100 / Nitrite: Positive   Leuk Esterase: Moderate / RBC: 2-5 /HPF / WBC 11-25 /HPF   Sq Epi: x / Non Sq Epi: Occasional /HPF / Bacteria: Trace /HPF    LABS:                        9.9    10.07 )-----------( 251      ( 2020 05:54 )             32.8     02-07    143  |  114<H>  |  22<H>  ----------------------------<  105<H>  4.4   |  26  |  1.07    Ca    7.9<L>      2020 07:40    TPro  5.5<L>  /  Alb  1.9<L>  /  TBili  0.5  /  DBili  x   /  AST  14  /  ALT  <6<L>  /  AlkPhos  91  02-07    PTT - ( 2020 05:54 )  PTT:65.0 sec                                        LABS:                        10.2   17.12 )-----------( 254      ( 2020 07:47 )             33.6     02-06    145  |  113<H>  |  21<H>  ----------------------------<  128<H>  4.9   |  25  |  1.28    Ca    8.3<L>      2020 07:47    TPro  6.3  /  Alb  2.2<L>  /  TBili  0.8  /  DBili  0.2  /  AST  31  /  ALT  9<L>  /  AlkPhos  93  02-06      Urinalysis Basic - ( 2020 10:10 )    Color: Yellow / Appearance: Clear / S.020 / pH: x  Gluc: x / Ketone: Trace  / Bili: Small / Urobili: 1   Blood: x / Protein: 100 / Nitrite: Positive   Leuk Esterase: Moderate / RBC: 2-5 /HPF / WBC 11-25 /HPF   Sq Epi: x / Non Sq Epi: Occasional /HPF / Bacteria: Trace /HPF                          LABS:                        9.9    9.47  )-----------( 234      ( 2020 07:40 )             32.5     02-05    144  |  112<H>  |  16  ----------------------------<  103<H>  4.4   |  27  |  0.99    Ca    8.0<L>      2020 07:40    TPro  6.0  /  Alb  2.4<L>  /  TBili  0.4  /  DBili  x   /  AST  15  /  ALT  10  /  AlkPhos  92  02-04    PT/INR - ( 2020 07:40 )   PT: 13.2 sec;   INR: 1.18 ratio         PTT - ( 2020 07:40 )  PTT:37.4 sec                      LABS:                        10.8   7.02  )-----------( 279      ( 2020 07:40 )             35.4     02-04    147<H>  |  111<H>  |  19<H>  ----------------------------<  88  4.3   |  28  |  0.93    Ca    8.7      2020 07:40    TPro  6.0  /  Alb  2.4<L>  /  TBili  0.4  /  DBili  x   /  AST  15  /  ALT  10  /  AlkPhos  92  02-04    PT/INR - ( 2020 07:40 )   PT: 13.2 sec;   INR: 1.18 ratio         PTT - ( 2020 07:40 )  PTT:37.4 sec                      LABS:                        11.2   7.63  )-----------( 265      ( 2020 07:30 )             36.3     02-02    144  |  113<H>  |  19<H>  ----------------------------<  86  3.7   |  26  |  0.87    Ca    7.9<L>      2020 08:00      PTT - ( 2020 10:05 )  PTT:90.7 sec                        LABS:                        9.9    6.56  )-----------( 254      ( 2020 08:00 )             32.3     02-02    144  |  113<H>  |  19<H>  ----------------------------<  86  3.7   |  26  |  0.87    Ca    7.9<L>      2020 08:00      PTT - ( 2020 08:00 )  PTT:41.0 sec                        LABS:                        10.3   8.03  )-----------( 291      ( 2020 07:28 )             33.6     02-01    145  |  114<H>  |  23<H>  ----------------------------<  86  3.6   |  27  |  0.84    Ca    8.4      2020 07:28                              LABS:                        12.0   10.20 )-----------( 368      ( 2020 16:49 )             38.7     01-    143  |  113<H>  |  31<H>  ----------------------------<  82  4.5   |  25  |  1.07    Ca    8.2<L>      2020 16:49    TPro  6.9  /  Alb  2.6<L>  /  TBili  0.4  /  DBili  x   /  AST  23  /  ALT  10  /  AlkPhos  117      PT/INR - ( 2020 16:49 )   PT: 16.6 sec;   INR: 1.48 ratio         PTT - ( 2020 16:49 )  PTT:49.9 sec    CAPILLARY BLOOD GLUCOSE    < from: Transthoracic Echocardiogram (19 @ 10:44) >  Ejection Fraction Visual Estimate: >55 %    ------------------------------------------------------------------------  OBSERVATIONS:  Mitral Valve: Mitral annular calcification. Mild mitral  regurgitation.  Aortic Root: Normal aortic root.  Aortic Valve: Calcified trileaflet aortic valve with normal  opening. Mild aortic regurgitation.  LeftAtrium: Moderate left atrial enlargement.  Left Ventricle: Endocardium not well visualized; grossly  normal left ventricular systolic function. Normal left  ventricular internal dimensions and wall thicknesses.  Right Heart: Normal right atrium. Normal right ventricular  size and function. There is moderate-severe tricuspid  regurgitation. Normal pulmonic valve.  Pericardium/PleuraNormal pericardium with no pericardial  effusion.  Hemodynamic: RA Pressure is 10 mm Hg. RV systolic pressure  is 84 mm Hg. Severe pulmonary hypertension.  ------------------------------------------------------------------------  CONCLUSIONS:  1. Mitral annular calcification. Mild mitral regurgitation.    2. Calcified trileaflet aortic valve with normal opening.  Mild aortic regurgitation.  3. Moderate left atrial enlargement.  4. Normal left ventricular internal dimensions and wall  thicknesses.  5. Endocardium not well visualized; grossly normal left  ventricular systolic function.  6. Normal right ventricularsize and function.  7. RV systolic pressure is 84 mm Hg. Severe pulmonary  hypertension.    ------------------------------------------------------------------------  Confirmed on  2019 - 08:27:04 by Ayush Yang MD  ------------------------------------------------------------------------    < end of copied text >                RADIOLOGY & ADDITIONAL TESTS:        < from: CT Abdomen and Pelvis w/ IV Cont (20 @ 17:29) >  MPRESSION:     Cholecystostomy tube appears in good position.    Deep vein thrombosis in the right common femoral vein.    Small enhancing fluid surrounding both ischial tuberosities.    Dr. Heart discussed the findings with Dr. Jacinto on 2020 at 5:55 PM.  Readback was obtained.

## 2020-02-11 ENCOUNTER — TRANSCRIPTION ENCOUNTER (OUTPATIENT)
Age: 85
End: 2020-02-11

## 2020-02-11 LAB
ALBUMIN SERPL ELPH-MCNC: 1.7 G/DL — LOW (ref 3.5–5)
ALP SERPL-CCNC: 98 U/L — SIGNIFICANT CHANGE UP (ref 40–120)
ALT FLD-CCNC: 7 U/L DA — LOW (ref 10–60)
ANION GAP SERPL CALC-SCNC: 8 MMOL/L — SIGNIFICANT CHANGE UP (ref 5–17)
APTT BLD: 33.2 SEC — SIGNIFICANT CHANGE UP (ref 27.5–36.3)
APTT BLD: 36.1 SEC — SIGNIFICANT CHANGE UP (ref 27.5–36.3)
APTT BLD: 63.5 SEC — HIGH (ref 27.5–36.3)
AST SERPL-CCNC: 11 U/L — SIGNIFICANT CHANGE UP (ref 10–40)
BILIRUB SERPL-MCNC: 0.4 MG/DL — SIGNIFICANT CHANGE UP (ref 0.2–1.2)
BUN SERPL-MCNC: 13 MG/DL — SIGNIFICANT CHANGE UP (ref 7–18)
CALCIUM SERPL-MCNC: 7.8 MG/DL — LOW (ref 8.4–10.5)
CHLORIDE SERPL-SCNC: 109 MMOL/L — HIGH (ref 96–108)
CO2 SERPL-SCNC: 23 MMOL/L — SIGNIFICANT CHANGE UP (ref 22–31)
CREAT SERPL-MCNC: 0.89 MG/DL — SIGNIFICANT CHANGE UP (ref 0.5–1.3)
GLUCOSE SERPL-MCNC: 82 MG/DL — SIGNIFICANT CHANGE UP (ref 70–99)
HCT VFR BLD CALC: 28.4 % — LOW (ref 39–50)
HCT VFR BLD CALC: 30 % — LOW (ref 39–50)
HGB BLD-MCNC: 8.6 G/DL — LOW (ref 13–17)
HGB BLD-MCNC: 9.2 G/DL — LOW (ref 13–17)
MCHC RBC-ENTMCNC: 30.3 GM/DL — LOW (ref 32–36)
MCHC RBC-ENTMCNC: 30.7 GM/DL — LOW (ref 32–36)
MCHC RBC-ENTMCNC: 30.8 PG — SIGNIFICANT CHANGE UP (ref 27–34)
MCHC RBC-ENTMCNC: 30.9 PG — SIGNIFICANT CHANGE UP (ref 27–34)
MCV RBC AUTO: 100.3 FL — HIGH (ref 80–100)
MCV RBC AUTO: 102.2 FL — HIGH (ref 80–100)
NRBC # BLD: 0 /100 WBCS — SIGNIFICANT CHANGE UP (ref 0–0)
NRBC # BLD: 0 /100 WBCS — SIGNIFICANT CHANGE UP (ref 0–0)
PLATELET # BLD AUTO: 330 K/UL — SIGNIFICANT CHANGE UP (ref 150–400)
PLATELET # BLD AUTO: 387 K/UL — SIGNIFICANT CHANGE UP (ref 150–400)
POTASSIUM SERPL-MCNC: 4.1 MMOL/L — SIGNIFICANT CHANGE UP (ref 3.5–5.3)
POTASSIUM SERPL-SCNC: 4.1 MMOL/L — SIGNIFICANT CHANGE UP (ref 3.5–5.3)
PROT SERPL-MCNC: 5.5 G/DL — LOW (ref 6–8.3)
RBC # BLD: 2.78 M/UL — LOW (ref 4.2–5.8)
RBC # BLD: 2.99 M/UL — LOW (ref 4.2–5.8)
RBC # FLD: 15.3 % — HIGH (ref 10.3–14.5)
RBC # FLD: 15.4 % — HIGH (ref 10.3–14.5)
SODIUM SERPL-SCNC: 140 MMOL/L — SIGNIFICANT CHANGE UP (ref 135–145)
WBC # BLD: 10.5 K/UL — SIGNIFICANT CHANGE UP (ref 3.8–10.5)
WBC # BLD: 8.49 K/UL — SIGNIFICANT CHANGE UP (ref 3.8–10.5)
WBC # FLD AUTO: 10.5 K/UL — SIGNIFICANT CHANGE UP (ref 3.8–10.5)
WBC # FLD AUTO: 8.49 K/UL — SIGNIFICANT CHANGE UP (ref 3.8–10.5)

## 2020-02-11 RX ORDER — HEPARIN SODIUM 5000 [USP'U]/ML
3000 INJECTION INTRAVENOUS; SUBCUTANEOUS EVERY 6 HOURS
Refills: 0 | Status: DISCONTINUED | OUTPATIENT
Start: 2020-02-11 | End: 2020-02-12

## 2020-02-11 RX ORDER — HEPARIN SODIUM 5000 [USP'U]/ML
6500 INJECTION INTRAVENOUS; SUBCUTANEOUS EVERY 6 HOURS
Refills: 0 | Status: DISCONTINUED | OUTPATIENT
Start: 2020-02-11 | End: 2020-02-12

## 2020-02-11 RX ORDER — HEPARIN SODIUM 5000 [USP'U]/ML
1200 INJECTION INTRAVENOUS; SUBCUTANEOUS
Qty: 25000 | Refills: 0 | Status: DISCONTINUED | OUTPATIENT
Start: 2020-02-11 | End: 2020-02-12

## 2020-02-11 RX ADMIN — LISINOPRIL 2.5 MILLIGRAM(S): 2.5 TABLET ORAL at 06:03

## 2020-02-11 RX ADMIN — RISPERIDONE 0.25 MILLIGRAM(S): 4 TABLET ORAL at 11:36

## 2020-02-11 RX ADMIN — TAMSULOSIN HYDROCHLORIDE 0.4 MILLIGRAM(S): 0.4 CAPSULE ORAL at 21:57

## 2020-02-11 RX ADMIN — Medication 1 APPLICATION(S): at 18:00

## 2020-02-11 RX ADMIN — Medication 1 APPLICATION(S): at 06:03

## 2020-02-11 RX ADMIN — CEFTRIAXONE 100 MILLIGRAM(S): 500 INJECTION, POWDER, FOR SOLUTION INTRAMUSCULAR; INTRAVENOUS at 22:48

## 2020-02-11 RX ADMIN — HEPARIN SODIUM 6500 UNIT(S): 5000 INJECTION INTRAVENOUS; SUBCUTANEOUS at 00:40

## 2020-02-11 RX ADMIN — CALAMINE AND ZINC OXIDE AND PHENOL 1 APPLICATION(S): 160; 10 LOTION TOPICAL at 20:07

## 2020-02-11 RX ADMIN — SENNA PLUS 3 TABLET(S): 8.6 TABLET ORAL at 11:36

## 2020-02-11 RX ADMIN — HEPARIN SODIUM 1200 UNIT(S)/HR: 5000 INJECTION INTRAVENOUS; SUBCUTANEOUS at 20:31

## 2020-02-11 RX ADMIN — Medication 100 MICROGRAM(S): at 06:03

## 2020-02-11 RX ADMIN — HEPARIN SODIUM 1200 UNIT(S)/HR: 5000 INJECTION INTRAVENOUS; SUBCUTANEOUS at 11:27

## 2020-02-11 RX ADMIN — MIRTAZAPINE 7.5 MILLIGRAM(S): 45 TABLET, ORALLY DISINTEGRATING ORAL at 21:57

## 2020-02-11 RX ADMIN — SODIUM CHLORIDE 75 MILLILITER(S): 9 INJECTION, SOLUTION INTRAVENOUS at 05:47

## 2020-02-11 RX ADMIN — PANTOPRAZOLE SODIUM 40 MILLIGRAM(S): 20 TABLET, DELAYED RELEASE ORAL at 06:03

## 2020-02-11 NOTE — PROGRESS NOTE ADULT - ASSESSMENT
89y old Male s/p open cholecystectomy with +IOC and removal of SPT on 2/4, unsuccessful ERCP by GI on 2/6 and 2/10     - continue clear liquid diet  - continue MARYJO drain, monitor output  - continue IV antibiotics  - continue to trend labs  - will discuss with Dr. Elias

## 2020-02-11 NOTE — PROGRESS NOTE ADULT - SUBJECTIVE AND OBJECTIVE BOX
S/p open cholecystectomy with +IOC and removal of SPT on 2/4, unsuccessful ERCP by GI on 2/6 and 2/10  Patient seen and examined at bedside with no complaints.     Vital Signs Last 24 Hrs  T(F): 98.1 (02-11-20 @ 06:00), Max: 98.1 (02-11-20 @ 06:00)  HR: 59 (02-11-20 @ 06:00)  BP: 128/78 (02-11-20 @ 06:00)  RR: 18 (02-11-20 @ 06:00)  SpO2: 96% (02-11-20 @ 06:00)    GENERAL: Alert, NAD  CHEST/LUNG: respirations nonlabored  ABDOMEN: PATITO dressing in place with good seal; MARYJO with bilious drainage, soft, Nontender, Nondistended  : cabrales catheter in place draining 850ml/24hrs  EXTREMITIES:  no calf tenderness, No edema    I&O's Detail    10 Feb 2020 07:01  -  11 Feb 2020 07:00  --------------------------------------------------------  IN:  Total IN: 0 mL    OUT:    Bulb: 5 mL    Indwelling Catheter - Urethral: 850 mL  Total OUT: 855 mL    Total NET: -855 mL    LABS:                        8.6    8.49  )-----------( 330      ( 11 Feb 2020 07:32 )             28.4     02-11    140  |  109<H>  |  13  ----------------------------<  82  4.1   |  23  |  0.89    Ca    7.8<L>      11 Feb 2020 07:32    TPro  5.5<L>  /  Alb  1.7<L>  /  TBili  0.4  /  DBili  x   /  AST  11  /  ALT  7<L>  /  AlkPhos  98  02-11    PTT - ( 11 Feb 2020 07:32 )  PTT:36.1 sec

## 2020-02-11 NOTE — DISCHARGE NOTE PROVIDER - CARE PROVIDERS DIRECT ADDRESSES
,[email or web address],[email or web address]

## 2020-02-11 NOTE — PROGRESS NOTE ADULT - SUBJECTIVE AND OBJECTIVE BOX
Summary:   89y  Male      Subjective:       Objective:    MEDICATIONS  (STANDING):  ammonium lactate 12% Lotion 1 Application(s) Topical two times a day  calamine Lotion 1 Application(s) Topical daily  cefTRIAXone   IVPB 1000 milliGRAM(s) IV Intermittent every 24 hours  dextrose 5% + sodium chloride 0.45% 1000 milliLiter(s) (75 mL/Hr) IV Continuous <Continuous>  heparin  Infusion. 1200 Unit(s)/Hr (12 mL/Hr) IV Continuous <Continuous>  levothyroxine 100 MICROGram(s) Oral daily  lisinopril 2.5 milliGRAM(s) Oral daily  mirtazapine 7.5 milliGRAM(s) Oral at bedtime  pantoprazole  Injectable 40 milliGRAM(s) IV Push every 12 hours  risperiDONE   Tablet 0.25 milliGRAM(s) Oral daily  senna 3 Tablet(s) Oral daily  tamsulosin 0.4 milliGRAM(s) Oral at bedtime    MEDICATIONS  (PRN):  acetaminophen   Tablet .. 650 milliGRAM(s) Oral every 6 hours PRN Temp greater or equal to 38C (100.4F)  heparin  Injectable 6500 Unit(s) IV Push every 6 hours PRN For aPTT less than 40  heparin  Injectable 3000 Unit(s) IV Push every 6 hours PRN For aPTT between 40 - 57  ketorolac   Injectable 15 milliGRAM(s) IV Push every 6 hours PRN Moderate Pain (4 - 6)  ondansetron Injectable 4 milliGRAM(s) IV Push every 6 hours PRN Nausea              Vital Signs Last 24 Hrs  T(C): 36.7 (11 Feb 2020 06:00), Max: 36.7 (10 Feb 2020 15:44)  T(F): 98.1 (11 Feb 2020 06:00), Max: 98.1 (11 Feb 2020 06:00)  HR: 59 (11 Feb 2020 06:00) (59 - 95)  BP: 128/78 (11 Feb 2020 06:00) (104/65 - 142/78)  BP(mean): 97 (10 Feb 2020 15:44) (78 - 97)  RR: 18 (11 Feb 2020 06:00) (16 - 25)  SpO2: 96% (11 Feb 2020 06:00) (96% - 99%)      General:  Well developed, well nourished, alert and active, no pallor, NAD.  HEENT:    Normal appearance of conjunctiva, ears, nose, lips, oropharynx, and oral mucosa, anicteric.  Neck:  No masses, no asymmetry.  Lymph Nodes:  No lymphadenopathy.   Cardiovascular:  RRR normal S1/S2, no murmur.  Respiratory:  CTA B/L, normal respiratory effort.   Abdominal:   soft, no masses or tenderness, normoactive BS, NT/ND, no HSM.  Extremities:   No clubbing or cyanosis, normal capillary refill, no edema.   Skin:   No rash, jaundice, lesions, eczema.   Musculoskeletal:  No joint swelling, erythema or tenderness.   Neuro: No focal deficits.   Other:       LABS:                        8.6    8.49  )-----------( 330      ( 11 Feb 2020 07:32 )             28.4     02-11    140  |  109<H>  |  13  ----------------------------<  82  4.1   |  23  |  0.89    Ca    7.8<L>      11 Feb 2020 07:32    TPro  5.5<L>  /  Alb  1.7<L>  /  TBili  0.4  /  DBili  x   /  AST  11  /  ALT  7<L>  /  AlkPhos  98  02-11    PTT - ( 11 Feb 2020 07:32 )  PTT:36.1 sec      RADIOLOGY & ADDITIONAL TESTS:

## 2020-02-11 NOTE — DISCHARGE NOTE PROVIDER - NSDCCPCAREPLAN_GEN_ALL_CORE_FT
PRINCIPAL DISCHARGE DIAGNOSIS  Diagnosis: Cholecystitis, chronic  Assessment and Plan of Treatment: Drink plenty of fluids. Rest as needed. Do not lift anything heavier than 10 pounds. You may take motrin or tylenol for pain as needed. Call for any fever over 101, nausea, vomiting, severe pain, no passing of gas or bowel movement. You may shower.      SECONDARY DISCHARGE DIAGNOSES  Diagnosis: Bile duct calculus  Assessment and Plan of Treatment: please call for any yellowing of your skin or eyes, weakness, lethargy, change in color of stool and urine.    Diagnosis: Atrial fibrillation  Assessment and Plan of Treatment: Please continue oral anticoagulation PRINCIPAL DISCHARGE DIAGNOSIS  Diagnosis: Cholecystitis, chronic  Assessment and Plan of Treatment: Drink plenty of fluids. Rest as needed. Do not lift anything heavier than 10 pounds. You may take motrin or tylenol for pain as needed. Call for any fever over 101, nausea, vomiting, severe pain, no passing of gas or bowel movement. You may shower.      SECONDARY DISCHARGE DIAGNOSES  Diagnosis: Atrial fibrillation  Assessment and Plan of Treatment: Please continue oral Eliquis    Diagnosis: Bile duct calculus  Assessment and Plan of Treatment: please call for any yellowing of your skin or eyes, weakness, lethargy, change in color of stool and urine. PRINCIPAL DISCHARGE DIAGNOSIS  Diagnosis: Cholecystitis, chronic  Assessment and Plan of Treatment: Drink plenty of fluids. Rest as needed. Do not lift anything heavier than 10 pounds. You may take motrin or tylenol for pain as needed. Call for any fever over 101, nausea, vomiting, severe pain, no passing of gas or bowel movement. You may shower.      SECONDARY DISCHARGE DIAGNOSES  Diagnosis: Atrial fibrillation  Assessment and Plan of Treatment: Please continue oral Eliquis  Atrial fibrillation is the most common heart rhythm problem & has the risk of stroke & heart attack  Call your doctor if you feel your heart racing or beating unusually, chest tightness or pain, lightheaded, faint, shortness of breath especially with exercise  It is important to take your heart medication as prescribed      Diagnosis: HTN (hypertension)  Assessment and Plan of Treatment: Low salt diet  Activity as tolerated.  Take all medication as prescribed.  Follow up with your medical doctor for routine blood pressure monitoring at your next visit.  Notify your doctor if you have any of the following symptoms:   Dizziness, Lightheadedness, Blurry vision, Headache, Chest pain, Shortness of breath      Diagnosis: Bile duct calculus  Assessment and Plan of Treatment: please call for any yellowing of your skin or eyes, weakness, lethargy, change in color of stool and urine. PRINCIPAL DISCHARGE DIAGNOSIS  Diagnosis: Cholecystitis, chronic  Assessment and Plan of Treatment: Drink plenty of fluids. Rest as needed. Do not lift anything heavier than 10 pounds. You may take motrin or tylenol for pain as needed. Call for any fever over 101, nausea, vomiting, severe pain, no passing of gas or bowel movement. You may shower.  Follow up with facility MD within on week after discharges.      SECONDARY DISCHARGE DIAGNOSES  Diagnosis: HTN (hypertension)  Assessment and Plan of Treatment: Low salt diet  Activity as tolerated.  Take all medication as prescribed.  Follow up with your medical doctor for routine blood pressure monitoring at your next visit.  Notify your doctor if you have any of the following symptoms:   Dizziness, Lightheadedness, Blurry vision, Headache, Chest pain, Shortness of breath      Diagnosis: Atrial fibrillation  Assessment and Plan of Treatment: Please continue oral Eliquis  Atrial fibrillation is the most common heart rhythm problem & has the risk of stroke & heart attack  Call your doctor if you feel your heart racing or beating unusually, chest tightness or pain, lightheaded, faint, shortness of breath especially with exercise  It is important to take your heart medication as prescribed      Diagnosis: Bile duct calculus  Assessment and Plan of Treatment: please call for any yellowing of your skin or eyes, weakness, lethargy, change in color of stool and urine.    Diagnosis: Thrombocytosis  Assessment and Plan of Treatment: You blood platelet level was elevated during hospital stay, likely due to chronic inflammatory disease and hx of DVT.   You were noted without symptoms of acute thrombosis.   Monitor for chest pain, sob, swelling on extremities and report to your doctor.   Follow up with facility doctor for blood work.

## 2020-02-11 NOTE — PROGRESS NOTE ADULT - ASSESSMENT
89 year old male with  CBD stone s.p cholecystectomy     Plan:  I was able to access ampulla however wire was only able to cannulate PD.   No signs of pancreatitis today   MARYJO draining is decreasing   Monitor LFTs   Advance diet as per surgery       Advanced care planning was discussed with patient and family.  Advanced care planning forms were reviewed and discussed.  Risks, benefits and alternatives of gastroenterologic procedures were discussed in detail and all questions were answered.

## 2020-02-11 NOTE — PROGRESS NOTE ADULT - ASSESSMENT
89 y.o. M with PMH cholecystitis s/p IR PTC tube insertion, DVT on AC, hypothyroidism, afib, HTN presents to [facility] ER for management of PTC tube.  Medicine team is consulted for harper-operative optimization.

## 2020-02-11 NOTE — DISCHARGE NOTE PROVIDER - CARE PROVIDER_API CALL
Alec Elias)  Surgery  [address] [place] [address], [place] [address]  Phone: [phone]  Fax: [phone]  Follow Up Time: 2 weeks Alec Elias)  Surgery  [address] [facility], [place] [address]  Phone: [phone]  Fax: [phone]  Follow Up Time: 2 weeks    Lois Broussard)  Internal Medicine  [address] [place] [id number]  Phone: [phone]  Fax: [phone]  Follow Up Time: 1-3 days

## 2020-02-11 NOTE — DISCHARGE NOTE PROVIDER - HOSPITAL COURSE
89 y.o. M with PMH cholecystitis s/p IR PTC tube insertion, DVT on AC, hypothyroidism, afib, HTN presents to [facility] ER for management of PTC tube. PTC was placed by IR 11/29/2019 when pt was admitted for sepsis secondary to acute cholecystitis but refused surgical intervention and also had elevated coags. Pt was readmitted in December for dislodgement of PTC tube. Tube was secured by surgical team with suture. Pt currently with no acute complaints. States drain has been working well since last admission. He was taken to the OR for laparoscopic cholecystectomy which was converted to open cholecystectomy with Lysis of adhesions on 2/4, intraoperative cholangiogram was positive. A MARYJO drain was left in place which drained bilious fluid. PATITO dressing was placed over surgical incision and removed prior to discharge. He was taken for an ERCP on 2/6 which was unsuccessful. ERCP was repeated by a different GI attending on 2/10 which was also unsuccessful, however pancreatic duct was able to be cannulated at that time. 89 y.o. M with PMH cholecystitis s/p IR PTC tube insertion, DVT on AC, hypothyroidism, afib, HTN presents to [facility] ER for management of PTC tube. PTC was placed by IR 11/29/2019 when pt was admitted for sepsis secondary to acute cholecystitis but refused surgical intervention and also had elevated coags. Pt was readmitted in December for dislodgement of PTC tube. Tube was secured by surgical team with suture. Pt currently with no acute complaints. States drain has been working well since last admission. He was taken to the OR for laparoscopic cholecystectomy which was converted to open cholecystectomy with Lysis of adhesions on 2/4, intraoperative cholangiogram was positive. A MARYJO drain was left in place which drained bilious fluid. PATITO dressing was placed over surgical incision and removed prior to discharge. He was taken for an ERCP on 2/6 which was unsuccessful. ERCP was repeated by a different GI attending on 2/10 which was also unsuccessful, however pancreatic duct was able to be cannulated at that time. Meyers catheter was removed on 2/11 for TOV and patient was unable to void, catheter was reinserted. MARYJO drain output decreased and was removed at bedside on 2/12 when it was minimal. Eliquis was resumed after MARYJO removed and heparin drip was discontinued. 89 y.o. M with PMH cholecystitis s/p IR PTC tube insertion, DVT on AC, hypothyroidism, afib, HTN presents to [facility] ER for management of PTC tube. PTC was placed by IR 11/29/2019 when pt was admitted for sepsis secondary to acute cholecystitis but refused surgical intervention and also had elevated coags. Pt was readmitted in December for dislodgement of PTC tube. Tube was secured by surgical team with suture. Pt currently with no acute complaints. States drain has been working well since last admission. He was taken to the OR for laparoscopic cholecystectomy which was converted to open cholecystectomy with Lysis of adhesions on 2/4, intraoperative cholangiogram was positive. A MARYJO drain was left in place which drained bilious fluid. PATITO dressing was placed over surgical incision and removed on 2/12. He was taken for an ERCP on 2/6 which was unsuccessful. ERCP was repeated by a different GI attending on 2/10 which was also unsuccessful, however pancreatic duct was able to be cannulated at that time. Meyers catheter was removed on 2/11 for TOV and patient was unable to void, catheter was reinserted. MARYJO drain output decreased and was removed at bedside on 2/12 when it was minimal. Eliquis was resumed after MARYJO removed and heparin drip was discontinued. LFTs normalized. PT attempt to evaluate pt but unable to evaluate, recommendation is to EDIE. Wife, Ms. Truong called, ok to going back to [facility].     Given clinical improvement  and current hemodynamical stability, decision was made to discharge. 89 y.o. M with PMH cholecystitis s/p IR PTC tube insertion, DVT on AC, hypothyroidism, afib, HTN presents to [facility] ER for management of PTC tube. PTC was placed by IR 11/29/2019 when pt was admitted for sepsis secondary to acute cholecystitis but refused surgical intervention and also had elevated coags. Pt was readmitted in December for dislodgement of PTC tube. Tube was secured by surgical team with suture. Pt currently with no acute complaints. States drain has been working well since last admission. He was taken to the OR for laparoscopic cholecystectomy which was converted to open cholecystectomy with Lysis of adhesions on 2/4, intraoperative cholangiogram was positive. A MARYJO drain was left in place which drained bilious fluid. PATITO dressing was placed over surgical incision and removed on 2/12. He was taken for an ERCP on 2/6 which was unsuccessful. ERCP was repeated by a different GI attending on 2/10 which was also unsuccessful, however pancreatic duct was able to be cannulated at that time. Meyers catheter was removed on 2/11 for TOV and patient was unable to void, catheter was reinserted. MARYJO drain output decreased and was removed at bedside on 2/12 when it was minimal. Eliquis was resumed after MARYJO removed and heparin drip was discontinued. LFTs normalized. PT attempt to evaluate pt but unable to evaluate, recommendation is to EDIE. Wife, Ms. Truong called, ok to going back to [facility].     Given clinical improvement  and current hemodynamical stability pt medically optimal for discharge with outpt follow up 89 y.o. M with PMH cholecystitis s/p IR PTC tube insertion, DVT on AC, hypothyroidism, afib, HTN presents to [facility] ER for management of PTC tube. PTC was placed by IR 11/29/2019 when pt was admitted for sepsis secondary to acute cholecystitis but refused surgical intervention and also had elevated coags. Pt was readmitted in December for dislodgement of PTC tube. Tube was secured by surgical team with suture. Pt currently with no acute complaints. States drain has been working well since last admission. He was taken to the OR for laparoscopic cholecystectomy which was converted to open cholecystectomy with Lysis of adhesions on 2/4, intraoperative cholangiogram was positive. A MARYJO drain was left in place which drained bilious fluid. PATITO dressing was placed over surgical incision and removed on 2/12. He was taken for an ERCP on 2/6 which was unsuccessful. ERCP was repeated by a different GI attending on 2/10 which was also unsuccessful, however pancreatic duct was able to be cannulated at that time. Meyers catheter was removed on 2/11 for TOV and patient was unable to void, catheter was reinserted. MARYJO drain output decreased and was removed at bedside on 2/12 when it was minimal. Eliquis was resumed after MARYJO removed and heparin drip was discontinued. LFTs normalized. PT attempt to evaluate pt but unable to evaluate, recommendation is to EDIE. Wife, Ms. Truong called, ok to going back to [facility].     Given clinical improvement  and current hemodynamical stability pt medically optimal for discharge with outpt follow up.

## 2020-02-11 NOTE — DISCHARGE NOTE PROVIDER - NSDCCPTREATMENT_GEN_ALL_CORE_FT
PRINCIPAL PROCEDURE  Procedure: Conversion, cholecystectomy, laparoscopic to open  Findings and Treatment:       SECONDARY PROCEDURE  Procedure: Endoscopic retrograde cholangiopancreatography (ERCP)  Findings and Treatment:     Procedure: Lysis of peritoneal adhesions  Findings and Treatment:     Procedure: Intraoperative cholangiography  Findings and Treatment:

## 2020-02-11 NOTE — DISCHARGE NOTE PROVIDER - NSDCMRMEDTOKEN_GEN_ALL_CORE_FT
acetaminophen 325 mg oral tablet: 2 tab(s) orally every 6 hours, As needed, Temp greater or equal to 38C (100.4F), Moderate Pain (4 - 6)  ammonium lactate 12% topical lotion: Apply topically to affected area 2 times a day  calamine topical lotion: 1 application topically once a day  clopidogrel 75 mg oral tablet: 1 tab(s) orally once a day  Eliquis Starter Pack for Treatment of DVT and PE 5 mg oral tablet: 10mg q 12hrs for 5 more days and then decrease to 5mg Q 12hrs   ferrous sulfate 220 mg/5 mL (44 mg elemental iron) oral elixir: 7.5 milliliter(s) orally once a day  levothyroxine 100 mcg (0.1 mg) oral tablet: 1 tab(s) orally once a day  lisinopril 2.5 mg oral tablet: 1 tab(s) orally once a day  Remeron 15 mg oral tablet: 0.5 tab(s) orally once a day (at bedtime)  risperiDONE 0.25 mg oral tablet: 1 tab(s) orally once a day  Senna 8.6 mg oral tablet: 3 tab(s) orally once a day (at bedtime)  tamsulosin 0.4 mg oral capsule: 1 cap(s) orally once a day (at bedtime) acetaminophen 325 mg oral tablet: 2 tab(s) orally every 6 hours, As needed, Temp greater or equal to 38C (100.4F), Moderate Pain (4 - 6)  ammonium lactate 12% topical lotion: Apply topically to affected area 2 times a day  apixaban 5 mg oral tablet: 1 tab(s) orally every 12 hours  calamine topical lotion: 1 application topically once a day  clopidogrel 75 mg oral tablet: 1 tab(s) orally once a day  ferrous sulfate 220 mg/5 mL (44 mg elemental iron) oral elixir: 7.5 milliliter(s) orally once a day  levothyroxine 100 mcg (0.1 mg) oral tablet: 1 tab(s) orally once a day  lisinopril 2.5 mg oral tablet: 1 tab(s) orally once a day  Remeron 15 mg oral tablet: 0.5 tab(s) orally once a day (at bedtime)  risperiDONE 0.25 mg oral tablet: 1 tab(s) orally once a day  Senna 8.6 mg oral tablet: 3 tab(s) orally once a day (at bedtime)  tamsulosin 0.4 mg oral capsule: 1 cap(s) orally once a day (at bedtime)

## 2020-02-11 NOTE — PROGRESS NOTE ADULT - SUBJECTIVE AND OBJECTIVE BOX
patient was seen and examined   s no specific  c/o offered  no cp or sob , or abdominal pain  s/p surgery   s/p  ERCP again  but unsuccessful   MEDICATIONS  (STANDING):  ammonium lactate 12% Lotion 1 Application(s) Topical two times a day  calamine Lotion 1 Application(s) Topical daily  cefTRIAXone   IVPB 1000 milliGRAM(s) IV Intermittent every 24 hours  dextrose 5% + sodium chloride 0.45% 1000 milliLiter(s) (75 mL/Hr) IV Continuous <Continuous>  heparin  Infusion. 1200 Unit(s)/Hr (12 mL/Hr) IV Continuous <Continuous>  levothyroxine 100 MICROGram(s) Oral daily  lisinopril 2.5 milliGRAM(s) Oral daily  mirtazapine 7.5 milliGRAM(s) Oral at bedtime  pantoprazole  Injectable 40 milliGRAM(s) IV Push every 12 hours  risperiDONE   Tablet 0.25 milliGRAM(s) Oral daily  senna 3 Tablet(s) Oral daily  tamsulosin 0.4 milliGRAM(s) Oral at bedtime    MEDICATIONS  (PRN):  acetaminophen   Tablet .. 650 milliGRAM(s) Oral every 6 hours PRN Temp greater or equal to 38C (100.4F)  heparin  Injectable 6500 Unit(s) IV Push every 6 hours PRN For aPTT less than 40  heparin  Injectable 3000 Unit(s) IV Push every 6 hours PRN For aPTT between 40 - 57  ketorolac   Injectable 15 milliGRAM(s) IV Push every 6 hours PRN Moderate Pain (4 - 6)  ondansetron Injectable 4 milliGRAM(s) IV Push every 6 hours PRN Nausea    PAST MEDICAL & SURGICAL HISTORY:  PVD (peripheral vascular disease)  Acalculous cholecystitis: s/p percutaneous cholecystostomy  HTN (hypertension)  Hypothyroid  Ulcers of both lower extremities  No significant past surgical history    REVIEW OF SYSTEMS:   CONSTITUTIONAL: No fever, weight loss, or fatigue  EYES: No eye pain, visual disturbances, or discharge  ENMT:  No difficulty hearing, tinnitus, vertigo; No sinus or throat pain  NECK: No pain or stiffness  BREASTS: No pain, masses, or nipple discharge  RESPIRATORY: No cough, wheezing, chills or hemoptysis; No shortness of breath  CARDIOVASCULAR: No chest pain, palpitations, dizziness, or leg swelling  GASTROINTESTINAL: No abdominal or epigastric pain. No nausea, vomiting, or hematemesis; No diarrhea or constipation. No melena or hematochezia.  GENITOURINARY: No dysuria, frequency, hematuria, or incontinence  NEUROLOGICAL: No headaches, memory loss, loss of strength, numbness, or tremors  SKIN: No itching, burning, rashes, or lesions   LYMPH NODES: No enlarged glands  ENDOCRINE: No heat or cold intolerance; No hair loss  MUSCULOSKELETAL: No joint pain or swelling; No muscle, back, or extremity pain  PSYCHIATRIC: No depression, anxiety, mood swings, or difficulty sleeping  HEME/LYMPH: No easy bruising, or bleeding gums  ALLERY AND IMMUNOLOGIC: No hives or eczema      Vital Signs Last 24 Hrs  T(C): 36.4 (2020 14:56), Max: 36.7 (2020 06:00)  T(F): 97.5 (:56), Max: 98.1 (2020 06:00)  HR: 93 (:56) (59 - 93)  BP: 143/77 (2020 14:56) (128/78 - 143/77)  BP(mean): --  RR: 18 (2020 14:56) (18 - 18)  SpO2: 100% (2020 14:56) (96% - 100%)    PHYSICAL EXAM:  GENERAL: NAD, well-groomed, well-developed  HEAD:  Atraumatic, Normocephalic  EYES: EOMI, PERRLA, conjunctiva and sclera clear  ENMT: No tonsillar erythema, exudates, or enlargement; Moist mucous membranes, Good dentition, No lesions  NECK: Supple, No JVD, Normal thyroid  NERVOUS SYSTEM:  Alert & Oriented X 1 to self, Good concentration;   CHEST/LUNG: Clear to percussion bilaterally; No rales, rhonchi, wheezing, or rubs  HEART: Regular rate and rhythm; No murmurs, rubs, or gallops  ABDOMEN: Soft, Nontender, Nondistended; Bowel sounds present, surgical site , no bleeding  EXTREMITIES:  2+ Peripheral Pulses, chronic venous stasis changes No clubbing, cyanosis, or edema  LYMPH: No lymphadenopathy noted  SKIN: No rashes or lesions  LABS:                        8.6    8.49  )-----------( 330      ( 2020 07:32 )             28.4     02-11    140  |  109<H>  |  13  ----------------------------<  82  4.1   |  23  |  0.89    Ca    7.8<L>      2020 07:32    TPro  5.5<L>  /  Alb  1.7<L>  /  TBili  0.4  /  DBili  x   /  AST  11  /  ALT  7<L>  /  AlkPhos  98  02-11    PTT - ( 2020 07:32 )  PTT:36.1 sec                      LABS:                        9.2    8.73  )-----------( 295      ( 10 Feb 2020 07:26 )             29.8           PTT - ( 10 Feb 2020 07:26 )  PTT:66.9 sec                                            10.1   8.21  )-----------( 279      ( 2020 06:37 )             33.3           PTT - ( 2020 06:37 )  PTT:58.0 sec                        LABS:                        9.4    12.46 )-----------( 207      ( 2020 07:40 )             31.1     02-07    143  |  114<H>  |  22<H>  ----------------------------<  105<H>  4.4   |  26  |  1.07    Ca    7.9<L>      2020 07:40    TPro  5.5<L>  /  Alb  1.9<L>  /  TBili  0.5  /  DBili  x   /  AST  14  /  ALT  <6<L>  /  AlkPhos  91  02-07      Urinalysis Basic - ( 2020 10:10 )    Color: Yellow / Appearance: Clear / S.020 / pH: x  Gluc: x / Ketone: Trace  / Bili: Small / Urobili: 1   Blood: x / Protein: 100 / Nitrite: Positive   Leuk Esterase: Moderate / RBC: 2-5 /HPF / WBC 11-25 /HPF   Sq Epi: x / Non Sq Epi: Occasional /HPF / Bacteria: Trace /HPF    LABS:                        9.9    10.07 )-----------( 251      ( 2020 05:54 )             32.8     02-07    143  |  114<H>  |  22<H>  ----------------------------<  105<H>  4.4   |  26  |  1.07    Ca    7.9<L>      2020 07:40    TPro  5.5<L>  /  Alb  1.9<L>  /  TBili  0.5  /  DBili  x   /  AST  14  /  ALT  <6<L>  /  AlkPhos  91  02-07    PTT - ( 2020 05:54 )  PTT:65.0 sec                                        LABS:                        10.2   17.12 )-----------( 254      ( 2020 07:47 )             33.6     02-06    145  |  113<H>  |  21<H>  ----------------------------<  128<H>  4.9   |  25  |  1.28    Ca    8.3<L>      2020 07:47    TPro  6.3  /  Alb  2.2<L>  /  TBili  0.8  /  DBili  0.2  /  AST  31  /  ALT  9<L>  /  AlkPhos  93  02-06      Urinalysis Basic - ( 2020 10:10 )    Color: Yellow / Appearance: Clear / S.020 / pH: x  Gluc: x / Ketone: Trace  / Bili: Small / Urobili: 1   Blood: x / Protein: 100 / Nitrite: Positive   Leuk Esterase: Moderate / RBC: 2-5 /HPF / WBC 11-25 /HPF   Sq Epi: x / Non Sq Epi: Occasional /HPF / Bacteria: Trace /HPF                          LABS:                        9.9    9.47  )-----------( 234      ( 2020 07:40 )             32.5     02-05    144  |  112<H>  |  16  ----------------------------<  103<H>  4.4   |  27  |  0.99    Ca    8.0<L>      2020 07:40    TPro  6.0  /  Alb  2.4<L>  /  TBili  0.4  /  DBili  x   /  AST  15  /  ALT  10  /  AlkPhos  92  02-04    PT/INR - ( 2020 07:40 )   PT: 13.2 sec;   INR: 1.18 ratio         PTT - ( 2020 07:40 )  PTT:37.4 sec                      LABS:                        10.8   7.02  )-----------( 279      ( 2020 07:40 )             35.4     02-04    147<H>  |  111<H>  |  19<H>  ----------------------------<  88  4.3   |  28  |  0.93    Ca    8.7      2020 07:40    TPro  6.0  /  Alb  2.4<L>  /  TBili  0.4  /  DBili  x   /  AST  15  /  ALT  10  /  AlkPhos  92  02-04    PT/INR - ( 2020 07:40 )   PT: 13.2 sec;   INR: 1.18 ratio         PTT - ( 2020 07:40 )  PTT:37.4 sec                      LABS:                        11.2   7.63  )-----------( 265      ( 2020 07:30 )             36.3     02-02    144  |  113<H>  |  19<H>  ----------------------------<  86  3.7   |  26  |  0.87    Ca    7.9<L>      2020 08:00      PTT - ( 2020 10:05 )  PTT:90.7 sec                        LABS:                        9.9    6.56  )-----------( 254      ( 2020 08:00 )             32.3     02-02    144  |  113<H>  |  19<H>  ----------------------------<  86  3.7   |  26  |  0.87    Ca    7.9<L>      2020 08:00      PTT - ( 2020 08:00 )  PTT:41.0 sec                        LABS:                        10.3   8.03  )-----------( 291      ( 2020 07:28 )             33.6     02-01    145  |  114<H>  |  23<H>  ----------------------------<  86  3.6   |  27  |  0.84    Ca    8.4      2020 07:28                              LABS:                        12.0   10.20 )-----------( 368      ( 2020 16:49 )             38.7         143  |  113<H>  |  31<H>  ----------------------------<  82  4.5   |  25  |  1.07    Ca    8.2<L>      2020 16:49    TPro  6.9  /  Alb  2.6<L>  /  TBili  0.4  /  DBili  x   /  AST  23  /  ALT  10  /  AlkPhos  117      PT/INR - ( 2020 16:49 )   PT: 16.6 sec;   INR: 1.48 ratio         PTT - ( 2020 16:49 )  PTT:49.9 sec    CAPILLARY BLOOD GLUCOSE    < from: Transthoracic Echocardiogram (11..19 @ 10:44) >  Ejection Fraction Visual Estimate: >55 %    ------------------------------------------------------------------------  OBSERVATIONS:  Mitral Valve: Mitral annular calcification. Mild mitral  regurgitation.  Aortic Root: Normal aortic root.  Aortic Valve: Calcified trileaflet aortic valve with normal  opening. Mild aortic regurgitation.  LeftAtrium: Moderate left atrial enlargement.  Left Ventricle: Endocardium not well visualized; grossly  normal left ventricular systolic function. Normal left  ventricular internal dimensions and wall thicknesses.  Right Heart: Normal right atrium. Normal right ventricular  size and function. There is moderate-severe tricuspid  regurgitation. Normal pulmonic valve.  Pericardium/PleuraNormal pericardium with no pericardial  effusion.  Hemodynamic: RA Pressure is 10 mm Hg. RV systolic pressure  is 84 mm Hg. Severe pulmonary hypertension.  ------------------------------------------------------------------------  CONCLUSIONS:  1. Mitral annular calcification. Mild mitral regurgitation.    2. Calcified trileaflet aortic valve with normal opening.  Mild aortic regurgitation.  3. Moderate left atrial enlargement.  4. Normal left ventricular internal dimensions and wall  thicknesses.  5. Endocardium not well visualized; grossly normal left  ventricular systolic function.  6. Normal right ventricularsize and function.  7. RV systolic pressure is 84 mm Hg. Severe pulmonary  hypertension.    ------------------------------------------------------------------------  Confirmed on  2019 - 08:27:04 by Ayush Yang MD  ------------------------------------------------------------------------    < end of copied text >                RADIOLOGY & ADDITIONAL TESTS:        < from: CT Abdomen and Pelvis w/ IV Cont (20 @ 17:29) >  MPRESSION:     Cholecystostomy tube appears in good position.    Deep vein thrombosis in the right common femoral vein.    Small enhancing fluid surrounding both ischial tuberosities.    Dr. Heart discussed the findings with Dr. Jacinto on 2020 at 5:55 PM.  Readback was obtained.

## 2020-02-11 NOTE — DISCHARGE NOTE PROVIDER - PROVIDER TOKENS
PROVIDER:[TOKEN:[[id number]],FOLLOWUP:[2 weeks]] PROVIDER:[TOKEN:[[id number]],FOLLOWUP:[2 weeks]],PROVIDER:[TOKEN:[[id number]],FOLLOWUP:[1-3 days]]

## 2020-02-12 LAB
ALBUMIN SERPL ELPH-MCNC: 1.8 G/DL — LOW (ref 3.5–5)
ALP SERPL-CCNC: 92 U/L — SIGNIFICANT CHANGE UP (ref 40–120)
ALT FLD-CCNC: 6 U/L DA — LOW (ref 10–60)
ANION GAP SERPL CALC-SCNC: 6 MMOL/L — SIGNIFICANT CHANGE UP (ref 5–17)
APTT BLD: 63 SEC — HIGH (ref 27.5–36.3)
APTT BLD: 79.8 SEC — HIGH (ref 27.5–36.3)
AST SERPL-CCNC: 12 U/L — SIGNIFICANT CHANGE UP (ref 10–40)
BILIRUB SERPL-MCNC: 0.4 MG/DL — SIGNIFICANT CHANGE UP (ref 0.2–1.2)
BUN SERPL-MCNC: 11 MG/DL — SIGNIFICANT CHANGE UP (ref 7–18)
CALCIUM SERPL-MCNC: 7.7 MG/DL — LOW (ref 8.4–10.5)
CHLORIDE SERPL-SCNC: 107 MMOL/L — SIGNIFICANT CHANGE UP (ref 96–108)
CO2 SERPL-SCNC: 25 MMOL/L — SIGNIFICANT CHANGE UP (ref 22–31)
CREAT SERPL-MCNC: 0.95 MG/DL — SIGNIFICANT CHANGE UP (ref 0.5–1.3)
GLUCOSE SERPL-MCNC: 100 MG/DL — HIGH (ref 70–99)
HCT VFR BLD CALC: 29.1 % — LOW (ref 39–50)
HGB BLD-MCNC: 8.9 G/DL — LOW (ref 13–17)
MCHC RBC-ENTMCNC: 30.4 PG — SIGNIFICANT CHANGE UP (ref 27–34)
MCHC RBC-ENTMCNC: 30.6 GM/DL — LOW (ref 32–36)
MCV RBC AUTO: 99.3 FL — SIGNIFICANT CHANGE UP (ref 80–100)
MRSA PCR RESULT.: SIGNIFICANT CHANGE UP
NRBC # BLD: 0 /100 WBCS — SIGNIFICANT CHANGE UP (ref 0–0)
PLATELET # BLD AUTO: 392 K/UL — SIGNIFICANT CHANGE UP (ref 150–400)
POTASSIUM SERPL-MCNC: 3.7 MMOL/L — SIGNIFICANT CHANGE UP (ref 3.5–5.3)
POTASSIUM SERPL-SCNC: 3.7 MMOL/L — SIGNIFICANT CHANGE UP (ref 3.5–5.3)
PROT SERPL-MCNC: 5.5 G/DL — LOW (ref 6–8.3)
RBC # BLD: 2.93 M/UL — LOW (ref 4.2–5.8)
RBC # FLD: 15.5 % — HIGH (ref 10.3–14.5)
S AUREUS DNA NOSE QL NAA+PROBE: SIGNIFICANT CHANGE UP
SODIUM SERPL-SCNC: 138 MMOL/L — SIGNIFICANT CHANGE UP (ref 135–145)
WBC # BLD: 9.97 K/UL — SIGNIFICANT CHANGE UP (ref 3.8–10.5)
WBC # FLD AUTO: 9.97 K/UL — SIGNIFICANT CHANGE UP (ref 3.8–10.5)

## 2020-02-12 RX ORDER — APIXABAN 2.5 MG/1
5 TABLET, FILM COATED ORAL EVERY 12 HOURS
Refills: 0 | Status: DISCONTINUED | OUTPATIENT
Start: 2020-02-12 | End: 2020-02-20

## 2020-02-12 RX ORDER — APIXABAN 2.5 MG/1
1 TABLET, FILM COATED ORAL
Qty: 0 | Refills: 0 | DISCHARGE
Start: 2020-02-12

## 2020-02-12 RX ADMIN — Medication 1 APPLICATION(S): at 17:40

## 2020-02-12 RX ADMIN — Medication 1 APPLICATION(S): at 06:06

## 2020-02-12 RX ADMIN — SENNA PLUS 3 TABLET(S): 8.6 TABLET ORAL at 12:25

## 2020-02-12 RX ADMIN — APIXABAN 5 MILLIGRAM(S): 2.5 TABLET, FILM COATED ORAL at 17:40

## 2020-02-12 RX ADMIN — HEPARIN SODIUM 1200 UNIT(S)/HR: 5000 INJECTION INTRAVENOUS; SUBCUTANEOUS at 03:21

## 2020-02-12 RX ADMIN — MIRTAZAPINE 7.5 MILLIGRAM(S): 45 TABLET, ORALLY DISINTEGRATING ORAL at 21:12

## 2020-02-12 RX ADMIN — PANTOPRAZOLE SODIUM 40 MILLIGRAM(S): 20 TABLET, DELAYED RELEASE ORAL at 17:40

## 2020-02-12 RX ADMIN — TAMSULOSIN HYDROCHLORIDE 0.4 MILLIGRAM(S): 0.4 CAPSULE ORAL at 21:12

## 2020-02-12 RX ADMIN — Medication 100 MICROGRAM(S): at 06:07

## 2020-02-12 RX ADMIN — LISINOPRIL 2.5 MILLIGRAM(S): 2.5 TABLET ORAL at 06:07

## 2020-02-12 RX ADMIN — PANTOPRAZOLE SODIUM 40 MILLIGRAM(S): 20 TABLET, DELAYED RELEASE ORAL at 06:07

## 2020-02-12 RX ADMIN — RISPERIDONE 0.25 MILLIGRAM(S): 4 TABLET ORAL at 12:25

## 2020-02-12 NOTE — PROGRESS NOTE ADULT - SUBJECTIVE AND OBJECTIVE BOX
Summary:   89y  Male      Subjective:   No events overnight     Objective:    MEDICATIONS  (STANDING):  ammonium lactate 12% Lotion 1 Application(s) Topical two times a day  calamine Lotion 1 Application(s) Topical daily  cefTRIAXone   IVPB 1000 milliGRAM(s) IV Intermittent every 24 hours  dextrose 5% + sodium chloride 0.45% 1000 milliLiter(s) (75 mL/Hr) IV Continuous <Continuous>  heparin  Infusion. 1200 Unit(s)/Hr (12 mL/Hr) IV Continuous <Continuous>  levothyroxine 100 MICROGram(s) Oral daily  lisinopril 2.5 milliGRAM(s) Oral daily  mirtazapine 7.5 milliGRAM(s) Oral at bedtime  pantoprazole  Injectable 40 milliGRAM(s) IV Push every 12 hours  risperiDONE   Tablet 0.25 milliGRAM(s) Oral daily  senna 3 Tablet(s) Oral daily  tamsulosin 0.4 milliGRAM(s) Oral at bedtime    MEDICATIONS  (PRN):  acetaminophen   Tablet .. 650 milliGRAM(s) Oral every 6 hours PRN Temp greater or equal to 38C (100.4F)  heparin  Injectable 6500 Unit(s) IV Push every 6 hours PRN For aPTT less than 40  heparin  Injectable 3000 Unit(s) IV Push every 6 hours PRN For aPTT between 40 - 57  ketorolac   Injectable 15 milliGRAM(s) IV Push every 6 hours PRN Moderate Pain (4 - 6)  ondansetron Injectable 4 milliGRAM(s) IV Push every 6 hours PRN Nausea              Vital Signs Last 24 Hrs  T(C): 36.7 (11 Feb 2020 06:00), Max: 36.7 (10 Feb 2020 15:44)  T(F): 98.1 (11 Feb 2020 06:00), Max: 98.1 (11 Feb 2020 06:00)  HR: 59 (11 Feb 2020 06:00) (59 - 95)  BP: 128/78 (11 Feb 2020 06:00) (104/65 - 142/78)  BP(mean): 97 (10 Feb 2020 15:44) (78 - 97)  RR: 18 (11 Feb 2020 06:00) (16 - 25)  SpO2: 96% (11 Feb 2020 06:00) (96% - 99%)      General:  Well developed, well nourished, alert and active, no pallor, NAD.  HEENT:    Normal appearance of conjunctiva, ears, nose, lips, oropharynx, and oral mucosa, anicteric.  Neck:  No masses, no asymmetry.  Lymph Nodes:  No lymphadenopathy.   Cardiovascular:  RRR normal S1/S2, no murmur.  Respiratory:  CTA B/L, normal respiratory effort.   Abdominal:   soft, no masses or tenderness, normoactive BS, NT/ND, no HSM.  Extremities:   No clubbing or cyanosis, normal capillary refill, no edema.   Skin:   No rash, jaundice, lesions, eczema.   Musculoskeletal:  No joint swelling, erythema or tenderness.   Neuro: No focal deficits.   Other:       LABS:                        8.6    8.49  )-----------( 330      ( 11 Feb 2020 07:32 )             28.4     02-11    140  |  109<H>  |  13  ----------------------------<  82  4.1   |  23  |  0.89    Ca    7.8<L>      11 Feb 2020 07:32    TPro  5.5<L>  /  Alb  1.7<L>  /  TBili  0.4  /  DBili  x   /  AST  11  /  ALT  7<L>  /  AlkPhos  98  02-11    PTT - ( 11 Feb 2020 07:32 )  PTT:36.1 sec      RADIOLOGY & ADDITIONAL TESTS:

## 2020-02-12 NOTE — PROGRESS NOTE ADULT - SUBJECTIVE AND OBJECTIVE BOX
patient was seen and examined   s no specific  c/o offered  no cp or sob , or abdominal pain  s/p surgery   s/p  ERCP again  but unsuccessful , no pain , less drainage  MEDICATIONS  (STANDING):  ammonium lactate 12% Lotion 1 Application(s) Topical two times a day  calamine Lotion 1 Application(s) Topical daily  cefTRIAXone   IVPB 1000 milliGRAM(s) IV Intermittent every 24 hours  dextrose 5% + sodium chloride 0.45% 1000 milliLiter(s) (75 mL/Hr) IV Continuous <Continuous>  heparin  Infusion. 1200 Unit(s)/Hr (12 mL/Hr) IV Continuous <Continuous>  levothyroxine 100 MICROGram(s) Oral daily  lisinopril 2.5 milliGRAM(s) Oral daily  mirtazapine 7.5 milliGRAM(s) Oral at bedtime  pantoprazole  Injectable 40 milliGRAM(s) IV Push every 12 hours  risperiDONE   Tablet 0.25 milliGRAM(s) Oral daily  senna 3 Tablet(s) Oral daily  tamsulosin 0.4 milliGRAM(s) Oral at bedtime    MEDICATIONS  (PRN):  acetaminophen   Tablet .. 650 milliGRAM(s) Oral every 6 hours PRN Temp greater or equal to 38C (100.4F)  heparin  Injectable 6500 Unit(s) IV Push every 6 hours PRN For aPTT less than 40  heparin  Injectable 3000 Unit(s) IV Push every 6 hours PRN For aPTT between 40 - 57  ketorolac   Injectable 15 milliGRAM(s) IV Push every 6 hours PRN Moderate Pain (4 - 6)  ondansetron Injectable 4 milliGRAM(s) IV Push every 6 hours PRN Nausea    PAST MEDICAL & SURGICAL HISTORY:  PVD (peripheral vascular disease)  Acalculous cholecystitis: s/p percutaneous cholecystostomy  HTN (hypertension)  Hypothyroid  Ulcers of both lower extremities  No significant past surgical history    REVIEW OF SYSTEMS:   CONSTITUTIONAL: No fever, weight loss, or fatigue  EYES: No eye pain, visual disturbances, or discharge  ENMT:  No difficulty hearing, tinnitus, vertigo; No sinus or throat pain  NECK: No pain or stiffness  BREASTS: No pain, masses, or nipple discharge  RESPIRATORY: No cough, wheezing, chills or hemoptysis; No shortness of breath  CARDIOVASCULAR: No chest pain, palpitations, dizziness, or leg swelling  GASTROINTESTINAL: No abdominal or epigastric pain. No nausea, vomiting, or hematemesis; No diarrhea or constipation. No melena or hematochezia.  GENITOURINARY: No dysuria, frequency, hematuria, or incontinence  NEUROLOGICAL: No headaches, memory loss, loss of strength, numbness, or tremors  SKIN: No itching, burning, rashes, or lesions   LYMPH NODES: No enlarged glands  ENDOCRINE: No heat or cold intolerance; No hair loss  MUSCULOSKELETAL: No joint pain or swelling; No muscle, back, or extremity pain  PSYCHIATRIC: No depression, anxiety, mood swings, or difficulty sleeping  HEME/LYMPH: No easy bruising, or bleeding gums  ALLERY AND IMMUNOLOGIC: No hives or eczema    Vital Signs Last 24 Hrs  T(C): 36.5 (2020 05:30), Max: 36.9 (2020 21:36)  T(F): 97.7 (2020 05:30), Max: 98.4 (2020 21:36)  HR: 84 (2020 05:30) (84 - 93)  BP: 149/78 (2020 05:30) (117/58 - 149/78)  BP(mean): --  RR: 18 (2020 05:30) (18 - 18)  SpO2: 94% (2020 05:30) (94% - 100%)    PHYSICAL EXAM:  GENERAL: NAD, well-groomed, well-developed  HEAD:  Atraumatic, Normocephalic  EYES: EOMI, PERRLA, conjunctiva and sclera clear  ENMT: No tonsillar erythema, exudates, or enlargement; Moist mucous membranes, Good dentition, No lesions  NECK: Supple, No JVD, Normal thyroid  NERVOUS SYSTEM:  Alert & Oriented X 1 to self, Good concentration;   CHEST/LUNG: Clear to percussion bilaterally; No rales, rhonchi, wheezing, or rubs  HEART: Regular rate and rhythm; No murmurs, rubs, or gallops  ABDOMEN: Soft, Nontender, Nondistended; Bowel sounds present, surgical site , no bleeding  EXTREMITIES:  2+ Peripheral Pulses, chronic venous stasis changes No clubbing, cyanosis, or edema  LYMPH: No lymphadenopathy noted  SKIN: No rashes or lesions  LABS:                        8.9    9.97  )-----------( 392      ( 2020 08:00 )             29.1     02-12    138  |  107  |  11  ----------------------------<  100<H>  3.7   |  25  |  0.95    Ca    7.7<L>      2020 08:00    TPro  5.5<L>  /  Alb  1.8<L>  /  TBili  0.4  /  DBili  x   /  AST  12  /  ALT  6<L>  /  AlkPhos  92  -12    PTT - ( 2020 08:00 )  PTT:79.8 sec                      LABS:                        8.6    8.49  )-----------( 330      ( 2020 07:32 )             28.4     02-11    140  |  109<H>  |  13  ----------------------------<  82  4.1   |  23  |  0.89    Ca    7.8<L>      2020 07:32    TPro  5.5<L>  /  Alb  1.7<L>  /  TBili  0.4  /  DBili  x   /  AST  11  /  ALT  7<L>  /  AlkPhos  98  02-11    PTT - ( 2020 07:32 )  PTT:36.1 sec                      LABS:                        9.2    8.73  )-----------( 295      ( 10 Feb 2020 07:26 )             29.8           PTT - ( 10 Feb 2020 07:26 )  PTT:66.9 sec                                            10.1   8.21  )-----------( 279      ( 2020 06:37 )             33.3           PTT - ( 2020 06:37 )  PTT:58.0 sec                        LABS:                        9.4    12.46 )-----------( 207      ( 2020 07:40 )             31.1     02-07    143  |  114<H>  |  22<H>  ----------------------------<  105<H>  4.4   |  26  |  1.07    Ca    7.9<L>      2020 07:40    TPro  5.5<L>  /  Alb  1.9<L>  /  TBili  0.5  /  DBili  x   /  AST  14  /  ALT  <6<L>  /  AlkPhos  91  02-07      Urinalysis Basic - ( 2020 10:10 )    Color: Yellow / Appearance: Clear / S.020 / pH: x  Gluc: x / Ketone: Trace  / Bili: Small / Urobili: 1   Blood: x / Protein: 100 / Nitrite: Positive   Leuk Esterase: Moderate / RBC: 2-5 /HPF / WBC 11-25 /HPF   Sq Epi: x / Non Sq Epi: Occasional /HPF / Bacteria: Trace /HPF    LABS:                        9.9    10.07 )-----------( 251      ( 2020 05:54 )             32.8     02-07    143  |  114<H>  |  22<H>  ----------------------------<  105<H>  4.4   |  26  |  1.07    Ca    7.9<L>      2020 07:40    TPro  5.5<L>  /  Alb  1.9<L>  /  TBili  0.5  /  DBili  x   /  AST  14  /  ALT  <6<L>  /  AlkPhos  91  02-07    PTT - ( 2020 05:54 )  PTT:65.0 sec                                        LABS:                        10.2   17.12 )-----------( 254      ( 2020 07:47 )             33.6     02-06    145  |  113<H>  |  21<H>  ----------------------------<  128<H>  4.9   |  25  |  1.28    Ca    8.3<L>      2020 07:47    TPro  6.3  /  Alb  2.2<L>  /  TBili  0.8  /  DBili  0.2  /  AST  31  /  ALT  9<L>  /  AlkPhos  93  02-06      Urinalysis Basic - ( 2020 10:10 )    Color: Yellow / Appearance: Clear / S.020 / pH: x  Gluc: x / Ketone: Trace  / Bili: Small / Urobili: 1   Blood: x / Protein: 100 / Nitrite: Positive   Leuk Esterase: Moderate / RBC: 2-5 /HPF / WBC 11-25 /HPF   Sq Epi: x / Non Sq Epi: Occasional /HPF / Bacteria: Trace /HPF                          LABS:                        9.9    9.47  )-----------( 234      ( 2020 07:40 )             32.5     02-05    144  |  112<H>  |  16  ----------------------------<  103<H>  4.4   |  27  |  0.99    Ca    8.0<L>      2020 07:40    TPro  6.0  /  Alb  2.4<L>  /  TBili  0.4  /  DBili  x   /  AST  15  /  ALT  10  /  AlkPhos  92  02-04    PT/INR - ( 2020 07:40 )   PT: 13.2 sec;   INR: 1.18 ratio         PTT - ( 2020 07:40 )  PTT:37.4 sec                      LABS:                        10.8   7.02  )-----------( 279      ( 2020 07:40 )             35.4     02-04    147<H>  |  111<H>  |  19<H>  ----------------------------<  88  4.3   |  28  |  0.93    Ca    8.7      2020 07:40    TPro  6.0  /  Alb  2.4<L>  /  TBili  0.4  /  DBili  x   /  AST  15  /  ALT  10  /  AlkPhos  92  02-04    PT/INR - ( 2020 07:40 )   PT: 13.2 sec;   INR: 1.18 ratio         PTT - ( 2020 07:40 )  PTT:37.4 sec                      LABS:                        11.2   7.63  )-----------( 265      ( 2020 07:30 )             36.3     02-02    144  |  113<H>  |  19<H>  ----------------------------<  86  3.7   |  26  |  0.87    Ca    7.9<L>      2020 08:00      PTT - ( 2020 10:05 )  PTT:90.7 sec                        LABS:                        9.9    6.56  )-----------( 254      ( 2020 08:00 )             32.3     02-02    144  |  113<H>  |  19<H>  ----------------------------<  86  3.7   |  26  |  0.87    Ca    7.9<L>      2020 08:00      PTT - ( 2020 08:00 )  PTT:41.0 sec                        LABS:                        10.3   8.03  )-----------( 291      ( 2020 07:28 )             33.6     02-01    145  |  114<H>  |  23<H>  ----------------------------<  86  3.6   |  27  |  0.84    Ca    8.4      2020 07:28                              LABS:                        12.0   10.20 )-----------( 368      ( 2020 16:49 )             38.7     -    143  |  113<H>  |  31<H>  ----------------------------<  82  4.5   |  25  |  1.07    Ca    8.2<L>      2020 16:49    TPro  6.9  /  Alb  2.6<L>  /  TBili  0.4  /  DBili  x   /  AST  23  /  ALT  10  /  AlkPhos  117  -    PT/INR - ( 2020 16:49 )   PT: 16.6 sec;   INR: 1.48 ratio         PTT - ( 2020 16:49 )  PTT:49.9 sec    CAPILLARY BLOOD GLUCOSE    < from: Transthoracic Echocardiogram (.22.19 @ 10:44) >  Ejection Fraction Visual Estimate: >55 %    ------------------------------------------------------------------------  OBSERVATIONS:  Mitral Valve: Mitral annular calcification. Mild mitral  regurgitation.  Aortic Root: Normal aortic root.  Aortic Valve: Calcified trileaflet aortic valve with normal  opening. Mild aortic regurgitation.  LeftAtrium: Moderate left atrial enlargement.  Left Ventricle: Endocardium not well visualized; grossly  normal left ventricular systolic function. Normal left  ventricular internal dimensions and wall thicknesses.  Right Heart: Normal right atrium. Normal right ventricular  size and function. There is moderate-severe tricuspid  regurgitation. Normal pulmonic valve.  Pericardium/PleuraNormal pericardium with no pericardial  effusion.  Hemodynamic: RA Pressure is 10 mm Hg. RV systolic pressure  is 84 mm Hg. Severe pulmonary hypertension.  ------------------------------------------------------------------------  CONCLUSIONS:  1. Mitral annular calcification. Mild mitral regurgitation.    2. Calcified trileaflet aortic valve with normal opening.  Mild aortic regurgitation.  3. Moderate left atrial enlargement.  4. Normal left ventricular internal dimensions and wall  thicknesses.  5. Endocardium not well visualized; grossly normal left  ventricular systolic function.  6. Normal right ventricularsize and function.  7. RV systolic pressure is 84 mm Hg. Severe pulmonary  hypertension.    ------------------------------------------------------------------------  Confirmed on  2019 - 08:27:04 by Ayush Yang MD  ------------------------------------------------------------------------    < end of copied text >                RADIOLOGY & ADDITIONAL TESTS:        < from: CT Abdomen and Pelvis w/ IV Cont (20 @ 17:29) >  MPRESSION:     Cholecystostomy tube appears in good position.    Deep vein thrombosis in the right common femoral vein.    Small enhancing fluid surrounding both ischial tuberosities.    Dr. Heart discussed the findings with Dr. Jacinto on 2020 at 5:55 PM.  Readback was obtained.

## 2020-02-12 NOTE — CHART NOTE - NSCHARTNOTEFT_GEN_A_CORE
Patient seen and examined at bedside for removal of MARYJO drain per discussion with Dr. Elias. Securing suture clipped at bedside, drain taken off suction and removed easily. Area covered with DSD. Patient tolerated well.

## 2020-02-12 NOTE — PROGRESS NOTE ADULT - ASSESSMENT
89 y.o. M with PMH cholecystitis s/p IR PTC tube insertion, DVT on AC, hypothyroidism, afib, HTN presents to [facility] ER for management of PTC tube.  Medicine team is consulted for harper-operative optimization.

## 2020-02-12 NOTE — PROGRESS NOTE ADULT - ASSESSMENT
89y old Male s/p open cholecystectomy with +IOC and removal of SPT on 2/4, unsuccessful ERCP by GI on 2/6 and 2/10. Failed TOV yesterday and catheter reinserted     - continue diet  - plan to remove MARYJO drain today per Dr. Elias  - continue IV antibiotics  - continue to trend labs  - after MARYJO removed with stop heparin gtt and resume eliquis   - discharge planning  - discussed with Dr. Elias

## 2020-02-12 NOTE — PROGRESS NOTE ADULT - ASSESSMENT
89 year old male with  CBD stone s.p cholecystectomy     Plan:  I was able to access ampulla however wire was only able to cannulate PD.   No signs of pancreatitis  MARYJO draining is decreasing  pending removal by surgery today   Monitor LFTs   Advance diet as per surgery       Advanced care planning was discussed with patient and family.  Advanced care planning forms were reviewed and discussed.  Risks, benefits and alternatives of gastroenterologic procedures were discussed in detail and all questions were answered.

## 2020-02-12 NOTE — PROGRESS NOTE ADULT - SUBJECTIVE AND OBJECTIVE BOX
S/p open cholecystectomy with +IOC and removal of SPT on 2/4, unsuccessful ERCP by GI on 2/6 and 2/10  Patient seen and examined at bedside with no complaints.     Vital Signs Last 24 Hrs  T(F): 97.7 (02-12-20 @ 05:30), Max: 98.4 (02-11-20 @ 21:36)  HR: 84 (02-12-20 @ 05:30)  BP: 149/78 (02-12-20 @ 05:30)  RR: 18 (02-12-20 @ 05:30)  SpO2: 94% (02-12-20 @ 05:30)    GENERAL: Alert, NAD  CHEST/LUNG: respirations nonlabored  ABDOMEN: PATITO dressing no longer working and removed at bedside, wound well healed with no erythema or draiange; MARYJO with bilious drainage: 2.5ml/24hours, soft, Nontender, Nondistended  : cabrales catheter in place draining clear yellow urine  EXTREMITIES:  no calf tenderness, No edema    I&O's Detail    11 Feb 2020 07:01  -  12 Feb 2020 07:00  --------------------------------------------------------  IN:  Total IN: 0 mL    OUT:    Bulb: 2.5 mL  Total OUT: 2.5 mL    Total NET: -2.5 mL    LABS:                        8.9    9.97  )-----------( 392      ( 12 Feb 2020 08:00 )             29.1     02-12    138  |  107  |  11  ----------------------------<  100<H>  3.7   |  25  |  0.95    Ca    7.7<L>      12 Feb 2020 08:00    TPro  5.5<L>  /  Alb  1.8<L>  /  TBili  0.4  /  DBili  x   /  AST  12  /  ALT  6<L>  /  AlkPhos  92  02-12    PTT - ( 12 Feb 2020 08:00 )  PTT:79.8 sec

## 2020-02-13 DIAGNOSIS — Z02.9 ENCOUNTER FOR ADMINISTRATIVE EXAMINATIONS, UNSPECIFIED: ICD-10-CM

## 2020-02-13 DIAGNOSIS — Z71.89 OTHER SPECIFIED COUNSELING: ICD-10-CM

## 2020-02-13 RX ADMIN — Medication 100 MICROGRAM(S): at 05:11

## 2020-02-13 RX ADMIN — LISINOPRIL 2.5 MILLIGRAM(S): 2.5 TABLET ORAL at 05:11

## 2020-02-13 RX ADMIN — TAMSULOSIN HYDROCHLORIDE 0.4 MILLIGRAM(S): 0.4 CAPSULE ORAL at 21:49

## 2020-02-13 RX ADMIN — Medication 1 APPLICATION(S): at 05:10

## 2020-02-13 RX ADMIN — MIRTAZAPINE 7.5 MILLIGRAM(S): 45 TABLET, ORALLY DISINTEGRATING ORAL at 21:48

## 2020-02-13 RX ADMIN — RISPERIDONE 0.25 MILLIGRAM(S): 4 TABLET ORAL at 12:15

## 2020-02-13 RX ADMIN — PANTOPRAZOLE SODIUM 40 MILLIGRAM(S): 20 TABLET, DELAYED RELEASE ORAL at 05:11

## 2020-02-13 RX ADMIN — APIXABAN 5 MILLIGRAM(S): 2.5 TABLET, FILM COATED ORAL at 17:54

## 2020-02-13 RX ADMIN — APIXABAN 5 MILLIGRAM(S): 2.5 TABLET, FILM COATED ORAL at 05:11

## 2020-02-13 RX ADMIN — SENNA PLUS 3 TABLET(S): 8.6 TABLET ORAL at 12:15

## 2020-02-13 RX ADMIN — PANTOPRAZOLE SODIUM 40 MILLIGRAM(S): 20 TABLET, DELAYED RELEASE ORAL at 17:54

## 2020-02-13 RX ADMIN — Medication 1 APPLICATION(S): at 17:54

## 2020-02-13 NOTE — CHART NOTE - NSCHARTNOTEFT_GEN_A_CORE
Assessment:   Patient  visited. Pt is alert but confused. D/W Wife at bedside, Observed Lunch meal pt ate mashes potatoes and ~25 % of Lasagna. Pt is on Dysphagia 2 mech soft Thin liquids. Appetite is poor. Offered Nutritional supplement Agreed to try Likes Chocolate Ensure and Offered  Nutrient Dense snacks Offered and  Agreed to try. Pureed diet offered but wife started pt dislikes Pureed food.     PO intake:   Source for PO intake [ ] Patient/family [ ] chart [ ] staff     Current Weight:   % Weight Change Bed scale 194 lb with out scd device    Pertinent Medications: MEDICATIONS  (STANDING):  ammonium lactate 12% Lotion 1 Application(s) Topical two times a day  apixaban 5 milliGRAM(s) Oral every 12 hours  calamine Lotion 1 Application(s) Topical daily  levothyroxine 100 MICROGram(s) Oral daily  lisinopril 2.5 milliGRAM(s) Oral daily  mirtazapine 7.5 milliGRAM(s) Oral at bedtime  pantoprazole  Injectable 40 milliGRAM(s) IV Push every 12 hours  risperiDONE   Tablet 0.25 milliGRAM(s) Oral daily  senna 3 Tablet(s) Oral daily  tamsulosin 0.4 milliGRAM(s) Oral at bedtime    MEDICATIONS  (PRN):  acetaminophen   Tablet .. 650 milliGRAM(s) Oral every 6 hours PRN Temp greater or equal to 38C (100.4F)  ketorolac   Injectable 15 milliGRAM(s) IV Push every 6 hours PRN Moderate Pain (4 - 6)  ondansetron Injectable 4 milliGRAM(s) IV Push every 6 hours PRN Nausea    Pertinent Labs:  02-12 Na138 mmol/L Glu 100 mg/dL<H> K+ 3.7 mmol/L Cr  0.95 mg/dL BUN 11 mg/dL 02-12 Alb 1.8 g/dL<L> 01-15 [id number] 5.0 % 01-15 Chol 110 mg/dL LDL 60 mg/dL HDL 34 mg/dL<L> Trig 80 mg/dL      Skin: L heel Unstageable     Estimated Needs:   [ ] no change since previous assessment  [ ] recalculated:       Previous Nutrition Diagnosis:   [ ] Inadequate Energy Intake [x ]Inadequate Oral Intake [ ] Excessive Energy Intake   [ ] Underweight [ ] Increased Nutrient Needs [ ] Overweight/Obesity   [ ] Altered GI Function [ ] Unintended Weight Loss [ ] Food & Nutrition Related Knowledge Deficit [ ] Malnutrition     Nutrition Diagnosis is [x] ongoing  [ ] resolved [ ] not applicable     New Nutrition Diagnosis: [ ] not applicable  [ ] Inadequate Energy Intake [ ]Inadequate Oral Intake [ ] Excessive Energy Intake   [ ] Underweight [ ] Increased Nutrient Needs [ ] Overweight/Obesity   [ ] Altered GI Function [ ] Unintended Weight Loss [ ] Food & Nutrition Related Knowledge Deficit [ ] Malnutrition     Related to:     As evidenced by:     Interventions:   Recommend  [ ] Change Diet To:  [ x] Nutrition Supplement Ensure enlive BID.  (x) provide Nutrient Dense Foods.  [ ] Nutrition Support  [ ] Other:       x] follow up per protocol  [ ] other: Assessment:   Patient  visited. Pt is alert but confused. D/W Wife at bedside, Observed Lunch meal pt ate mashes potatoes and ~25 % of Lasagna. Pt is on Dysphagia 2 mech soft Thin liquids. Appetite is poor. Offered Nutritional supplement Agreed to try Likes Chocolate Ensure and Offered  Nutrient Dense snacks Offered and  Agreed to try. Pureed diet offered but wife started pt dislikes Pureed food.     PO intake:   Source for PO intake [ ] Patient/family [ ] chart [ ] staff     Current Weight:   % Weight Change Bed scale 194 lb with out scd device    Pertinent Medications: MEDICATIONS  (STANDING):  ammonium lactate 12% Lotion 1 Application(s) Topical two times a day  apixaban 5 milliGRAM(s) Oral every 12 hours  calamine Lotion 1 Application(s) Topical daily  levothyroxine 100 MICROGram(s) Oral daily  lisinopril 2.5 milliGRAM(s) Oral daily  mirtazapine 7.5 milliGRAM(s) Oral at bedtime  pantoprazole  Injectable 40 milliGRAM(s) IV Push every 12 hours  risperiDONE   Tablet 0.25 milliGRAM(s) Oral daily  senna 3 Tablet(s) Oral daily  tamsulosin 0.4 milliGRAM(s) Oral at bedtime    MEDICATIONS  (PRN):  acetaminophen   Tablet .. 650 milliGRAM(s) Oral every 6 hours PRN Temp greater or equal to 38C (100.4F)  ketorolac   Injectable 15 milliGRAM(s) IV Push every 6 hours PRN Moderate Pain (4 - 6)  ondansetron Injectable 4 milliGRAM(s) IV Push every 6 hours PRN Nausea    Pertinent Labs:  02-12 Na138 mmol/L Glu 100 mg/dL<H> K+ 3.7 mmol/L Cr  0.95 mg/dL BUN 11 mg/dL 02-12 Alb 1.8 g/dL<L> 01-15 [id number] 5.0 % 01-15 Chol 110 mg/dL LDL 60 mg/dL HDL 34 mg/dL<L> Trig 80 mg/dL      Skin: L heel Unstageable     Estimated Needs:   [ ] no change since previous assessment  [ ] recalculated:       Previous Nutrition Diagnosis:   [ ] Inadequate Energy Intake [x ]Inadequate Oral Intake [ ] Excessive Energy Intake   [ ] Underweight [ ] Increased Nutrient Needs [ ] Overweight/Obesity   [ ] Altered GI Function [ ] Unintended Weight Loss [ ] Food & Nutrition Related Knowledge Deficit [ ] Malnutrition     Nutrition Diagnosis is [x] ongoing  [ ] resolved [ ] not applicable     New Nutrition Diagnosis: [ ] not applicable  [ ] Inadequate Energy Intake [ ]Inadequate Oral Intake [ ] Excessive Energy Intake   [ ] Underweight [ ] Increased Nutrient Needs [ ] Overweight/Obesity   [ ] Altered GI Function [ ] Unintended Weight Loss [ ] Food & Nutrition Related Knowledge Deficit [ ] Malnutrition     Related to:     As evidenced by:     Interventions:   Recommend  [ ] Change Diet To:  [ x] Nutrition Supplement Ensure enlive BID.  (x] Other: Provide Nutrient dense Foods W meals.  (x) Mvim, vitc, zn       (x] follow up per protocol  [ ] other:

## 2020-02-13 NOTE — PROGRESS NOTE ADULT - ASSESSMENT
89y old Male s/p open cholecystectomy with +IOC and removal of SPT on 2/4, unsuccessful ERCP by GI on 2/6 and 2/10. Failed TOV yesterday and catheter reinserted. MARYJO and PATITO dressing removed 2/12. Heparin gtt stopped and Eliquis resumed 2/12    - continue diet  - continue IV antibiotics  - continue to trend labs  - on Eliquis  - discharge planning  - continue medical management 89y old Male s/p open cholecystectomy with +IOC and removal of SPT on 2/4, unsuccessful ERCP by GI on 2/6 and 2/10. Failed TOV yesterday and catheter reinserted. MARYJO and PATITO dressing removed 2/12. Heparin gtt stopped and Eliquis resumed 2/12    - continue diet  - continue to trend labs  - on Eliquis  - discharge planning  - continue medical management

## 2020-02-13 NOTE — PROGRESS NOTE ADULT - SUBJECTIVE AND OBJECTIVE BOX
NP Note discussed with  Primary Attending    Patient is a 89y old  Male who presents with a chief complaint of Chronic cholecystitis (13 Feb 2020 10:33)  89 y.o. M with PMH cholecystitis s/p IR PTC tube insertion, DVT on AC, hypothyroidism, afib, HTN presents to [facility] ER for management of PTC tube.   He was taken to the OR for laparoscopic cholecystectomy which was converted to open cholecystectomy with Lysis of adhesions on 2/4, intraoperative cholangiogram was positive. A MARYJO drain was left in place till yesterday and Eliquis resume. He was taken for an ERCP on 2/6 which was unsuccessful. ERCP was repeated by a different GI attending on 2/10 which was also unsuccessful, however pancreatic duct was able to be cannulated at that time. Meyers catheter was removed on 2/11 for TOV, failed,  catheter was reinserted. LFTs normalized. PT attempt to evaluate pt but unable to evaluate, recommendation is to EDIE.     INTERVAL HPI/OVERNIGHT EVENTS: no new complaints    MEDICATIONS  (STANDING):  ammonium lactate 12% Lotion 1 Application(s) Topical two times a day  apixaban 5 milliGRAM(s) Oral every 12 hours  calamine Lotion 1 Application(s) Topical daily  levothyroxine 100 MICROGram(s) Oral daily  lisinopril 2.5 milliGRAM(s) Oral daily  mirtazapine 7.5 milliGRAM(s) Oral at bedtime  pantoprazole  Injectable 40 milliGRAM(s) IV Push every 12 hours  risperiDONE   Tablet 0.25 milliGRAM(s) Oral daily  senna 3 Tablet(s) Oral daily  tamsulosin 0.4 milliGRAM(s) Oral at bedtime    MEDICATIONS  (PRN):  acetaminophen   Tablet .. 650 milliGRAM(s) Oral every 6 hours PRN Temp greater or equal to 38C (100.4F)  ketorolac   Injectable 15 milliGRAM(s) IV Push every 6 hours PRN Moderate Pain (4 - 6)  ondansetron Injectable 4 milliGRAM(s) IV Push every 6 hours PRN Nausea      __________________________________________________  REVIEW OF SYSTEMS:      Vital Signs Last 24 Hrs  T(C): 37.1 (13 Feb 2020 04:42), Max: 37.1 (13 Feb 2020 04:42)  T(F): 98.7 (13 Feb 2020 04:42), Max: 98.7 (13 Feb 2020 04:42)  HR: 63 (13 Feb 2020 10:58) (63 - 87)  BP: 106/51 (13 Feb 2020 10:58) (106/51 - 134/80)  BP(mean): --  RR: 18 (13 Feb 2020 04:42) (18 - 19)  SpO2: 94% (13 Feb 2020 04:42) (94% - 96%)    ________________________________________________  PHYSICAL EXAM:  GENERAL: NAD  HEENT: Normocephalic;  conjunctivae and sclerae clear; moist mucous membranes;   NECK : supple  CHEST/LUNG: Clear to auscultation bilaterally with good air entry   HEART: S1 S2  regular; no murmurs, gallops or rubs  ABDOMEN: Soft, Nontender, Nondistended; Bowel sounds present  EXTREMITIES: no cyanosis; no edema; no calf tenderness  SKIN: warm and dry; no rash  NERVOUS SYSTEM:  Awake and alert; Oriented  to place, person and time ; no new deficits    _________________________________________________  LABS:                        8.9    9.97  )-----------( 392      ( 12 Feb 2020 08:00 )             29.1     02-12    138  |  107  |  11  ----------------------------<  100<H>  3.7   |  25  |  0.95    Ca    7.7<L>      12 Feb 2020 08:00    TPro  5.5<L>  /  Alb  1.8<L>  /  TBili  0.4  /  DBili  x   /  AST  12  /  ALT  6<L>  /  AlkPhos  92  02-12    PTT - ( 12 Feb 2020 08:00 )  PTT:79.8 sec    CAPILLARY BLOOD GLUCOSE            RADIOLOGY & ADDITIONAL TESTS:    Imaging Personally Reviewed:  YES/NO    Consultant(s) Notes Reviewed:   YES/ No    Care Discussed with Consultants :     Plan of care was discussed with patient and /or primary care giver; all questions and concerns were addressed and care was aligned with patient's wishes. NP Note discussed with  Primary Attending    Patient is a 89y old  Male who presents with a chief complaint of Chronic cholecystitis (13 Feb 2020 10:33)  89 y.o. M with PMH cholecystitis s/p IR PTC tube insertion, DVT on AC, hypothyroidism, afib, HTN presents to [facility] ER for management of PTC tube.   He was taken to the OR for laparoscopic cholecystectomy which was converted to open cholecystectomy with Lysis of adhesions on 2/4, intraoperative cholangiogram was positive. A MARYJO drain was left in place till yesterday and Eliquis resume. He was taken for an ERCP on 2/6 which was unsuccessful. ERCP was repeated by a different GI attending on 2/10 which was also unsuccessful, however pancreatic duct was able to be cannulated at that time. Meyers catheter was removed on 2/11 for TOV, failed,  catheter was reinserted. LFTs normalized. PT attempt to evaluate pt but unable to evaluate, recommendation is to EDIE.     INTERVAL HPI/OVERNIGHT EVENTS: no new complaints    MEDICATIONS  (STANDING):  ammonium lactate 12% Lotion 1 Application(s) Topical two times a day  apixaban 5 milliGRAM(s) Oral every 12 hours  calamine Lotion 1 Application(s) Topical daily  levothyroxine 100 MICROGram(s) Oral daily  lisinopril 2.5 milliGRAM(s) Oral daily  mirtazapine 7.5 milliGRAM(s) Oral at bedtime  pantoprazole  Injectable 40 milliGRAM(s) IV Push every 12 hours  risperiDONE   Tablet 0.25 milliGRAM(s) Oral daily  senna 3 Tablet(s) Oral daily  tamsulosin 0.4 milliGRAM(s) Oral at bedtime    MEDICATIONS  (PRN):  acetaminophen   Tablet .. 650 milliGRAM(s) Oral every 6 hours PRN Temp greater or equal to 38C (100.4F)  ketorolac   Injectable 15 milliGRAM(s) IV Push every 6 hours PRN Moderate Pain (4 - 6)  ondansetron Injectable 4 milliGRAM(s) IV Push every 6 hours PRN Nausea      __________________________________________________  REVIEW OF SYSTEMS:    unable to obtain ROS due to mental status   Vital Signs Last 24 Hrs  T(C): 37.1 (13 Feb 2020 04:42), Max: 37.1 (13 Feb 2020 04:42)  T(F): 98.7 (13 Feb 2020 04:42), Max: 98.7 (13 Feb 2020 04:42)  HR: 63 (13 Feb 2020 10:58) (63 - 87)  BP: 106/51 (13 Feb 2020 10:58) (106/51 - 134/80)  BP(mean): --  RR: 18 (13 Feb 2020 04:42) (18 - 19)  SpO2: 94% (13 Feb 2020 04:42) (94% - 96%)    ________________________________________________  PHYSICAL EXAM:  GENERAL: NAD  HEENT: Normocephalic;  conjunctivae and sclerae clear; moist mucous membranes;   NECK : supple  CHEST/LUNG: Clear to auscultation bilaterally with good air entry   HEART: S1 S2  regular; no murmurs, gallops or rubs  ABDOMEN: Soft, Nontender, Nondistended; Bowel sounds present  EXTREMITIES: no cyanosis; no edema; no calf tenderness  SKIN: warm and dry; no rash  NERVOUS SYSTEM:  Awake and alert; Oriented  to  person    _________________________________________________  LABS:                        8.9    9.97  )-----------( 392      ( 12 Feb 2020 08:00 )             29.1     02-12    138  |  107  |  11  ----------------------------<  100<H>  3.7   |  25  |  0.95    Ca    7.7<L>      12 Feb 2020 08:00    TPro  5.5<L>  /  Alb  1.8<L>  /  TBili  0.4  /  DBili  x   /  AST  12  /  ALT  6<L>  /  AlkPhos  92  02-12    PTT - ( 12 Feb 2020 08:00 )  PTT:79.8 sec    CAPILLARY BLOOD GLUCOSE            RADIOLOGY & ADDITIONAL TESTS:    Imaging Personally Reviewed:  YES/NO    Consultant(s) Notes Reviewed:   YES/ No    Care Discussed with Consultants :     Plan of care was discussed with patient and /or primary care giver; all questions and concerns were addressed and care was aligned with patient's wishes.

## 2020-02-13 NOTE — PROGRESS NOTE ADULT - SUBJECTIVE AND OBJECTIVE BOX
Summary:   89y  Male      Subjective:   MARYJO drain removed     Objective:    MEDICATIONS  (STANDING):  ammonium lactate 12% Lotion 1 Application(s) Topical two times a day  calamine Lotion 1 Application(s) Topical daily  cefTRIAXone   IVPB 1000 milliGRAM(s) IV Intermittent every 24 hours  dextrose 5% + sodium chloride 0.45% 1000 milliLiter(s) (75 mL/Hr) IV Continuous <Continuous>  heparin  Infusion. 1200 Unit(s)/Hr (12 mL/Hr) IV Continuous <Continuous>  levothyroxine 100 MICROGram(s) Oral daily  lisinopril 2.5 milliGRAM(s) Oral daily  mirtazapine 7.5 milliGRAM(s) Oral at bedtime  pantoprazole  Injectable 40 milliGRAM(s) IV Push every 12 hours  risperiDONE   Tablet 0.25 milliGRAM(s) Oral daily  senna 3 Tablet(s) Oral daily  tamsulosin 0.4 milliGRAM(s) Oral at bedtime    MEDICATIONS  (PRN):  acetaminophen   Tablet .. 650 milliGRAM(s) Oral every 6 hours PRN Temp greater or equal to 38C (100.4F)  heparin  Injectable 6500 Unit(s) IV Push every 6 hours PRN For aPTT less than 40  heparin  Injectable 3000 Unit(s) IV Push every 6 hours PRN For aPTT between 40 - 57  ketorolac   Injectable 15 milliGRAM(s) IV Push every 6 hours PRN Moderate Pain (4 - 6)  ondansetron Injectable 4 milliGRAM(s) IV Push every 6 hours PRN Nausea              Vital Signs Last 24 Hrs  T(C): 36.7 (11 Feb 2020 06:00), Max: 36.7 (10 Feb 2020 15:44)  T(F): 98.1 (11 Feb 2020 06:00), Max: 98.1 (11 Feb 2020 06:00)  HR: 59 (11 Feb 2020 06:00) (59 - 95)  BP: 128/78 (11 Feb 2020 06:00) (104/65 - 142/78)  BP(mean): 97 (10 Feb 2020 15:44) (78 - 97)  RR: 18 (11 Feb 2020 06:00) (16 - 25)  SpO2: 96% (11 Feb 2020 06:00) (96% - 99%)      General:  Well developed, well nourished, alert and active, no pallor, NAD.  HEENT:    Normal appearance of conjunctiva, ears, nose, lips, oropharynx, and oral mucosa, anicteric.  Neck:  No masses, no asymmetry.  Lymph Nodes:  No lymphadenopathy.   Cardiovascular:  RRR normal S1/S2, no murmur.  Respiratory:  CTA B/L, normal respiratory effort.   Abdominal:   soft, no masses or tenderness, normoactive BS, NT/ND, no HSM.  Extremities:   No clubbing or cyanosis, normal capillary refill, no edema.   Skin:   No rash, jaundice, lesions, eczema.   Musculoskeletal:  No joint swelling, erythema or tenderness.   Neuro: No focal deficits.   Other:       LABS:                        8.6    8.49  )-----------( 330      ( 11 Feb 2020 07:32 )             28.4     02-11    140  |  109<H>  |  13  ----------------------------<  82  4.1   |  23  |  0.89    Ca    7.8<L>      11 Feb 2020 07:32    TPro  5.5<L>  /  Alb  1.7<L>  /  TBili  0.4  /  DBili  x   /  AST  11  /  ALT  7<L>  /  AlkPhos  98  02-11    PTT - ( 11 Feb 2020 07:32 )  PTT:36.1 sec      RADIOLOGY & ADDITIONAL TESTS:

## 2020-02-13 NOTE — PROGRESS NOTE ADULT - SUBJECTIVE AND OBJECTIVE BOX
patient was seen and examined   s no specific  c/o offered  no cp or sob , or abdominal pain  s/p surgery   s/p  ERCP again  but unsuccessful , no pain , less drainage  MEDICATIONS  (STANDING):  ammonium lactate 12% Lotion 1 Application(s) Topical two times a day  apixaban 5 milliGRAM(s) Oral every 12 hours  calamine Lotion 1 Application(s) Topical daily  levothyroxine 100 MICROGram(s) Oral daily  lisinopril 2.5 milliGRAM(s) Oral daily  mirtazapine 7.5 milliGRAM(s) Oral at bedtime  pantoprazole  Injectable 40 milliGRAM(s) IV Push every 12 hours  risperiDONE   Tablet 0.25 milliGRAM(s) Oral daily  senna 3 Tablet(s) Oral daily  tamsulosin 0.4 milliGRAM(s) Oral at bedtime    MEDICATIONS  (PRN):  acetaminophen   Tablet .. 650 milliGRAM(s) Oral every 6 hours PRN Temp greater or equal to 38C (100.4F)  ketorolac   Injectable 15 milliGRAM(s) IV Push every 6 hours PRN Moderate Pain (4 - 6)  ondansetron Injectable 4 milliGRAM(s) IV Push every 6 hours PRN Nausea      PAST MEDICAL & SURGICAL HISTORY:  PVD (peripheral vascular disease)  Acalculous cholecystitis: s/p percutaneous cholecystostomy  HTN (hypertension)  Hypothyroid  Ulcers of both lower extremities  No significant past surgical history    REVIEW OF SYSTEMS:   CONSTITUTIONAL: No fever, weight loss, or fatigue  EYES: No eye pain, visual disturbances, or discharge  ENMT:  No difficulty hearing, tinnitus, vertigo; No sinus or throat pain  NECK: No pain or stiffness  BREASTS: No pain, masses, or nipple discharge  RESPIRATORY: No cough, wheezing, chills or hemoptysis; No shortness of breath  CARDIOVASCULAR: No chest pain, palpitations, dizziness, or leg swelling  GASTROINTESTINAL: No abdominal or epigastric pain. No nausea, vomiting, or hematemesis; No diarrhea or constipation. No melena or hematochezia.  GENITOURINARY: No dysuria, frequency, hematuria, or incontinence  NEUROLOGICAL: No headaches, memory loss, loss of strength, numbness, or tremors  SKIN: No itching, burning, rashes, or lesions   LYMPH NODES: No enlarged glands  ENDOCRINE: No heat or cold intolerance; No hair loss  MUSCULOSKELETAL: No joint pain or swelling; No muscle, back, or extremity pain  PSYCHIATRIC: No depression, anxiety, mood swings, or difficulty sleeping  HEME/LYMPH: No easy bruising, or bleeding gums  ALLERY AND IMMUNOLOGIC: No hives or eczema    Vital Signs Last 24 Hrs  T(C): 37.1 (2020 04:42), Max: 37.1 (2020 04:42)  T(F): 98.7 (2020 04:42), Max: 98.7 (2020 04:42)  HR: 70 (2020 04:42) (70 - 87)  BP: 134/80 (2020 04:42) (125/67 - 134/80)  BP(mean): --  RR: 18 (2020 04:42) (18 - 19)  SpO2: 94% (2020 04:42) (94% - 96%)      PHYSICAL EXAM:  GENERAL: NAD, well-groomed, well-developed  HEAD:  Atraumatic, Normocephalic  EYES: EOMI, PERRLA, conjunctiva and sclera clear  ENMT: No tonsillar erythema, exudates, or enlargement; Moist mucous membranes, Good dentition, No lesions  NECK: Supple, No JVD, Normal thyroid  NERVOUS SYSTEM:  Alert & Oriented X 1 to self, Good concentration;   CHEST/LUNG: Clear to percussion bilaterally; No rales, rhonchi, wheezing, or rubs  HEART: Regular rate and rhythm; No murmurs, rubs, or gallops  ABDOMEN: Soft, Nontender, Nondistended; Bowel sounds present, surgical site , no bleeding  EXTREMITIES:  2+ Peripheral Pulses, chronic venous stasis changes No clubbing, cyanosis, or edema  LYMPH: No lymphadenopathy noted  SKIN: No rashes or lesions    LABS:                        8.9    9.97  )-----------( 392      ( 2020 08:00 )             29.1     02-12    138  |  107  |  11  ----------------------------<  100<H>  3.7   |  25  |  0.95    Ca    7.7<L>      2020 08:00    TPro  5.5<L>  /  Alb  1.8<L>  /  TBili  0.4  /  DBili  x   /  AST  12  /  ALT  6<L>  /  AlkPhos  92  -12    PTT - ( 2020 08:00 )  PTT:79.8 sec                      LABS:                        8.9    9.97  )-----------( 392      ( 2020 08:00 )             29.1     -    138  |  107  |  11  ----------------------------<  100<H>  3.7   |  25  |  0.95    Ca    7.7<L>      2020 08:00    TPro  5.5<L>  /  Alb  1.8<L>  /  TBili  0.4  /  DBili  x   /  AST  12  /  ALT  6<L>  /  AlkPhos  92  -12    PTT - ( 2020 08:00 )  PTT:79.8 sec                      LABS:                        8.6    8.49  )-----------( 330      ( 2020 07:32 )             28.4     -11    140  |  109<H>  |  13  ----------------------------<  82  4.1   |  23  |  0.89    Ca    7.8<L>      2020 07:32    TPro  5.5<L>  /  Alb  1.7<L>  /  TBili  0.4  /  DBili  x   /  AST  11  /  ALT  7<L>  /  AlkPhos  98  -11    PTT - ( 2020 07:32 )  PTT:36.1 sec                      LABS:                        9.2    8.73  )-----------( 295      ( 10 Feb 2020 07:26 )             29.8           PTT - ( 10 Feb 2020 07:26 )  PTT:66.9 sec                                            10.1   8.21  )-----------( 279      ( 2020 06:37 )             33.3           PTT - ( 2020 06:37 )  PTT:58.0 sec                        LABS:                        9.4    12.46 )-----------( 207      ( 2020 07:40 )             31.1     02-    143  |  114<H>  |  22<H>  ----------------------------<  105<H>  4.4   |  26  |  1.07    Ca    7.9<L>      2020 07:40    TPro  5.5<L>  /  Alb  1.9<L>  /  TBili  0.5  /  DBili  x   /  AST  14  /  ALT  <6<L>  /  AlkPhos  91  02-07      Urinalysis Basic - ( 2020 10:10 )    Color: Yellow / Appearance: Clear / S.020 / pH: x  Gluc: x / Ketone: Trace  / Bili: Small / Urobili: 1   Blood: x / Protein: 100 / Nitrite: Positive   Leuk Esterase: Moderate / RBC: 2-5 /HPF / WBC 11-25 /HPF   Sq Epi: x / Non Sq Epi: Occasional /HPF / Bacteria: Trace /HPF    LABS:                        9.9    10.07 )-----------( 251      ( 2020 05:54 )             32.8     02-07    143  |  114<H>  |  22<H>  ----------------------------<  105<H>  4.4   |  26  |  1.07    Ca    7.9<L>      2020 07:40    TPro  5.5<L>  /  Alb  1.9<L>  /  TBili  0.5  /  DBili  x   /  AST  14  /  ALT  <6<L>  /  AlkPhos  91  02-07    PTT - ( 2020 05:54 )  PTT:65.0 sec                                        LABS:                        10.2   17.12 )-----------( 254      ( 2020 07:47 )             33.6     02-06    145  |  113<H>  |  21<H>  ----------------------------<  128<H>  4.9   |  25  |  1.28    Ca    8.3<L>      2020 07:47    TPro  6.3  /  Alb  2.2<L>  /  TBili  0.8  /  DBili  0.2  /  AST  31  /  ALT  9<L>  /  AlkPhos  93  02-06      Urinalysis Basic - ( 2020 10:10 )    Color: Yellow / Appearance: Clear / S.020 / pH: x  Gluc: x / Ketone: Trace  / Bili: Small / Urobili: 1   Blood: x / Protein: 100 / Nitrite: Positive   Leuk Esterase: Moderate / RBC: 2-5 /HPF / WBC 11-25 /HPF   Sq Epi: x / Non Sq Epi: Occasional /HPF / Bacteria: Trace /HPF                          LABS:                        9.9    9.47  )-----------( 234      ( 2020 07:40 )             32.5     02-05    144  |  112<H>  |  16  ----------------------------<  103<H>  4.4   |  27  |  0.99    Ca    8.0<L>      2020 07:40    TPro  6.0  /  Alb  2.4<L>  /  TBili  0.4  /  DBili  x   /  AST  15  /  ALT  10  /  AlkPhos    02-04    PT/INR - ( 2020 07:40 )   PT: 13.2 sec;   INR: 1.18 ratio         PTT - ( 2020 07:40 )  PTT:37.4 sec                      LABS:                        10.8   7.02  )-----------( 279      ( 2020 07:40 )             35.4     02-04    147<H>  |  111<H>  |  19<H>  ----------------------------<  88  4.3   |  28  |  0.93    Ca    8.7      2020 07:40    TPro  6.0  /  Alb  2.4<L>  /  TBili  0.4  /  DBili  x   /  AST  15  /  ALT  10  /  AlkPhos  92  02-04    PT/INR - ( 2020 07:40 )   PT: 13.2 sec;   INR: 1.18 ratio         PTT - ( 2020 07:40 )  PTT:37.4 sec                                              CAPILLARY BLOOD GLUCOSE    < from: Transthoracic Echocardiogram (.19 @ 10:44) >  Ejection Fraction Visual Estimate: >55 %    ------------------------------------------------------------------------  OBSERVATIONS:  Mitral Valve: Mitral annular calcification. Mild mitral  regurgitation.  Aortic Root: Normal aortic root.  Aortic Valve: Calcified trileaflet aortic valve with normal  opening. Mild aortic regurgitation.  LeftAtrium: Moderate left atrial enlargement.  Left Ventricle: Endocardium not well visualized; grossly  normal left ventricular systolic function. Normal left  ventricular internal dimensions and wall thicknesses.  Right Heart: Normal right atrium. Normal right ventricular  size and function. There is moderate-severe tricuspid  regurgitation. Normal pulmonic valve.  Pericardium/PleuraNormal pericardium with no pericardial  effusion.  Hemodynamic: RA Pressure is 10 mm Hg. RV systolic pressure  is 84 mm Hg. Severe pulmonary hypertension.  ------------------------------------------------------------------------  CONCLUSIONS:  1. Mitral annular calcification. Mild mitral regurgitation.    2. Calcified trileaflet aortic valve with normal opening.  Mild aortic regurgitation.  3. Moderate left atrial enlargement.  4. Normal left ventricular internal dimensions and wall  thicknesses.  5. Endocardium not well visualized; grossly normal left  ventricular systolic function.  6. Normal right ventricularsize and function.  7. RV systolic pressure is 84 mm Hg. Severe pulmonary  hypertension.    ------------------------------------------------------------------------  Confirmed on  2019 - 08:27:04 by Ayush Yang MD  ------------------------------------------------------------------------    < end of copied text >                RADIOLOGY & ADDITIONAL TESTS:        < from: CT Abdomen and Pelvis w/ IV Cont (20 @ 17:29) >  MPRESSION:     Cholecystostomy tube appears in good position.    Deep vein thrombosis in the right common femoral vein.    Small enhancing fluid surrounding both ischial tuberosities.    Dr. Heart discussed the findings with Dr. Jacinto on 2020 at 5:55 PM.  Readback was obtained.

## 2020-02-13 NOTE — PROGRESS NOTE ADULT - ASSESSMENT
89 year old male with  CBD stone s.p cholecystectomy     Plan:  ?CBD stone     LFts remain stable .   No abdominal; pain   discharge planning as per primary team     Advanced care planning was discussed with patient and family.  Advanced care planning forms were reviewed and discussed.  Risks, benefits and alternatives of gastroenterologic procedures were discussed in detail and all questions were answered.

## 2020-02-13 NOTE — PROGRESS NOTE ADULT - ASSESSMENT
89 y.o. M with PMH cholecystitis s/p IR PTC tube insertion, DVT on AC, hypothyroidism, afib, HTN presents to [facility] ER for management of PTC tube.  Medicine team is consulted for harper-operative optimization.

## 2020-02-13 NOTE — PROGRESS NOTE ADULT - SUBJECTIVE AND OBJECTIVE BOX
S/p open cholecystectomy with +IOC and removal of SPT on 2/4, unsuccessful ERCP by GI on 2/6 and 2/10  Patient seen and examined at bedside with no complaints.     Vital Signs Last 24 Hrs  T(F): 98.7 (02-13-20 @ 04:42), Max: 98.7 (02-13-20 @ 04:42)  HR: 70 (02-13-20 @ 04:42)  BP: 134/80 (02-13-20 @ 04:42)  RR: 18 (02-13-20 @ 04:42)  SpO2: 94% (02-13-20 @ 04:42)    GENERAL: Alert, NAD  CHEST/LUNG: respirations nonlabored  ABDOMEN: RUQ incision clean and dry, well healed, old MARYJO site dressing clean dry and intact, soft, Nontender, Nondistended  : indwelling catheter in place draining clear yellow urine: 1025ml/24hours recorded  EXTREMITIES:  no calf tenderness, No edema    I&O's Detail    12 Feb 2020 07:01  -  13 Feb 2020 07:00  --------------------------------------------------------  IN:  Total IN: 0 mL    OUT:    Indwelling Catheter - Urethral: 1025 mL  Total OUT: 1025 mL    Total NET: -1025 mL    LABS:                        8.9    9.97  )-----------( 392      ( 12 Feb 2020 08:00 )             29.1     02-12    138  |  107  |  11  ----------------------------<  100<H>  3.7   |  25  |  0.95    Ca    7.7<L>      12 Feb 2020 08:00    TPro  5.5<L>  /  Alb  1.8<L>  /  TBili  0.4  /  DBili  x   /  AST  12  /  ALT  6<L>  /  AlkPhos  92  02-12    PTT - ( 12 Feb 2020 08:00 )  PTT:79.8 sec

## 2020-02-14 RX ADMIN — Medication 1 APPLICATION(S): at 17:47

## 2020-02-14 RX ADMIN — Medication 1 APPLICATION(S): at 05:25

## 2020-02-14 RX ADMIN — LISINOPRIL 2.5 MILLIGRAM(S): 2.5 TABLET ORAL at 05:26

## 2020-02-14 RX ADMIN — MIRTAZAPINE 7.5 MILLIGRAM(S): 45 TABLET, ORALLY DISINTEGRATING ORAL at 22:02

## 2020-02-14 RX ADMIN — RISPERIDONE 0.25 MILLIGRAM(S): 4 TABLET ORAL at 12:36

## 2020-02-14 RX ADMIN — APIXABAN 5 MILLIGRAM(S): 2.5 TABLET, FILM COATED ORAL at 05:26

## 2020-02-14 RX ADMIN — TAMSULOSIN HYDROCHLORIDE 0.4 MILLIGRAM(S): 0.4 CAPSULE ORAL at 22:02

## 2020-02-14 RX ADMIN — PANTOPRAZOLE SODIUM 40 MILLIGRAM(S): 20 TABLET, DELAYED RELEASE ORAL at 05:26

## 2020-02-14 RX ADMIN — SENNA PLUS 3 TABLET(S): 8.6 TABLET ORAL at 12:35

## 2020-02-14 RX ADMIN — CALAMINE AND ZINC OXIDE AND PHENOL 1 APPLICATION(S): 160; 10 LOTION TOPICAL at 12:36

## 2020-02-14 RX ADMIN — Medication 100 MICROGRAM(S): at 05:26

## 2020-02-14 NOTE — PHYSICAL THERAPY INITIAL EVALUATION ADULT - LIVES WITH, PROFILE
rehab transfers
Currently from rehab, lives in private house w/spouse with a few stairs to enter does not negotiate stairs at baseline). Prior to hospitalization was in [facility].

## 2020-02-14 NOTE — PHYSICAL THERAPY INITIAL EVALUATION ADULT - MANUAL MUSCLE TESTING RESULTS, REHAB EVAL
LUE 3/5; trunk 2/5; LLE 2/5; RUE 2-/5 and RLE 2/5/grossly assessed due to
Unable to properly assess due to impaired ability to follow directions. BUE at least a 2+/5. BLE at least a 2+/5.

## 2020-02-14 NOTE — PHYSICAL THERAPY INITIAL EVALUATION ADULT - IMPAIRMENTS CONTRIBUTING IMPAIRED BED MOBILITY, REHAB EVAL
decreased flexibility/impaired postural control/impaired balance/impaired motor control/decreased strength
decreased strength/abnormal muscle tone/impaired postural control/impaired balance/cognition/decreased ROM/pain

## 2020-02-14 NOTE — PHYSICAL THERAPY INITIAL EVALUATION ADULT - IMPAIRMENTS FOUND, PT EVAL
gait, locomotion, and balance/gross motor/aerobic capacity/endurance/posture/muscle strength
gait, locomotion, and balance/aerobic capacity/endurance/tone/muscle strength/arousal, attention, and cognition/cognitive impairment/posture/ROM

## 2020-02-14 NOTE — PHYSICAL THERAPY INITIAL EVALUATION ADULT - CRITERIA FOR SKILLED THERAPEUTIC INTERVENTIONS
functional limitations in following categories/impairments found/risk reduction/prevention
functional limitations in following categories/therapy frequency/anticipated discharge recommendation/impairments found/predicted duration of therapy intervention/rehab potential

## 2020-02-14 NOTE — PROGRESS NOTE ADULT - SUBJECTIVE AND OBJECTIVE BOX
Patient is a 89y old  Male who presents with a chief complaint of Chronic cholecystitis (13 Feb 2020 10:33)  89 y.o. M with PMH cholecystitis s/p IR PTC tube insertion, DVT on AC, hypothyroidism, afib, HTN presents to [facility] ER for management of PTC tube.   He was taken to the OR for laparoscopic cholecystectomy which was converted to open cholecystectomy with Lysis of adhesions on 2/4, intraoperative cholangiogram was positive. A MARYJO drain was left in place till yesterday and Eliquis resume. He was taken for an ERCP on 2/6 which was unsuccessful. ERCP was repeated by a different GI attending on 2/10 which was also unsuccessful, however pancreatic duct was able to be cannulated at that time. Meyers catheter was removed on 2/11 for TOV, failed,  catheter was reinserted. LFTs normalized. PT attempt to evaluate pt but unable to evaluate, recommendation is to EDIE.     INTERVAL HPI/OVERNIGHT EVENTS: no new complaints    MEDICATIONS  (STANDING):  ammonium lactate 12% Lotion 1 Application(s) Topical two times a day  apixaban 5 milliGRAM(s) Oral every 12 hours  calamine Lotion 1 Application(s) Topical daily  levothyroxine 100 MICROGram(s) Oral daily  lisinopril 2.5 milliGRAM(s) Oral daily  mirtazapine 7.5 milliGRAM(s) Oral at bedtime  pantoprazole  Injectable 40 milliGRAM(s) IV Push every 12 hours  risperiDONE   Tablet 0.25 milliGRAM(s) Oral daily  senna 3 Tablet(s) Oral daily  tamsulosin 0.4 milliGRAM(s) Oral at bedtime    MEDICATIONS  (PRN):  acetaminophen   Tablet .. 650 milliGRAM(s) Oral every 6 hours PRN Temp greater or equal to 38C (100.4F)  ketorolac   Injectable 15 milliGRAM(s) IV Push every 6 hours PRN Moderate Pain (4 - 6)  ondansetron Injectable 4 milliGRAM(s) IV Push every 6 hours PRN Nausea      __________________________________________________  REVIEW OF SYSTEMS:    unable to obtain ROS due to mental status   Vital Signs Last 24 Hrs  T(C): 36.8 (14 Feb 2020 14:10), Max: 37.1 (14 Feb 2020 05:05)  T(F): 98.2 (14 Feb 2020 14:10), Max: 98.7 (14 Feb 2020 05:05)  HR: 85 (14 Feb 2020 14:10) (75 - 85)  BP: 133/62 (14 Feb 2020 14:10) (118/59 - 133/62)  BP(mean): --  RR: 20 (14 Feb 2020 14:10) (17 - 20)  SpO2: 96% (14 Feb 2020 14:10) (96% - 98%)  ________________________________________________  PHYSICAL EXAM:  GENERAL: NAD  HEENT: Normocephalic;  conjunctivae and sclerae clear; moist mucous membranes;   NECK : supple  CHEST/LUNG: Clear to auscultation bilaterally with good air entry   HEART: S1 S2  regular; no murmurs, gallops or rubs  ABDOMEN: Soft, Nontender, Nondistended; Bowel sounds present  EXTREMITIES: no cyanosis; no edema; no calf tenderness  SKIN: warm and dry; no rash  NERVOUS SYSTEM:  Awake and alert; Oriented  to  person    _______LABS:                              __________________________________________  LABS:                        8.9    9.97  )-----------( 392      ( 12 Feb 2020 08:00 )             29.1     02-12    138  |  107  |  11  ----------------------------<  100<H>  3.7   |  25  |  0.95    Ca    7.7<L>      12 Feb 2020 08:00    TPro  5.5<L>  /  Alb  1.8<L>  /  TBili  0.4  /  DBili  x   /  AST  12  /  ALT  6<L>  /  AlkPhos  92  02-12    PTT - ( 12 Feb 2020 08:00 )  PTT:79.8 sec    CAPILLARY BLOOD GLUCOSE            RADIOLOGY & ADDITIONAL TESTS:    Imaging Personally Reviewed:  YES/NO    Consultant(s) Notes Reviewed:   YES/ No    Care Discussed with Consultants :     Plan of care was discussed with patient and /or primary care giver; all questions and concerns were addressed and care was aligned with patient's wishes.

## 2020-02-14 NOTE — PHYSICAL THERAPY INITIAL EVALUATION ADULT - GENERAL OBSERVATIONS, REHAB EVAL
pt revisit, awake alert verbally responsive, requires assistance with rom and bedside bedmobiility activities from physical impairment
Consult received, chart reviewed. Patient received supine in bed, NAD, + SCD, +cabrales, +Abd Qing +MARYJO. Patient agreed to EVALUATION from Physical Therapist.

## 2020-02-14 NOTE — PHYSICAL THERAPY INITIAL EVALUATION ADULT - PASSIVE RANGE OF MOTION EXAMINATION, REHAB EVAL
bilateral lower extremity Passive ROM was WFL (within functional limits)/bilateral upper extremity Passive ROM was WFL (within functional limits)
B/L Shoulder missing 1/2 range. B/L Hip missing 1/2 normal range. B/L Knees missing 1/2 normal range. B/L Ankle to neutral.

## 2020-02-14 NOTE — PHYSICAL THERAPY INITIAL EVALUATION ADULT - PLANNED THERAPY INTERVENTIONS, PT EVAL
bed mobility training/strengthening/transfer training
stretching/postural re-education/strengthening/neuromuscular re-education/ROM/balance training/gait training/transfer training/bed mobility training

## 2020-02-14 NOTE — PHYSICAL THERAPY INITIAL EVALUATION ADULT - BED MOBILITY LIMITATIONS, REHAB EVAL
impaired ability to control trunk for mobility
decreased ability to use legs for bridging/pushing/decreased ability to use arms for pushing/pulling/impaired ability to control trunk for mobility

## 2020-02-14 NOTE — PHYSICAL THERAPY INITIAL EVALUATION ADULT - DIAGNOSIS, PT EVAL
Impaired Mobility, Unstable Balance, Gross Deconditioning Prevention
Impaired strength, ROM, endurance, cognition, balance. Increase mm. tone. Abdominal pain.

## 2020-02-14 NOTE — PHYSICAL THERAPY INITIAL EVALUATION ADULT - PERTINENT HX OF CURRENT PROBLEM, REHAB EVAL
ED management of condition
S/P laparoscopic converted to open cholecystectomy with positive intraop cholangiogram for cbd stone POD #1. Hx of cholecystitis s/p IR PTC tube insertion, DVT on AC, hypothyroidism, afib.

## 2020-02-15 LAB
HCT VFR BLD CALC: 29 % — LOW (ref 39–50)
HGB BLD-MCNC: 8.9 G/DL — LOW (ref 13–17)
MCHC RBC-ENTMCNC: 30.7 GM/DL — LOW (ref 32–36)
MCHC RBC-ENTMCNC: 31.3 PG — SIGNIFICANT CHANGE UP (ref 27–34)
MCV RBC AUTO: 102.1 FL — HIGH (ref 80–100)
NRBC # BLD: 0 /100 WBCS — SIGNIFICANT CHANGE UP (ref 0–0)
PLATELET # BLD AUTO: 502 K/UL — HIGH (ref 150–400)
RBC # BLD: 2.84 M/UL — LOW (ref 4.2–5.8)
RBC # FLD: 15.9 % — HIGH (ref 10.3–14.5)
WBC # BLD: 9.34 K/UL — SIGNIFICANT CHANGE UP (ref 3.8–10.5)
WBC # FLD AUTO: 9.34 K/UL — SIGNIFICANT CHANGE UP (ref 3.8–10.5)

## 2020-02-15 RX ADMIN — RISPERIDONE 0.25 MILLIGRAM(S): 4 TABLET ORAL at 11:46

## 2020-02-15 RX ADMIN — Medication 100 MICROGRAM(S): at 05:32

## 2020-02-15 RX ADMIN — PANTOPRAZOLE SODIUM 40 MILLIGRAM(S): 20 TABLET, DELAYED RELEASE ORAL at 05:32

## 2020-02-15 RX ADMIN — SENNA PLUS 3 TABLET(S): 8.6 TABLET ORAL at 11:46

## 2020-02-15 RX ADMIN — APIXABAN 5 MILLIGRAM(S): 2.5 TABLET, FILM COATED ORAL at 05:32

## 2020-02-15 RX ADMIN — Medication 1 APPLICATION(S): at 17:25

## 2020-02-15 RX ADMIN — CALAMINE AND ZINC OXIDE AND PHENOL 1 APPLICATION(S): 160; 10 LOTION TOPICAL at 11:46

## 2020-02-15 RX ADMIN — Medication 1 APPLICATION(S): at 05:32

## 2020-02-15 RX ADMIN — MIRTAZAPINE 7.5 MILLIGRAM(S): 45 TABLET, ORALLY DISINTEGRATING ORAL at 21:40

## 2020-02-15 RX ADMIN — PANTOPRAZOLE SODIUM 40 MILLIGRAM(S): 20 TABLET, DELAYED RELEASE ORAL at 17:24

## 2020-02-15 RX ADMIN — TAMSULOSIN HYDROCHLORIDE 0.4 MILLIGRAM(S): 0.4 CAPSULE ORAL at 21:39

## 2020-02-15 RX ADMIN — LISINOPRIL 2.5 MILLIGRAM(S): 2.5 TABLET ORAL at 05:32

## 2020-02-15 RX ADMIN — APIXABAN 5 MILLIGRAM(S): 2.5 TABLET, FILM COATED ORAL at 17:24

## 2020-02-15 NOTE — PROGRESS NOTE ADULT - SUBJECTIVE AND OBJECTIVE BOX
Patient is a 89y old  Male who presents with a chief complaint of Chronic cholecystitis (13 Feb 2020 10:33)  89 y.o. M with PMH cholecystitis s/p IR PTC tube insertion, DVT on AC, hypothyroidism, afib, HTN presents to [facility] ER for management of PTC tube.   He was taken to the OR for laparoscopic cholecystectomy which was converted to open cholecystectomy with Lysis of adhesions on 2/4, intraoperative cholangiogram was positive. A MARYJO drain was left in place till yesterday and Eliquis resume. He was taken for an ERCP on 2/6 which was unsuccessful. ERCP was repeated by a different GI attending on 2/10 which was also unsuccessful, however pancreatic duct was able to be cannulated at that time. Meyers catheter was removed on 2/11 for TOV, failed,  catheter was reinserted. LFTs normalized. PT attempt to evaluate pt but unable to evaluate, recommendation is to EDIE.     INTERVAL HPI/OVERNIGHT EVENTS: no new complaints    MEDICATIONS  (STANDING):  ammonium lactate 12% Lotion 1 Application(s) Topical two times a day  apixaban 5 milliGRAM(s) Oral every 12 hours  calamine Lotion 1 Application(s) Topical daily  levothyroxine 100 MICROGram(s) Oral daily  lisinopril 2.5 milliGRAM(s) Oral daily  mirtazapine 7.5 milliGRAM(s) Oral at bedtime  pantoprazole  Injectable 40 milliGRAM(s) IV Push every 12 hours  risperiDONE   Tablet 0.25 milliGRAM(s) Oral daily  senna 3 Tablet(s) Oral daily  tamsulosin 0.4 milliGRAM(s) Oral at bedtime    MEDICATIONS  (PRN):  acetaminophen   Tablet .. 650 milliGRAM(s) Oral every 6 hours PRN Temp greater or equal to 38C (100.4F)  ketorolac   Injectable 15 milliGRAM(s) IV Push every 6 hours PRN Moderate Pain (4 - 6)  ondansetron Injectable 4 milliGRAM(s) IV Push every 6 hours PRN Nausea      __________________________________________________  REVIEW OF SYSTEMS:    unable to obtain ROS due to mental status     Vital Signs Last 24 Hrs  T(C): 36.6 (15 Feb 2020 12:52), Max: 36.8 (14 Feb 2020 14:10)  T(F): 97.8 (15 Feb 2020 12:52), Max: 98.2 (14 Feb 2020 14:10)  HR: 89 (15 Feb 2020 12:52) (70 - 89)  BP: 127/45 (15 Feb 2020 12:52) (113/50 - 133/62)  BP(mean): --  RR: 18 (15 Feb 2020 12:52) (18 - 20)  SpO2: 98% (15 Feb 2020 12:52) (96% - 100%)    ________________________________________________  PHYSICAL EXAM:  GENERAL: NAD  HEENT: Normocephalic;  conjunctivae and sclerae clear; moist mucous membranes;   NECK : supple  CHEST/LUNG: Clear to auscultation bilaterally with good air entry   HEART: S1 S2  regular; no murmurs, gallops or rubs  ABDOMEN: Soft, Nontender, Nondistended; Bowel sounds present  EXTREMITIES: no cyanosis; no edema; no calf tenderness  SKIN: warm and dry; no rash  NERVOUS SYSTEM:  Awake and alert; Oriented  to  person    LABS:                        8.9    9.34  )-----------( 502      ( 15 Feb 2020 06:56 )             29.0                                                             __________________________________________  LABS:                        8.9    9.97  )-----------( 392      ( 12 Feb 2020 08:00 )             29.1     02-12    138  |  107  |  11  ----------------------------<  100<H>  3.7   |  25  |  0.95    Ca    7.7<L>      12 Feb 2020 08:00    TPro  5.5<L>  /  Alb  1.8<L>  /  TBili  0.4  /  DBili  x   /  AST  12  /  ALT  6<L>  /  AlkPhos  92  02-12    PTT - ( 12 Feb 2020 08:00 )  PTT:79.8 sec    CAPILLARY BLOOD GLUCOSE            RADIOLOGY & ADDITIONAL TESTS:    Imaging Personally Reviewed:  YES/NO    Consultant(s) Notes Reviewed:   YES/ No    Care Discussed with Consultants :     Plan of care was discussed with patient and /or primary care giver; all questions and concerns were addressed and care was aligned with patient's wishes.

## 2020-02-16 LAB
ANION GAP SERPL CALC-SCNC: 3 MMOL/L — LOW (ref 5–17)
BUN SERPL-MCNC: 11 MG/DL — SIGNIFICANT CHANGE UP (ref 7–18)
CALCIUM SERPL-MCNC: 7.9 MG/DL — LOW (ref 8.4–10.5)
CHLORIDE SERPL-SCNC: 109 MMOL/L — HIGH (ref 96–108)
CO2 SERPL-SCNC: 28 MMOL/L — SIGNIFICANT CHANGE UP (ref 22–31)
CREAT SERPL-MCNC: 1.04 MG/DL — SIGNIFICANT CHANGE UP (ref 0.5–1.3)
GLUCOSE SERPL-MCNC: 93 MG/DL — SIGNIFICANT CHANGE UP (ref 70–99)
HCT VFR BLD CALC: 30.3 % — LOW (ref 39–50)
HGB BLD-MCNC: 9.2 G/DL — LOW (ref 13–17)
MCHC RBC-ENTMCNC: 30.4 GM/DL — LOW (ref 32–36)
MCHC RBC-ENTMCNC: 31 PG — SIGNIFICANT CHANGE UP (ref 27–34)
MCV RBC AUTO: 102 FL — HIGH (ref 80–100)
NRBC # BLD: 0 /100 WBCS — SIGNIFICANT CHANGE UP (ref 0–0)
PLATELET # BLD AUTO: 567 K/UL — HIGH (ref 150–400)
POTASSIUM SERPL-MCNC: 4.4 MMOL/L — SIGNIFICANT CHANGE UP (ref 3.5–5.3)
POTASSIUM SERPL-SCNC: 4.4 MMOL/L — SIGNIFICANT CHANGE UP (ref 3.5–5.3)
RBC # BLD: 2.97 M/UL — LOW (ref 4.2–5.8)
RBC # FLD: 15.8 % — HIGH (ref 10.3–14.5)
SODIUM SERPL-SCNC: 140 MMOL/L — SIGNIFICANT CHANGE UP (ref 135–145)
WBC # BLD: 9.99 K/UL — SIGNIFICANT CHANGE UP (ref 3.8–10.5)
WBC # FLD AUTO: 9.99 K/UL — SIGNIFICANT CHANGE UP (ref 3.8–10.5)

## 2020-02-16 RX ADMIN — Medication 1 APPLICATION(S): at 17:31

## 2020-02-16 RX ADMIN — RISPERIDONE 0.25 MILLIGRAM(S): 4 TABLET ORAL at 11:35

## 2020-02-16 RX ADMIN — APIXABAN 5 MILLIGRAM(S): 2.5 TABLET, FILM COATED ORAL at 17:30

## 2020-02-16 RX ADMIN — MIRTAZAPINE 7.5 MILLIGRAM(S): 45 TABLET, ORALLY DISINTEGRATING ORAL at 22:31

## 2020-02-16 RX ADMIN — TAMSULOSIN HYDROCHLORIDE 0.4 MILLIGRAM(S): 0.4 CAPSULE ORAL at 22:31

## 2020-02-16 RX ADMIN — APIXABAN 5 MILLIGRAM(S): 2.5 TABLET, FILM COATED ORAL at 06:03

## 2020-02-16 RX ADMIN — CALAMINE AND ZINC OXIDE AND PHENOL 1 APPLICATION(S): 160; 10 LOTION TOPICAL at 11:35

## 2020-02-16 RX ADMIN — SENNA PLUS 3 TABLET(S): 8.6 TABLET ORAL at 11:36

## 2020-02-16 RX ADMIN — PANTOPRAZOLE SODIUM 40 MILLIGRAM(S): 20 TABLET, DELAYED RELEASE ORAL at 17:30

## 2020-02-16 RX ADMIN — Medication 100 MICROGRAM(S): at 06:03

## 2020-02-16 RX ADMIN — PANTOPRAZOLE SODIUM 40 MILLIGRAM(S): 20 TABLET, DELAYED RELEASE ORAL at 06:03

## 2020-02-16 RX ADMIN — Medication 1 APPLICATION(S): at 06:03

## 2020-02-16 RX ADMIN — LISINOPRIL 2.5 MILLIGRAM(S): 2.5 TABLET ORAL at 06:03

## 2020-02-16 NOTE — PROGRESS NOTE ADULT - SUBJECTIVE AND OBJECTIVE BOX
Patient is a 89y old  Male who presents with a chief complaint of Chronic cholecystitis (13 Feb 2020 10:33)  89 y.o. M with PMH cholecystitis s/p IR PTC tube insertion, DVT on AC, hypothyroidism, afib, HTN presents to [facility] ER for management of PTC tube.   He was taken to the OR for laparoscopic cholecystectomy which was converted to open cholecystectomy with Lysis of adhesions on 2/4, intraoperative cholangiogram was positive. A MARYJO drain was left in place till yesterday and Eliquis resume. He was taken for an ERCP on 2/6 which was unsuccessful. ERCP was repeated by a different GI attending on 2/10 which was also unsuccessful, however pancreatic duct was able to be cannulated at that time. Meyers catheter was removed on 2/11 for TOV, failed,  catheter was reinserted. LFTs normalized. PT attempt to evaluate pt but unable to evaluate, recommendation is to EDIE.     INTERVAL HPI/OVERNIGHT EVENTS: no new complaints  MEDICATIONS  (STANDING):  ammonium lactate 12% Lotion 1 Application(s) Topical two times a day  apixaban 5 milliGRAM(s) Oral every 12 hours  calamine Lotion 1 Application(s) Topical daily  levothyroxine 100 MICROGram(s) Oral daily  lisinopril 2.5 milliGRAM(s) Oral daily  mirtazapine 7.5 milliGRAM(s) Oral at bedtime  pantoprazole  Injectable 40 milliGRAM(s) IV Push every 12 hours  risperiDONE   Tablet 0.25 milliGRAM(s) Oral daily  senna 3 Tablet(s) Oral daily  tamsulosin 0.4 milliGRAM(s) Oral at bedtime    MEDICATIONS  (PRN):  acetaminophen   Tablet .. 650 milliGRAM(s) Oral every 6 hours PRN Temp greater or equal to 38C (100.4F)  ondansetron Injectable 4 milliGRAM(s) IV Push every 6 hours PRN Nausea      PAST MEDICAL & SURGICAL HISTORY:  PVD (peripheral vascular disease)  Acalculous cholecystitis: s/p percutaneous cholecystostomy  HTN (hypertension)  Hypothyroid  Ulcers of both lower extremities  No significant past surgical history    __________________________________________________  REVIEW OF SYSTEMS:    unable to obtain ROS due to mental status       Vital Signs Last 24 Hrs  T(C): 37.1 (16 Feb 2020 14:45), Max: 37.1 (16 Feb 2020 14:45)  T(F): 98.8 (16 Feb 2020 14:45), Max: 98.8 (16 Feb 2020 14:45)  HR: 83 (16 Feb 2020 14:45) (83 - 94)  BP: 138/81 (16 Feb 2020 14:45) (122/76 - 147/71)  BP(mean): --  RR: 16 (16 Feb 2020 14:45) (16 - 18)  SpO2: 99% (16 Feb 2020 14:45) (98% - 99%)  ________________________________________________  PHYSICAL EXAM:  GENERAL: NAD  HEENT: Normocephalic;  conjunctivae and sclerae clear; moist mucous membranes;   NECK : supple  CHEST/LUNG: Clear to auscultation bilaterally with good air entry   HEART: S1 S2  regular; no murmurs, gallops or rubs  ABDOMEN: Soft, Nontender, Nondistended; Bowel sounds present  EXTREMITIES: no cyanosis; no edema; no calf tenderness  SKIN: warm and dry; no rash  NERVOUS SYSTEM:  Awake and alert; Oriented  to  person  LABS:                        9.2    9.99  )-----------( 567      ( 16 Feb 2020 06:04 )             30.3     02-16    140  |  109<H>  |  11  ----------------------------<  93  4.4   |  28  |  1.04    Ca    7.9<L>      16 Feb 2020 06:04                              LABS:                        8.9    9.34  )-----------( 502      ( 15 Feb 2020 06:56 )             29.0                                                             __________________________________________  LABS:                        8.9    9.97  )-----------( 392      ( 12 Feb 2020 08:00 )             29.1     02-12    138  |  107  |  11  ----------------------------<  100<H>  3.7   |  25  |  0.95    Ca    7.7<L>      12 Feb 2020 08:00    TPro  5.5<L>  /  Alb  1.8<L>  /  TBili  0.4  /  DBili  x   /  AST  12  /  ALT  6<L>  /  AlkPhos  92  02-12    PTT - ( 12 Feb 2020 08:00 )  PTT:79.8 sec    CAPILLARY BLOOD GLUCOSE            RADIOLOGY & ADDITIONAL TESTS:    Imaging Personally Reviewed:  YES/NO    Consultant(s) Notes Reviewed:   YES/ No    Care Discussed with Consultants :     Plan of care was discussed with patient and /or primary care giver; all questions and concerns were addressed and care was aligned with patient's wishes. PAST MEDICAL HISTORY:  CA - Cancer of Tongue     H/O: hypothyroidism     HLD (hyperlipidemia)

## 2020-02-17 LAB
HCT VFR BLD CALC: 29.6 % — LOW (ref 39–50)
HGB BLD-MCNC: 8.9 G/DL — LOW (ref 13–17)
MCHC RBC-ENTMCNC: 30.1 GM/DL — LOW (ref 32–36)
MCHC RBC-ENTMCNC: 30.6 PG — SIGNIFICANT CHANGE UP (ref 27–34)
MCV RBC AUTO: 101.7 FL — HIGH (ref 80–100)
NRBC # BLD: 0 /100 WBCS — SIGNIFICANT CHANGE UP (ref 0–0)
PLATELET # BLD AUTO: 622 K/UL — HIGH (ref 150–400)
RBC # BLD: 2.91 M/UL — LOW (ref 4.2–5.8)
RBC # FLD: 15.9 % — HIGH (ref 10.3–14.5)
WBC # BLD: 10.26 K/UL — SIGNIFICANT CHANGE UP (ref 3.8–10.5)
WBC # FLD AUTO: 10.26 K/UL — SIGNIFICANT CHANGE UP (ref 3.8–10.5)

## 2020-02-17 RX ADMIN — SENNA PLUS 3 TABLET(S): 8.6 TABLET ORAL at 12:20

## 2020-02-17 RX ADMIN — Medication 1 APPLICATION(S): at 06:07

## 2020-02-17 RX ADMIN — Medication 1 APPLICATION(S): at 17:49

## 2020-02-17 RX ADMIN — MIRTAZAPINE 7.5 MILLIGRAM(S): 45 TABLET, ORALLY DISINTEGRATING ORAL at 21:50

## 2020-02-17 RX ADMIN — LISINOPRIL 2.5 MILLIGRAM(S): 2.5 TABLET ORAL at 06:07

## 2020-02-17 RX ADMIN — TAMSULOSIN HYDROCHLORIDE 0.4 MILLIGRAM(S): 0.4 CAPSULE ORAL at 21:51

## 2020-02-17 RX ADMIN — APIXABAN 5 MILLIGRAM(S): 2.5 TABLET, FILM COATED ORAL at 06:07

## 2020-02-17 RX ADMIN — APIXABAN 5 MILLIGRAM(S): 2.5 TABLET, FILM COATED ORAL at 17:49

## 2020-02-17 RX ADMIN — PANTOPRAZOLE SODIUM 40 MILLIGRAM(S): 20 TABLET, DELAYED RELEASE ORAL at 06:08

## 2020-02-17 RX ADMIN — Medication 100 MICROGRAM(S): at 06:07

## 2020-02-17 RX ADMIN — PANTOPRAZOLE SODIUM 40 MILLIGRAM(S): 20 TABLET, DELAYED RELEASE ORAL at 17:49

## 2020-02-17 RX ADMIN — CALAMINE AND ZINC OXIDE AND PHENOL 1 APPLICATION(S): 160; 10 LOTION TOPICAL at 12:19

## 2020-02-17 RX ADMIN — RISPERIDONE 0.25 MILLIGRAM(S): 4 TABLET ORAL at 12:20

## 2020-02-17 NOTE — PROGRESS NOTE ADULT - SUBJECTIVE AND OBJECTIVE BOX
Summary:   89y  Male      Subjective Resting comfortable     Objective:    MEDICATIONS  (STANDING):  ammonium lactate 12% Lotion 1 Application(s) Topical two times a day  calamine Lotion 1 Application(s) Topical daily  cefTRIAXone   IVPB 1000 milliGRAM(s) IV Intermittent every 24 hours  dextrose 5% + sodium chloride 0.45% 1000 milliLiter(s) (75 mL/Hr) IV Continuous <Continuous>  heparin  Infusion. 1200 Unit(s)/Hr (12 mL/Hr) IV Continuous <Continuous>  levothyroxine 100 MICROGram(s) Oral daily  lisinopril 2.5 milliGRAM(s) Oral daily  mirtazapine 7.5 milliGRAM(s) Oral at bedtime  pantoprazole  Injectable 40 milliGRAM(s) IV Push every 12 hours  risperiDONE   Tablet 0.25 milliGRAM(s) Oral daily  senna 3 Tablet(s) Oral daily  tamsulosin 0.4 milliGRAM(s) Oral at bedtime    MEDICATIONS  (PRN):  acetaminophen   Tablet .. 650 milliGRAM(s) Oral every 6 hours PRN Temp greater or equal to 38C (100.4F)  heparin  Injectable 6500 Unit(s) IV Push every 6 hours PRN For aPTT less than 40  heparin  Injectable 3000 Unit(s) IV Push every 6 hours PRN For aPTT between 40 - 57  ketorolac   Injectable 15 milliGRAM(s) IV Push every 6 hours PRN Moderate Pain (4 - 6)  ondansetron Injectable 4 milliGRAM(s) IV Push every 6 hours PRN Nausea              Vital Signs Last 24 Hrs  T(C): 36.7 (11 Feb 2020 06:00), Max: 36.7 (10 Feb 2020 15:44)  T(F): 98.1 (11 Feb 2020 06:00), Max: 98.1 (11 Feb 2020 06:00)  HR: 59 (11 Feb 2020 06:00) (59 - 95)  BP: 128/78 (11 Feb 2020 06:00) (104/65 - 142/78)  BP(mean): 97 (10 Feb 2020 15:44) (78 - 97)  RR: 18 (11 Feb 2020 06:00) (16 - 25)  SpO2: 96% (11 Feb 2020 06:00) (96% - 99%)      General:  Well developed, well nourished, alert and active, no pallor, NAD.  HEENT:    Normal appearance of conjunctiva, ears, nose, lips, oropharynx, and oral mucosa, anicteric.  Neck:  No masses, no asymmetry.  Lymph Nodes:  No lymphadenopathy.   Cardiovascular:  RRR normal S1/S2, no murmur.  Respiratory:  CTA B/L, normal respiratory effort.   Abdominal:   soft, no masses or tenderness, normoactive BS, NT/ND, no HSM.  Extremities:   No clubbing or cyanosis, normal capillary refill, no edema.   Skin:   No rash, jaundice, lesions, eczema.   Musculoskeletal:  No joint swelling, erythema or tenderness.   Neuro: No focal deficits.   Other:       LABS:                        8.6    8.49  )-----------( 330      ( 11 Feb 2020 07:32 )             28.4     02-11    140  |  109<H>  |  13  ----------------------------<  82  4.1   |  23  |  0.89    Ca    7.8<L>      11 Feb 2020 07:32    TPro  5.5<L>  /  Alb  1.7<L>  /  TBili  0.4  /  DBili  x   /  AST  11  /  ALT  7<L>  /  AlkPhos  98  02-11    PTT - ( 11 Feb 2020 07:32 )  PTT:36.1 sec      RADIOLOGY & ADDITIONAL TESTS:

## 2020-02-17 NOTE — PROGRESS NOTE ADULT - SUBJECTIVE AND OBJECTIVE BOX
Patient is a 89y old  Male who presents with a chief complaint of Chronic cholecystitis (13 Feb 2020 10:33)  89 y.o. M with PMH cholecystitis s/p IR PTC tube insertion, DVT on AC, hypothyroidism, afib, HTN [facility].   He was taken to the OR for laparoscopic cholecystectomy which was converted to open cholecystectomy with Lysis of adhesions on 2/4, intraoperative cholangiogram was positive. A MARYJO drain was left in place till yesterday and Eliquis resume. He was taken for an ERCP on 2/6 which was unsuccessful. ERCP was repeated by a different GI attending on 2/10 which was also unsuccessful, however pancreatic duct was able to be cannulated at that time. Meyers catheter was removed on 2/11 for TOV, failed,  catheter was reinserted. LFTs normalized. PT attempt to evaluate pt but unable to evaluate, recommendation is to EDIE.     INTERVAL HPI/OVERNIGHT EVENTS: no new complaints  MEDICATIONS  (STANDING):  ammonium lactate 12% Lotion 1 Application(s) Topical two times a day  apixaban 5 milliGRAM(s) Oral every 12 hours  calamine Lotion 1 Application(s) Topical daily  levothyroxine 100 MICROGram(s) Oral daily  lisinopril 2.5 milliGRAM(s) Oral daily  mirtazapine 7.5 milliGRAM(s) Oral at bedtime  pantoprazole  Injectable 40 milliGRAM(s) IV Push every 12 hours  risperiDONE   Tablet 0.25 milliGRAM(s) Oral daily  senna 3 Tablet(s) Oral daily  tamsulosin 0.4 milliGRAM(s) Oral at bedtime    MEDICATIONS  (PRN):  acetaminophen   Tablet .. 650 milliGRAM(s) Oral every 6 hours PRN Temp greater or equal to 38C (100.4F)  ondansetron Injectable 4 milliGRAM(s) IV Push every 6 hours PRN Nausea      PAST MEDICAL & SURGICAL HISTORY:  PVD (peripheral vascular disease)  Acalculous cholecystitis: s/p percutaneous cholecystostomy  HTN (hypertension)  Hypothyroid  Ulcers of both lower extremities  No significant past surgical history    __________________________________________________  REVIEW OF SYSTEMS:    unable to obtain ROS due to mental status     Vital Signs Last 24 Hrs  T(C): 37.1 (17 Feb 2020 13:07), Max: 37.1 (16 Feb 2020 14:45)  T(F): 98.8 (17 Feb 2020 13:07), Max: 98.8 (16 Feb 2020 14:45)  HR: 73 (17 Feb 2020 13:07) (73 - 85)  BP: 121/53 (17 Feb 2020 13:07) (116/64 - 138/81)  BP(mean): --  RR: 17 (17 Feb 2020 13:07) (16 - 18)  SpO2: 97% (17 Feb 2020 13:07) (94% - 99%)________________________________________________  PHYSICAL EXAM:  GENERAL: NAD  HEENT: Normocephalic;  conjunctivae and sclerae clear; moist mucous membranes;   NECK : supple  CHEST/LUNG: Clear to auscultation bilaterally with good air entry   HEART: S1 S2  regular; no murmurs, gallops or rubs  ABDOMEN: Soft, Nontender, Nondistended; Bowel sounds present  EXTREMITIES: no cyanosis; no edema; no calf tenderness  SKIN: warm and dry; no rash  NERVOUS SYSTEM:  Awake and alert; Oriented  to  person    LABS:                        8.9    10.26 )-----------( 622      ( 17 Feb 2020 06:44 )             29.6     02-16    140  |  109<H>  |  11  ----------------------------<  93  4.4   |  28  |  1.04    Ca    7.9<L>      16 Feb 2020 06:04                            LABS:                        9.2    9.99  )-----------( 567      ( 16 Feb 2020 06:04 )             30.3     02-16    140  |  109<H>  |  11  ----------------------------<  93  4.4   |  28  |  1.04    Ca    7.9<L>      16 Feb 2020 06:04                              LABS:                        8.9    9.34  )-----------( 502      ( 15 Feb 2020 06:56 )             29.0                                                             __________________________________________  LABS:                        8.9    9.97  )-----------( 392      ( 12 Feb 2020 08:00 )             29.1     02-12    138  |  107  |  11  ----------------------------<  100<H>  3.7   |  25  |  0.95    Ca    7.7<L>      12 Feb 2020 08:00    TPro  5.5<L>  /  Alb  1.8<L>  /  TBili  0.4  /  DBili  x   /  AST  12  /  ALT  6<L>  /  AlkPhos  92  02-12    PTT - ( 12 Feb 2020 08:00 )  PTT:79.8 sec    CAPILLARY BLOOD GLUCOSE            RADIOLOGY & ADDITIONAL TESTS:    Imaging Personally Reviewed:  YES/NO    Consultant(s) Notes Reviewed:   YES/ No    Care Discussed with Consultants :     Plan of care was discussed with patient and /or primary care giver; all questions and concerns were addressed and care was aligned with patient's wishes.

## 2020-02-18 LAB
ALBUMIN SERPL ELPH-MCNC: 1.7 G/DL — LOW (ref 3.5–5)
ALP SERPL-CCNC: 79 U/L — SIGNIFICANT CHANGE UP (ref 40–120)
ALT FLD-CCNC: 8 U/L DA — LOW (ref 10–60)
ANION GAP SERPL CALC-SCNC: 6 MMOL/L — SIGNIFICANT CHANGE UP (ref 5–17)
AST SERPL-CCNC: 20 U/L — SIGNIFICANT CHANGE UP (ref 10–40)
BILIRUB DIRECT SERPL-MCNC: 0.1 MG/DL — SIGNIFICANT CHANGE UP (ref 0–0.2)
BILIRUB INDIRECT FLD-MCNC: 0.2 MG/DL — SIGNIFICANT CHANGE UP (ref 0.2–1)
BILIRUB SERPL-MCNC: 0.3 MG/DL — SIGNIFICANT CHANGE UP (ref 0.2–1.2)
BUN SERPL-MCNC: 10 MG/DL — SIGNIFICANT CHANGE UP (ref 7–18)
CALCIUM SERPL-MCNC: 8 MG/DL — LOW (ref 8.4–10.5)
CHLORIDE SERPL-SCNC: 110 MMOL/L — HIGH (ref 96–108)
CO2 SERPL-SCNC: 26 MMOL/L — SIGNIFICANT CHANGE UP (ref 22–31)
CREAT SERPL-MCNC: 0.95 MG/DL — SIGNIFICANT CHANGE UP (ref 0.5–1.3)
GLUCOSE SERPL-MCNC: 88 MG/DL — SIGNIFICANT CHANGE UP (ref 70–99)
HCT VFR BLD CALC: 28.1 % — LOW (ref 39–50)
HGB BLD-MCNC: 8.6 G/DL — LOW (ref 13–17)
MCHC RBC-ENTMCNC: 30.6 GM/DL — LOW (ref 32–36)
MCHC RBC-ENTMCNC: 30.9 PG — SIGNIFICANT CHANGE UP (ref 27–34)
MCV RBC AUTO: 101.1 FL — HIGH (ref 80–100)
NRBC # BLD: 0 /100 WBCS — SIGNIFICANT CHANGE UP (ref 0–0)
PLATELET # BLD AUTO: 567 K/UL — HIGH (ref 150–400)
POTASSIUM SERPL-MCNC: 4.2 MMOL/L — SIGNIFICANT CHANGE UP (ref 3.5–5.3)
POTASSIUM SERPL-SCNC: 4.2 MMOL/L — SIGNIFICANT CHANGE UP (ref 3.5–5.3)
PROT SERPL-MCNC: 5.4 G/DL — LOW (ref 6–8.3)
RBC # BLD: 2.78 M/UL — LOW (ref 4.2–5.8)
RBC # FLD: 15.9 % — HIGH (ref 10.3–14.5)
SODIUM SERPL-SCNC: 142 MMOL/L — SIGNIFICANT CHANGE UP (ref 135–145)
WBC # BLD: 8.32 K/UL — SIGNIFICANT CHANGE UP (ref 3.8–10.5)
WBC # FLD AUTO: 8.32 K/UL — SIGNIFICANT CHANGE UP (ref 3.8–10.5)

## 2020-02-18 RX ADMIN — APIXABAN 5 MILLIGRAM(S): 2.5 TABLET, FILM COATED ORAL at 06:29

## 2020-02-18 RX ADMIN — PANTOPRAZOLE SODIUM 40 MILLIGRAM(S): 20 TABLET, DELAYED RELEASE ORAL at 06:29

## 2020-02-18 RX ADMIN — PANTOPRAZOLE SODIUM 40 MILLIGRAM(S): 20 TABLET, DELAYED RELEASE ORAL at 18:04

## 2020-02-18 RX ADMIN — Medication 100 MICROGRAM(S): at 06:29

## 2020-02-18 RX ADMIN — TAMSULOSIN HYDROCHLORIDE 0.4 MILLIGRAM(S): 0.4 CAPSULE ORAL at 22:14

## 2020-02-18 RX ADMIN — SENNA PLUS 3 TABLET(S): 8.6 TABLET ORAL at 12:25

## 2020-02-18 RX ADMIN — LISINOPRIL 2.5 MILLIGRAM(S): 2.5 TABLET ORAL at 06:29

## 2020-02-18 RX ADMIN — Medication 1 APPLICATION(S): at 18:04

## 2020-02-18 RX ADMIN — MIRTAZAPINE 7.5 MILLIGRAM(S): 45 TABLET, ORALLY DISINTEGRATING ORAL at 22:14

## 2020-02-18 RX ADMIN — APIXABAN 5 MILLIGRAM(S): 2.5 TABLET, FILM COATED ORAL at 18:04

## 2020-02-18 RX ADMIN — Medication 1 APPLICATION(S): at 06:32

## 2020-02-18 RX ADMIN — RISPERIDONE 0.25 MILLIGRAM(S): 4 TABLET ORAL at 12:25

## 2020-02-18 NOTE — PROGRESS NOTE ADULT - SUBJECTIVE AND OBJECTIVE BOX
Patient is a 89y old  Male who presents with a chief complaint of Chronic cholecystitis (13 Feb 2020 10:33)  89 y.o. M with PMH cholecystitis s/p IR PTC tube insertion, DVT on AC, hypothyroidism, afib, HTN presents to [facility] ER for management of PTC tube.   He was taken to the OR for laparoscopic cholecystectomy which was converted to open cholecystectomy with Lysis of adhesions on 2/4, intraoperative cholangiogram was positive. A MARYJO drain was left in place till yesterday and Eliquis resume. He was taken for an ERCP on 2/6 which was unsuccessful. ERCP was repeated by a different GI attending on 2/10 which was also unsuccessful, however pancreatic duct was able to be cannulated at that time. Meyers catheter was removed on 2/11 for TOV, failed,  catheter was reinserted. LFTs normalized. PT attempt to evaluate pt but unable to evaluate, recommendation is to EDIE.     INTERVAL HPI/OVERNIGHT EVENTS: no new complaints    MEDICATIONS  (STANDING):  ammonium lactate 12% Lotion 1 Application(s) Topical two times a day  apixaban 5 milliGRAM(s) Oral every 12 hours  calamine Lotion 1 Application(s) Topical daily  levothyroxine 100 MICROGram(s) Oral daily  lisinopril 2.5 milliGRAM(s) Oral daily  mirtazapine 7.5 milliGRAM(s) Oral at bedtime  pantoprazole  Injectable 40 milliGRAM(s) IV Push every 12 hours  risperiDONE   Tablet 0.25 milliGRAM(s) Oral daily  senna 3 Tablet(s) Oral daily  tamsulosin 0.4 milliGRAM(s) Oral at bedtime    MEDICATIONS  (PRN):  acetaminophen   Tablet .. 650 milliGRAM(s) Oral every 6 hours PRN Temp greater or equal to 38C (100.4F)  ondansetron Injectable 4 milliGRAM(s) IV Push every 6 hours PRN Nausea    PAST MEDICAL & SURGICAL HISTORY:  PVD (peripheral vascular disease)  Acalculous cholecystitis: s/p percutaneous cholecystostomy  HTN (hypertension)  Hypothyroid  Ulcers of both lower extremities  No significant past surgical history    __________________________________________________  REVIEW OF SYSTEMS:    unable to obtain ROS due to mental status   Vital Signs Last 24 Hrs  T(C): 36.2 (18 Feb 2020 05:00), Max: 37.1 (17 Feb 2020 13:07)  T(F): 97.2 (18 Feb 2020 05:00), Max: 98.8 (17 Feb 2020 13:07)  HR: 77 (18 Feb 2020 05:00) (73 - 77)  BP: 108/62 (18 Feb 2020 05:00) (108/62 - 121/53)  BP(mean): --  RR: 16 (18 Feb 2020 05:00) (16 - 17)  SpO2: 94% (18 Feb 2020 05:00) (94% - 97%)      PHYSICAL EXAM:  GENERAL: NAD  HEENT: Normocephalic;  conjunctivae and sclerae clear; moist mucous membranes;   NECK : supple  CHEST/LUNG: Clear to auscultation bilaterally with good air entry   HEART: S1 S2  regular; no murmurs, gallops or rubs  ABDOMEN: Soft, Nontender, Nondistended; Bowel sounds present  EXTREMITIES: no cyanosis; no edema; no calf tenderness  SKIN: warm and dry; no rash  NERVOUS SYSTEM:  Awake and alert; Oriented  to  person  LABS:                        8.6    8.32  )-----------( 567      ( 18 Feb 2020 08:05 )             28.1     02-18    142  |  110<H>  |  10  ----------------------------<  88  4.2   |  26  |  0.95    Ca    8.0<L>      18 Feb 2020 08:05                              LABS:                        8.9    10.26 )-----------( 622      ( 17 Feb 2020 06:44 )             29.6     02-16    140  |  109<H>  |  11  ----------------------------<  93  4.4   |  28  |  1.04    Ca    7.9<L>      16 Feb 2020 06:04                            LABS:                        9.2    9.99  )-----------( 567      ( 16 Feb 2020 06:04 )             30.3     02-16    140  |  109<H>  |  11  ----------------------------<  93  4.4   |  28  |  1.04    Ca    7.9<L>      16 Feb 2020 06:04                              LABS:                        8.9    9.34  )-----------( 502      ( 15 Feb 2020 06:56 )             29.0                                                             __________________________________________  LABS:                        8.9    9.97  )-----------( 392      ( 12 Feb 2020 08:00 )             29.1     02-12    138  |  107  |  11  ----------------------------<  100<H>  3.7   |  25  |  0.95    Ca    7.7<L>      12 Feb 2020 08:00    TPro  5.5<L>  /  Alb  1.8<L>  /  TBili  0.4  /  DBili  x   /  AST  12  /  ALT  6<L>  /  AlkPhos  92  02-12    PTT - ( 12 Feb 2020 08:00 )  PTT:79.8 sec    CAPILLARY BLOOD GLUCOSE            RADIOLOGY & ADDITIONAL TESTS:    Imaging Personally Reviewed:  YES/NO    Consultant(s) Notes Reviewed:   YES/ No    Care Discussed with Consultants :     Plan of care was discussed with patient and /or primary care giver; all questions and concerns were addressed and care was aligned with patient's wishes.

## 2020-02-18 NOTE — PROGRESS NOTE ADULT - SUBJECTIVE AND OBJECTIVE BOX
HPI- 89 y.o. M with PMH cholecystitis s/p IR PTC tube insertion, DVT on AC, hypothyroidism, afib, HTN presents to [facility] ER for management of PTC tube.   He was taken to the OR for laparoscopic cholecystectomy which was converted to open cholecystectomy with Lysis of adhesions on 2/4, intraoperative cholangiogram was positive. A MARYJO drain was left in place till yesterday and Eliquis resume. He was taken for an ERCP on 2/6 which was unsuccessful. ERCP was repeated by a different GI attending on 2/10 which was also unsuccessful, however pancreatic duct was able to be cannulated at that time. Cabrales catheter was removed on 2/11 for TOV, failed,  catheter was reinserted. LFTs normalized. PT attempt to evaluate pt but unable to evaluate, recommendation is to EDIE. awaiting for bed availability.       INTERVAL HPI/OVERNIGHT EVENTS: no new complaints    MEDICATIONS  (STANDING):  ammonium lactate 12% Lotion 1 Application(s) Topical two times a day  apixaban 5 milliGRAM(s) Oral every 12 hours  calamine Lotion 1 Application(s) Topical daily  levothyroxine 100 MICROGram(s) Oral daily  lisinopril 2.5 milliGRAM(s) Oral daily  mirtazapine 7.5 milliGRAM(s) Oral at bedtime  pantoprazole  Injectable 40 milliGRAM(s) IV Push every 12 hours  risperiDONE   Tablet 0.25 milliGRAM(s) Oral daily  senna 3 Tablet(s) Oral daily  tamsulosin 0.4 milliGRAM(s) Oral at bedtime    MEDICATIONS  (PRN):  acetaminophen   Tablet .. 650 milliGRAM(s) Oral every 6 hours PRN Temp greater or equal to 38C (100.4F)  ondansetron Injectable 4 milliGRAM(s) IV Push every 6 hours PRN Nausea      __________________________________________________  REVIEW OF SYSTEMS:  Limited ROS due to impaired cognition  CONSTITUTIONAL: No fever,   EYES: no acute visual disturbances  RESPIRATORY: No cough; No shortness of breath  CARDIOVASCULAR: No chest pain,   GASTROINTESTINAL: No pain. No nausea or vomiting   NEUROLOGICAL: No headache    MUSCULOSKELETAL: No joint pain, no muscle pain  GENITOURINARY: no pain around cabrales insertion site.   PSYCHIATRY: no depression , no anxiety  ALL OTHER  ROS negative        Vital Signs Last 24 Hrs  T(C): 36.9 (18 Feb 2020 12:59), Max: 36.9 (18 Feb 2020 12:59)  T(F): 98.5 (18 Feb 2020 12:59), Max: 98.5 (18 Feb 2020 12:59)  HR: 84 (18 Feb 2020 14:00) (76 - 84)  BP: 133/61 (18 Feb 2020 14:00) (86/44 - 133/61)  BP(mean): --  RR: 16 (18 Feb 2020 12:59) (16 - 16)  SpO2: 97% (18 Feb 2020 12:59) (94% - 97%)    ________________________________________________  PHYSICAL EXAM:  GENERAL: NAD. lying in bed.   HEENT: Normocephalic;  conjunctivae and sclerae clear; moist mucous membranes;   NECK : supple  CHEST/LUNG: Clear to auscultation bilaterally with good air entry   HEART: S1 S2  regular; no murmurs, gallops or rubs  ABDOMEN: Soft, Nontender, Nondistended; Bowel sounds present  : cabrales in place. clear urine draining.   EXTREMITIES: no cyanosis; no edema; no calf tenderness  SKIN: warm and dry; no rash  NERVOUS SYSTEM:  Awake and alert; Oriented  to self  _________________________________________________  LABS:                        8.6    8.32  )-----------( 567      ( 18 Feb 2020 08:05 )             28.1     02-18    142  |  110<H>  |  10  ----------------------------<  88  4.2   |  26  |  0.95    Ca    8.0<L>      18 Feb 2020 08:05    TPro  5.4<L>  /  Alb  1.7<L>  /  TBili  0.3  /  DBili  0.1  /  AST  20  /  ALT  8<L>  /  AlkPhos  79  02-18        CAPILLARY BLOOD GLUCOSE            RADIOLOGY & ADDITIONAL TESTS:    Imaging  Reviewed:  YES  no new orders  Consultant(s) Notes Reviewed:   YES  GI,     Plan of care was discussed with patient and /or primary care giver; all questions and concerns were addressed

## 2020-02-19 DIAGNOSIS — D47.3 ESSENTIAL (HEMORRHAGIC) THROMBOCYTHEMIA: ICD-10-CM

## 2020-02-19 LAB
ANION GAP SERPL CALC-SCNC: 5 MMOL/L — SIGNIFICANT CHANGE UP (ref 5–17)
BUN SERPL-MCNC: 13 MG/DL — SIGNIFICANT CHANGE UP (ref 7–18)
CALCIUM SERPL-MCNC: 8.2 MG/DL — LOW (ref 8.4–10.5)
CHLORIDE SERPL-SCNC: 110 MMOL/L — HIGH (ref 96–108)
CO2 SERPL-SCNC: 27 MMOL/L — SIGNIFICANT CHANGE UP (ref 22–31)
CREAT SERPL-MCNC: 1.01 MG/DL — SIGNIFICANT CHANGE UP (ref 0.5–1.3)
GLUCOSE SERPL-MCNC: 94 MG/DL — SIGNIFICANT CHANGE UP (ref 70–99)
HCT VFR BLD CALC: 30.2 % — LOW (ref 39–50)
HGB BLD-MCNC: 9.2 G/DL — LOW (ref 13–17)
MCHC RBC-ENTMCNC: 30.5 GM/DL — LOW (ref 32–36)
MCHC RBC-ENTMCNC: 30.9 PG — SIGNIFICANT CHANGE UP (ref 27–34)
MCV RBC AUTO: 101.3 FL — HIGH (ref 80–100)
NRBC # BLD: 0 /100 WBCS — SIGNIFICANT CHANGE UP (ref 0–0)
PLATELET # BLD AUTO: 617 K/UL — HIGH (ref 150–400)
POTASSIUM SERPL-MCNC: 4.4 MMOL/L — SIGNIFICANT CHANGE UP (ref 3.5–5.3)
POTASSIUM SERPL-SCNC: 4.4 MMOL/L — SIGNIFICANT CHANGE UP (ref 3.5–5.3)
RBC # BLD: 2.98 M/UL — LOW (ref 4.2–5.8)
RBC # FLD: 15.8 % — HIGH (ref 10.3–14.5)
SODIUM SERPL-SCNC: 142 MMOL/L — SIGNIFICANT CHANGE UP (ref 135–145)
WBC # BLD: 9.28 K/UL — SIGNIFICANT CHANGE UP (ref 3.8–10.5)
WBC # FLD AUTO: 9.28 K/UL — SIGNIFICANT CHANGE UP (ref 3.8–10.5)

## 2020-02-19 RX ADMIN — CALAMINE AND ZINC OXIDE AND PHENOL 1 APPLICATION(S): 160; 10 LOTION TOPICAL at 11:31

## 2020-02-19 RX ADMIN — TAMSULOSIN HYDROCHLORIDE 0.4 MILLIGRAM(S): 0.4 CAPSULE ORAL at 21:39

## 2020-02-19 RX ADMIN — LISINOPRIL 2.5 MILLIGRAM(S): 2.5 TABLET ORAL at 06:28

## 2020-02-19 RX ADMIN — Medication 1 APPLICATION(S): at 18:07

## 2020-02-19 RX ADMIN — MIRTAZAPINE 7.5 MILLIGRAM(S): 45 TABLET, ORALLY DISINTEGRATING ORAL at 21:40

## 2020-02-19 RX ADMIN — Medication 100 MICROGRAM(S): at 06:28

## 2020-02-19 RX ADMIN — RISPERIDONE 0.25 MILLIGRAM(S): 4 TABLET ORAL at 11:30

## 2020-02-19 RX ADMIN — PANTOPRAZOLE SODIUM 40 MILLIGRAM(S): 20 TABLET, DELAYED RELEASE ORAL at 06:28

## 2020-02-19 RX ADMIN — APIXABAN 5 MILLIGRAM(S): 2.5 TABLET, FILM COATED ORAL at 18:07

## 2020-02-19 RX ADMIN — APIXABAN 5 MILLIGRAM(S): 2.5 TABLET, FILM COATED ORAL at 06:28

## 2020-02-19 RX ADMIN — Medication 1 APPLICATION(S): at 06:28

## 2020-02-19 RX ADMIN — SENNA PLUS 3 TABLET(S): 8.6 TABLET ORAL at 11:31

## 2020-02-19 RX ADMIN — PANTOPRAZOLE SODIUM 40 MILLIGRAM(S): 20 TABLET, DELAYED RELEASE ORAL at 18:07

## 2020-02-19 NOTE — PROGRESS NOTE ADULT - PROBLEM SELECTOR PLAN 8
medically optimized for discharge back to [facility], facility does not have a bed today, d/c when bed is available

## 2020-02-19 NOTE — PROGRESS NOTE ADULT - SUBJECTIVE AND OBJECTIVE BOX
HPI- 89 y.o. M with PMH cholecystitis s/p IR PTC tube insertion, DVT on AC, hypothyroidism, afib, HTN presents to [facility] ER for management of PTC tube.   He was taken to the OR for laparoscopic cholecystectomy which was converted to open cholecystectomy with Lysis of adhesions on 2/4, intraoperative cholangiogram was positive. A MARYJO drain was left in place till yesterday and Eliquis resume. He was taken for an ERCP on 2/6 which was unsuccessful. ERCP was repeated by a different GI attending on 2/10 which was also unsuccessful, however pancreatic duct was able to be cannulated at that time. Cabrales catheter was removed on 2/11 for TOV, failed,  catheter was reinserted. LFTs normalized. PT attempt to evaluate pt but unable to evaluate, recommendation is to EDIE. awaiting for bed availability.       INTERVAL HPI/OVERNIGHT EVENTS: no new complaints    MEDICATIONS  (STANDING):  ammonium lactate 12% Lotion 1 Application(s) Topical two times a day  apixaban 5 milliGRAM(s) Oral every 12 hours  calamine Lotion 1 Application(s) Topical daily  levothyroxine 100 MICROGram(s) Oral daily  lisinopril 2.5 milliGRAM(s) Oral daily  mirtazapine 7.5 milliGRAM(s) Oral at bedtime  pantoprazole  Injectable 40 milliGRAM(s) IV Push every 12 hours  risperiDONE   Tablet 0.25 milliGRAM(s) Oral daily  senna 3 Tablet(s) Oral daily  tamsulosin 0.4 milliGRAM(s) Oral at bedtime    MEDICATIONS  (PRN):  acetaminophen   Tablet .. 650 milliGRAM(s) Oral every 6 hours PRN Temp greater or equal to 38C (100.4F)  ondansetron Injectable 4 milliGRAM(s) IV Push every 6 hours PRN Nausea      __________________________________________________  REVIEW OF SYSTEMS:  Limited ROS due to impaired cognition  CONSTITUTIONAL: No fever,   EYES: no acute visual disturbances  RESPIRATORY: No cough; No shortness of breath  CARDIOVASCULAR: No chest pain,   GASTROINTESTINAL: No pain. No nausea or vomiting   NEUROLOGICAL: No headache    MUSCULOSKELETAL: No joint pain, no muscle pain  GENITOURINARY: no pain around cabrales insertion site.   PSYCHIATRY: no depression , no anxiety  ALL OTHER  ROS negative      Vital Signs Last 24 Hrs  T(C): 36.9 (19 Feb 2020 05:25), Max: 36.9 (18 Feb 2020 12:59)  T(F): 98.5 (19 Feb 2020 05:25), Max: 98.5 (18 Feb 2020 12:59)  HR: 73 (19 Feb 2020 05:25) (73 - 96)  BP: 133/63 (19 Feb 2020 05:25) (86/44 - 133/63)  BP(mean): --  RR: 16 (19 Feb 2020 05:25) (16 - 16)  SpO2: 96% (19 Feb 2020 05:25) (96% - 97%)    ________________________________________________  PHYSICAL EXAM:  GENERAL: NAD. lying in bed.   HEENT: Normocephalic;  conjunctivae and sclerae clear; moist mucous membranes;   NECK : supple  CHEST/LUNG: Clear to auscultation bilaterally with good air entry   HEART: S1 S2  regular; no murmurs, gallops or rubs  ABDOMEN: Soft, Nontender, Nondistended; Bowel sounds present  : cabrales in place. clear urine draining.   EXTREMITIES: no cyanosis; no edema; no calf tenderness  SKIN: warm and dry; no rash  NERVOUS SYSTEM:  Awake and alert; Oriented  to self  _________________________________________________    LABS:                        9.2    9.28  )-----------( 617      ( 19 Feb 2020 05:49 )             30.2     02-19    142  |  110<H>  |  13  ----------------------------<  94  4.4   |  27  |  1.01    Ca    8.2<L>      19 Feb 2020 05:49    TPro  5.4<L>  /  Alb  1.7<L>  /  TBili  0.3  /  DBili  0.1  /  AST  20  /  ALT  8<L>  /  AlkPhos  79  02-18                          LABS:                        8.6    8.32  )-----------( 567      ( 18 Feb 2020 08:05 )             28.1     02-18    142  |  110<H>  |  10  ----------------------------<  88  4.2   |  26  |  0.95    Ca    8.0<L>      18 Feb 2020 08:05    TPro  5.4<L>  /  Alb  1.7<L>  /  TBili  0.3  /  DBili  0.1  /  AST  20  /  ALT  8<L>  /  AlkPhos  79  02-18        CAPILLARY BLOOD GLUCOSE            RADIOLOGY & ADDITIONAL TESTS:    Imaging  Reviewed:  YES  no new orders  Consultant(s) Notes Reviewed:   YES  GI,     Plan of care was discussed with patient and /or primary care giver; all questions and concerns were addressed

## 2020-02-19 NOTE — PROGRESS NOTE ADULT - SUBJECTIVE AND OBJECTIVE BOX
HPI- 89 y.o. M with PMH cholecystitis s/p IR PTC tube insertion, DVT on AC, hypothyroidism, afib, HTN presents to [facility] ER for management of PTC tube.   He was taken to the OR for laparoscopic cholecystectomy which was converted to open cholecystectomy with Lysis of adhesions on 2/4, intraoperative cholangiogram was positive. A MARYJO drain was left in place till yesterday and Eliquis resume. He was taken for an ERCP on 2/6 which was unsuccessful. ERCP was repeated by a different GI attending on 2/10 which was also unsuccessful, however pancreatic duct was able to be cannulated at that time. Meyers catheter was removed on 2/11 for TOV, failed,  catheter was reinserted. LFTs normalized. PT attempt to evaluate pt but unable to evaluate, recommendation is to EDIE. awaiting for bed availability.     Pt is noted with thrombocytosis, likely due to chronic inflammatory disease and hx of DVT. on AC therapy. plan to monitor CBC daily.     INTERVAL HPI/OVERNIGHT EVENTS: no new complaints    MEDICATIONS  (STANDING):  ammonium lactate 12% Lotion 1 Application(s) Topical two times a day  apixaban 5 milliGRAM(s) Oral every 12 hours  calamine Lotion 1 Application(s) Topical daily  levothyroxine 100 MICROGram(s) Oral daily  lisinopril 2.5 milliGRAM(s) Oral daily  mirtazapine 7.5 milliGRAM(s) Oral at bedtime  pantoprazole  Injectable 40 milliGRAM(s) IV Push every 12 hours  risperiDONE   Tablet 0.25 milliGRAM(s) Oral daily  senna 3 Tablet(s) Oral daily  tamsulosin 0.4 milliGRAM(s) Oral at bedtime    MEDICATIONS  (PRN):  acetaminophen   Tablet .. 650 milliGRAM(s) Oral every 6 hours PRN Temp greater or equal to 38C (100.4F)  ondansetron Injectable 4 milliGRAM(s) IV Push every 6 hours PRN Nausea      __________________________________________________  REVIEW OF SYSTEMS:  Limited ROS due to impaired cognition.   CONSTITUTIONAL: No fever,   RESPIRATORY: No cough; No shortness of breath  CARDIOVASCULAR: No chest pain   GASTROINTESTINAL: No pain. No nausea    NEUROLOGICAL: No headache    MUSCULOSKELETAL: No joint pain, c/o pain on movement  GENITOURINARY: no pain on pelvic area       Vital Signs Last 24 Hrs  T(C): 36.8 (19 Feb 2020 12:39), Max: 36.9 (18 Feb 2020 21:05)  T(F): 98.2 (19 Feb 2020 12:39), Max: 98.5 (18 Feb 2020 21:05)  HR: 78 (19 Feb 2020 12:39) (73 - 96)  BP: 124/77 (19 Feb 2020 12:39) (124/77 - 133/63)  BP(mean): --  RR: 16 (19 Feb 2020 12:39) (16 - 16)  SpO2: 96% (19 Feb 2020 12:39) (96% - 96%)    ________________________________________________  PHYSICAL EXAM:  GENERAL: NAD. combative on exam. conversant  HEENT: Normocephalic;  conjunctivae and sclerae clear; moist mucous membranes;   NECK : supple  CHEST/LUNG: Poor inspiratory effort. no dyspnea  HEART: S1 S2  regular; no murmurs, gallops or rubs  ABDOMEN: Soft, Nontender, Nondistended; Bowel sounds present  : Meyers in place draining clear urine.   EXTREMITIES: no cyanosis; mild edema right arm-non pitting.  no calf tenderness  SKIN: warm and dry; no rash  NERVOUS SYSTEM:  Awake and alert; Oriented  to place, person and time ; no new deficits    _________________________________________________  LABS:                        9.2    9.28  )-----------( 617      ( 19 Feb 2020 05:49 )             30.2     02-19    142  |  110<H>  |  13  ----------------------------<  94  4.4   |  27  |  1.01    Ca    8.2<L>      19 Feb 2020 05:49    TPro  5.4<L>  /  Alb  1.7<L>  /  TBili  0.3  /  DBili  0.1  /  AST  20  /  ALT  8<L>  /  AlkPhos  79  02-18        CAPILLARY BLOOD GLUCOSE            RADIOLOGY & ADDITIONAL TESTS:    Imaging  Reviewed:  YES/No  no new orderes    Consultant(s) Notes Reviewed:   YES  GI      Plan of care was discussed with patient and /or primary care giver; all questions and concerns were addressed

## 2020-02-20 ENCOUNTER — TRANSCRIPTION ENCOUNTER (OUTPATIENT)
Age: 85
End: 2020-02-20

## 2020-02-20 VITALS
OXYGEN SATURATION: 97 % | TEMPERATURE: 98 F | RESPIRATION RATE: 16 BRPM | HEART RATE: 81 BPM | SYSTOLIC BLOOD PRESSURE: 107 MMHG | DIASTOLIC BLOOD PRESSURE: 65 MMHG

## 2020-02-20 LAB
ALBUMIN SERPL ELPH-MCNC: 1.9 G/DL — LOW (ref 3.5–5)
ALP SERPL-CCNC: 89 U/L — SIGNIFICANT CHANGE UP (ref 40–120)
ALT FLD-CCNC: 6 U/L DA — LOW (ref 10–60)
ANION GAP SERPL CALC-SCNC: 4 MMOL/L — LOW (ref 5–17)
AST SERPL-CCNC: 12 U/L — SIGNIFICANT CHANGE UP (ref 10–40)
BILIRUB SERPL-MCNC: 0.3 MG/DL — SIGNIFICANT CHANGE UP (ref 0.2–1.2)
BUN SERPL-MCNC: 13 MG/DL — SIGNIFICANT CHANGE UP (ref 7–18)
CALCIUM SERPL-MCNC: 8.2 MG/DL — LOW (ref 8.4–10.5)
CHLORIDE SERPL-SCNC: 109 MMOL/L — HIGH (ref 96–108)
CO2 SERPL-SCNC: 28 MMOL/L — SIGNIFICANT CHANGE UP (ref 22–31)
CREAT SERPL-MCNC: 0.99 MG/DL — SIGNIFICANT CHANGE UP (ref 0.5–1.3)
GLUCOSE SERPL-MCNC: 83 MG/DL — SIGNIFICANT CHANGE UP (ref 70–99)
HCT VFR BLD CALC: 33.4 % — LOW (ref 39–50)
HGB BLD-MCNC: 9.9 G/DL — LOW (ref 13–17)
MCHC RBC-ENTMCNC: 29.6 GM/DL — LOW (ref 32–36)
MCHC RBC-ENTMCNC: 30.2 PG — SIGNIFICANT CHANGE UP (ref 27–34)
MCV RBC AUTO: 101.8 FL — HIGH (ref 80–100)
NRBC # BLD: 0 /100 WBCS — SIGNIFICANT CHANGE UP (ref 0–0)
PLATELET # BLD AUTO: 609 K/UL — HIGH (ref 150–400)
POTASSIUM SERPL-MCNC: 4.4 MMOL/L — SIGNIFICANT CHANGE UP (ref 3.5–5.3)
POTASSIUM SERPL-SCNC: 4.4 MMOL/L — SIGNIFICANT CHANGE UP (ref 3.5–5.3)
PROT SERPL-MCNC: 6 G/DL — SIGNIFICANT CHANGE UP (ref 6–8.3)
RBC # BLD: 3.28 M/UL — LOW (ref 4.2–5.8)
RBC # FLD: 15.6 % — HIGH (ref 10.3–14.5)
SODIUM SERPL-SCNC: 141 MMOL/L — SIGNIFICANT CHANGE UP (ref 135–145)
WBC # BLD: 7.36 K/UL — SIGNIFICANT CHANGE UP (ref 3.8–10.5)
WBC # FLD AUTO: 7.36 K/UL — SIGNIFICANT CHANGE UP (ref 3.8–10.5)

## 2020-02-20 PROCEDURE — 97110 THERAPEUTIC EXERCISES: CPT

## 2020-02-20 PROCEDURE — 87641 MR-STAPH DNA AMP PROBE: CPT

## 2020-02-20 PROCEDURE — 36415 COLL VENOUS BLD VENIPUNCTURE: CPT

## 2020-02-20 PROCEDURE — C1769: CPT

## 2020-02-20 PROCEDURE — 92610 EVALUATE SWALLOWING FUNCTION: CPT

## 2020-02-20 PROCEDURE — 85027 COMPLETE CBC AUTOMATED: CPT

## 2020-02-20 PROCEDURE — 87640 STAPH A DNA AMP PROBE: CPT

## 2020-02-20 PROCEDURE — 80053 COMPREHEN METABOLIC PANEL: CPT

## 2020-02-20 PROCEDURE — 99285 EMERGENCY DEPT VISIT HI MDM: CPT

## 2020-02-20 PROCEDURE — 85730 THROMBOPLASTIN TIME PARTIAL: CPT

## 2020-02-20 PROCEDURE — 86900 BLOOD TYPING SEROLOGIC ABO: CPT

## 2020-02-20 PROCEDURE — 80048 BASIC METABOLIC PNL TOTAL CA: CPT

## 2020-02-20 PROCEDURE — 85610 PROTHROMBIN TIME: CPT

## 2020-02-20 PROCEDURE — 71045 X-RAY EXAM CHEST 1 VIEW: CPT

## 2020-02-20 PROCEDURE — 76000 FLUOROSCOPY <1 HR PHYS/QHP: CPT

## 2020-02-20 PROCEDURE — 94640 AIRWAY INHALATION TREATMENT: CPT

## 2020-02-20 PROCEDURE — 81001 URINALYSIS AUTO W/SCOPE: CPT

## 2020-02-20 PROCEDURE — 97164 PT RE-EVAL EST PLAN CARE: CPT

## 2020-02-20 PROCEDURE — 86901 BLOOD TYPING SEROLOGIC RH(D): CPT

## 2020-02-20 PROCEDURE — 97162 PT EVAL MOD COMPLEX 30 MIN: CPT

## 2020-02-20 PROCEDURE — 86850 RBC ANTIBODY SCREEN: CPT

## 2020-02-20 PROCEDURE — 80076 HEPATIC FUNCTION PANEL: CPT

## 2020-02-20 PROCEDURE — 88304 TISSUE EXAM BY PATHOLOGIST: CPT

## 2020-02-20 PROCEDURE — 97530 THERAPEUTIC ACTIVITIES: CPT

## 2020-02-20 RX ADMIN — PANTOPRAZOLE SODIUM 40 MILLIGRAM(S): 20 TABLET, DELAYED RELEASE ORAL at 05:36

## 2020-02-20 RX ADMIN — CALAMINE AND ZINC OXIDE AND PHENOL 1 APPLICATION(S): 160; 10 LOTION TOPICAL at 11:24

## 2020-02-20 RX ADMIN — Medication 100 MICROGRAM(S): at 05:36

## 2020-02-20 RX ADMIN — APIXABAN 5 MILLIGRAM(S): 2.5 TABLET, FILM COATED ORAL at 05:36

## 2020-02-20 RX ADMIN — Medication 1 APPLICATION(S): at 05:37

## 2020-02-20 RX ADMIN — RISPERIDONE 0.25 MILLIGRAM(S): 4 TABLET ORAL at 11:23

## 2020-02-20 RX ADMIN — SENNA PLUS 3 TABLET(S): 8.6 TABLET ORAL at 11:24

## 2020-02-20 RX ADMIN — LISINOPRIL 2.5 MILLIGRAM(S): 2.5 TABLET ORAL at 05:36

## 2020-02-20 NOTE — PROGRESS NOTE ADULT - PROBLEM SELECTOR PROBLEM 2
Bile duct calculus
HTN (hypertension)
Bile duct calculus

## 2020-02-20 NOTE — PROGRESS NOTE ADULT - PROBLEM SELECTOR PROBLEM 7
Bile duct calculus
Discharge planning issues
Goals of care, counseling/discussion
Discharge planning issues

## 2020-02-20 NOTE — PROGRESS NOTE ADULT - PROBLEM SELECTOR PROBLEM 4
DVT of deep femoral vein
Hypothyroid
DVT of deep femoral vein

## 2020-02-20 NOTE — PROGRESS NOTE ADULT - PROBLEM SELECTOR PLAN 1
s/p lap blayne on 2/4  s/p AMRYJO drain removal of 2/12   no s/s of pain on exam
s/p lap blayne on 2/4  s/p MARYJO drain removal of 2/12   no s/s of pain on exam
s/p  cholesystectomy on 2/4
s/p  cholesystectomy on 2/4  removal of drainage today as per
s/p  cholesystectomy on 2/4  removal of drainage today as per
s/p cholesystostomy  for cholesystectomy
s/p cholesystostomy  for cholesystectomy on 2/4
s/p lap blayne on 2/4  s/p MARYJO drain removal of 2/12   no s/s of pain on exam

## 2020-02-20 NOTE — PROGRESS NOTE ADULT - PROBLEM SELECTOR PLAN 5
elevated Plt 617, likely due to chronic inflammatory dxs and hx DVT. on AC.  no bleeding  monitor CBC daily.
no DVT ppx -- pt is on eliquis  c/w pantoprazole for GI ppx
on elliquis  needs to change to iv heparine 48 hours before surgery   heparine to be held 6 hours before surgery
on elliquis  needs to change to iv heparine 48 hours before surgery   heparine to be held 6 hours before surgery
on iv heparine    heparine to be held 6 hours before surgery
on iv heparine    heparine to be held 6 hours before surgery
on iv heparine    may d/c in am and restart elliquis
on iv heparine    on hold for sugery  heparine to be held 6 hours before surgery
no DVT ppx -- pt is on eliquis  c/w pantoprazole for GI ppx

## 2020-02-20 NOTE — PROGRESS NOTE ADULT - ATTENDING COMMENTS
Agree with above  Awaiting Medical and Cardiology clearance
Agree with above  Awaiting medical clearance
Agree with above.  ERCP and stent today
Condition discussed with patient, options risks and benefits explained  Laparoscopic cholecystectomy, possible open in AM
Patient was seen and examined ,interim events noted,Laboratory and Imaging studies were reviewed.  Thank you for the courtesy of the consultation,I would be available for any further discussion if needed.  Gilbert Jaffe MD,FACC.  [address] [place] [address] [phone]
Agree with above  For laparoscopic cholecystectomy early next week
Agree with above  No abdominal pain  As per Medicine
cardiology eval and clearance  done by dr flores
cardiology eval and clearance by dr flores
cardiology eval and clearance by dr flores before surgery
cardiology eval and clearance by dr flores before surgery  adjust heparine drip as per ptt
gi for ercp , dr elma  but unsuccessful, also seen by dr marks    may d/c heparine in am and resume snow if no further surgical intervention are scheduled in near future
gi for ercp , dr lema  but unsuccessful, also seen by dr marks    possible ercp again tomorrow  monitor ptt closely d/c heparine 6 hours before procedure
gi for ercp , dr lema  but unsuccessful, also seen by dr marks    possible ercp agin early next week   monitor ptt closely
gi for ercp , dr lema on case   at [facility]   resume anticogulation 24 hours after surgery/procedure
gi for ercp , dr lema on case   at [facility]
dc planning back to [facility]   repeat lft in [facility]
dc planning back to [facility]   repeat lft this monday
dc planning back to [facility]   repeat lft this monday
dc planning back to [facility]   repeat lft tomorrow
dc planning back to [facility]   waiting bed in [facility]    repeat lft
patient is clinically stable   d/c back to [facility] for [facility]hab
patient is clinically stable at present for d/c back to [facility]   no bed available in [facility] at present
patient is clinically stable at present for d/c back to [facility] today
resume anticoagulation as per surgical protocol

## 2020-02-20 NOTE — PROGRESS NOTE ADULT - PROBLEM SELECTOR PROBLEM 3
Atrial fibrillation
DVT of deep femoral vein
Atrial fibrillation

## 2020-02-20 NOTE — PROGRESS NOTE ADULT - PROBLEM SELECTOR PROBLEM 6
Goals of care, counseling/discussion
Prophylactic measure
Goals of care, counseling/discussion

## 2020-02-20 NOTE — PROGRESS NOTE ADULT - PROBLEM SELECTOR PROBLEM 5
Prophylactic measure
Thrombocytosis
Atrial fibrillation
Prophylactic measure

## 2020-02-20 NOTE — PROGRESS NOTE ADULT - PROBLEM SELECTOR PLAN 2
tried ERCP twice but unsuccessful outcome  LTFs remain WNL  monitor LFTs
continue 'lisinopril
tried ERCP twice but unsuccessful outcome  LTFs remain WNL  monitor LFTs

## 2020-02-20 NOTE — PROGRESS NOTE ADULT - PROBLEM SELECTOR PROBLEM 1
Cholecystitis, chronic

## 2020-02-20 NOTE — PROGRESS NOTE ADULT - SUBJECTIVE AND OBJECTIVE BOX
HPI- 89 y.o. M with PMH cholecystitis s/p IR PTC tube insertion, DVT on AC, hypothyroidism, afib, HTN presents to [facility] ER for management of PTC tube.   He was taken to the OR for laparoscopic cholecystectomy which was converted to open cholecystectomy with Lysis of adhesions on 2/4, intraoperative cholangiogram was positive. A MARYJO drain was left in place till yesterday and Eliquis resume. He was taken for an ERCP on 2/6 which was unsuccessful. ERCP was repeated by a different GI attending on 2/10 which was also unsuccessful, however pancreatic duct was able to be cannulated at that time. Cabrales catheter was removed on 2/11 for TOV, failed,  catheter was reinserted. LFTs normalized. PT attempt to evaluate pt but unable to evaluate, recommendation is to EDIE. awaiting for bed availability.       INTERVAL HPI/OVERNIGHT EVENTS: no new complaints  MEDICATIONS  (STANDING):  ammonium lactate 12% Lotion 1 Application(s) Topical two times a day  apixaban 5 milliGRAM(s) Oral every 12 hours  calamine Lotion 1 Application(s) Topical daily  levothyroxine 100 MICROGram(s) Oral daily  lisinopril 2.5 milliGRAM(s) Oral daily  mirtazapine 7.5 milliGRAM(s) Oral at bedtime  pantoprazole  Injectable 40 milliGRAM(s) IV Push every 12 hours  risperiDONE   Tablet 0.25 milliGRAM(s) Oral daily  senna 3 Tablet(s) Oral daily  tamsulosin 0.4 milliGRAM(s) Oral at bedtime    MEDICATIONS  (PRN):  acetaminophen   Tablet .. 650 milliGRAM(s) Oral every 6 hours PRN Temp greater or equal to 38C (100.4F)  ondansetron Injectable 4 milliGRAM(s) IV Push every 6 hours PRN Nausea  PAST MEDICAL & SURGICAL HISTORY:  PVD (peripheral vascular disease)  Acalculous cholecystitis: s/p percutaneous cholecystostomy  HTN (hypertension)  Hypothyroid  Ulcers of both lower extremities  No significant past surgical history    __________________________________________________  REVIEW OF SYSTEMS:  Limited ROS due to impaired cognition  CONSTITUTIONAL: No fever,   EYES: no acute visual disturbances  RESPIRATORY: No cough; No shortness of breath  CARDIOVASCULAR: No chest pain,   GASTROINTESTINAL: No pain. No nausea or vomiting   NEUROLOGICAL: No headache    MUSCULOSKELETAL: No joint pain, no muscle pain  GENITOURINARY: no pain around cabrales insertion site.   PSYCHIATRY: no depression , no anxiety  ALL OTHER  ROS negative        Vital Signs Last 24 Hrs  T(C): 36.5 (20 Feb 2020 12:55), Max: 37.6 (19 Feb 2020 20:55)  T(F): 97.7 (20 Feb 2020 12:55), Max: 99.6 (19 Feb 2020 20:55)  HR: 81 (20 Feb 2020 12:55) (75 - 87)  BP: 107/65 (20 Feb 2020 12:55) (107/65 - 124/67)  BP(mean): --  RR: 16 (20 Feb 2020 12:55) (16 - 17)  SpO2: 97% (20 Feb 2020 12:55) (92% - 97%)    ________________________________________________  PHYSICAL EXAM:  GENERAL: NAD. lying in bed.   HEENT: Normocephalic;  conjunctivae and sclerae clear; moist mucous membranes;   NECK : supple  CHEST/LUNG: Clear to auscultation bilaterally with good air entry   HEART: S1 S2  regular; no murmurs, gallops or rubs  ABDOMEN: Soft, Nontender, Nondistended; Bowel sounds present  : cabrales in place. clear urine draining.   EXTREMITIES: no cyanosis; no edema; no calf tenderness  SKIN: warm and dry; no rash  NERVOUS SYSTEM:  Awake and alert; Oriented  to self  _________________________________________________    LABS:                        9.2    9.28  )-----------( 617      ( 19 Feb 2020 05:49 )             30.2     02-19    142  |  110<H>  |  13  ----------------------------<  94  4.4   |  27  |  1.01    Ca    8.2<L>      19 Feb 2020 05:49    TPro  5.4<L>  /  Alb  1.7<L>  /  TBili  0.3  /  DBili  0.1  /  AST  20  /  ALT  8<L>  /  AlkPhos  79  02-18                          LABS:                        8.6    8.32  )-----------( 567      ( 18 Feb 2020 08:05 )             28.1     02-18    142  |  110<H>  |  10  ----------------------------<  88  4.2   |  26  |  0.95    Ca    8.0<L>      18 Feb 2020 08:05    TPro  5.4<L>  /  Alb  1.7<L>  /  TBili  0.3  /  DBili  0.1  /  AST  20  /  ALT  8<L>  /  AlkPhos  79  02-18        CAPILLARY BLOOD GLUCOSE            RADIOLOGY & ADDITIONAL TESTS:    Imaging  Reviewed:  YES  no new orders  Consultant(s) Notes Reviewed:   YES  GI,     Plan of care was discussed with patient and /or primary care giver; all questions and concerns were addressed

## 2020-02-20 NOTE — PROGRESS NOTE ADULT - PROBLEM SELECTOR PLAN 7
full code
going for repeat ercp
medically optimized for discharge back to [facility], facility does not have a bed today, d/c when bed is available
medically optimized for discharge back to [facility], facility does not have a bed today, d/c when bed is available
medically optimized for discharge back to [facility], facility does not have a bed today, d/c when bed is available
medically optimized for discharge back to [facility], facility does not have a bed today, d/c when bed is available
medically optimized for discharge back to [facility], facility does not have a bed today, d/c when bed is available
medically optimized for discharge back to [facility], facility does not have a bed today, d/c when bed is available
medically optimized for discharge back to [facility], facility does not have a bed today, d/c when bed is available
medically optimized for discharge back to [facility], facility does not have a bed today, d/c when bed is available
unsuccessful ercp extraction
medically optimized for discharge back to [facility], facility does not have a bed today, d/c when bed is available

## 2020-02-20 NOTE — PROGRESS NOTE ADULT - REASON FOR ADMISSION
Chronic cholecystitis

## 2020-02-20 NOTE — DISCHARGE NOTE NURSING/CASE MANAGEMENT/SOCIAL WORK - PATIENT PORTAL LINK FT
You can access the FollowMyHealth Patient Portal offered by [facility] by registering at the following website: [email or web address]. By joining [employer]’s FollowMyHealth portal, you will also be able to view your health information using other applications (apps) compatible with our system.

## 2020-02-20 NOTE — PROGRESS NOTE ADULT - PROBLEM SELECTOR PLAN 3
s/p heparin drip   c/w eliquis
elliquis  d/c eliquis 48 hours before sugery
iv heparine   monitor ptt
iv heparine   monitor ptt
iv heparine  on hold for surgery  monitor ptt
snow
s/p heparin drip   c/w eliquis

## 2020-02-20 NOTE — PROGRESS NOTE ADULT - PROBLEM SELECTOR PLAN 6
full code
no DVT ppx -- pt is on eliquis  c/w pantoprazole for GI ppx
full code

## 2020-02-20 NOTE — PROGRESS NOTE ADULT - NSHPATTENDINGPLANDISCUSS_GEN_ALL_CORE
, surgical pa and patient's family.
, surgical team and patient's family.
NP  and patient's family.
resident  and patient's family.
house staff covering

## 2020-02-20 NOTE — PROGRESS NOTE ADULT - PROBLEM SELECTOR PLAN 4
c/w eliquis  monitor for bleeding
c/w vaishali
levothyroxine
c/w vaishali

## 2020-09-17 NOTE — PATIENT PROFILE ADULT - CAREGIVER RELATION TO PATIENT
----- Message from Kim Khanna sent at 9/17/2020  4:19 PM CDT -----  Pt is calling to speak with the nurse and would like for the nurse to give him a call back about two rx that Dr. Mayorga sent to his pharmacy      wife

## 2021-02-17 NOTE — H&P ADULT - NSHPPHYSICALEXAM_GEN_ALL_CORE
Vital Signs Last 24 Hrs  T(C): 37.1 (15 Nov 2019 23:38), Max: 38 (15 Nov 2019 16:21)  T(F): 98.7 (15 Nov 2019 23:38), Max: 100.4 (15 Nov 2019 16:21)  HR: 83 (15 Nov 2019 23:38) (83 - 101)  BP: 105/55 (15 Nov 2019 23:38) (103/88 - 119/89)  BP(mean): --  RR: 18 (15 Nov 2019 23:38) (17 - 20)  SpO2: 98% (15 Nov 2019 23:38) (95% - 98%)    PHYSICAL EXAM:  GENERAL: NAD, well-developed  HEAD:  Atraumatic, Normocephalic  EYES: EOMI, PERRLA, conjunctiva and sclera clear  NECK: Supple, No JVD  CHEST/LUNG: Clear to auscultation bilaterally; No wheeze  HEART: Regular rate and rhythm; s1+ s2+  ABDOMEN: Soft, Nontender, Nondistended; Bowel sounds present  EXTREMITIES: b/l foot erythema extending up to anterior part of lower leg, associated with redness and warmness, no tenderness   NEUROLOGY:AAOx3 non-focal  SKIN: No rashes or lesions Consent (Scalp)/Introductory Paragraph: The rationale for Mohs was explained to the patient and consent was obtained. The risks, benefits and alternatives to therapy were discussed in detail. Specifically, the risks of changes in hair growth pattern secondary to repair, infection, scarring, bleeding, prolonged wound healing, incomplete removal, allergy to anesthesia, nerve injury and recurrence were addressed. Prior to the procedure, the treatment site was clearly identified and confirmed by the patient. All components of Universal Protocol/PAUSE Rule completed.

## 2021-04-20 NOTE — PROGRESS NOTE ADULT - ATTENDING COMMENTS
From: Vivien Storey  To: Flaco Valdez MD  Sent: 4/20/2021 7:22 AM CDT  Subject: Non-Urgent Medical Question    This message is being sent by Miguel Britton on behalf of Vivien Storey. Francia Arguello has been complaining about a sore throat for two days now. I did not take him to get swab because he has no fever and no white patches in the back of his throat. His throat does appear to be irritated but I told him it was just drainage. Now he has some weird splotches on his hands and his feet. Could this be hand-foot-and-mouth? Patient seen/evaluated at bedside 11/16/2019. I agree with the resident progress note/outlined plan of care. My independent findings and conclusions are documented.    Pt states in general, he feels unwell and that both of his legs hurt. Spouse at bedside. She denies any discharge from LE. No diarrhea. (+) intermittent mostly dry cough    Patient's spouse states pt is status post recent course of abx from [facility] at [address] [place] [address] for lower extremity cellulitis    1. sepsis possibly from   2. LE cellullitis  3. ARMIN  4. cough  5. deconditioning/debility  6. atrial fibrillation  7. hypothyroidism    -check renal ultrasound w/ post void residual  -unaysn w/ doxycycline  -CT chest  -call outpt pharmacy for most recent abx course  physical therapy consult  update and plan of care provided to spouse at bedside

## 2021-04-27 NOTE — ED CLERICAL - NS ED CLERK UNITS
The History and Physical has been reviewed, the patient has been examined and no changes have occurred in the patient's condition since the H & P was completed.       
The History and Physical has been reviewed, the patient has been examined and no changes have occurred in the patient's condition since the H & P was completed.       
[id number]/[id number]

## 2021-08-30 NOTE — PHYSICAL THERAPY INITIAL EVALUATION ADULT - LEVEL OF INDEPENDENCE: SUPINE/SIT, REHAB EVAL
Caller: Luda Johnson    Relationship: Self    Best call back number: [phone]    What is the best time to reach you: ANYTIME    Who are you requesting to speak with (clinical staff, provider,  specific staff member): MEDICAL STAFF    What was the call regarding: PATIENT STATES THAT desvenlafaxine 25 MG tablet sustained-release 24 hour WAS CANCELLED. PLEASE CALL PATIENT TO ADVISE.   maximum assist (25% patients effort)

## 2021-12-30 NOTE — PHYSICAL THERAPY INITIAL EVALUATION ADULT - NAME OF CLINICIAN
CONTACT INFORMATION:  The main office phone number is [phone]. For emergencies after hours and on weekends, this number will convert over to our answering service and the on call provider will answer. Please try to keep non emergent phone calls/ questions to office hours 9am-5pm Monday through Friday.     Sm[employer]rer  As an alternative, you can sign up and use the Epic MyChart system for more direct and quicker access for non emergent questions/ problems.  [facility] Smarterer allows you to send messages to your doctor, view your test results, renew your prescriptions, schedule appointments, and more. To sign up, go to Answer.To and click on the Sign Up Now link in the New User? box. Enter your Smarterer Activation Code exactly as it appears below along with the last four digits of your Social Security Number and your Date of Birth () to complete the sign-up process. If you do not sign up before the expiration date, you must request a new code.    Sm[employer] Activation Code: Activation code not generated  Smarterer account available for proxy use    If you have questions, you can email [email or web address] or call [phone] to talk to our Smarterer staff. Remember, Smarterer is NOT to be used for urgent needs. For medical emergencies, dial 911.        
RN

## 2021-12-30 NOTE — H&P ADULT - NSICDXPASTMEDICALHX_GEN_ALL_CORE_FT
Epidermal Closure: running PAST MEDICAL HISTORY:  Acalculous cholecystitis s/p percutaneous cholecystostomy    HTN (hypertension)     Hypothyroid     Ulcers of both lower extremities

## 2022-01-01 NOTE — DISCHARGE NOTE NURSING/CASE MANAGEMENT/SOCIAL WORK - NSDPDISTO_GEN_ALL_CORE
Skilled Nursing Facility For information on Fall & Injury Prevention, visit: [email or web address] OR  [email or web address] OR  https://www.cdc.gov/steadi/patient.html

## 2022-02-21 NOTE — PROGRESS NOTE ADULT - PROBLEM SELECTOR PLAN 6
Pt verbalized that he wants to die. indifference. loss of appetite. depressive mood.  He denies suicidal ideation with actual plan.  monitor mood/behavior.   Psych consulted. Pt verbalized that he wants to die. indifference. loss of appetite. depressive mood.  He denies suicidal ideation with actual plan.  monitor mood/behavior.   continue remeron.   Psych consulted. Abormal VS: Temp > 100F or < 96.8F; SBP < 90 mmHG; HR > 120bpm; Resp > 24/min

## 2022-04-01 NOTE — CONSULT NOTE ADULT - PROBLEM SELECTOR PROBLEM 1
.. Ongoing SW/CM Assessment/Plan of Care Note     See SW/CM flowsheets for goals and other objective data.    Progress note:  PATRICK/Heart Failure/NSTEMI. Transfer from med floor with SOB 3/30. Contin BiPAP. Milrinone IV Gtt. Ntg IV Gtt off and Dobutamine IV Gtt started. Lasix IV Gtt.  IV Abx. NPO. VQ scan pending. Pt may need HD/UF if inadequate response to diuretics.     Current Status  Physical Therapy: Pending.  Occupational Therapy: Pending.  Nutrition/SLP - Recommendations:    Current Mental Status:    Stressors:      Barriers to discharge:   Medical clearance    Discharge plan:  Return home vs SNF- await PT Eval    CM/SW team to continue to follow for discharge needs.   Cholecystitis, chronic

## 2022-05-17 NOTE — ED PROVIDER NOTE - CONDUCTED A DETAILED DISCUSSION WITH PATIENT AND/OR GUARDIAN REGARDING, MDM
25-May-2022 need for outpatient follow-up/return to ED if symptoms worsen, persist or questions arise/lab results
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
## 2022-09-20 NOTE — BEHAVIORAL HEALTH ASSESSMENT NOTE - NS ED B[facility] MED ROS ALLERGIC IMMUNOLOGIC
Sohan Peña is requesting a refill of:    Refused Prescriptions:                       Disp   Refills    venlafaxine (EFFEXOR XR) 75 MG 24 hr capsu*90 cap*3        Sig: TAKE ONE CAPSULE BY MOUTH EVERY DAY WITH FOOD  Refused By: MICHAEL YEE  Reason for Refusal: Patient needs appointment      
No complaints

## 2022-10-13 NOTE — CONSULT NOTE ADULT - NSPROBSELRECBLANK_6_GEN
Rec'd request from pharmacy requesting a 90 day supply of clonidine. Ok to refill this medication as a 90 day supply. Patient has a follow up scheduled with Bienvenido on 11/17/2022. Script sent to pharmacy.    DISPLAY PLAN FREE TEXT

## 2022-12-30 NOTE — ED ADULT NURSE NOTE - SEPSIS REFERENCE DATA CRITERIA 1
48-year-old female history of GERD presents evaluation of substernal chest pain described as heaviness past week.  Patient was placed in observation for evaluation.  Here screening labs are grossly unremarkable including 2 negative sets of cardiac enzymes.  Coronary CT demonstrates minimal to mild change in caliber at the LCx origin resulting from a hairpin turn, cad rad 1/2.  Patient is pain-free and discharged with aspirin statin Temp > 100F or < 96.8F; SBP < 90 mmHG; HR > 120bpm; Resp > 24/min

## 2023-02-09 NOTE — PACU DISCHARGE NOTE - AIRWAY PATENCY:
Satisfactory
Satisfactory
Abbe Flap (Lower To Upper Lip) Text: The defect of the upper lip was assessed and measured.  Given the location and size of the defect, an Abbe flap was deemed most appropriate.  Using a sterile surgical marker, an appropriate Abbe flap was measured and drawn on the lower lip. Local anesthesia was then infiltrated. A scalpel was then used to incise the upper lip through and through the skin, vermilion, muscle and mucosa, leaving the flap pedicled on the opposite side.  The flap was then rotated and transferred to the lower lip defect.  The flap was then sutured into place with a three layer technique, closing the orbicularis oris muscle layer with subcutaneous buried sutures, followed by a mucosal layer and an epidermal layer.

## 2023-08-17 NOTE — CONSULT NOTE ADULT - RESPIRATORY AND THORAX
negative Sotyktu Pregnancy And Lactation Text: There is insufficient data to evaluate whether or not Sotyktu is safe to use during pregnancy.   It is not known if Sotyktu passes into breast milk and whether or not it is safe to use when breastfeeding.

## 2023-08-17 NOTE — PROGRESS NOTE ADULT - SUBJECTIVE AND OBJECTIVE BOX
INTERVAL HPI/OVERNIGHT EVENTS:  Pt stable.   flatus/BM.    Vital Signs Last 24 Hrs  T(C): 36.6 (25 Nov 2019 10:57), Max: 38.3 (24 Nov 2019 13:22)  T(F): 97.8 (25 Nov 2019 10:57), Max: 101 (24 Nov 2019 13:22)  HR: 83 (25 Nov 2019 10:57) (83 - 103)  BP: 120/78 (25 Nov 2019 10:57) (112/64 - 134/84)  BP(mean): --  RR: 16 (25 Nov 2019 10:57) (16 - 18)  SpO2: 97% (25 Nov 2019 10:57) (93% - 99%)    Physical:  Abdomen: Soft nondistended, nontender this AM  No palpable masses    I&O's Summary    24 Nov 2019 07:01  -  25 Nov 2019 07:00  --------------------------------------------------------  IN: 1200 mL / OUT: 0 mL / NET: 1200 mL        LABS:                        11.7   24.15 )-----------( 474      ( 25 Nov 2019 08:13 )             35.3             11-25    150<H>  |  119<H>  |  32<H>  ----------------------------<  129<H>  3.7   |  24  |  1.25    Ca    8.4      25 Nov 2019 08:13    TPro  6.6  /  Alb  1.4<L>  /  TBili  1.4<H>  /  DBili  x   /  AST  119<H>  /  ALT  129<H>  /  AlkPhos  242<H>  11-25 No

## 2023-08-23 NOTE — ED PROVIDER NOTE - NS ED MD EM SELECTION
Daughter called EP  stating that the cardioversion scheduled for 8/22 was to be cancelled. She stated that this was decided at the July appointment with Dr. Barrera.     She also asks if the echo booked for 8/31 is necessary or the Sept appointment with Dr. Barrera.     Nancy Duque  [facility] Electrophysiology   [phone]    
EP  talked to the , explained that Theresa Ross wanted this cancelled until they see Dr. EMERITA Barrera again on 9/7 - confirmed the time     Nancy Duque  Periop Electrophysiology   [phone]    
[id number] Comprehensive

## 2023-10-05 NOTE — BEHAVIORAL HEALTH ASSESSMENT NOTE - SUICIDE RISK FACTORS
-- DO NOT REPLY / DO NOT REPLY ALL --  -- Message is from Engagement Center Operations (ECO) --    General Patient Message: Patient would like to schedule pneumonia vax     Caller Information       Type Contact Phone/Fax    10/05/2023 08:44 AM CDT Phone (Incoming) YAJAIRA POSADA (Emergency Contact) [phone]        Alternative phone number: none    Can a detailed message be left? Yes    Message Turnaround:     Is it Working Hours? Yes - Working Hours     [place]:    Please give this turnaround time to the caller:   \"This message will be sent to [state Provider's name]. The clinical team will fulfill your request as soon as they review your message.\"                
Pt advised and deja  
None Known

## 2023-10-06 NOTE — PATIENT PROFILE ADULT - NSPROMUTINFOINDIVIDFT_GEN_A_NUR
"I want to go home" H Plasty Text: Given the location of the defect, shape of the defect and the proximity to free margins a H-plasty was deemed most appropriate for repair. Using a sterile surgical marker, the appropriate advancement arms of the H-plasty were drawn incorporating the defect and placing the expected incisions within the relaxed skin tension lines where possible. The area thus outlined was incised deep to adipose tissue with a #15 scalpel blade. The skin margins were undermined to an appropriate distance in all directions utilizing iris scissors.  The opposing advancement arms were then advanced and carried over into place in opposite direction and anchored with interrupted buried subcutaneous sutures.

## 2024-02-07 NOTE — PATIENT PROFILE ADULT - NSPROGENBLOODRESTRICT_GEN_A_NUR
2/7/2024         RE: Matthieu Pearce  [address]  [facility] [address]        Dear Colleague,    Thank you for referring your patient, Matthieu Pearce, to the [facility]. Please see a copy of my visit note below.    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 Pearce is a 58 year old  female who presents for follow-up of colon cancer.  I initially saw her on 3/30/2022.  Please see my previous note for details.  I have copied and updated from prior notes.        I have also reviewed notes from Dr. Bolanos.    She had a colonoscopy on 1/24/2022 for work-up of iron deficiency anemia which showed a nonobstructing mass in the cecum and needle biopsy was consistent with colon adenocarcinoma.  There was loss of MSH6.  Further testing on the biopsy specimen demonstrates a high microsatellite instability phenotype (MSI-H; with 40% or more of analyzed markers unstable).    There was no evidence of metastasis on CT chest abdomen and pelvis and CEA was normal 1.3.    EGD was unremarkable.    On 2/22/2022 she had laparoscopy for right hemicolectomy by Dr. Bolanos.  Final pathology showed a 2.7 cm moderate to poorly differentiated invasive adenocarcinoma arising in the cecum invading into the muscularis propria.  No lymphovascular or perineural invasion was seen.  Tumor budding was seen. All margins were negative.  15 lymph nodes were removed and all were benign.  It was a pT2N0 lesion.    Initially prior to the colonoscopy she was noticing fatigue for a few months.  She was also having restless legs.  There was a time when she was craving for water as well.  She had a feeling of incomplete emptying in and constipation and had noticed black-colored stools twice.  She was also off-and-on feeling lightheaded.  Started oral iron  twice daily in Dec 2021. She stopped 1 week before surgery so took for about 6 weeks or so.  Surgery went well and when she saw me in March 2022, she was feeling very good.    I did not recommend adjuvant chemotherapy and she was recommended to continue with surveillance for colon cancer.      She had total abdominal hysterectomy/bilateral salpingo-oophorectomy in December 2022.  I also reviewed notes from colorectal surgery.  She had anal fissure which was treated with topical diltiazem and she was told to start fiber.  She had a colonoscopy in January 2023 which was essentially unremarkable.          Interval history.    She is feeling well.  Denies any pain.  Abdomen feels well.  No nausea vomiting diarrhea constipation.  Denies any bleeding.  No infections or fevers or shortness of breath.  In September 2025 she was feeling very nauseous lightheaded and had hypotension and was evaluated and was noted to have hyponatremia.  She recovered on her own.  Since then she has been doing well.  She does notice that there is a small keloid scar near the bellybutton which is sensitive when she is unable to wear a belt so she wants something to be done about it.        ECOG 0    ROS:  A ROS was otherwise neg    I reviewed other history in epic as below.      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    MSH6 mutation [id number] G2 [employer] 2/22/2022     PPD positive     bcg as child, cxr neg     Recurrent cold sores      Past Surgical History:   Procedure Laterality Date     C/SECTION, LOW TRANSVERSE       COLONOSCOPY N/A 01/24/2022    Procedure: COLONOSCOPY, WITH POLYPECTOMY  AND BIOPSY;  Surgeon: Jalen Peterson MD;  Location: [facility] OR     COLONOSCOPY N/A 01/24/2022    Procedure: COLONOSCOPY, FLEXIBLE, WITH LESION REMOVAL USING SNARE;  Surgeon: Jalen Peterson MD;  Location: [facility] OR     COLONOSCOPY N/A 01/25/2023    Procedure: COLONOSCOPY;  Surgeon: Perry Bolanos MD;  Location: [facility] OR     COLONOSCOPY N/A 1/31/2024    Procedure: Colonoscopy;  Surgeon: Perry Bolanos MD;  Location: [facility] OR     COLONOSCOPY WITH CO2 INSUFFLATION N/A 08/19/2016    Procedure: COLONOSCOPY WITH CO2 INSUFFLATION;  Surgeon: Manuel Paz MD;  Location: [facility] OR     COLONOSCOPY WITH CO2 INSUFFLATION N/A 01/24/2022    Procedure: COLONOSCOPY, WITH CO2 INSUFFLATION;  Surgeon: Jalen Peterson MD;  Location: [facility] OR     COMBINED ESOPHAGOSCOPY, GASTROSCOPY, DUODENOSCOPY (EGD) WITH CO2 INSUFFLATION N/A 01/24/2022    Procedure: ESOPHAGOGASTRODUODENOSCOPY, WITH CO2 INSUFFLATION;  Surgeon: Jalen Peterson MD;  Location: [facility] OR     ESOPHAGOSCOPY, GASTROSCOPY, DUODENOSCOPY (EGD), COMBINED N/A 01/24/2022    Procedure: ESOPHAGOGASTRODUODENOSCOPY, WITH BIOPSY;  Surgeon: Jalen Peterson MD;  Location: [facility] OR     HEMORRHOIDECTOMY       HYSTERECTOMY, PAP NO LONGER INDICATED       LAPAROSCOPIC ASSISTED COLECTOMY  02/22/2022    Laparoscopic right jose-colectomy with Dr. Mitchell     LAPAROSCOPIC HYSTERECTOMY TOTAL, BILATERAL SALPINGO-OOPHORECTOMY, COMBINED Bilateral 12/02/2022    Procedure: Total laparoscopic hysterectomy, bilateral salpingo-oophorectomy, scar excision and revision;  Surgeon: Pastor Trejo MD;  Location: [facility] OR     Cancer History:   As above    Allergies:  Allergies as of 02/07/2024 - Reviewed 01/31/2024   Allergen Reaction Noted     Seasonal allergies  07/01/2010     Current Medications:  Current Outpatient Medications   Medication Sig Dispense Refill     acetaminophen (TYLENOL) 325 MG tablet Take 2 tablets (650 mg) by mouth  every 6 hours as needed for mild pain 24 tablet 0     Cyanocobalamin (B-12 PO)        diltiazem 2% in PLO gel To anal opening three times daily.  Use a pea-sized amount.  Store at room temperature. 60 g 3     diltiazem 2% in PLO gel Apply 120 clicks (30 g) topically 3 times daily as needed 30 g 1     fluticasone-salmeterol (ADVAIR) 500-50 MCG/ACT inhaler Inhale 1 puff into the lungs every 12 hours 60 each 1     levalbuterol (XOPENEX HFA) 45 MCG/ACT inhaler Inhale 2 puffs into the lungs every 6 hours as needed for shortness of breath or wheezing 15 g 1     levalbuterol (XOPENEX HFA) 45 MCG/ACT inhaler Inhale 2 puffs into the lungs every 4 hours as needed for shortness of breath or wheezing 15 g 0     levalbuterol (XOPENEX) 1.25 MG/3ML neb solution Take 3 mLs (1.25 mg) by nebulization every 4 hours as needed for shortness of breath or wheezing 90 mL 1     montelukast (SINGULAIR) 10 MG tablet Take 1 tablet (10 mg) by mouth at bedtime 90 tablet 3     ondansetron (ZOFRAN ODT) 4 MG ODT tab Take 1 tablet (4 mg) by mouth every 8 hours as needed for nausea 20 tablet 0     rizatriptan (MAXALT) 5 MG tablet TAKE 1 TABLET (5 MG) AT ONSET OF HEADACHE FOR MIGRAINE MAY REPEAT IN 2 HOURS IF NOT BENEFICIAL 18 tablet 0     Sodium Sulfate-Mag Sulfate-KCl 4985-249-527 MG TABS Take 12 tablets by mouth 2 times daily Take as directed. Step 1: At 4 pm, open one bottle of 12 tablets Step 2: Fill the provided container with 16 ounces of water (up to the fill line). Swallow 1 tablet every 1 to 2 minutes, you should finish the 12 tablets and the entire 16 ounces of water within 20 minutes.  Step 3: Approximately 1 hour after the last tablet is ingested, fill the provided container again with 16 ounces of water (up to the fill line), and drink the entire amount over 30 minutes. Step 4: Approximately 30 minutes after finishing the second container of water, fill the provided container with 16 ounces of water (up to the fill ine, and drink the  entire amount over 30 minutes. If you arrive for your procedure before 11 AM - At 8 PM, open the second bottle of 12 tablets.  Repeat step 1 to step 4 again. If you arrive for your procedure after 11 AM -At 6 AM on the day of the exam open the second bottle of 12 tablets.  Repeat step 1 to step 4 again. 24 tablet 0     Sodium Sulfate-Mag Sulfate-KCl 7844-981-683 MG TABS Take 12 tablets by mouth 2 times daily Take as directed. Step 1: At 4 pm, open one bottle of 12 tablets Step 2: Fill the provided container with 16 ounces of water (up to the fill line). Swallow 1 tablet every 1 to 2 minutes, you should finish the 12 tablets and the entire 16 ounces of water within 20 minutes.  Step 3: Approximately 1 hour after the last tablet is ingested, fill the provided container again with 16 ounces of water (up to the fill line), and drink the entire amount over 30 minutes. Step 4: Approximately 30 minutes after finishing the second container of water, fill the provided container with 16 ounces of water (up to the fill ine, and drink the entire amount over 30 minutes. If you arrive for your procedure before 11 AM - At 8 PM, open the second bottle of 12 tablets.  Repeat step 1 to step 4 again. If you arrive for your procedure after 11 AM -At 6 AM on the day of the exam open the second bottle of 12 tablets.  Repeat step 1 to step 4 again. 24 tablet 0     spacer (OPTICHAMBER [place]) holding chamber Use with Inhalers 1 each 0     venlafaxine (EFFEXOR XR) 75 MG 24 hr capsule Take 1 capsule (75 mg) by mouth daily 90 capsule 0      Family History:  Family History   Problem Relation Age of Onset     C.A.D. Mother      Cerebrovascular Disease Father          age 89 stroke     Prostate Cancer Father 85     C.A.D. Sister      Diabetes Sister      Breast Cancer Sister 40         at 46, mastectomy and chemo     Septicemia Sister      Coronary Artery Disease Sister      Diabetes Brother      Liver Cancer Brother 68         at  "66, significant ETOH, smoking     Coronary Artery Disease Brother 60     Cancer Maternal Grandfather 47        \"cancer of the knee\";  at 47     Cancer Maternal Aunt      Cancer Maternal Uncle      Breast Cancer Paternal Cousin         two paternal cousins, unknown ages     Other - See Comments Son         negaibacilio MSH gene     Asthma No family hx of      Hypertension No family hx of      Cancer - colorectal No family hx of        Sister with breast cancer  Maternal grand father had cancer around knee  Father prostate cancer    Social History:  Social History     Socioeconomic History     Marital status:      Spouse name: Eddie     Number of children: 1     Years of education: Not on file     Highest education level: Not on file   Occupational History     Employer: [employer]   Tobacco Use     Smoking status: Never     Passive exposure: Never     Smokeless tobacco: Never   Vaping Use     Vaping Use: Never used   Substance and Sexual Activity     Alcohol use: Yes     Drug use: No     Sexual activity: Yes     Partners: Male     Birth control/protection: Surgical   Other Topics Concern     Parent/sibling w/ CABG, MI or angioplasty before 65F 55M? No      Service No     Blood Transfusions No     Caffeine Concern Yes     Occupational Exposure No     Hobby Hazards No     Sleep Concern No     Stress Concern No     Weight Concern No     Special Diet Yes     Comment: low cholesterol     Back Care No     Exercise Yes     Bike Helmet Not Asked     Seat Belt Yes     Self-Exams Yes   Social History Narrative     Not on file     Social Determinants of Health     Financial Resource Strain: Low Risk  (2023)    Financial Resource Strain      Within the past 12 months, have you or your family members you live with been unable to get utilities (heat, electricity) when it was really needed?: No   Food Insecurity: Low Risk  (2023)    Food Insecurity      Within the past 12 months, did you worry that your " "food would run out before you got money to buy more?: No      Within the past 12 months, did the food you bought just not last and you didn t have money to get more?: No   Transportation Needs: Low Risk  (12/20/2023)    Transportation Needs      Within the past 12 months, has lack of transportation kept you from medical appointments, getting your medicines, non-medical meetings or appointments, work, or from getting things that you need?: No   Physical Activity: Not on file   Stress: Not on file   Social Connections: Not on file   Interpersonal Safety: Low Risk  (1/19/2024)    Interpersonal Safety      Do you feel physically and emotionally safe where you currently live?: Yes      Within the past 12 months, have you been hit, slapped, kicked or otherwise physically hurt by someone?: No      Within the past 12 months, have you been humiliated or emotionally abused in other ways by your partner or ex-partner?: No   Housing Stability: Low Risk  (12/20/2023)    Housing Stability      Do you have housing? : Yes      Are you worried about losing your housing?: No     Physical Exam:  /77 (BP Location: Left arm, Patient Position: Chair, Cuff Size: Adult Regular)   Pulse 74   Ht 1.6 m (5' 3\")   Wt 64.4 kg (142 lb)   LMP  (LMP Unknown)   SpO2 99%   BMI 25.15 kg/m    Wt Readings from Last 4 Encounters:   02/07/24 64.4 kg (142 lb)   01/31/24 63.5 kg (140 lb)   01/19/24 63.8 kg (140 lb 9.6 oz)   08/09/23 67.6 kg (149 lb)     CONSTITUTIONAL: no acute distress  EYES: PERRLA, no palor or icterus.   ENT/MOUTH: no mouth lesions. Ears normal  CVS: s1s2 no m r g .   RESPIRATORY: clear to auscultation b/l  GI: soft non tender no hepatosplenomegaly.  There is a small keloid scar near the umbilicus where she had previous incision.  It is sensitive to touch.  No sign of infection.  NEURO: AAOX3  Grossly non focal neuro exam  INTEGUMENT: no obvious rashes  LYMPHATIC: no palpable cervical, supraclavicular, axillary or inguinal " LAD  MUSCULOSKELETAL: Unremarkable. No bony tenderness.   EXTREMITIES: no edema  PSYCH: Mentation, mood and affect are normal. Decision making capacity is intact          Laboratory/Imaging Studies    Reviewed    1/29/2024     CBC shows WBC 4.4.  Hemoglobin 11.9.  MCV 86.  Platelets 224.    Ferritin 15.  Iron 70.  TIBC 335.  Iron saturation index 21%.      CEA 2.1.      September 2023  BMP shows sodium 134 and bicarb 18    Baseline CEA 1.3 on 1/24/2022.    CT chest abdomen and pelvis on 1/29/2024     IMPRESSION: Compared to prior CT from 1/27/2023:      Motion related artifact slightly limits the study.        1. Postoperative changes of right hemicolectomy. No local recurrent or  new distant metastatic disease identified.  2. Sub-6 mm pulmonary nodules which are not significantly changed  compared to previous exam. Consider attention on follow-up.  3. Additional findings are similar to prior including multiple hepatic  hypodensities, right renal hypodensity likely cyst and subcentimeter  right hilar, upper retroperitoneal and mesenteric lymph nodes.    Colonoscopy on 1/31/2024 showed normal findings.  No specimens were collected.    Plan to repeat colonoscopy in 2 years for surveillance.      2/22/2022  Final pathology showed a 2.7 cm moderate to poorly differentiated invasive adenocarcinoma arising in the cecum invading into the muscularis propria.  No lymphovascular or perineural invasion was seen.  Tumor budding was seen. All margins were negative.  15 lymph nodes were removed and all were benign.  It was a pT2N0 lesion.      ASSESSMENT/PLAN:    Stage I [id number] poorly differentiated adenocarcinoma of the cecum. There was loss of MSH6.  Further testing on the biopsy specimen demonstrates a high microsatellite instability phenotype (MSI-H; with 40% or more of analyzed markers unstable).  There was no lymphovascular or perineural invasion.  All 15 lymph nodes were negative.  Margins were negative.  She had right  hemicolectomy on 2/22/2022.    I did not recommend adjuvant chemotherapy.  We recommended routine surveillance for colon cancer which would include CEA every 6 months for the first couple of years and then annually for the next 3 years.  She will have CT scan once a year for 5 years.  Colonoscopy in January 2023 and again in January 2024 was unremarkable.      She feels well.  CT scan does not show any evidence of recurrence.  CEA is normal.     She is now resolved from the surgery.    We will check CEA and CT chest abdomen and pelvis in 1 year.      She is getting colonoscopy arranged through [facility] as she follows with for Washington syndrome.        Left upper quadrant pain/lower rib cage area pain.  Previously she was getting off-and-on discomfort in the area since surgery in December 2022 when she had total abdominal hysterectomy/bilateral salpingo-oophorectomy.  This pain has resolved.  This pain was likely postsurgical. CT scan in January 2024 unremarkable.      Hyponatremia.  Previously she had mild hyponatremia.  Bicarb was also low.  Probably she was dehydrated then.  We will repeat BMP today.    Keloid scar in the abdomen.  She will follow-up with dermatology next week to see if anything could be done about it.      Iron deficiency anemia.  This was because of the colon cancer.  She took oral iron for about 6 weeks prior to surgery.  Hemoglobin has normalized although she remains mildly iron deficient.  She occasionally has rectal bleeding likely from anal fissure/hemorrhoids.   Then she had total abdominal hysterectomy/bilateral salpingo-oophorectomy in December 2022.    Oral iron made her very constipated so she is unable to tolerate that.  We have not given her IV iron.  Over time iron studies have improved.  Hemoglobin is normal.  Continue to serially monitor.    We did not address the following today    Anal fissures.  Was being followed by colorectal surgery. Treated with topical diltiazem.  Was  told to take fiber supplement.  Overall doing better but occasionally has some bleeding.    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  There was loss of MSH6 by immunohistochemistry.  Molecular testing showed high microsatellite instability phenotype (MSI-H; with 40% or more of analyzed markers unstable).  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   She met with Gyn Onc and on 12/2/2022 had total abdominal hysterectomy/bilateral salpingo-oophorectomy.  Pathology was benign.   She will continue to follow with [facility] in cancer risk management program.  She mentions her son does not have the mutation.       See me back in 1 year with labs/CT scan prior.  All questions answered and the patient is agreeable and comfortable with the plan.       Cathy Estrada MD         Again, thank you for allowing me to participate in the care of your patient.        Sincerely,        Cathy Estrada MD   none

## 2024-05-17 NOTE — PRE-OP CHECKLIST - TEMPERATURE IN FAHRENHEIT (DEGREES F)
97.3 Patient states he BP medication was reducing her urine output so she stopped the medication. Sister is concerned/ Please call

## 2024-06-06 NOTE — DISCHARGE NOTE NURSING/CASE MANAGEMENT/SOCIAL WORK - NSDCPEELIQUIS_GEN_ALL_CORE
denies family h/o reactions to anesthesia
Apixaban/Eliquis - Dietary Advice/Apixaban/Eliquis - Compliance/Apixaban/Eliquis - Potential for adverse drug reactions and interactions/Apixaban/Eliquis - Follow up monitoring

## 2024-07-29 NOTE — PROGRESS NOTE ADULT - RS GEN PE MLT RESP DETAILS PC
Abdomen , soft, nontender, nondistended , no guarding or rigidity , no masses palpable , normal bowel sounds , Liver and Spleen , no hepatomegaly present , no hepatosplenomegaly , liver nontender , spleen not palpable
good air movement/breath sounds equal/rales/no rhonchi/no wheezes
good air movement/no rhonchi/clear to auscultation bilaterally/no rales/no wheezes

## 2024-09-10 NOTE — PATIENT PROFILE ADULT - DOES PATIENT HAVE ADVANCE DIRECTIVE
PA Initiation    Medication: WEGOVY 1.7 MG/0.75ML SC SOAJ  Insurance Company: [employer] - Phone [phone] Fax [phone]  Pharmacy Filling the Rx:    Filling Pharmacy Phone:    Filling Pharmacy Fax:    Start Date: 9/10/2024    [id number]      
Prior Authorization Approval    Medication: WEGOVY 1.7 MG/0.75ML SC SOAJ  Authorization Effective Date: 9/10/2024  Authorization Expiration Date: 9/10/2025  Approved Dose/Quantity: 3ml per 28 days  Reference #: [id number]   Insurance Company: [employer] - Phone [phone] Fax [phone]  Expected CoPay: $    CoPay Card Available: No    Financial Assistance Needed:   Which Pharmacy is filling the prescription:    Pharmacy Notified:   Patient Notified:      
no

## 2024-10-28 NOTE — DISCHARGE NOTE PROVIDER - NSDCHC_MEDRECSTATUS_GEN_ALL_CORE
Behavioral Health Consultation   Patricia Irving, PhD  Clinical Psychologist  10/28/2024      Time spent with Patient: 35 minutes 9:30- 10:35  This is patient's 2nd  [facility] appointment.    Reason for Consult:   Chief Complaint   Patient presents with    Chronic Pain       Referring Provider: Karime Reed MD    TELEHEALTH VISIT -- Audio/Visual    Richard Pierre was evaluated through a synchronous (real-time) audio-video encounter (via [email or web address]). The patient (or guardian, if applicable) is aware that this is a billable service, which includes applicable co-pays. This Virtual Visit was conducted with patient's (and/or legal guardian's) consent. Patient identification was verified, and a caregiver was present when appropriate. The patient was located in a state where the provider was licensed to provide care.     Conducted a risk-benefit analysis and determined that the patient's presenting problems are consistent with the use of telepsychology. Determined that the patient has sufficient knowledge and skills in the use of technology enabling them to adequately benefit from telepsychology. It was determined that this patient was able to be properly treated without an in-person session. Patient verified that they were currently located at the [place] address that was provided during registration or another address, if noted below.    Verified the following information:  Patient's identification: yes  Patient location: [address] [place] [address] [address] [address]   Patient's call back number: [phone]   Patient's emergency contact's name and number, as well as permission to contact them if needed: Extended Emergency Contact Information  Primary Emergency Contact: Briana Macdonald  Mobile Phone: [phone]  Relation: Niece/Nephew  Secondary Emergency Contact: PIERRESALOMON  Home Phone: [phone]  Mobile Phone: [phone]  Relation: Ex-Spouse   needed? No     Provider location: [facility]  PT  Admission Reconciliation is Completed  Discharge Reconciliation is Not Complete Admission Reconciliation is Completed  Discharge Reconciliation is Completed

## 2025-05-05 NOTE — BEHAVIORAL HEALTH ASSESSMENT NOTE - NS ED [facility] REVIEW OF ED CHART AVAILABLE LABS REVIEWED
No care due was identified.  [facility] Embedded Care Due Messages. Reference number: [id number].   5/04/2025 8:37:50 PM CDT  
Yes

## 2025-05-13 NOTE — PROGRESS NOTE ADULT - MINUTES
A Prior Auth was requested for Clascoterone. Since the PA was initiated by the physicians office, the EPA team cannot access the PA. Please follow-up with the insurance plan to complete the PA or Appeal. Thank you.      
PA initiated  Sent to PA Team    
To PA Team    Pt has tried the following:  Note for 12/19/2020  Dr. Kurtis Kline  It has been present for A few months and has been gradually worsening. Previous treatments: otc differin (adapalene) gel no help      
48